# Patient Record
Sex: FEMALE | Race: BLACK OR AFRICAN AMERICAN | Employment: OTHER | URBAN - METROPOLITAN AREA
[De-identification: names, ages, dates, MRNs, and addresses within clinical notes are randomized per-mention and may not be internally consistent; named-entity substitution may affect disease eponyms.]

---

## 2018-01-09 PROBLEM — E11.21 TYPE 2 DIABETES MELLITUS WITH NEPHROPATHY (HCC): Status: ACTIVE | Noted: 2018-01-09

## 2020-04-28 ENCOUNTER — HOSPITAL ENCOUNTER (OUTPATIENT)
Dept: SURGERY | Age: 75
Discharge: HOME | End: 2020-04-28

## 2021-01-20 PROBLEM — N18.4 CKD (CHRONIC KIDNEY DISEASE) STAGE 4, GFR 15-29 ML/MIN (HCC): Status: ACTIVE | Noted: 2021-01-20

## 2021-01-20 PROBLEM — R01.1 CARDIAC MURMUR: Status: ACTIVE | Noted: 2021-01-20

## 2021-01-20 PROBLEM — R60.0 BILATERAL LEG EDEMA: Status: ACTIVE | Noted: 2021-01-20

## 2021-02-23 ENCOUNTER — HOSPITAL ENCOUNTER (OUTPATIENT)
Dept: LAB | Age: 76
Discharge: HOME | End: 2021-02-23
Payer: MEDICARE

## 2021-02-23 DIAGNOSIS — E78.5 HYPERLIPIDEMIA, UNSPECIFIED HYPERLIPIDEMIA TYPE: ICD-10-CM

## 2021-02-23 LAB
CHOLEST SERPL-MCNC: 145 MG/DL
HDLC SERPL-MCNC: 57 MG/DL (ref 40–60)
HDLC SERPL: 2.5 {RATIO}
LDLC SERPL CALC-MCNC: 65.2 MG/DL
LIPID PROFILE,FLP: NORMAL
TRIGL SERPL-MCNC: 114 MG/DL (ref 35–150)
VLDLC SERPL CALC-MCNC: 22.8 MG/DL (ref 6–23)

## 2021-02-23 PROCEDURE — 36415 COLL VENOUS BLD VENIPUNCTURE: CPT

## 2021-02-23 PROCEDURE — 80061 LIPID PANEL: CPT

## 2021-02-25 NOTE — PROGRESS NOTES
Spoke with pt and let her know that Dr. Mee De Oliveira wants a record of bp readings daily for 7 days

## 2021-09-01 ENCOUNTER — HOSPITAL ENCOUNTER (EMERGENCY)
Age: 76
Discharge: HOME | End: 2021-09-01
Payer: MEDICARE

## 2021-09-01 VITALS
WEIGHT: 182 LBS | RESPIRATION RATE: 15 BRPM | SYSTOLIC BLOOD PRESSURE: 199 MMHG | HEIGHT: 63 IN | OXYGEN SATURATION: 98 % | DIASTOLIC BLOOD PRESSURE: 82 MMHG | TEMPERATURE: 98.3 F | HEART RATE: 77 BPM | BODY MASS INDEX: 32.25 KG/M2

## 2021-09-01 DIAGNOSIS — I10 ESSENTIAL HYPERTENSION: ICD-10-CM

## 2021-09-01 DIAGNOSIS — R04.0 EPISTAXIS: Primary | ICD-10-CM

## 2021-09-01 PROCEDURE — 74011250637 HC RX REV CODE- 250/637

## 2021-09-01 PROCEDURE — 99283 EMERGENCY DEPT VISIT LOW MDM: CPT

## 2021-09-01 RX ORDER — AMOXICILLIN 875 MG/1
875 TABLET, FILM COATED ORAL 2 TIMES DAILY
Qty: 14 TABLET | Refills: 0 | Status: SHIPPED | OUTPATIENT
Start: 2021-09-01 | End: 2021-09-08

## 2021-09-01 RX ORDER — AMLODIPINE BESYLATE 10 MG/1
10 TABLET ORAL
Status: COMPLETED | OUTPATIENT
Start: 2021-09-01 | End: 2021-09-01

## 2021-09-01 RX ORDER — OXYMETAZOLINE HCL 0.05 %
2 SPRAY, NON-AEROSOL (ML) NASAL
Status: COMPLETED | OUTPATIENT
Start: 2021-09-01 | End: 2021-09-01

## 2021-09-01 RX ADMIN — AMLODIPINE BESYLATE 10 MG: 10 TABLET ORAL at 05:58

## 2021-09-01 RX ADMIN — OXYMETAZOLINE HCL 2 SPRAY: 0.05 SPRAY NASAL at 05:34

## 2021-09-01 NOTE — ED PROVIDER NOTES
61-year-old female with nosebleed. Patient woke this morning had a nosebleed. She got to the ED her blood pressure was elevated as well. Is unsure which came first.    The history is provided by the patient. Epistaxis   This is a new problem. The current episode started less than 1 hour ago. The problem occurs constantly. The problem has not changed since onset. The bleeding has been from the right nare. She has tried applying pressure for the symptoms. Past Medical History:   Diagnosis Date    DM (diabetes mellitus) Type 2 2/19/2014    HTN (hypertension) 2/19/2014    Hyperlipemia 2/19/2014       Past Surgical History:   Procedure Laterality Date    HX HEENT  11/15    partial parathyroidectomy    HX PARATHYROIDECTOMY  11/11/2015    HX REFRACTIVE SURGERY  2011         Family History:   Problem Relation Age of Onset    Elevated Lipids Other     Hypertension Other     Heart Disease Other     Diabetes Other        Social History     Socioeconomic History    Marital status:      Spouse name: Not on file    Number of children: Not on file    Years of education: Not on file    Highest education level: Not on file   Occupational History    Not on file   Tobacco Use    Smoking status: Never Smoker    Smokeless tobacco: Never Used   Substance and Sexual Activity    Alcohol use: No    Drug use: No    Sexual activity: Yes     Partners: Male   Other Topics Concern    Not on file   Social History Narrative    Not on file     Social Determinants of Health     Financial Resource Strain:     Difficulty of Paying Living Expenses:    Food Insecurity:     Worried About Running Out of Food in the Last Year:     Ran Out of Food in the Last Year:    Transportation Needs:     Lack of Transportation (Medical):      Lack of Transportation (Non-Medical):    Physical Activity:     Days of Exercise per Week:     Minutes of Exercise per Session:    Stress:     Feeling of Stress :    Social Connections:     Frequency of Communication with Friends and Family:     Frequency of Social Gatherings with Friends and Family:     Attends Tenriism Services:     Active Member of Clubs or Organizations:     Attends Club or Organization Meetings:     Marital Status:    Intimate Partner Violence:     Fear of Current or Ex-Partner:     Emotionally Abused:     Physically Abused:     Sexually Abused: ALLERGIES: Prednisone    Review of Systems   Constitutional: Negative. Negative for activity change. HENT: Negative. Eyes: Negative. Respiratory: Negative. Cardiovascular: Negative. Gastrointestinal: Negative. Genitourinary: Negative. Musculoskeletal: Negative. Skin: Negative. Neurological: Negative. Psychiatric/Behavioral: Negative. All other systems reviewed and are negative. Vitals:    09/01/21 0516   BP: (!) 188/87   Pulse: 74   Resp: 16   Temp: 98.3 °F (36.8 °C)   SpO2: 99%   Weight: 82.6 kg (182 lb)   Height: 5' 3\" (1.6 m)            Physical Exam  Vitals and nursing note reviewed. Constitutional:       General: She is not in acute distress. Appearance: She is well-developed. HENT:      Head: Normocephalic and atraumatic. Right Ear: External ear normal.      Left Ear: External ear normal.      Nose:      Right Nostril: Epistaxis present. Eyes:      General: No scleral icterus. Right eye: No discharge. Left eye: No discharge. Conjunctiva/sclera: Conjunctivae normal.      Pupils: Pupils are equal, round, and reactive to light. Cardiovascular:      Rate and Rhythm: Normal rate. Pulmonary:      Effort: Pulmonary effort is normal. No respiratory distress. Breath sounds: No stridor. No wheezing or rales. Abdominal:      General: There is no distension. Palpations: Abdomen is soft. Tenderness: There is no abdominal tenderness. Musculoskeletal:         General: Normal range of motion.       Cervical back: Normal range of motion. Skin:     General: Skin is warm and dry. Findings: No rash. Neurological:      Mental Status: She is alert and oriented to person, place, and time. Motor: No abnormal muscle tone. Coordination: Coordination normal.   Psychiatric:         Behavior: Behavior normal.          MDM  Number of Diagnoses or Management Options  Diagnosis management comments: Right anterior nosebleed off the septum. Merocel sponge placed hemostasis is achieved blood pressure trending down.     Risk of Complications, Morbidity, and/or Mortality  Presenting problems: moderate  Diagnostic procedures: moderate  Management options: moderate           Procedures

## 2021-09-01 NOTE — ED NOTES
I have reviewed discharge instructions with the patient. The patient verbalized understanding. Patient left ED via Discharge Method: ambulatory to Home with family. Opportunity for questions and clarification provided. Patient given 1 scripts. To continue your aftercare when you leave the hospital, you may receive an automated call from our care team to check in on how you are doing. This is a free service and part of our promise to provide the best care and service to meet your aftercare needs.  If you have questions, or wish to unsubscribe from this service please call [phone]. Thank you for Choosing our [facility].

## 2022-03-19 PROBLEM — R01.1 CARDIAC MURMUR: Status: ACTIVE | Noted: 2021-01-20

## 2022-03-19 PROBLEM — R60.0 BILATERAL LEG EDEMA: Status: ACTIVE | Noted: 2021-01-20

## 2022-03-19 PROBLEM — E11.21 TYPE 2 DIABETES MELLITUS WITH NEPHROPATHY (HCC): Status: ACTIVE | Noted: 2018-01-09

## 2022-03-19 PROBLEM — N18.4 CKD (CHRONIC KIDNEY DISEASE) STAGE 4, GFR 15-29 ML/MIN (HCC): Status: ACTIVE | Noted: 2021-01-20

## 2022-05-16 LAB
AVERAGE GLUCOSE: NORMAL
HBA1C MFR BLD: 6.7 %

## 2022-05-23 RX ORDER — OLMESARTAN MEDOXOMIL 40 MG/1
TABLET ORAL
Qty: 90 TABLET | Refills: 3 | Status: SHIPPED | OUTPATIENT
Start: 2022-05-23

## 2022-07-21 ENCOUNTER — OFFICE VISIT (OUTPATIENT)
Dept: FAMILY MEDICINE CLINIC | Facility: CLINIC | Age: 77
End: 2022-07-21
Payer: MEDICARE

## 2022-07-21 VITALS
WEIGHT: 178.6 LBS | SYSTOLIC BLOOD PRESSURE: 156 MMHG | RESPIRATION RATE: 18 BRPM | DIASTOLIC BLOOD PRESSURE: 86 MMHG | BODY MASS INDEX: 31.64 KG/M2 | HEART RATE: 69 BPM | OXYGEN SATURATION: 98 %

## 2022-07-21 DIAGNOSIS — I10 PRIMARY HYPERTENSION: ICD-10-CM

## 2022-07-21 DIAGNOSIS — E03.9 HYPOTHYROIDISM, UNSPECIFIED TYPE: ICD-10-CM

## 2022-07-21 DIAGNOSIS — N18.4 CKD (CHRONIC KIDNEY DISEASE) STAGE 4, GFR 15-29 ML/MIN (HCC): ICD-10-CM

## 2022-07-21 DIAGNOSIS — E11.69 TYPE 2 DIABETES MELLITUS WITH OTHER SPECIFIED COMPLICATION, WITHOUT LONG-TERM CURRENT USE OF INSULIN (HCC): ICD-10-CM

## 2022-07-21 DIAGNOSIS — E55.9 VITAMIN D DEFICIENCY: Primary | ICD-10-CM

## 2022-07-21 PROCEDURE — 2022F DILAT RTA XM EVC RTNOPTHY: CPT | Performed by: FAMILY MEDICINE

## 2022-07-21 PROCEDURE — G8417 CALC BMI ABV UP PARAM F/U: HCPCS | Performed by: FAMILY MEDICINE

## 2022-07-21 PROCEDURE — 3044F HG A1C LEVEL LT 7.0%: CPT | Performed by: FAMILY MEDICINE

## 2022-07-21 PROCEDURE — 1123F ACP DISCUSS/DSCN MKR DOCD: CPT | Performed by: FAMILY MEDICINE

## 2022-07-21 PROCEDURE — 1090F PRES/ABSN URINE INCON ASSESS: CPT | Performed by: FAMILY MEDICINE

## 2022-07-21 PROCEDURE — 4004F PT TOBACCO SCREEN RCVD TLK: CPT | Performed by: FAMILY MEDICINE

## 2022-07-21 PROCEDURE — G8400 PT W/DXA NO RESULTS DOC: HCPCS | Performed by: FAMILY MEDICINE

## 2022-07-21 PROCEDURE — G8427 DOCREV CUR MEDS BY ELIG CLIN: HCPCS | Performed by: FAMILY MEDICINE

## 2022-07-21 PROCEDURE — 99214 OFFICE O/P EST MOD 30 MIN: CPT | Performed by: FAMILY MEDICINE

## 2022-07-21 PROCEDURE — 3017F COLORECTAL CA SCREEN DOC REV: CPT | Performed by: FAMILY MEDICINE

## 2022-07-21 ASSESSMENT — ANXIETY QUESTIONNAIRES
6. BECOMING EASILY ANNOYED OR IRRITABLE: 0
IF YOU CHECKED OFF ANY PROBLEMS ON THIS QUESTIONNAIRE, HOW DIFFICULT HAVE THESE PROBLEMS MADE IT FOR YOU TO DO YOUR WORK, TAKE CARE OF THINGS AT HOME, OR GET ALONG WITH OTHER PEOPLE: NOT DIFFICULT AT ALL
1. FEELING NERVOUS, ANXIOUS, OR ON EDGE: 0
3. WORRYING TOO MUCH ABOUT DIFFERENT THINGS: 0
4. TROUBLE RELAXING: 0
7. FEELING AFRAID AS IF SOMETHING AWFUL MIGHT HAPPEN: 0
5. BEING SO RESTLESS THAT IT IS HARD TO SIT STILL: 0
GAD7 TOTAL SCORE: 0
2. NOT BEING ABLE TO STOP OR CONTROL WORRYING: 0

## 2022-07-21 ASSESSMENT — PATIENT HEALTH QUESTIONNAIRE - PHQ9
SUM OF ALL RESPONSES TO PHQ QUESTIONS 1-9: 0
1. LITTLE INTEREST OR PLEASURE IN DOING THINGS: 0
SUM OF ALL RESPONSES TO PHQ QUESTIONS 1-9: 0
SUM OF ALL RESPONSES TO PHQ QUESTIONS 1-9: 0
SUM OF ALL RESPONSES TO PHQ9 QUESTIONS 1 & 2: 0
2. FEELING DOWN, DEPRESSED OR HOPELESS: 0
SUM OF ALL RESPONSES TO PHQ QUESTIONS 1-9: 0

## 2022-07-22 LAB
ALBUMIN SERPL-MCNC: 3.2 G/DL (ref 3.2–4.6)
ALBUMIN/GLOB SERPL: 0.6 {RATIO} (ref 1.2–3.5)
ALP SERPL-CCNC: 307 U/L (ref 50–136)
ALT SERPL-CCNC: 93 U/L (ref 12–65)
ANION GAP SERPL CALC-SCNC: 6 MMOL/L (ref 7–16)
AST SERPL-CCNC: 82 U/L (ref 15–37)
BILIRUB SERPL-MCNC: 0.3 MG/DL (ref 0.2–1.1)
BUN SERPL-MCNC: 57 MG/DL (ref 8–23)
CALCIUM SERPL-MCNC: 8.7 MG/DL (ref 8.3–10.4)
CHLORIDE SERPL-SCNC: 113 MMOL/L (ref 98–107)
CO2 SERPL-SCNC: 20 MMOL/L (ref 21–32)
CREAT SERPL-MCNC: 3.6 MG/DL (ref 0.6–1)
GLOBULIN SER CALC-MCNC: 5.8 G/DL (ref 2.3–3.5)
GLUCOSE SERPL-MCNC: 124 MG/DL (ref 65–100)
POTASSIUM SERPL-SCNC: 4.7 MMOL/L (ref 3.5–5.1)
PROT SERPL-MCNC: 9 G/DL (ref 6.3–8.2)
SODIUM SERPL-SCNC: 139 MMOL/L (ref 136–145)
TSH, 3RD GENERATION: 3.16 UIU/ML (ref 0.36–3.74)

## 2022-07-23 ASSESSMENT — ENCOUNTER SYMPTOMS
GASTROINTESTINAL NEGATIVE: 1
RESPIRATORY NEGATIVE: 1
EYES NEGATIVE: 1

## 2022-07-24 NOTE — PROGRESS NOTES
HISTORY OF PRESENT ILLNESS  Nataliia Diaz is a 78 y.o. y.o. female    Diabetes  She presents for her follow-up diabetic visit. She has type 2 diabetes mellitus. Her disease course has been improving. Other  This is a recurrent (thyroid) problem. The problem has been unchanged. Flank Pain  This is a recurrent (ckd) problem. The problem has been gradually worsening since onset. Hypertension  This is a recurrent problem. The problem is unchanged. Allergies   Allergen Reactions    Prednisone Other (See Comments)     Elevated blood sugar         Current Outpatient Medications   Medication Sig    olmesartan (BENICAR) 40 MG tablet TAKE 1 TABLET BY MOUTH DAILY    Lancets MISC Check BS BID    amLODIPine (NORVASC) 10 MG tablet TAKE 1 TABLET BY MOUTH DAILY    atorvastatin (LIPITOR) 40 MG tablet Take 40 mg by mouth daily    carvedilol (COREG) 25 MG tablet Take 25 mg by mouth 2 times daily    doxazosin (CARDURA) 4 MG tablet Take 4 mg by mouth daily    furosemide (LASIX) 40 MG tablet Take 40 mg by mouth 2 times daily    glipiZIDE (GLUCOTROL) 10 MG tablet Take 10 mg by mouth 2 times daily    levothyroxine (SYNTHROID) 75 MCG tablet TAKE 1 TABLET BY MOUTH DAILY BEFORE BREAKFAST     No current facility-administered medications for this visit.         Past Medical History:   Diagnosis Date    DM (diabetes mellitus) ([facility] [address] 2/19/2014    HTN (hypertension) 2/19/2014    Hyperlipemia 2/19/2014        Past Surgical History:   Procedure Laterality Date    HEENT  11/15    partial parathyroidectomy    PARATHYROIDECTOMY  11/11/2015    REFRACTIVE SURGERY  2011        Social History     Socioeconomic History    Marital status:      Spouse name: Not on file    Number of children: Not on file    Years of education: Not on file    Highest education level: Not on file   Occupational History    Not on file   Tobacco Use    Smoking status: Never    Smokeless tobacco: Never   Substance and Sexual Activity    Alcohol use: No    Drug use: No    Sexual activity: Not on file   Other Topics Concern    Not on file   Social History Narrative    Not on file     Social Determinants of Health     Financial Resource Strain: Not on file   Food Insecurity: Not on file   Transportation Needs: Not on file   Physical Activity: Not on file   Stress: Not on file   Social Connections: Not on file   Intimate Partner Violence: Not on file   Housing Stability: Not on file        Review of Systems   Constitutional: Negative. HENT: Negative. Eyes: Negative. Respiratory: Negative. Cardiovascular: Negative. Gastrointestinal: Negative. Endocrine: Negative. Genitourinary:  Positive for flank pain. Skin: Negative. Neurological: Negative. Psychiatric/Behavioral: Negative. BP (!) 156/86   Pulse 69   Resp 18   Wt 178 lb 9.6 oz (81 kg)   SpO2 98%   BMI 31.64 kg/m²      Physical Exam  Constitutional:       Appearance: Normal appearance. HENT:      Head: Normocephalic. Right Ear: Tympanic membrane, ear canal and external ear normal.      Left Ear: Tympanic membrane, ear canal and external ear normal.      Nose: Nose normal.      Mouth/Throat:      Mouth: Mucous membranes are moist.      Pharynx: Oropharynx is clear. Eyes:      General: No scleral icterus. Extraocular Movements: Extraocular movements intact. Conjunctiva/sclera: Conjunctivae normal.   Neck:      Vascular: No carotid bruit. Cardiovascular:      Rate and Rhythm: Normal rate and regular rhythm. Pulses: Normal pulses. Heart sounds: Normal heart sounds. Pulmonary:      Effort: Pulmonary effort is normal. No respiratory distress. Breath sounds: Normal breath sounds. No wheezing or rales. Abdominal:      General: Abdomen is flat. Bowel sounds are normal. There is no distension. Palpations: Abdomen is soft. There is no mass. Tenderness: There is no abdominal tenderness. Musculoskeletal:         General: Normal range of motion. Cervical back: Normal range of motion and neck supple. Skin:     General: Skin is warm and dry. Neurological:      General: No focal deficit present. Mental Status: She is alert and oriented to person, place, and time. Psychiatric:         Mood and Affect: Mood normal.         Behavior: Behavior normal.         Thought Content: Thought content normal.         Judgment: Judgment normal.       Abstract on 07/01/2022   Component Date Value Ref Range Status    Hemoglobin A1C 05/16/2022 6.7  % Final       ASSESSMENT and PLAN    Vitamin D deficiency  Hypothyroidism, unspecified type  -     TSH; Future  CKD (chronic kidney disease) stage 4, GFR 15-29 ml/min ([facility])  -     Comprehensive Metabolic Panel; Future  Primary hypertension  Type 2 diabetes mellitus with other specified complication, without long-term current use of insulin ([facility] [address]      Current treatment plan is effective. no changes in therapy  lab results and schedule for future lab studies reviewed with patient  reviewed diet, exercise and weight control     No follow-ups on file.      Elva Velarde MD

## 2022-08-04 DIAGNOSIS — R19.00 ABDOMINAL SWELLING: ICD-10-CM

## 2022-08-04 DIAGNOSIS — N19 RENAL FAILURE, UNSPECIFIED CHRONICITY: Primary | ICD-10-CM

## 2022-08-15 ENCOUNTER — HOSPITAL ENCOUNTER (OUTPATIENT)
Dept: ULTRASOUND IMAGING | Age: 77
Discharge: HOME | End: 2022-08-18
Payer: MEDICARE

## 2022-08-15 DIAGNOSIS — R19.00 ABDOMINAL SWELLING: ICD-10-CM

## 2022-08-15 DIAGNOSIS — N19 RENAL FAILURE, UNSPECIFIED CHRONICITY: ICD-10-CM

## 2022-08-15 PROCEDURE — 76700 US EXAM ABDOM COMPLETE: CPT

## 2022-08-15 NOTE — RESULT ENCOUNTER NOTE
Please call and notify to get a better look at the live we need to order an abd MRI with att to findings on the Left hepatic lobe seen on the [address] on 8/15 /2022

## 2022-08-16 LAB
AVERAGE GLUCOSE: NORMAL
HBA1C MFR BLD: 7.3 %

## 2022-08-17 DIAGNOSIS — R93.2 ABNORMAL LIVER ULTRASOUND: Primary | ICD-10-CM

## 2022-11-10 ENCOUNTER — NURSE TRIAGE (OUTPATIENT)
Dept: OTHER | Facility: CLINIC | Age: 77
End: 2022-11-10

## 2022-11-10 RX ORDER — LEVOTHYROXINE SODIUM 0.07 MG/1
TABLET ORAL
Qty: 90 TABLET | OUTPATIENT
Start: 2022-11-10

## 2022-11-10 NOTE — TELEPHONE ENCOUNTER
Location of patient: [place]    Received call from Marisela Blancas at [facility] with [employer]. Subjective: Caller states \"I am having a little swelling in my ankles. \"     Current Symptoms: intermittent bilateral ankle and leg swelling - swelling does not extend up to knee    Onset: patient declined to answer      Pain Severity: 0/10; Denies - discoloration / red streaking / chest pain / shortness of breath / swelling of face arms or hands    Temperature: denies     What has been tried: nothing      Recommended disposition: See PCP within 3 Days    Care advice provided, patient verbalizes understanding; denies any other questions or concerns; instructed to call back for any new or worsening symptoms. Patient/Caller agrees with recommended disposition; writer provided warm transfer to Marshall Chatterjee at [facility] for appointment scheduling    Attention Provider: Thank you for allowing me to participate in the care of your patient. The patient was connected to triage in response to information provided to the ECC/PSC. Please do not respond through this encounter as the response is not directed to a shared pool.           Reason for Disposition   MILD swelling of both ankles (i.e., pedal edema) AND new-onset or worsening    Protocols used: Leg Swelling and Edema-ADULT-[place]

## 2022-11-18 ENCOUNTER — OFFICE VISIT (OUTPATIENT)
Dept: FAMILY MEDICINE CLINIC | Facility: CLINIC | Age: 77
End: 2022-11-18
Payer: MEDICARE

## 2022-11-18 VITALS
DIASTOLIC BLOOD PRESSURE: 86 MMHG | SYSTOLIC BLOOD PRESSURE: 166 MMHG | OXYGEN SATURATION: 99 % | WEIGHT: 166 LBS | BODY MASS INDEX: 29.41 KG/M2 | HEART RATE: 71 BPM | TEMPERATURE: 97.4 F

## 2022-11-18 DIAGNOSIS — N18.5 CHRONIC KIDNEY DISEASE (CKD), STAGE V (HCC): ICD-10-CM

## 2022-11-18 DIAGNOSIS — R60.0 BILATERAL LOWER EXTREMITY EDEMA: ICD-10-CM

## 2022-11-18 DIAGNOSIS — E11.69 TYPE 2 DIABETES MELLITUS WITH OTHER SPECIFIED COMPLICATION, WITHOUT LONG-TERM CURRENT USE OF INSULIN (HCC): Primary | ICD-10-CM

## 2022-11-18 DIAGNOSIS — E03.9 HYPOTHYROIDISM, UNSPECIFIED TYPE: ICD-10-CM

## 2022-11-18 LAB
ALBUMIN SERPL-MCNC: 3.1 G/DL (ref 3.2–4.6)
ALBUMIN/GLOB SERPL: 0.5 {RATIO} (ref 0.4–1.6)
ALP SERPL-CCNC: 400 U/L (ref 50–136)
ALT SERPL-CCNC: 87 U/L (ref 12–65)
ANION GAP SERPL CALC-SCNC: 8 MMOL/L (ref 2–11)
AST SERPL-CCNC: 96 U/L (ref 15–37)
BASOPHILS # BLD: 0 K/UL (ref 0–0.2)
BASOPHILS NFR BLD: 1 % (ref 0–2)
BILIRUB SERPL-MCNC: 0.4 MG/DL (ref 0.2–1.1)
BUN SERPL-MCNC: 61 MG/DL (ref 8–23)
CALCIUM SERPL-MCNC: 9 MG/DL (ref 8.3–10.4)
CHLORIDE SERPL-SCNC: 106 MMOL/L (ref 101–110)
CO2 SERPL-SCNC: 23 MMOL/L (ref 21–32)
CREAT SERPL-MCNC: 4.6 MG/DL (ref 0.6–1)
DIFFERENTIAL METHOD BLD: ABNORMAL
EOSINOPHIL # BLD: 0.1 K/UL (ref 0–0.8)
EOSINOPHIL NFR BLD: 2 % (ref 0.5–7.8)
ERYTHROCYTE [DISTWIDTH] IN BLOOD BY AUTOMATED COUNT: 16.5 % (ref 11.9–14.6)
GLOBULIN SER CALC-MCNC: 5.7 G/DL (ref 2.8–4.5)
GLUCOSE SERPL-MCNC: 206 MG/DL (ref 65–100)
HBA1C MFR BLD: 5.9 %
HCT VFR BLD AUTO: 28.3 % (ref 35.8–46.3)
HGB BLD-MCNC: 8.7 G/DL (ref 11.7–15.4)
IMM GRANULOCYTES # BLD AUTO: 0 K/UL (ref 0–0.5)
IMM GRANULOCYTES NFR BLD AUTO: 0 % (ref 0–5)
LYMPHOCYTES # BLD: 1 K/UL (ref 0.5–4.6)
LYMPHOCYTES NFR BLD: 23 % (ref 13–44)
MCH RBC QN AUTO: 26.2 PG (ref 26.1–32.9)
MCHC RBC AUTO-ENTMCNC: 30.7 G/DL (ref 31.4–35)
MCV RBC AUTO: 85.2 FL (ref 82–102)
MONOCYTES # BLD: 0.5 K/UL (ref 0.1–1.3)
MONOCYTES NFR BLD: 11 % (ref 4–12)
NEUTS SEG # BLD: 2.7 K/UL (ref 1.7–8.2)
NEUTS SEG NFR BLD: 63 % (ref 43–78)
NRBC # BLD: 0 K/UL (ref 0–0.2)
NT PRO BNP: 1825 PG/ML
PLATELET # BLD AUTO: 114 K/UL (ref 150–450)
PMV BLD AUTO: ABNORMAL FL (ref 9.4–12.3)
POTASSIUM SERPL-SCNC: 4.5 MMOL/L (ref 3.5–5.1)
PROT SERPL-MCNC: 8.8 G/DL (ref 6.3–8.2)
RBC # BLD AUTO: 3.32 M/UL (ref 4.05–5.2)
SODIUM SERPL-SCNC: 137 MMOL/L (ref 133–143)
TSH, 3RD GENERATION: 4.26 UIU/ML (ref 0.36–3.74)
WBC # BLD AUTO: 4.2 K/UL (ref 4.3–11.1)

## 2022-11-18 PROCEDURE — 3078F DIAST BP <80 MM HG: CPT | Performed by: NURSE PRACTITIONER

## 2022-11-18 PROCEDURE — 3075F SYST BP GE 130 - 139MM HG: CPT | Performed by: NURSE PRACTITIONER

## 2022-11-18 PROCEDURE — 1123F ACP DISCUSS/DSCN MKR DOCD: CPT | Performed by: NURSE PRACTITIONER

## 2022-11-18 PROCEDURE — 3051F HG A1C>EQUAL 7.0%<8.0%: CPT | Performed by: NURSE PRACTITIONER

## 2022-11-18 PROCEDURE — 99213 OFFICE O/P EST LOW 20 MIN: CPT | Performed by: NURSE PRACTITIONER

## 2022-11-18 PROCEDURE — 83036 HEMOGLOBIN GLYCOSYLATED A1C: CPT | Performed by: NURSE PRACTITIONER

## 2022-11-18 RX ORDER — INSULIN GLARGINE 300 U/ML
20 INJECTION, SOLUTION SUBCUTANEOUS EVERY MORNING
COMMUNITY
Start: 2022-08-16 | End: 2023-08-16

## 2022-11-18 RX ORDER — OLMESARTAN MEDOXOMIL 40 MG/1
40 TABLET ORAL DAILY
COMMUNITY

## 2022-11-18 RX ORDER — LEVOTHYROXINE SODIUM 0.07 MG/1
75 TABLET ORAL DAILY
Qty: 90 TABLET | Refills: 3 | Status: SHIPPED | OUTPATIENT
Start: 2022-11-18

## 2022-11-18 ASSESSMENT — PATIENT HEALTH QUESTIONNAIRE - PHQ9
DEPRESSION UNABLE TO ASSESS: FUNCTIONAL CAPACITY MOTIVATION LIMITS ACCURACY
SUM OF ALL RESPONSES TO PHQ9 QUESTIONS 1 & 2: 0
SUM OF ALL RESPONSES TO PHQ QUESTIONS 1-9: 0
SUM OF ALL RESPONSES TO PHQ QUESTIONS 1-9: 0
1. LITTLE INTEREST OR PLEASURE IN DOING THINGS: 0
SUM OF ALL RESPONSES TO PHQ QUESTIONS 1-9: 0
2. FEELING DOWN, DEPRESSED OR HOPELESS: 0
SUM OF ALL RESPONSES TO PHQ QUESTIONS 1-9: 0

## 2022-11-18 NOTE — PROGRESS NOTES
Nataliia Boucher (:  1945) is a 78 y.o. female,Established patient, here for evaluation of the following chief complaint(s):  3 Month Follow-Up, Diabetes, and Swelling (ankles)         ASSESSMENT/PLAN:  1. Type 2 diabetes mellitus with other specified complication, without long-term current use of insulin (HCC)  -     AMB POC HEMOGLOBIN A1C  -     levothyroxine (SYNTHROID) 75 MCG tablet; Take 1 tablet by mouth Daily, Disp-90 tablet, R-3Normal  -     Comprehensive Metabolic Panel; Future  2. Bilateral lower extremity edema  -     CBC with Auto Differential; Future  -     Comprehensive Metabolic Panel; Future  -     Brain Natriuretic Peptide; Future  3. Hypothyroidism, unspecified type  -     TSH; Future  4. Chronic kidney disease (CKD), stage V ([facility] [address] [[id number]]    Await labs. Pt has appointment with endo later this month. She will decrease her Toujeo to 18 units daily. Precautions discussed and she verbalized understanding. Return in about 3 months (around 2023), or if symptoms worsen or fail to improve. Subjective   SUBJECTIVE/OBJECTIVE:  HPI  Recheck of DM. No increased urination or thirst.  BS have been between 70 and 121 fasting. No numbness in feet. No CP or SOB. BLE swelling. Improving. CKD stage 4. Followed by nephrology and vascular. Weight has been about the same. On Lasix daily. Doing fairly well with diet. No nausea or vomiting. A1c is 5.9 22. Review of Systems   Constitutional:  Negative for chills and fever. HENT:  Negative for congestion, ear pain, sinus pain and sore throat. Eyes:  Negative for pain and redness. Respiratory:  Negative for cough and shortness of breath. Cardiovascular:  Positive for leg swelling. Negative for chest pain and palpitations. Gastrointestinal:  Negative for blood in stool, constipation, diarrhea, nausea and vomiting. Endocrine: Negative for polydipsia.    Genitourinary:  Negative for dysuria, frequency and urgency. Musculoskeletal:  Negative for arthralgias, back pain and myalgias. Skin:  Negative for rash. Allergic/Immunologic: Negative for environmental allergies. Neurological:  Negative for dizziness and headaches. Psychiatric/Behavioral:  Negative for hallucinations and suicidal ideas. Objective   Physical Exam  Vitals and nursing note reviewed. Constitutional:       General: She is not in acute distress. Appearance: Normal appearance. HENT:      Head: Normocephalic. Right Ear: External ear normal.      Left Ear: External ear normal.      Nose: Nose normal.      Mouth/Throat:      Lips: Pink. Mouth: Mucous membranes are moist.   Eyes:      Conjunctiva/sclera: Conjunctivae normal.      Pupils: Pupils are equal, round, and reactive to light. Cardiovascular:      Rate and Rhythm: Normal rate and regular rhythm. Pulmonary:      Effort: Pulmonary effort is normal.      Breath sounds: Normal breath sounds. No wheezing, rhonchi or rales. Musculoskeletal:         General: Normal range of motion. Cervical back: Normal range of motion. Right lower leg: Edema (1+) present. Left lower leg: Edema (1+) present. Lymphadenopathy:      Cervical: No cervical adenopathy. Skin:     General: Skin is warm and dry. Capillary Refill: Capillary refill takes less than 2 seconds. Findings: No rash. Neurological:      General: No focal deficit present. Mental Status: She is alert and oriented to person, place, and time. Mental status is at baseline. On this date 11/18/2022 I have spent 20-29 minutes reviewing previous notes, test results and face to face with the patient discussing the diagnosis and importance of compliance with the treatment plan as well as documenting on the day of the visit. An electronic signature was used to authenticate this note.     --MATTHEW Miller - CNP

## 2022-11-21 ENCOUNTER — HOSPITAL ENCOUNTER (EMERGENCY)
Age: 77
Discharge: HOME | End: 2022-11-21
Attending: EMERGENCY MEDICINE
Payer: MEDICARE

## 2022-11-21 VITALS
HEART RATE: 74 BPM | HEIGHT: 64 IN | SYSTOLIC BLOOD PRESSURE: 181 MMHG | RESPIRATION RATE: 18 BRPM | TEMPERATURE: 98.2 F | BODY MASS INDEX: 28.51 KG/M2 | OXYGEN SATURATION: 99 % | WEIGHT: 167 LBS | DIASTOLIC BLOOD PRESSURE: 80 MMHG

## 2022-11-21 DIAGNOSIS — N18.5 STAGE 5 CHRONIC KIDNEY DISEASE NOT ON CHRONIC DIALYSIS (HCC): Primary | ICD-10-CM

## 2022-11-21 DIAGNOSIS — I10 ESSENTIAL HYPERTENSION: ICD-10-CM

## 2022-11-21 DIAGNOSIS — R79.89 ELEVATED BRAIN NATRIURETIC PEPTIDE (BNP) LEVEL: Primary | ICD-10-CM

## 2022-11-21 DIAGNOSIS — R60.9 SWELLING: ICD-10-CM

## 2022-11-21 LAB
ALBUMIN SERPL-MCNC: 3 G/DL (ref 3.2–4.6)
ALBUMIN/GLOB SERPL: 0.5 {RATIO} (ref 0.4–1.6)
ALP SERPL-CCNC: 434 U/L (ref 50–130)
ALT SERPL-CCNC: 94 U/L (ref 12–65)
ANION GAP SERPL CALC-SCNC: 5 MMOL/L (ref 2–11)
APPEARANCE UR: ABNORMAL
AST SERPL-CCNC: 113 U/L (ref 15–37)
BACTERIA URNS QL MICRO: ABNORMAL /HPF
BASOPHILS # BLD: 0 K/UL (ref 0–0.2)
BASOPHILS NFR BLD: 0 % (ref 0–2)
BILIRUB SERPL-MCNC: 0.4 MG/DL (ref 0.2–1.1)
BILIRUB UR QL: NEGATIVE
BUN SERPL-MCNC: 60 MG/DL (ref 8–23)
CALCIUM SERPL-MCNC: 9 MG/DL (ref 8.3–10.4)
CHLORIDE SERPL-SCNC: 109 MMOL/L (ref 101–110)
CO2 SERPL-SCNC: 24 MMOL/L (ref 21–32)
COLOR UR: YELLOW
CREAT SERPL-MCNC: 4.19 MG/DL (ref 0.6–1)
DIFFERENTIAL METHOD BLD: ABNORMAL
EOSINOPHIL # BLD: 0.1 K/UL (ref 0–0.8)
EOSINOPHIL NFR BLD: 3 % (ref 0.5–7.8)
EPI CELLS #/AREA URNS HPF: ABNORMAL /HPF
ERYTHROCYTE [DISTWIDTH] IN BLOOD BY AUTOMATED COUNT: 16.2 % (ref 11.9–14.6)
GLOBULIN SER CALC-MCNC: 6.4 G/DL (ref 2.8–4.5)
GLUCOSE SERPL-MCNC: 104 MG/DL (ref 65–100)
GLUCOSE UR STRIP.AUTO-MCNC: NEGATIVE MG/DL
HCT VFR BLD AUTO: 28.9 % (ref 35.8–46.3)
HGB BLD-MCNC: 9.1 G/DL (ref 11.7–15.4)
HGB UR QL STRIP: ABNORMAL
IMM GRANULOCYTES # BLD AUTO: 0 K/UL (ref 0–0.5)
IMM GRANULOCYTES NFR BLD AUTO: 0 % (ref 0–5)
KETONES UR QL STRIP.AUTO: NEGATIVE MG/DL
LEUKOCYTE ESTERASE UR QL STRIP.AUTO: ABNORMAL
LYMPHOCYTES # BLD: 1.1 K/UL (ref 0.5–4.6)
LYMPHOCYTES NFR BLD: 22 % (ref 13–44)
MCH RBC QN AUTO: 25.9 PG (ref 26.1–32.9)
MCHC RBC AUTO-ENTMCNC: 31.5 G/DL (ref 31.4–35)
MCV RBC AUTO: 82.3 FL (ref 82–102)
MONOCYTES # BLD: 0.5 K/UL (ref 0.1–1.3)
MONOCYTES NFR BLD: 11 % (ref 4–12)
NEUTS SEG # BLD: 3.2 K/UL (ref 1.7–8.2)
NEUTS SEG NFR BLD: 64 % (ref 43–78)
NITRITE UR QL STRIP.AUTO: NEGATIVE
NRBC # BLD: 0 K/UL (ref 0–0.2)
NT PRO BNP: 3115 PG/ML
OTHER OBSERVATIONS: ABNORMAL
PH UR STRIP: 6.5 [PH] (ref 5–9)
PHOSPHATE SERPL-MCNC: 4 MG/DL (ref 2.3–3.7)
PLATELET # BLD AUTO: 127 K/UL (ref 150–450)
PMV BLD AUTO: ABNORMAL FL (ref 9.4–12.3)
POTASSIUM SERPL-SCNC: 4.6 MMOL/L (ref 3.5–5.1)
PROT SERPL-MCNC: 9.4 G/DL (ref 6.3–8.2)
PROT UR STRIP-MCNC: ABNORMAL MG/DL
RBC # BLD AUTO: 3.51 M/UL (ref 4.05–5.2)
RBC #/AREA URNS HPF: ABNORMAL /HPF
SODIUM SERPL-SCNC: 138 MMOL/L (ref 133–143)
SP GR UR REFRACTOMETRY: 1.02 (ref 1–1.02)
TRICHOMONAS UR QL MICRO: ABNORMAL
UROBILINOGEN UR QL STRIP.AUTO: 0.2 EU/DL (ref 0.2–1)
WBC # BLD AUTO: 5 K/UL (ref 4.3–11.1)
WBC URNS QL MICRO: ABNORMAL /HPF

## 2022-11-21 PROCEDURE — 99283 EMERGENCY DEPT VISIT LOW MDM: CPT

## 2022-11-21 PROCEDURE — 83880 ASSAY OF NATRIURETIC PEPTIDE: CPT

## 2022-11-21 PROCEDURE — 85025 COMPLETE CBC W/AUTO DIFF WBC: CPT

## 2022-11-21 PROCEDURE — 80053 COMPREHEN METABOLIC PANEL: CPT

## 2022-11-21 PROCEDURE — 83735 ASSAY OF MAGNESIUM: CPT

## 2022-11-21 PROCEDURE — 81003 URINALYSIS AUTO W/O SCOPE: CPT

## 2022-11-21 PROCEDURE — 36415 COLL VENOUS BLD VENIPUNCTURE: CPT

## 2022-11-21 PROCEDURE — 84100 ASSAY OF PHOSPHORUS: CPT

## 2022-11-21 ASSESSMENT — ENCOUNTER SYMPTOMS
CHEST TIGHTNESS: 0
SORE THROAT: 0
SHORTNESS OF BREATH: 0
EYE REDNESS: 0
SHORTNESS OF BREATH: 0
BACK PAIN: 0
DIARRHEA: 0
EYE PAIN: 0
SINUS PAIN: 0
COUGH: 0
BLOOD IN STOOL: 0
VOMITING: 0
CONSTIPATION: 0
NAUSEA: 0

## 2022-11-21 ASSESSMENT — PAIN - FUNCTIONAL ASSESSMENT: PAIN_FUNCTIONAL_ASSESSMENT: NONE - DENIES PAIN

## 2022-11-21 NOTE — ED TRIAGE NOTES
Patient reports seeing PCP last week for lab work, was having bilateral ankle swelling. Patient sent here today by PCP for low H&H and Kidney function.

## 2022-11-22 ENCOUNTER — CARE COORDINATION (OUTPATIENT)
Dept: CARE COORDINATION | Facility: CLINIC | Age: 77
End: 2022-11-22

## 2022-11-22 LAB — MAGNESIUM SERPL-MCNC: 2 MG/DL (ref 1.8–2.4)

## 2022-11-22 NOTE — ED NOTES
I have reviewed discharge instructions with the patient. The patient verbalized understanding. Patient left ED via Discharge Method: ambulatory to Home with self. Opportunity for questions and clarification provided. Patient given 0 scripts. To continue your aftercare when you leave the hospital, you may receive an automated call from our care team to check in on how you are doing. This is a free service and part of our promise to provide the best care and service to meet your aftercare needs.  If you have questions, or wish to unsubscribe from this service please call [phone]. Thank you for Choosing our [facility] Emergency Department.         TIMUR Ambrose  11/21/22 1429

## 2022-11-22 NOTE — CARE COORDINATION
Ambulatory Care Coordination Note  11/22/2022    ACC: Michael Hull RN    See assessment below. Ccm outreached to patient. Patient agreeable to ccm services. Reviewed with patient discharge summary. Patient verbalized understanding. Patient states overall she is doing well. Patient states no questions or concerns. Follow up appointments have been made. [facility] discussed that I would outreach next week. Patient agreeable. Offered patient enrollment in the Remote Patient Monitoring (RPM) program for in-home monitoring: NA. Ambulatory Care Coordination Assessment    Care Coordination Protocol  Referral from Primary Care Provider: No  Week 1 - Initial Assessment     Do you have all of your prescriptions and are they filled?: No  Barriers to medication adherence: None  Are you able to afford your medications?: Yes  How often do you have trouble taking your medications the way you have been told to take them?: I always take them as prescribed. Do you have Home O2 Therapy?: No      Ability to seek help/take action for Emergent Urgent situations i.e. fire, crime, inclement weather or health crisis. : Independent  Ability to ambulate to restroom: Independent  Ability handle personal hygeine needs (bathing/dressing/grooming): Independent  Ability to manage Medications: Independent  Ability to prepare Food Preparation: Independent  Ability to maintain home (clean home, laundry): Independent  Ability to drive and/or has transportation: Independent  Ability to do shopping: Independent  Ability to manage finances:  Independent  Is patient able to live independently?: Yes     Current Housing: Private Residence        Per the Fall Risk Screening, did the patient have 2 or more falls or 1 fall with injury in the past year?: No     Frequent urination at night?: No  Do you use rails/bars?: No  Do you have a non-slip tub mat?: Yes        Suggested Interventions and Community Resources                  Prior to Admission medications    Medication Sig Start Date End Date Taking?  Authorizing Provider   levothyroxine (SYNTHROID) 75 MCG tablet Take 1 tablet by mouth Daily 11/18/22   Carmen Bland APRN - CNP   olmesartan (BENICAR) 40 MG tablet Take 40 mg by mouth daily    Historical Provider, MD   insulin glargine, 1 unit dial, (TOUJEO SOLOSTAR) 300 UNIT/ML concentrated injection pen Inject 20 Units into the skin every morning 8/16/22 8/16/23  Historical Provider, MD   olmesartan (BENICAR) 40 MG tablet TAKE 1 TABLET BY MOUTH DAILY 5/23/22   Hoang Gray MD   Lancets MISC Check BS BID 12/5/14   Ar Automatic Reconciliation   amLODIPine (NORVASC) 10 MG tablet TAKE 1 TABLET BY MOUTH DAILY 3/14/22   Ar Automatic Reconciliation   atorvastatin (LIPITOR) 40 MG tablet Take 40 mg by mouth daily 12/29/21   Ar Automatic Reconciliation   carvedilol (COREG) 25 MG tablet Take 25 mg by mouth 2 times daily 4/13/22   Ar Automatic Reconciliation   doxazosin (CARDURA) 4 MG tablet Take 4 mg by mouth daily 9/27/21   Ar Automatic Reconciliation   furosemide (LASIX) 40 MG tablet Take 80 mg by mouth daily 9/24/21   Ar Automatic Reconciliation   glipiZIDE (GLUCOTROL) 10 MG tablet Take 10 mg by mouth 2 times daily 4/13/22   Ar Automatic Reconciliation       Future Appointments   Date Time Provider Bettie Kinney   1/6/2023  9:00 AM Hoang Gray MD [facility]

## 2022-11-22 NOTE — ED PROVIDER NOTES
Emergency Department Provider Note                   PCP: Meg Layne MD               Age: 78 y.o. Sex: female     No diagnosis found. DISPOSITION          MDM  Number of Diagnoses or Management Options  Diagnosis management comments: Patient with chronic renal disease stage V and in the process of being seen by vascular surgery for dialysis graft. Patient is being otherwise asymptomatic and sent for abnormal blood work done 3 days ago. It would appear to be a standard progression of her renal disease. We will recheck labs but at this point with a hemoglobin of 8.7 transfusion is not indicated. Case was discussed and laboratory results discussed with Dr. Chun mike who is on-call for [facility]. He agrees there is no indication for admission to the hospital and will arrange for close follow-up in the office. We will place referral to vascular surgery for dialysis shunt assessment. Amount and/or Complexity of Data Reviewed  Clinical lab tests: ordered and reviewed  Independent visualization of images, tracings, or specimens: yes    Risk of Complications, Morbidity, and/or Mortality  Presenting problems: moderate  Diagnostic procedures: moderate  Management options: moderate    Patient Progress  Patient progress: stable             Orders Placed This Encounter   Procedures    CBC with Auto Differential    Comprehensive Metabolic Panel    Brain Natriuretic Peptide    Magnesium    Phosphorus    POCT Urine Dipstick        Medications - No data to display    New Prescriptions    No medications on file        Sarah Dhillon is a 78 y.o. female who presents to the Emergency Department with chief complaint of    Chief Complaint   Patient presents with    Other      Patient presents emerged from and at the direction of her primary care provider due to abnormal lab work performed 3 days ago in the office.   Patient has a history of chronic kidney disease and advanced age and is in the process of being evaluated for dialysis graft. She is being followed by Dr. Cosme Harris of [facility] nephrology as well. She is asymptomatic. But does have chronic edema of the lower extremities which she states has improved since she started taking Lasix 80 mg daily about 3 weeks ago. The history is provided by the patient and medical records. Review of Systems   Constitutional:  Negative for chills and fever. Respiratory:  Negative for chest tightness and shortness of breath. Cardiovascular:  Positive for leg swelling. Negative for chest pain. All other systems reviewed and are negative. Past Medical History:   Diagnosis Date    DM (diabetes mellitus) ([facility] [address] 2/19/2014    HTN (hypertension) 2/19/2014    Hyperlipemia 2/19/2014    Hyperparathyroidism ([facility] 8/26/2014        Past Surgical History:   Procedure Laterality Date    HEENT  11/15    partial parathyroidectomy    PARATHYROIDECTOMY  11/11/2015    REFRACTIVE SURGERY  2011        Family History   Problem Relation Age of Onset    Elevated Lipids Other     Hypertension Other     Diabetes Other     Heart Disease Other         Social History     Socioeconomic History    Marital status:       Spouse name: None    Number of children: None    Years of education: None    Highest education level: None   Tobacco Use    Smoking status: Never    Smokeless tobacco: Never   Vaping Use    Vaping Use: Never used   Substance and Sexual Activity    Alcohol use: No    Drug use: No         Prednisone     Previous Medications    AMLODIPINE (NORVASC) 10 MG TABLET    TAKE 1 TABLET BY MOUTH DAILY    ATORVASTATIN (LIPITOR) 40 MG TABLET    Take 40 mg by mouth daily    CARVEDILOL (COREG) 25 MG TABLET    Take 25 mg by mouth 2 times daily    DOXAZOSIN (CARDURA) 4 MG TABLET    Take 4 mg by mouth daily    FUROSEMIDE (LASIX) 40 MG TABLET    Take 80 mg by mouth daily    GLIPIZIDE (GLUCOTROL) 10 MG TABLET    Take 10 mg by mouth 2 times daily    INSULIN GLARGINE, 1 UNIT DIAL, (TOUJEO SOLOSTAR) 300 UNIT/ML CONCENTRATED INJECTION PEN    Inject 20 Units into the skin every morning    LANCETS MISC    Check BS BID    LEVOTHYROXINE (SYNTHROID) 75 MCG TABLET    Take 1 tablet by mouth Daily    OLMESARTAN (BENICAR) 40 MG TABLET    TAKE 1 TABLET BY MOUTH DAILY    OLMESARTAN (BENICAR) 40 MG TABLET    Take 40 mg by mouth daily        Vitals signs and nursing note reviewed. Patient Vitals for the past 4 hrs:   Temp Pulse Resp BP SpO2   11/21/22 1804 98.2 °F (36.8 °C) 74 18 (!) 181/80 99 %          Physical Exam  Vitals and nursing note reviewed. Constitutional:       General: She is not in acute distress. Appearance: Normal appearance. She is normal weight. She is not ill-appearing, toxic-appearing or diaphoretic. HENT:      Head: Normocephalic and atraumatic. Nose: Nose normal.      Mouth/Throat:      Mouth: Mucous membranes are moist.      Pharynx: Oropharynx is clear. Eyes:      Extraocular Movements: Extraocular movements intact. Conjunctiva/sclera: Conjunctivae normal.      Pupils: Pupils are equal, round, and reactive to light. Cardiovascular:      Rate and Rhythm: Normal rate and regular rhythm. Pulses: Normal pulses. Pulmonary:      Effort: Pulmonary effort is normal.      Breath sounds: Normal breath sounds. Abdominal:      General: There is no distension. Palpations: Abdomen is soft. Tenderness: There is no abdominal tenderness. There is no right CVA tenderness, left CVA tenderness or guarding. Musculoskeletal:         General: Normal range of motion. Cervical back: Normal range of motion and neck supple. No rigidity or tenderness. Right lower leg: Edema present. Left lower leg: Edema present. Lymphadenopathy:      Cervical: No cervical adenopathy. Skin:     General: Skin is warm and dry. Capillary Refill: Capillary refill takes less than 2 seconds. Neurological:      General: No focal deficit present. Mental Status: She is alert and oriented to person, place, and time. Mental status is at baseline. Psychiatric:         Mood and Affect: Mood normal.         Behavior: Behavior normal.         Thought Content: Thought content normal.        Procedures    No results found for any visits on 11/21/22. No orders to display                       Voice dictation software was used during the making of this note. This software is not perfect and grammatical and other typographical errors may be present. This note has not been completely proofread for errors.      Bear Barajas, DO  11/21/22 676 Juancarlos Ray, DO  11/21/22 [id number]

## 2022-11-28 ENCOUNTER — CARE COORDINATION (OUTPATIENT)
Dept: CARE COORDINATION | Facility: CLINIC | Age: 77
End: 2022-11-28

## 2022-11-28 NOTE — CARE COORDINATION
Ambulatory Care Coordination Note  11/28/2022    ACC: Gregory Love, RN    Ccm outreached to patient. Patient states she is doing well at this time. Patient states no questions or concerns. Ccm discussed that I would outreach next week. Patient agreeable. Offered patient enrollment in the Remote Patient Monitoring (RPM) program for in-home monitoring: NA. Lab Results       None            Care Coordination Interventions    Referral from Primary Care Provider: No  Suggested Interventions and Community Resources          Goals Addressed                   This Visit's Progress     Conditions and Symptoms   On track     I will schedule office visits, as directed by my provider. I will keep my appointment or reschedule if I have to cancel. I will notify my provider of any barriers to my plan of care. I will notify my provider of any symptoms that indicate a worsening of my condition. Barriers: none  Plan for overcoming my barriers: N/A  Confidence: 9/10  Anticipated Goal Completion Date: 2/22/23         Self Monitoring   On track     Self-Monitored Blood Glucose - I will check my blood sugar blood sugars ac & HS    MyChart Blood Glucose No flowsheet data found. Barriers: none  Plan for overcoming my barriers: N/A  Confidence: 9/10  Anticipated Goal Completion Date: 2/22/23                Prior to Admission medications    Medication Sig Start Date End Date Taking?  Authorizing Provider   levothyroxine (SYNTHROID) 75 MCG tablet Take 1 tablet by mouth Daily 11/18/22   MATTHEW Bergeron - CNP   olmesartan (BENICAR) 40 MG tablet Take 40 mg by mouth daily    Historical Provider, MD   insulin glargine, 1 unit dial, (TOUJEO SOLOSTAR) 300 UNIT/ML concentrated injection pen Inject 20 Units into the skin every morning 8/16/22 8/16/23  Historical Provider, MD   olmesartan (BENICAR) 40 MG tablet TAKE 1 TABLET BY MOUTH DAILY 5/23/22   Alli Ibarra MD   Lancets MISC Check BS BID 12/5/14   Ar Automatic Reconciliation amLODIPine (NORVASC) 10 MG tablet TAKE 1 TABLET BY MOUTH DAILY 3/14/22   Ar Automatic Reconciliation   atorvastatin (LIPITOR) 40 MG tablet Take 40 mg by mouth daily 12/29/21   Ar Automatic Reconciliation   carvedilol (COREG) 25 MG tablet Take 25 mg by mouth 2 times daily 4/13/22   Ar Automatic Reconciliation   doxazosin (CARDURA) 4 MG tablet Take 4 mg by mouth daily 9/27/21   Ar Automatic Reconciliation   furosemide (LASIX) 40 MG tablet Take 80 mg by mouth daily 9/24/21   Ar Automatic Reconciliation   glipiZIDE (GLUCOTROL) 10 MG tablet Take 10 mg by mouth 2 times daily 4/13/22   Ar Automatic Reconciliation       Future Appointments   Date Time Provider Bettie Kinney   12/6/2022  8:30 AM [facility] 3 [facility]   12/8/2022  8:30 AM Arianne Torres MD [facility] AM[facility]   12/27/2022  9:30 AM Annalise Heredia MD [facility] AM[facility]   1/6/2023  9:00 AM Ian Nuñez MD [facility] AMB

## 2022-12-05 ENCOUNTER — CARE COORDINATION (OUTPATIENT)
Dept: CARE COORDINATION | Facility: CLINIC | Age: 77
End: 2022-12-05

## 2022-12-05 NOTE — CARE COORDINATION
Ambulatory Care Coordination Note  12/5/2022    ACC: Jimmie Gilbert RN    Ccm outreached to patient. Patient states she is doing well at this time. Patient states no questions or concerns. Ccm discussed that I would outreach next week. Patient agreeable. Offered patient enrollment in the Remote Patient Monitoring (RPM) program for in-home monitoring: NA. Lab Results       None            Care Coordination Interventions    Referral from Primary Care Provider: No  Suggested Interventions and Community Resources          Goals Addressed                   This Visit's Progress     Conditions and Symptoms   On track     I will schedule office visits, as directed by my provider. I will keep my appointment or reschedule if I have to cancel. I will notify my provider of any barriers to my plan of care. I will notify my provider of any symptoms that indicate a worsening of my condition. Barriers: none  Plan for overcoming my barriers: N/A  Confidence: 9/10  Anticipated Goal Completion Date: 2/22/23         Self Monitoring   On track     Self-Monitored Blood Glucose - I will check my blood sugar blood sugars ac & HS    MyChart Blood Glucose No flowsheet data found. Barriers: none  Plan for overcoming my barriers: N/A  Confidence: 9/10  Anticipated Goal Completion Date: 2/22/23                Prior to Admission medications    Medication Sig Start Date End Date Taking?  Authorizing Provider   levothyroxine (SYNTHROID) 75 MCG tablet Take 1 tablet by mouth Daily 11/18/22   MATTHEW Bergeron - CNP   olmesartan (BENICAR) 40 MG tablet Take 40 mg by mouth daily    Historical Provider, MD   insulin glargine, 1 unit dial, (TOUJEO SOLOSTAR) 300 UNIT/ML concentrated injection pen Inject 20 Units into the skin every morning 8/16/22 8/16/23  Historical Provider, MD   olmesartan (BENICAR) 40 MG tablet TAKE 1 TABLET BY MOUTH DAILY 5/23/22   Yecenia Sarmiento MD   Lancets MISC Check BS BID 12/5/14   Ar Automatic Reconciliation amLODIPine (NORVASC) 10 MG tablet TAKE 1 TABLET BY MOUTH DAILY 3/14/22   Ar Automatic Reconciliation   atorvastatin (LIPITOR) 40 MG tablet Take 40 mg by mouth daily 12/29/21   Ar Automatic Reconciliation   carvedilol (COREG) 25 MG tablet Take 25 mg by mouth 2 times daily 4/13/22   Ar Automatic Reconciliation   doxazosin (CARDURA) 4 MG tablet Take 4 mg by mouth daily 9/27/21   Ar Automatic Reconciliation   furosemide (LASIX) 40 MG tablet Take 80 mg by mouth daily 9/24/21   Ar Automatic Reconciliation   glipiZIDE (GLUCOTROL) 10 MG tablet Take 10 mg by mouth 2 times daily 4/13/22   Ar Automatic Reconciliation       Future Appointments   Date Time Provider Bettie Gregoria   12/6/2022  8:30 AM [facility] 3 [facility] AM[facility]   12/8/2022  8:30 AM Adore Pope MD [facility] AMB   12/27/2022  9:30 AM Iker Dumont MD [facility] AMB   1/6/2023  9:00 AM Aye Roldan MD \[facility]\"" [facility] AMB

## 2022-12-12 ENCOUNTER — CARE COORDINATION (OUTPATIENT)
Dept: CARE COORDINATION | Facility: CLINIC | Age: 77
End: 2022-12-12

## 2022-12-12 NOTE — CARE COORDINATION
Ambulatory Care Management Note        Date/Time:  12/12/2022 1:30 PM    Ccm outreached to patient. No answer at this time, message left. Awaiting response. If no response, ccm will outreach next week.

## 2022-12-19 ENCOUNTER — CARE COORDINATION (OUTPATIENT)
Dept: CARE COORDINATION | Facility: CLINIC | Age: 77
End: 2022-12-19

## 2022-12-19 NOTE — CARE COORDINATION
Ambulatory Care Management Note        Date/Time:  12/19/2022 1:46 PM    Ccm outreached to patient. No answer at this time, message left. Awaiting response. If no response, ccm will outreach in 2 weeks.

## 2022-12-27 ENCOUNTER — INITIAL CONSULT (OUTPATIENT)
Dept: CARDIOLOGY CLINIC | Age: 77
End: 2022-12-27
Payer: MEDICARE

## 2022-12-27 VITALS
BODY MASS INDEX: 27.89 KG/M2 | DIASTOLIC BLOOD PRESSURE: 72 MMHG | HEIGHT: 64 IN | HEART RATE: 70 BPM | WEIGHT: 163.38 LBS | SYSTOLIC BLOOD PRESSURE: 167 MMHG

## 2022-12-27 DIAGNOSIS — N18.5 CHRONIC KIDNEY DISEASE (CKD), STAGE V (HCC): ICD-10-CM

## 2022-12-27 DIAGNOSIS — E78.2 MIXED HYPERLIPIDEMIA: ICD-10-CM

## 2022-12-27 DIAGNOSIS — I10 PRIMARY HYPERTENSION: ICD-10-CM

## 2022-12-27 DIAGNOSIS — R94.31 ABNORMAL EKG: ICD-10-CM

## 2022-12-27 DIAGNOSIS — R60.0 BILATERAL LEG EDEMA: Primary | ICD-10-CM

## 2022-12-27 PROCEDURE — 93000 ELECTROCARDIOGRAM COMPLETE: CPT | Performed by: INTERNAL MEDICINE

## 2022-12-27 PROCEDURE — 3075F SYST BP GE 130 - 139MM HG: CPT | Performed by: INTERNAL MEDICINE

## 2022-12-27 PROCEDURE — 99215 OFFICE O/P EST HI 40 MIN: CPT | Performed by: INTERNAL MEDICINE

## 2022-12-27 PROCEDURE — 1123F ACP DISCUSS/DSCN MKR DOCD: CPT | Performed by: INTERNAL MEDICINE

## 2022-12-27 PROCEDURE — 3078F DIAST BP <80 MM HG: CPT | Performed by: INTERNAL MEDICINE

## 2022-12-27 RX ORDER — SODIUM BICARBONATE 650 MG/1
1300 TABLET ORAL 2 TIMES DAILY
COMMUNITY
Start: 2022-10-21

## 2022-12-27 RX ORDER — CALCITRIOL 0.25 UG/1
0.25 CAPSULE, LIQUID FILLED ORAL DAILY
COMMUNITY
Start: 2022-10-21

## 2022-12-27 RX ORDER — DOXAZOSIN MESYLATE 4 MG/1
4 TABLET ORAL 2 TIMES DAILY
Qty: 180 TABLET | Refills: 3 | Status: SHIPPED | OUTPATIENT
Start: 2022-12-27

## 2022-12-27 NOTE — PATIENT INSTRUCTIONS
-Be careful with TV dinners, processed meats, canned soups and canned vegetables. These have a lot of salt and can cause you to retain fluid and increase your blood pressures.  -Keep an eye on your heart rates and blood pressures and make a log of this at least once a day until we see you next.   -We will plan an echocardiogram on you-ultrasound scan of the heart to ensure that the structure is good and the pumping ability is normal along with your valves especially with your murmur

## 2022-12-27 NOTE — PROGRESS NOTES
[facility] CARDIOLOGY  [address] [facility], [address] [facility], [address] [place] [address]  PHONE: [phone]          22    NAME:  Anali Lopez  : 1945  MRN: [id number]         SUBJECTIVE:   Anali Lopez is a 78 y.o. female seen for a visit regarding the following:     Chief Complaint   Patient presents with    Consultation    Hypertension    Edema             HPI:    Cardio problem list:  1. Bilateral lower extremity edema/  Elevated BNP  2. Chronic kidney disease-stage V  3. Hypertension  4. Hyperlipidemia  5. Aortic valve sclerosis without stenosis  -Echo from 2021 showed an EF of 55 to 60% mildly dilated left atrium, aortic sclerosis without stenosis  -Has previously seen Dr. Saranya Pulliam    Dear Dr. Nikko Donnelly,  I saw Ms. Kurt Armenta is a pleasant 53-year-old AA woman in cardiovascular follow-up for bilateral lower extremity edema with an elevated BNP with progressive chronic kidney disease-now stage V, hypertension, hyperlipidemia, aortic sclerosis without stenosis. Bilateral lower extremity edema/elevated BNP-she went into the ER recently with increasing lower extremity edema and an elevated BNP after being directed by her primary. The ER gave her a dose of IV Lasix and increased her home dose of Lasix. She has advanced chronic kidney disease and they got in touch with nephrology and she will follow-up with them. She is not on dialysis currently but is at CKD stage V based on her GFR. Dyspnea exertion-has some chronic dyspnea on exertion but she said nothing worse than usual.  She says it is better now that her lower extremity edema has resolved. Denies chest pain in any way. No significant wheezing. Hypertension-resistant hypertension-likely cause for worsening chronic kidney disease-on multiple antihypertensives and still has high blood pressures. Needs better control. Hyperlipidemia-remains on atorvastatin therapy    Aortic sclerosis-without stenosis-last echo showed this.   This was done close to 2 years ago and she might need a repeat study. Past Medical History, Past Surgical History, Family history, Social History, and Medications were all reviewed with the patient today and updated as necessary.      Allergies   Allergen Reactions    Prednisone Other (See Comments)     Elevated blood sugar      Patient Active Problem List   Diagnosis    Hyperlipemia    Foot pain    Type 2 diabetes mellitus with nephropathy (HCC)    Pre-ulcerative corn or callous    HTN (hypertension)    Cardiac murmur    Bilateral leg edema    CKD (chronic kidney disease) stage 4, GFR 15-29 ml/min (HCC)    Hypothyroidism    Chronic kidney disease (CKD), stage V ([facility] [address] [[id number]]    Abnormal EKG     Past Medical History:   Diagnosis Date    [facility]/19/2014    HTN (hypertension) 2/19/2014    Hyperlipemia 2/19/2014    Hyperparathyroidism ([facility] 8/26/2014     Past Surgical History:   Procedure Laterality Date    HEENT  11/15    partial parathyroidectomy    PARATHYROIDECTOMY  11/11/2015    REFRACTIVE SURGERY  2011     Family History   Problem Relation Age of Onset    Elevated Lipids Other     Hypertension Other     Diabetes Other     Heart Disease Other      Social History     Tobacco Use    Smoking status: Never    Smokeless tobacco: Never   Substance Use Topics    Alcohol use: No     Current Outpatient Medications   Medication Sig Dispense Refill    sodium bicarbonate 650 MG tablet Take 1,300 mg by mouth 2 times daily      calcitRIOL (ROCALTROL) 0.25 MCG capsule Take 0.25 mcg by mouth daily      doxazosin (CARDURA) 4 MG tablet Take 1 tablet by mouth in the morning and at bedtime 180 tablet 3    levothyroxine (SYNTHROID) 75 MCG tablet Take 1 tablet by mouth Daily 90 tablet 3    insulin glargine, 1 unit dial, (TOUJEO SOLOSTAR) 300 UNIT/ML concentrated injection pen Inject 20 Units into the skin every morning      olmesartan (BENICAR) 40 MG tablet TAKE 1 TABLET BY MOUTH DAILY 90 tablet 3    Lancets MISC Check BS BID      amLODIPine (NORVASC) 10 MG tablet TAKE 1 TABLET BY MOUTH DAILY      atorvastatin (LIPITOR) 40 MG tablet Take 40 mg by mouth daily      carvedilol (COREG) 25 MG tablet Take 25 mg by mouth 2 times daily      furosemide (LASIX) 40 MG tablet Take 80 mg by mouth daily      glipiZIDE (GLUCOTROL) 10 MG tablet Take 10 mg by mouth daily       No current facility-administered medications for this visit. ROS    PHYSICAL EXAM:    BP (!) 167/72   Pulse 70   Ht 5' 4\" (1.626 m)   Wt 163 lb 6 oz (74.1 kg)   BMI 28.04 kg/m²      Physical Exam    General: Alert and oriented in no acute distress  HEENT: Head is normocephalic, atraumatic, pupils are equal bilaterally, throat appears to be clear  Neck: No significant jugular venous distention no cervical bruits  Cardiovascular: S1 and S2 heard, regular rate and rhythm, no significant murmurs rubs or gallops. Respiratory: Clear to auscultation bilaterally with no adventitious sounds, respirations are normal  Abdomen: Soft, nontender, nondistended, bowel sounds present. Extremities: No cyanosis clubbing or edema  Peripheral pulses: Bilateral radial artery pulses are palpated. Bilateral pedal pulses are well felt. Neuro: No facial droop and no gross focal motor deficits  Lymphatic: No significant cervical lymphadenopathy noted. Musculoskeletal: No significant redness or swelling noted in all exposed joints. Skin: No significant rashes noted the of the exposed regions. Medical problems and test results were reviewed with the patient today.      Recent Results (from the past 672 hour(s))   Vascular duplex vessel map for preop dialysis access bilateral upper extremities    Collection Time: 12/06/22  9:36 AM   Result Value Ref Range    Left Basilic Vein Upper Arm Mid Diam 4.2 mm    Right Basilic Vein Upper Arm Mid Diam 4.1 mm    Left Cephalic Vein Upper Arm Mid Diam 2.6 mm    Right Cephalic Vein Upper Arm Mid Diam 2.0 mm    Left Brachial Vein 1 Diam 5.0 mm    Right Brachial Vein 1 Diam 5.0 mm    Left Cephalic Vein Upper Arm Dist Diam 3.2 mm    Right Cephalic Vein Upper Arm Dist Diam 2.2 mm    Left Basilic Vein Lower Arm Prox Diam 2.6 mm    Right Basilic Vein Lower Arm Prox Diam 2.5 mm    Left Cephalic Vein Lower Arm Dist Diam 1.7 mm    Right Cephalic Vein Lower Arm Dist Diam 1.8 mm    Left Brachial A Diam 0.54 cm    Right Brachial A Diam 0.51 cm    Left Radial A Diam 0.24 cm    Right Radial A Diam 1.17 cm    Left Basilic Vein Upper Arm Dist Diam 3.2 mm    Right Basilic Vein Upper Arm Dist Diam 4.7 mm    Left Cephalic Vein Lower Arm Prox Diam 2.4 mm    Right Cephalic Vein Lower Arm Prox Diam 1.7 mm    Left Brachial A mid PSV 77.6 cm/s    Left Brachial A mid EDV 1.9 cm/s    Right Brachial A mid PSV 76.3 cm/s    Right Brachial A mid EDV 2.5 cm/s    Left Cephalic Vein Lower Arm Mid Diam 1.6 mm    Right Cephalic Vein Lower Arm Mid Diam 2.8 mm    Left Basilic Vein Upper Arm Prox Diam 8.0 mm    Right Basilic Vein Upper Arm Prox Diam 7.9 mm    Left Cephalic Vein Upper Arm Prox Diam 2.3 mm    Right Cephalic Vein Upper Arm Prox Diam 2.2 mm    Left Brachial Vein 2 Diam 3.0 mm    Right Brachial Vein 2 Diam 4.0 mm    Body Surface Area 1.82 m2     Lab Results   Component Value Date/Time    CHOL 178 01/12/2022 11:10 AM    HDL 78 01/12/2022 11:10 AM    VLDL 17 01/12/2022 11:10 AM   ,hemoglobin, basic metabolic panel,   Lab Results   Component Value Date/Time    TSH 3.310 01/12/2022 11:10 AM    ,  Lab Results   Component Value Date/Time     11/21/2022 10:00 PM    K 4.6 11/21/2022 10:00 PM     11/21/2022 10:00 PM    CO2 24 11/21/2022 10:00 PM    BUN 60 11/21/2022 10:00 PM    GFRAA 16 07/21/2022 03:46 PM    GLOB 6.4 11/21/2022 10:00 PM    ALT 94 11/21/2022 10:00 PM     11/21/2022 10:00 PM      Lab Results   Component Value Date    LDLCALC 83 01/12/2022      Lab Results   Component Value Date    CREATININE 4.19 (H) 11/21/2022      No results found for this or any previous visit. Lab Results   Component Value Date    HGB 9.1 (L) 11/21/2022      Lab Results   Component Value Date     (L) 11/21/2022      EKG on 12/27/2022 showed sinus rhythm, poor R wave progression, left axis deviation  ASSESSMENT and PLAN      Bilateral leg edema  -Improved with Lasix. Likely from a combination of hypertensive heart disease, chronic kidney disease. Rechecking echo    Primary hypertension  -     EKG 12 lead  -     Transthoracic echocardiogram (TTE) complete with contrast, bubble, strain, and 3D PRN; Future  -     doxazosin (CARDURA) 4 MG tablet; Take 1 tablet by mouth in the morning and at bedtime  -Increase doxazosin to 4 mg twice daily, continue olmesartan 40 mg once daily, amlodipine 10 mg once daily, carvedilol 25 mg once daily. Clearly has resistant hypertension. Chronic kidney disease (CKD), stage V ([facility] [[id number]]  -Advanced chronic kidney disease-would benefit eventually from dialysis which would normalize blood pressures. Mixed hyperlipidemia  -Continue atorvastatin 40 mg daily    Abnormal EKG  -     Transthoracic echocardiogram (TTE) complete with contrast, bubble, strain, and 3D PRN; Future   -     EKG 12 lead  -With progression-suggestive of a possible previous anterior MI versus left anterior fascicular block. Getting echo    Overall Impression  Change in made with medical therapy was to increase doxazosin to 4 mg twice daily for better control of blood pressures. Pressures are still running in the 572V systolic range and better control of pressures will certainly help decrease her BNP, dyspnea on exertion, lower extremity edema. Counseled on the importance of following a low-sodium diet. She is on Lasix 80 mg once daily. She says she follows with nephrology. Already appears to be at CKD stage V but she is try to hold off on dialysis for as long as she can.   From a volume status, she is not too bad at this point.  -I will get her through an echo to rule out any significant cardiomyopathy/worsening valvular heart disease. If her testing looks good, would see on follow-up in 4 weeks time but of course we have had make a log of her blood pressures and heart rates and bring this in with her when she sees us in follow-up. Return in about 4 weeks (around 1/24/2023) for htn. Thank you for allowing us to participate in the care of your patient. If you have any further questions, please do not hesitate to contact us.   Sincerely,        Kellee Otoole MD   12/27/2022

## 2023-01-03 ENCOUNTER — CARE COORDINATION (OUTPATIENT)
Dept: CARE COORDINATION | Facility: CLINIC | Age: 78
End: 2023-01-03

## 2023-01-03 NOTE — CARE COORDINATION
Ambulatory Care Management Note        Date/Time:  1/3/2023 8:32 AM    Ccm outreached to patient. No answer at this time, message left. Ccm will close case at this time d/t unable to reach x3. Ccm will remain available as needed.

## 2023-01-04 RX ORDER — ATORVASTATIN CALCIUM 40 MG/1
TABLET, FILM COATED ORAL
Qty: 90 TABLET | OUTPATIENT
Start: 2023-01-04

## 2023-01-05 RX ORDER — AMLODIPINE BESYLATE 10 MG/1
10 TABLET ORAL DAILY
Qty: 90 TABLET | Refills: 3 | Status: SHIPPED | OUTPATIENT
Start: 2023-01-05

## 2023-01-05 NOTE — TELEPHONE ENCOUNTER
Refill: amlodipine    Dosage: 10 mg  Freq:1 tablet at night daily  To: doreen frederick [place] vivi

## 2023-02-03 ENCOUNTER — TELEPHONE (OUTPATIENT)
Dept: CARDIOLOGY CLINIC | Age: 78
End: 2023-02-03

## 2023-02-03 NOTE — TELEPHONE ENCOUNTER
----- Message from Keith Huitron MD sent at 2/3/2023  7:01 AM EST -----  Please let her know that we reviewed her echo results and it showed normal pumping ability of the heart with no significant valve disease. There is some enlargement of the left upper chamber of the heart that happens with higher blood pressures over the years. Overall findings are reassuring.   Thanks,  AD

## 2023-02-06 NOTE — TELEPHONE ENCOUNTER
Pt called back. Gave pt Echo results per Dr. Cookie Guthrie. Pt forgot follow up appt so rescheduled to 3/10/23. Pt verbalized understanding and had no further questions.

## 2023-02-13 ENCOUNTER — OFFICE VISIT (OUTPATIENT)
Dept: FAMILY MEDICINE CLINIC | Facility: CLINIC | Age: 78
End: 2023-02-13
Payer: MEDICARE

## 2023-02-13 VITALS
HEIGHT: 64 IN | BODY MASS INDEX: 27.49 KG/M2 | OXYGEN SATURATION: 99 % | HEART RATE: 68 BPM | TEMPERATURE: 97.3 F | DIASTOLIC BLOOD PRESSURE: 80 MMHG | WEIGHT: 161 LBS | SYSTOLIC BLOOD PRESSURE: 160 MMHG

## 2023-02-13 DIAGNOSIS — E11.69 TYPE 2 DIABETES MELLITUS WITH OTHER SPECIFIED COMPLICATION, WITHOUT LONG-TERM CURRENT USE OF INSULIN (HCC): ICD-10-CM

## 2023-02-13 DIAGNOSIS — E03.9 HYPOTHYROIDISM, UNSPECIFIED TYPE: Primary | ICD-10-CM

## 2023-02-13 DIAGNOSIS — Z12.11 SCREENING FOR COLON CANCER: ICD-10-CM

## 2023-02-13 DIAGNOSIS — N18.5 CHRONIC KIDNEY DISEASE (CKD), STAGE V (HCC): ICD-10-CM

## 2023-02-13 PROCEDURE — 3074F SYST BP LT 130 MM HG: CPT | Performed by: FAMILY MEDICINE

## 2023-02-13 PROCEDURE — G8400 PT W/DXA NO RESULTS DOC: HCPCS | Performed by: FAMILY MEDICINE

## 2023-02-13 PROCEDURE — G8484 FLU IMMUNIZE NO ADMIN: HCPCS | Performed by: FAMILY MEDICINE

## 2023-02-13 PROCEDURE — 1036F TOBACCO NON-USER: CPT | Performed by: FAMILY MEDICINE

## 2023-02-13 PROCEDURE — 3046F HEMOGLOBIN A1C LEVEL >9.0%: CPT | Performed by: FAMILY MEDICINE

## 2023-02-13 PROCEDURE — 2022F DILAT RTA XM EVC RTNOPTHY: CPT | Performed by: FAMILY MEDICINE

## 2023-02-13 PROCEDURE — 1090F PRES/ABSN URINE INCON ASSESS: CPT | Performed by: FAMILY MEDICINE

## 2023-02-13 PROCEDURE — G8417 CALC BMI ABV UP PARAM F/U: HCPCS | Performed by: FAMILY MEDICINE

## 2023-02-13 PROCEDURE — 3017F COLORECTAL CA SCREEN DOC REV: CPT | Performed by: FAMILY MEDICINE

## 2023-02-13 PROCEDURE — G8427 DOCREV CUR MEDS BY ELIG CLIN: HCPCS | Performed by: FAMILY MEDICINE

## 2023-02-13 PROCEDURE — 99213 OFFICE O/P EST LOW 20 MIN: CPT | Performed by: FAMILY MEDICINE

## 2023-02-13 PROCEDURE — 1123F ACP DISCUSS/DSCN MKR DOCD: CPT | Performed by: FAMILY MEDICINE

## 2023-02-13 PROCEDURE — 3078F DIAST BP <80 MM HG: CPT | Performed by: FAMILY MEDICINE

## 2023-02-13 RX ORDER — LEVOTHYROXINE SODIUM 0.07 MG/1
75 TABLET ORAL DAILY
Qty: 90 TABLET | Refills: 3 | Status: SHIPPED | OUTPATIENT
Start: 2023-02-13

## 2023-02-13 RX ORDER — ATORVASTATIN CALCIUM 40 MG/1
40 TABLET, FILM COATED ORAL DAILY
Qty: 90 TABLET | Refills: 3 | Status: SHIPPED | OUTPATIENT
Start: 2023-02-13

## 2023-02-13 RX ORDER — OLMESARTAN MEDOXOMIL 40 MG/1
TABLET ORAL
Qty: 90 TABLET | Refills: 3 | Status: SHIPPED | OUTPATIENT
Start: 2023-02-13

## 2023-02-13 RX ORDER — AMLODIPINE BESYLATE 10 MG/1
10 TABLET ORAL DAILY
Qty: 90 TABLET | Refills: 3 | Status: SHIPPED | OUTPATIENT
Start: 2023-02-13

## 2023-02-13 SDOH — ECONOMIC STABILITY: FOOD INSECURITY: WITHIN THE PAST 12 MONTHS, YOU WORRIED THAT YOUR FOOD WOULD RUN OUT BEFORE YOU GOT MONEY TO BUY MORE.: NEVER TRUE

## 2023-02-13 SDOH — ECONOMIC STABILITY: FOOD INSECURITY: WITHIN THE PAST 12 MONTHS, THE FOOD YOU BOUGHT JUST DIDN'T LAST AND YOU DIDN'T HAVE MONEY TO GET MORE.: NEVER TRUE

## 2023-02-13 SDOH — ECONOMIC STABILITY: HOUSING INSECURITY
IN THE LAST 12 MONTHS, WAS THERE A TIME WHEN YOU DID NOT HAVE A STEADY PLACE TO SLEEP OR SLEPT IN A SHELTER (INCLUDING NOW)?: NO

## 2023-02-13 SDOH — ECONOMIC STABILITY: INCOME INSECURITY: HOW HARD IS IT FOR YOU TO PAY FOR THE VERY BASICS LIKE FOOD, HOUSING, MEDICAL CARE, AND HEATING?: NOT HARD AT ALL

## 2023-02-13 ASSESSMENT — PATIENT HEALTH QUESTIONNAIRE - PHQ9
1. LITTLE INTEREST OR PLEASURE IN DOING THINGS: 0
SUM OF ALL RESPONSES TO PHQ QUESTIONS 1-9: 0
2. FEELING DOWN, DEPRESSED OR HOPELESS: 0
SUM OF ALL RESPONSES TO PHQ QUESTIONS 1-9: 0
SUM OF ALL RESPONSES TO PHQ QUESTIONS 1-9: 0
SUM OF ALL RESPONSES TO PHQ9 QUESTIONS 1 & 2: 0
SUM OF ALL RESPONSES TO PHQ QUESTIONS 1-9: 0

## 2023-02-14 LAB
CALCIUM SERPL-MCNC: 7.6 MG/DL (ref 8.3–10.4)
PTH-INTACT SERPL-MCNC: 540 PG/ML (ref 18.5–88)
TSH, 3RD GENERATION: 3.76 UIU/ML (ref 0.36–3.74)

## 2023-02-14 NOTE — RESULT ENCOUNTER NOTE
Please call and notify that thyroid is better but PTH is not so we need to see what Dr. Morgan Boyce says first but consider referral back to endocrine . Please call because she doesn't do MY CHART .

## 2023-02-16 LAB — CA-I SERPL ISE-MCNC: 4.4 MG/DL (ref 4.5–5.6)

## 2023-02-25 ASSESSMENT — ENCOUNTER SYMPTOMS
RESPIRATORY NEGATIVE: 1
GASTROINTESTINAL NEGATIVE: 1
EYES NEGATIVE: 1

## 2023-02-25 NOTE — PROGRESS NOTES
HISTORY OF PRESENT ILLNESS  Nataliia Rivas is a 78 y.o. y.o. female    Ankle Pain   The incident occurred more than 1 week ago. There was no injury mechanism. The pain is present in the right ankle. The quality of the pain is described as aching. The pain is at a severity of 3/10. Knee Pain   The incident occurred more than 1 week ago. There was no injury mechanism. The pain is present in the right knee. The quality of the pain is described as aching. The pain is at a severity of 3/10. Diabetes  She presents for her follow-up diabetic visit. She has type 2 diabetes mellitus. Her disease course has been stable. Kidney Problem  This is a recurrent (progressive CKD set up for a vascular shunt for dialysis) problem. The problem has been gradually worsening. Allergies   Allergen Reactions    Prednisone Other (See Comments)     Elevated blood sugar         Current Outpatient Medications   Medication Sig    amLODIPine (NORVASC) 10 MG tablet Take 1 tablet by mouth daily TAKE 1 TABLET BY MOUTH DAILY    levothyroxine (SYNTHROID) 75 MCG tablet Take 1 tablet by mouth Daily    olmesartan (BENICAR) 40 MG tablet TAKE 1 TABLET BY MOUTH DAILY    atorvastatin (LIPITOR) 40 MG tablet Take 1 tablet by mouth daily    sodium bicarbonate 650 MG tablet Take 1,300 mg by mouth 2 times daily    calcitRIOL (ROCALTROL) 0.25 MCG capsule Take 0.25 mcg by mouth daily    doxazosin (CARDURA) 4 MG tablet Take 1 tablet by mouth in the morning and at bedtime    insulin glargine, 1 unit dial, (TOUJEO SOLOSTAR) 300 UNIT/ML concentrated injection pen Inject 20 Units into the skin every morning    Lancets MISC Check BS BID    carvedilol (COREG) 25 MG tablet Take 25 mg by mouth 2 times daily    furosemide (LASIX) 40 MG tablet Take 80 mg by mouth daily    glipiZIDE (GLUCOTROL) 10 MG tablet Take 10 mg by mouth daily     No current facility-administered medications for this visit.         Past Medical History:   Diagnosis Date    DM (diabetes mellitus) ([facility] 2/19/2014    HTN (hypertension) 2/19/2014    Hyperlipemia 2/19/2014    Hyperparathyroidism ([facility] 8/26/2014        Past Surgical History:   Procedure Laterality Date    HEENT  11/15    partial parathyroidectomy    PARATHYROIDECTOMY  11/11/2015    REFRACTIVE SURGERY  2011        Social History     Socioeconomic History    Marital status:      Spouse name: Not on file    Number of children: Not on file    Years of education: Not on file    Highest education level: Not on file   Occupational History    Not on file   Tobacco Use    Smoking status: Never    Smokeless tobacco: Never   Vaping Use    Vaping Use: Never used   Substance and Sexual Activity    Alcohol use: No    Drug use: No    Sexual activity: Not on file   Other Topics Concern    Not on file   Social History Narrative    Not on file     Social Determinants of Health     Financial Resource Strain: Low Risk     Difficulty of Paying Living Expenses: Not hard at all   Food Insecurity: No Food Insecurity    Worried About Running Out of Food in the Last Year: Never true    [address] in the Last Year: Never true   Transportation Needs: Unknown    Lack of Transportation (Medical): Not on file    Lack of Transportation (Non-Medical): No   Physical Activity: Not on file   Stress: Not on file   Social Connections: Not on file   Intimate Partner Violence: Not on file   Housing Stability: Unknown    Unable to Pay for Housing in the Last Year: Not on file    Number of Places Lived in the Last Year: Not on file    Unstable Housing in the Last Year: No        Review of Systems   Constitutional: Negative. HENT: Negative. Eyes: Negative. Respiratory: Negative. Cardiovascular: Negative. Gastrointestinal: Negative. Endocrine: Negative. Genitourinary: Negative. Musculoskeletal: Negative. Skin: Negative. Neurological: Negative. Psychiatric/Behavioral: Negative.        BP (!) 160/80   Pulse 68   Temp 97.3 °F (36.3 °C) (Temporal)   Ht 5' 4\" (1.626 m)   Wt 161 lb (73 kg)   SpO2 99%   BMI 27.64 kg/m²      Physical Exam  Constitutional:       Appearance: Normal appearance. HENT:      Head: Normocephalic. Right Ear: Tympanic membrane, ear canal and external ear normal.      Left Ear: Tympanic membrane, ear canal and external ear normal.      Nose: Nose normal.      Mouth/Throat:      Mouth: Mucous membranes are moist.      Pharynx: Oropharynx is clear. Eyes:      General: No scleral icterus. Extraocular Movements: Extraocular movements intact. Conjunctiva/sclera: Conjunctivae normal.   Neck:      Vascular: No carotid bruit. Cardiovascular:      Rate and Rhythm: Normal rate and regular rhythm. Pulses: Normal pulses. Heart sounds: Normal heart sounds. Pulmonary:      Effort: Pulmonary effort is normal. No respiratory distress. Breath sounds: Normal breath sounds. No wheezing or rales. Abdominal:      General: Abdomen is flat. Bowel sounds are normal. There is no distension. Palpations: Abdomen is soft. There is no mass. Tenderness: There is no abdominal tenderness. Musculoskeletal:      Cervical back: Normal range of motion and neck supple. Right knee: Tenderness present. Right foot: Decreased range of motion. Legs:         Feet:    Skin:     General: Skin is warm and dry. Neurological:      General: No focal deficit present. Mental Status: She is alert and oriented to person, place, and time. Psychiatric:         Mood and Affect: Mood normal.         Behavior: Behavior normal.         Thought Content:  Thought content normal.         Judgment: Judgment normal.       Ancillary Procedure on 01/19/2023   Component Date Value Ref Range Status    LV EDV A2C 01/19/2023 84  mL Final    LV EDV A4C 01/19/2023 95  mL Final    LV ESV A2C 01/19/2023 29  mL Final    LV ESV A4C 01/19/2023 41  mL Final    IVSd 01/19/2023 0.9  0.6 - 0.9 cm Final    LVIDd 01/19/2023 5.0 3.9 - 5.3 cm Final    LVIDs 01/19/2023 3.3  cm Final    LVPWd 01/19/2023 1.0 (A)  0.6 - 0.9 cm Final    LV E' Lateral Velocity 01/19/2023 10  cm/s Final    LV E' Septal Velocity 01/19/2023 8  cm/s Final    LV Ejection Fraction A2C 01/19/2023 65  % Final    LV Ejection Fraction A4C 01/19/2023 57  % Final    EF BP 01/19/2023 60  55 - 100 % Final    LA Minor Axis 01/19/2023 6.0  cm Final    LA Major Axis 01/19/2023 6.4  cm Final    LA Area 2C 01/19/2023 24.1  cm2 Final    LA Area 4C 01/19/2023 25.0  cm2 Final    LA Volume 2C 01/19/2023 79 (A)  22 - 52 mL Final    LA Volume 4C 01/19/2023 76 (A)  22 - 52 mL Final    LA Volume BP 01/19/2023 80 (A)  22 - 52 mL Final    LA Diameter 01/19/2023 4.3  cm Final    Aortic Root 01/19/2023 3.0  cm Final    MV E Wave Deceleration Time 01/19/2023 246.0  ms Final    MV A Velocity 01/19/2023 1.04  m/s Final    MV E Velocity 01/19/2023 1.00  m/s Final    Est. RA Pressure 01/19/2023 3  mmHg Final    RVIDd 01/19/2023 2.7  cm Final    TR Max Velocity 01/19/2023 2.41  m/s Final    TR Peak Gradient 01/19/2023 23  mmHg Final    Body Surface Area 01/19/2023 1.83  m2 Final    Fractional Shortening 2D 01/19/2023 34  28 - 44 % Final    LV ESV Index A4C 01/19/2023 23  mL/m2 Final    LV EDV Index A4C 01/19/2023 53  mL/m2 Final    LV ESV Index A2C 01/19/2023 16  mL/m2 Final    LV EDV Index A2C 01/19/2023 47  mL/m2 Final    LVIDd Index 01/19/2023 2.79  cm/m2 Final    LVIDs Index 01/19/2023 1.84  cm/m2 Final    LV RWT Ratio 01/19/2023 0.40   Final    LV Mass 2D 01/19/2023 169.9 (A)  67 - 162 g Final    LV Mass 2D Index 01/19/2023 94.9  43 - 95 g/m2 Final    MV E/A 01/19/2023 0.96   Final    E/E' Ratio (Averaged) 01/19/2023 11.25   Final    E/E' Lateral 01/19/2023 10.00   Final    E/E' Septal 01/19/2023 12.50   Final    LA Volume Index BP 01/19/2023 45 (A)  16 - 34 ml/m2 Final    LA Volume Index 2C 01/19/2023 44 (A)  16 - 34 mL/m2 Final    LA Volume Index 4C 01/19/2023 42 (A)  16 - 34 mL/m2 Final    LA Size Index 01/19/2023 2.40  cm/m2 Final    LA/AO Root Ratio 01/19/2023 1.43   Final    Ao Root Index 01/19/2023 1.68  cm/m2 Final    RVSP 01/19/2023 26  mmHg Final    RV Basal Dimension 01/19/2023 3.7  cm Final    RV Mid Dimension 01/19/2023 2.4  cm Final    Left Ventricular Ejection Fraction 01/19/2023 60   Final-Edited    LVEF MODALITY 01/19/2023 ECHO   Final-Edited       ASSESSMENT and PLAN    Hypothyroidism, unspecified type  -     TSH; Future  -     PTH, Intact; Future  Type 2 diabetes mellitus with other specified complication, without long-term current use of insulin (HCC)  -     levothyroxine (SYNTHROID) 75 MCG tablet; Take 1 tablet by mouth Daily, Disp-90 tablet, R-3Normal  Chronic kidney disease (CKD), stage V (HCC)  Assessment & Plan:   Monitored by specialist- no acute findings meriting change in the plan    Orders:  -     TSH; Future  -     PTH, Intact; Future  Screening for colon cancer  -     [facility] - Colonoscopy      Current treatment plan is effective. no changes in therapy  the following changes in treatment are made Consider ortho eval for right foot and ankle if it persist , do not want to add meds at this point . lab results and schedule for future lab studies reviewed with patient    No follow-ups on file.      Mendy Marie MD

## 2023-03-03 ENCOUNTER — OFFICE VISIT (OUTPATIENT)
Dept: VASCULAR SURGERY | Age: 78
End: 2023-03-03
Payer: MEDICARE

## 2023-03-03 VITALS
HEIGHT: 64 IN | BODY MASS INDEX: 27.31 KG/M2 | SYSTOLIC BLOOD PRESSURE: 177 MMHG | DIASTOLIC BLOOD PRESSURE: 78 MMHG | HEART RATE: 71 BPM | OXYGEN SATURATION: 99 % | WEIGHT: 160 LBS

## 2023-03-03 DIAGNOSIS — Z99.2 ESRD (END STAGE RENAL DISEASE) ON DIALYSIS (HCC): Primary | ICD-10-CM

## 2023-03-03 DIAGNOSIS — N18.6 ESRD (END STAGE RENAL DISEASE) ON DIALYSIS (HCC): Primary | ICD-10-CM

## 2023-03-03 PROCEDURE — G8484 FLU IMMUNIZE NO ADMIN: HCPCS | Performed by: SURGERY

## 2023-03-03 PROCEDURE — G8417 CALC BMI ABV UP PARAM F/U: HCPCS | Performed by: SURGERY

## 2023-03-03 PROCEDURE — 3077F SYST BP >= 140 MM HG: CPT | Performed by: SURGERY

## 2023-03-03 PROCEDURE — 1123F ACP DISCUSS/DSCN MKR DOCD: CPT | Performed by: SURGERY

## 2023-03-03 PROCEDURE — G8427 DOCREV CUR MEDS BY ELIG CLIN: HCPCS | Performed by: SURGERY

## 2023-03-03 PROCEDURE — 1090F PRES/ABSN URINE INCON ASSESS: CPT | Performed by: SURGERY

## 2023-03-03 PROCEDURE — 3017F COLORECTAL CA SCREEN DOC REV: CPT | Performed by: SURGERY

## 2023-03-03 PROCEDURE — 99204 OFFICE O/P NEW MOD 45 MIN: CPT | Performed by: SURGERY

## 2023-03-03 PROCEDURE — 1036F TOBACCO NON-USER: CPT | Performed by: SURGERY

## 2023-03-03 PROCEDURE — G8400 PT W/DXA NO RESULTS DOC: HCPCS | Performed by: SURGERY

## 2023-03-03 PROCEDURE — 3078F DIAST BP <80 MM HG: CPT | Performed by: SURGERY

## 2023-03-03 NOTE — PROGRESS NOTES
[address]   [address] [facility] FAX: [phone]    Lani Haines  1945    Chief Complaint   Patient presents with    New Patient    Results     New Patient  [facility] Vein Mapping 3/3/23           HPI   Ms. Lani Haines is a 78y.o. year old female who presents for dialysis access placement. Patient currently not on dialysis. She is right-hand dominant. Current Outpatient Medications   Medication Sig Dispense Refill    amLODIPine (NORVASC) 10 MG tablet Take 1 tablet by mouth daily TAKE 1 TABLET BY MOUTH DAILY 90 tablet 3    levothyroxine (SYNTHROID) 75 MCG tablet Take 1 tablet by mouth Daily 90 tablet 3    olmesartan (BENICAR) 40 MG tablet TAKE 1 TABLET BY MOUTH DAILY 90 tablet 3    atorvastatin (LIPITOR) 40 MG tablet Take 1 tablet by mouth daily 90 tablet 3    sodium bicarbonate 650 MG tablet Take 1,300 mg by mouth 2 times daily      calcitRIOL (ROCALTROL) 0.25 MCG capsule Take 0.25 mcg by mouth daily      doxazosin (CARDURA) 4 MG tablet Take 1 tablet by mouth in the morning and at bedtime 180 tablet 3    insulin glargine, 1 unit dial, (TOUJEO SOLOSTAR) 300 UNIT/ML concentrated injection pen Inject 20 Units into the skin every morning      Lancets MISC Check BS BID      carvedilol (COREG) 25 MG tablet Take 25 mg by mouth 2 times daily      furosemide (LASIX) 40 MG tablet Take 80 mg by mouth daily      glipiZIDE (GLUCOTROL) 10 MG tablet Take 10 mg by mouth daily       No current facility-administered medications for this visit.      Allergies   Allergen Reactions    Prednisone Other (See Comments)     Elevated blood sugar      Past Medical History:   Diagnosis Date    DM (diabetes mellitus) ([facility] 2/19/2014    HTN (hypertension) 2/19/2014    Hyperlipemia 2/19/2014    Hyperparathyroidism ([address] 8/26/2014     Family History   Problem Relation Age of Onset    Elevated Lipids Other     Hypertension Other     Diabetes Other     Heart Disease Other      Past Surgical History:   Procedure Laterality Date    HEENT  11/15    partial parathyroidectomy    PARATHYROIDECTOMY  11/11/2015    REFRACTIVE SURGERY  2011     Social History     Tobacco Use    Smoking status: Never    Smokeless tobacco: Never   Substance Use Topics    Alcohol use: No        Review of Systems  Constitutional: Negative for fever and chills. HENT: Negative for congestion and sore throat. Skin: Negative for rash and itching. Eyes: Negative for blurred vision and double vision. Respiratory: Negative for cough and shortness of breath. Cardiovascular: Negative for chest pain, palpitations, claudication and leg swelling. Gastrointestinal: Negative for nausea, vomiting and abdominal pain. Genitourinary: Negative for dysuria, hematuria and flank pain. Musculoskeletal: Negative for joint pain and falls. Neurological: Negative for dizziness, sensory change, focal weakness, loss of consciousness and headaches. PHYSICAL EXAM   [unfilled]     Constitutional: she appears well-developed. No distress. HENT: Hearing intact. Head: Atraumatic. Eyes: Pupils are equal, round, and reactive to light. Neck: Normal range of motion. Cardiovascular: Regular rhythm. Pulmonary/Chest: Effort normal and breath sounds normal. No respiratory distress. Abdominal: Soft. Bowel sounds are normal. she exhibits no distension. There is no tenderness. There is no guarding. No hernia. Musculoskeletal: Normal range of motion. Neurological: She is alert. CN II- XII grossly intact  Skin: Warm and dry. Vascular: Palpable brachial pulse      Imaging Results  Left cephalic vein measuring 0.  0.4 mm at the [facility] fossa    ASSESSMENT AND PLAN   Ms. Tiana Alford is a 78y.o. year old female end-stage renal disease  Long-term dialysis we will schedule patient for left brachiocephalic fistula placement. I discussed with the patient the risks and benefits of either. We will schedule patient in near future. Patient is aware of no blood draws or IV sticks in the left arm    Elements of this note have been dictated using speech recognition software. As a result, errors of speech recognition may have occurred.    50 minutes of time was spent on this consult with greater than 50% of time spent face-to-face with the patient discussing the disease process, education and treatment options.

## 2023-03-06 ENCOUNTER — PREP FOR PROCEDURE (OUTPATIENT)
Dept: VASCULAR SURGERY | Age: 78
End: 2023-03-06

## 2023-03-06 PROBLEM — N18.6 ESRD (END STAGE RENAL DISEASE) (HCC): Status: ACTIVE | Noted: 2023-03-06

## 2023-03-07 NOTE — PRE-PROCEDURE INSTRUCTIONS
Patient verified name and . Order for consent was not found in EHR ; patient verifies procedure. Type IB surgery, phone assessment complete. Orders were not received. Labs per surgeon: none received. Labs per anesthesia protocol: POC Hgb, Glucose, and Potassium. Patient answered medical/surgical history questions at their best of ability. All prior to admission medications documented in EPIC. Patient instructed to take the following medications the day of surgery according to anesthesia guidelines with a small sip of water: Levothyroxine, Carvedilol, Doxazosin. On the day before surgery please take Acetaminophen 1000mg in the morning and then again before bed. You may substitute for Tylenol 650 mg. Hold all vitamins 7 days prior to surgery and NSAIDS 5 days prior to surgery. Prescription meds to hold:Glipizide, sodium bicarbonate, Olmesartan, Furosemide, Calcitriol. Patient instructed to take 1/2 of her Toujeo Insulin the morning of the procedure, 10 units and how to treat hypoglycemia while fasting. Patient verbalized understanding. Per anesthesia protocol: If you feel symptoms of low blood sugar while fasting, check level. If low, drink only 4 ounces of a clear, sugary liquid and call pre-op at [phone]. Patient instructed on the following:    > Arrive at [facility], time of arrival to be called the day before by 1700  > NPO after midnight, unless otherwise indicated, including gum, mints, and ice chips  > Responsible adult must drive patient to the hospital, stay during surgery, and patient will need supervision 24 hours after anesthesia  > Use antibacterial soap in shower the night before surgery and on the morning of surgery  > All piercings must be removed prior to arrival.    > Leave all valuables (money and jewelry) at home but bring insurance card and ID on DOS.   > You may be required to pay a deductible or co-pay on the day of your procedure.  You can pre-pay by calling [phone]

## 2023-03-19 ENCOUNTER — ANESTHESIA EVENT (OUTPATIENT)
Dept: SURGERY | Age: 78
End: 2023-03-19
Payer: MEDICARE

## 2023-03-20 ENCOUNTER — HOSPITAL ENCOUNTER (OUTPATIENT)
Age: 78
Setting detail: OUTPATIENT SURGERY
Discharge: HOME | End: 2023-03-20
Attending: SURGERY | Admitting: SURGERY
Payer: MEDICARE

## 2023-03-20 ENCOUNTER — APPOINTMENT (OUTPATIENT)
Dept: ULTRASOUND IMAGING | Age: 78
End: 2023-03-20
Attending: SURGERY
Payer: MEDICARE

## 2023-03-20 ENCOUNTER — ANESTHESIA (OUTPATIENT)
Dept: SURGERY | Age: 78
End: 2023-03-20
Payer: MEDICARE

## 2023-03-20 VITALS
HEIGHT: 65 IN | BODY MASS INDEX: 26.39 KG/M2 | HEART RATE: 70 BPM | SYSTOLIC BLOOD PRESSURE: 161 MMHG | TEMPERATURE: 97.8 F | RESPIRATION RATE: 18 BRPM | DIASTOLIC BLOOD PRESSURE: 75 MMHG | OXYGEN SATURATION: 97 % | WEIGHT: 158.38 LBS

## 2023-03-20 DIAGNOSIS — N18.6 ESRD (END STAGE RENAL DISEASE) (HCC): ICD-10-CM

## 2023-03-20 DIAGNOSIS — N18.6 ESRD (END STAGE RENAL DISEASE) (HCC): Primary | ICD-10-CM

## 2023-03-20 LAB
GLUCOSE BLD STRIP.AUTO-MCNC: 74 MG/DL (ref 65–100)
GLUCOSE BLD STRIP.AUTO-MCNC: 84 MG/DL (ref 65–100)
HGB BLD-MCNC: 8.7 G/DL (ref 11.7–15.4)
POTASSIUM BLD-SCNC: 4.8 MMOL/L (ref 3.5–5.1)
SERVICE CMNT-IMP: NORMAL
SERVICE CMNT-IMP: NORMAL

## 2023-03-20 PROCEDURE — 3600000013 HC SURGERY LEVEL 3 ADDTL 15MIN: Performed by: SURGERY

## 2023-03-20 PROCEDURE — 7100000000 HC PACU RECOVERY - FIRST 15 MIN: Performed by: SURGERY

## 2023-03-20 PROCEDURE — 6370000000 HC RX 637 (ALT 250 FOR IP): Performed by: ANESTHESIOLOGY

## 2023-03-20 PROCEDURE — 2500000003 HC RX 250 WO HCPCS: Performed by: SURGERY

## 2023-03-20 PROCEDURE — 2580000003 HC RX 258: Performed by: ANESTHESIOLOGY

## 2023-03-20 PROCEDURE — 3700000000 HC ANESTHESIA ATTENDED CARE: Performed by: SURGERY

## 2023-03-20 PROCEDURE — 6360000002 HC RX W HCPCS: Performed by: NURSE ANESTHETIST, CERTIFIED REGISTERED

## 2023-03-20 PROCEDURE — 85018 HEMOGLOBIN: CPT

## 2023-03-20 PROCEDURE — 3700000001 HC ADD 15 MINUTES (ANESTHESIA): Performed by: SURGERY

## 2023-03-20 PROCEDURE — 6360000002 HC RX W HCPCS: Performed by: SURGERY

## 2023-03-20 PROCEDURE — 36821 AV FUSION DIRECT ANY SITE: CPT | Performed by: SURGERY

## 2023-03-20 PROCEDURE — 3600000003 HC SURGERY LEVEL 3 BASE: Performed by: SURGERY

## 2023-03-20 PROCEDURE — 7100000011 HC PHASE II RECOVERY - ADDTL 15 MIN: Performed by: SURGERY

## 2023-03-20 PROCEDURE — 84132 ASSAY OF SERUM POTASSIUM: CPT

## 2023-03-20 PROCEDURE — 82962 GLUCOSE BLOOD TEST: CPT

## 2023-03-20 PROCEDURE — 2709999900 HC NON-CHARGEABLE SUPPLY: Performed by: SURGERY

## 2023-03-20 PROCEDURE — 2580000003 HC RX 258: Performed by: NURSE ANESTHETIST, CERTIFIED REGISTERED

## 2023-03-20 PROCEDURE — 7100000010 HC PHASE II RECOVERY - FIRST 15 MIN: Performed by: SURGERY

## 2023-03-20 PROCEDURE — 7100000001 HC PACU RECOVERY - ADDTL 15 MIN: Performed by: SURGERY

## 2023-03-20 PROCEDURE — 2500000003 HC RX 250 WO HCPCS: Performed by: NURSE ANESTHETIST, CERTIFIED REGISTERED

## 2023-03-20 RX ORDER — HEPARIN SODIUM 1000 [USP'U]/ML
INJECTION, SOLUTION INTRAVENOUS; SUBCUTANEOUS PRN
Status: DISCONTINUED | OUTPATIENT
Start: 2023-03-20 | End: 2023-03-20 | Stop reason: SDUPTHER

## 2023-03-20 RX ORDER — SODIUM CHLORIDE 9 MG/ML
INJECTION, SOLUTION INTRAVENOUS PRN
Status: DISCONTINUED | OUTPATIENT
Start: 2023-03-20 | End: 2023-03-20 | Stop reason: HOSPADM

## 2023-03-20 RX ORDER — OXYCODONE HYDROCHLORIDE 5 MG/1
5 TABLET ORAL
Status: COMPLETED | OUTPATIENT
Start: 2023-03-20 | End: 2023-03-20

## 2023-03-20 RX ORDER — SODIUM CHLORIDE, SODIUM LACTATE, POTASSIUM CHLORIDE, CALCIUM CHLORIDE 600; 310; 30; 20 MG/100ML; MG/100ML; MG/100ML; MG/100ML
INJECTION, SOLUTION INTRAVENOUS CONTINUOUS
Status: DISCONTINUED | OUTPATIENT
Start: 2023-03-20 | End: 2023-03-20 | Stop reason: HOSPADM

## 2023-03-20 RX ORDER — HEPARIN SODIUM 5000 [USP'U]/ML
INJECTION, SOLUTION INTRAVENOUS; SUBCUTANEOUS PRN
Status: DISCONTINUED | OUTPATIENT
Start: 2023-03-20 | End: 2023-03-20 | Stop reason: HOSPADM

## 2023-03-20 RX ORDER — GLYCOPYRROLATE 0.2 MG/ML
INJECTION INTRAMUSCULAR; INTRAVENOUS PRN
Status: DISCONTINUED | OUTPATIENT
Start: 2023-03-20 | End: 2023-03-20 | Stop reason: SDUPTHER

## 2023-03-20 RX ORDER — LIDOCAINE HYDROCHLORIDE 10 MG/ML
1 INJECTION, SOLUTION INFILTRATION; PERINEURAL
Status: DISCONTINUED | OUTPATIENT
Start: 2023-03-20 | End: 2023-03-20 | Stop reason: HOSPADM

## 2023-03-20 RX ORDER — BUPIVACAINE HYDROCHLORIDE 2.5 MG/ML
INJECTION, SOLUTION EPIDURAL; INFILTRATION; INTRACAUDAL PRN
Status: DISCONTINUED | OUTPATIENT
Start: 2023-03-20 | End: 2023-03-20 | Stop reason: HOSPADM

## 2023-03-20 RX ORDER — OXYCODONE HYDROCHLORIDE AND ACETAMINOPHEN 5; 325 MG/1; MG/1
1 TABLET ORAL EVERY 4 HOURS PRN
Qty: 30 TABLET | Refills: 0 | Status: SHIPPED | OUTPATIENT
Start: 2023-03-20 | End: 2023-03-25

## 2023-03-20 RX ORDER — DEXTROSE MONOHYDRATE 100 MG/ML
INJECTION, SOLUTION INTRAVENOUS CONTINUOUS PRN
Status: DISCONTINUED | OUTPATIENT
Start: 2023-03-20 | End: 2023-03-20 | Stop reason: HOSPADM

## 2023-03-20 RX ORDER — SODIUM CHLORIDE 0.9 % (FLUSH) 0.9 %
5-40 SYRINGE (ML) INJECTION PRN
Status: DISCONTINUED | OUTPATIENT
Start: 2023-03-20 | End: 2023-03-20 | Stop reason: HOSPADM

## 2023-03-20 RX ORDER — MIDAZOLAM HYDROCHLORIDE 2 MG/2ML
2 INJECTION, SOLUTION INTRAMUSCULAR; INTRAVENOUS
Status: DISCONTINUED | OUTPATIENT
Start: 2023-03-20 | End: 2023-03-20 | Stop reason: HOSPADM

## 2023-03-20 RX ORDER — PROPOFOL 10 MG/ML
INJECTION, EMULSION INTRAVENOUS PRN
Status: DISCONTINUED | OUTPATIENT
Start: 2023-03-20 | End: 2023-03-20 | Stop reason: SDUPTHER

## 2023-03-20 RX ORDER — LIDOCAINE HYDROCHLORIDE 20 MG/ML
INJECTION, SOLUTION EPIDURAL; INFILTRATION; INTRACAUDAL; PERINEURAL PRN
Status: DISCONTINUED | OUTPATIENT
Start: 2023-03-20 | End: 2023-03-20 | Stop reason: SDUPTHER

## 2023-03-20 RX ORDER — ACETAMINOPHEN 500 MG
1000 TABLET ORAL ONCE
Status: DISCONTINUED | OUTPATIENT
Start: 2023-03-20 | End: 2023-03-20 | Stop reason: HOSPADM

## 2023-03-20 RX ORDER — EPHEDRINE SULFATE/0.9% NACL/PF 50 MG/5 ML
SYRINGE (ML) INTRAVENOUS PRN
Status: DISCONTINUED | OUTPATIENT
Start: 2023-03-20 | End: 2023-03-20 | Stop reason: SDUPTHER

## 2023-03-20 RX ORDER — ONDANSETRON 2 MG/ML
4 INJECTION INTRAMUSCULAR; INTRAVENOUS
Status: DISCONTINUED | OUTPATIENT
Start: 2023-03-20 | End: 2023-03-20 | Stop reason: HOSPADM

## 2023-03-20 RX ORDER — HALOPERIDOL 5 MG/ML
1 INJECTION INTRAMUSCULAR
Status: DISCONTINUED | OUTPATIENT
Start: 2023-03-20 | End: 2023-03-20 | Stop reason: HOSPADM

## 2023-03-20 RX ORDER — PAPAVERINE HYDROCHLORIDE 30 MG/ML
INJECTION INTRAMUSCULAR; INTRAVENOUS PRN
Status: DISCONTINUED | OUTPATIENT
Start: 2023-03-20 | End: 2023-03-20 | Stop reason: HOSPADM

## 2023-03-20 RX ORDER — SODIUM CHLORIDE 0.9 % (FLUSH) 0.9 %
5-40 SYRINGE (ML) INJECTION EVERY 12 HOURS SCHEDULED
Status: DISCONTINUED | OUTPATIENT
Start: 2023-03-20 | End: 2023-03-20 | Stop reason: HOSPADM

## 2023-03-20 RX ORDER — ONDANSETRON 2 MG/ML
INJECTION INTRAMUSCULAR; INTRAVENOUS PRN
Status: DISCONTINUED | OUTPATIENT
Start: 2023-03-20 | End: 2023-03-20 | Stop reason: SDUPTHER

## 2023-03-20 RX ORDER — HYDROMORPHONE HYDROCHLORIDE 2 MG/ML
0.25 INJECTION, SOLUTION INTRAMUSCULAR; INTRAVENOUS; SUBCUTANEOUS EVERY 5 MIN PRN
Status: DISCONTINUED | OUTPATIENT
Start: 2023-03-20 | End: 2023-03-20 | Stop reason: HOSPADM

## 2023-03-20 RX ORDER — FENTANYL CITRATE 50 UG/ML
100 INJECTION, SOLUTION INTRAMUSCULAR; INTRAVENOUS
Status: DISCONTINUED | OUTPATIENT
Start: 2023-03-20 | End: 2023-03-20 | Stop reason: HOSPADM

## 2023-03-20 RX ORDER — SODIUM CHLORIDE 9 MG/ML
INJECTION, SOLUTION INTRAVENOUS CONTINUOUS PRN
Status: DISCONTINUED | OUTPATIENT
Start: 2023-03-20 | End: 2023-03-20 | Stop reason: SDUPTHER

## 2023-03-20 RX ORDER — FENTANYL CITRATE 50 UG/ML
INJECTION, SOLUTION INTRAMUSCULAR; INTRAVENOUS PRN
Status: DISCONTINUED | OUTPATIENT
Start: 2023-03-20 | End: 2023-03-20 | Stop reason: SDUPTHER

## 2023-03-20 RX ADMIN — OXYCODONE HYDROCHLORIDE 5 MG: 5 TABLET ORAL at 10:12

## 2023-03-20 RX ADMIN — ONDANSETRON 4 MG: 2 INJECTION INTRAMUSCULAR; INTRAVENOUS at 08:25

## 2023-03-20 RX ADMIN — Medication 5 MG: at 08:43

## 2023-03-20 RX ADMIN — PHENYLEPHRINE HYDROCHLORIDE 25 MCG/MIN: 10 INJECTION INTRAVENOUS at 08:57

## 2023-03-20 RX ADMIN — FENTANYL CITRATE 25 MCG: 50 INJECTION, SOLUTION INTRAMUSCULAR; INTRAVENOUS at 08:20

## 2023-03-20 RX ADMIN — FENTANYL CITRATE 25 MCG: 50 INJECTION, SOLUTION INTRAMUSCULAR; INTRAVENOUS at 08:27

## 2023-03-20 RX ADMIN — HEPARIN SODIUM 5000 UNITS: 1000 INJECTION INTRAVENOUS; SUBCUTANEOUS at 08:51

## 2023-03-20 RX ADMIN — LIDOCAINE HYDROCHLORIDE 60 MG: 20 INJECTION, SOLUTION EPIDURAL; INFILTRATION; INTRACAUDAL; PERINEURAL at 08:20

## 2023-03-20 RX ADMIN — FENTANYL CITRATE 25 MCG: 50 INJECTION, SOLUTION INTRAMUSCULAR; INTRAVENOUS at 08:36

## 2023-03-20 RX ADMIN — Medication 2 G: at 08:29

## 2023-03-20 RX ADMIN — SODIUM CHLORIDE: 9 INJECTION, SOLUTION INTRAVENOUS at 07:38

## 2023-03-20 RX ADMIN — PROPOFOL 150 MG: 10 INJECTION, EMULSION INTRAVENOUS at 08:20

## 2023-03-20 RX ADMIN — GLYCOPYRROLATE 0.1 MG: 0.2 INJECTION INTRAMUSCULAR; INTRAVENOUS at 08:43

## 2023-03-20 RX ADMIN — PHENYLEPHRINE HYDROCHLORIDE 50 MCG: 0.1 INJECTION, SOLUTION INTRAVENOUS at 08:51

## 2023-03-20 RX ADMIN — SODIUM CHLORIDE: 900 INJECTION INTRAVENOUS at 08:10

## 2023-03-20 ASSESSMENT — PAIN DESCRIPTION - DESCRIPTORS
DESCRIPTORS: SORE
DESCRIPTORS: SORE

## 2023-03-20 ASSESSMENT — PAIN DESCRIPTION - ORIENTATION: ORIENTATION: LEFT;UPPER

## 2023-03-20 ASSESSMENT — PAIN DESCRIPTION - LOCATION: LOCATION: ARM

## 2023-03-20 ASSESSMENT — PAIN - FUNCTIONAL ASSESSMENT
PAIN_FUNCTIONAL_ASSESSMENT: NONE - DENIES PAIN
PAIN_FUNCTIONAL_ASSESSMENT: 0-10

## 2023-03-20 ASSESSMENT — PAIN SCALES - GENERAL: PAINLEVEL_OUTOF10: 2

## 2023-03-20 NOTE — ANESTHESIA PROCEDURE NOTES
Airway  Date/Time: 3/20/2023 8:23 AM  Urgency: elective    Airway not difficult    General Information and Staff    Patient location during procedure: OR  Resident/CRNA: MATTHEW Camacho CRNA  Performed: resident/CRNA     Indications and Patient Condition  Indications for airway management: anesthesia  Spontaneous Ventilation: absent  Sedation level: deep  Preoxygenated: yes  Patient position: sniffing  Mask difficulty assessment: vent by bag mask    Final Airway Details  Final airway type: supraglottic airway      Successful airway: oropharyngeal  Size 4     Number of attempts at approach: 1  Ventilation between attempts: supraglottic airway  Number of other approaches attempted: 0    Additional Comments  Atraumatic.  Oral structures and dentition unchanged

## 2023-03-20 NOTE — ANESTHESIA POSTPROCEDURE EVALUATION
Department of Anesthesiology  Postprocedure Note    Patient: Basilio Bo  MRN: [id number]  YOB: 1945  Date of evaluation: 3/20/2023      Procedure Summary     Date: 03/20/23 Room / Location: [facility] MAIN OR  / [facility] MAIN OR    Anesthesia Start: 0810 Anesthesia Stop: 4510    Procedure: LEFT AV FISTULA CREATION (Left: Arm Lower) Diagnosis:       ESRD (end stage renal disease) ([facility]      (ESRD (end stage renal disease) ([facility] [N18.6])    Providers: Columba Betts MD Responsible Provider: Jassi Patel MD    Anesthesia Type: General ASA Status: 4          Anesthesia Type: General    Milton Phase I: Milton Score: 10    Milton Phase II: Milton Score: 10      Anesthesia Post Evaluation    Patient location during evaluation: PACU  Patient participation: complete - patient participated  Level of consciousness: awake and alert  Airway patency: patent  Nausea: well controlled. Complications: no  Cardiovascular status: acceptable.   Respiratory status: acceptable  Hydration status: stable

## 2023-03-20 NOTE — PROGRESS NOTES
Patient status post creation of left brachycephalic fistula. Patient will keep dressing on until tomorrow. Instructed family do not get incision wet until Thursday. Pain medication sent to her pharmacy. She will follow-up in the office in 2 weeks for postop visit.     Mariella Garcia

## 2023-03-20 NOTE — DISCHARGE INSTRUCTIONS
please go to the nearest ED. If you have sleep apnea and have a CPAP machine, please use it for all naps and sleeping. Please use caution when taking narcotics and any of your home medications that may cause drowsiness. *  Please give a list of your current medications to your Primary Care Provider. *  Please update this list whenever your medications are discontinued, doses are      changed, or new medications (including over-the-counter products) are added. *  Please carry medication information at all times in case of emergency situations. These are general instructions for a healthy lifestyle:  No smoking/ No tobacco products/ Avoid exposure to second hand smoke  Surgeon General's Warning:  Quitting smoking now greatly reduces serious risk to your health. Obesity, smoking, and sedentary lifestyle greatly increases your risk for illness  A healthy diet, regular physical exercise & weight monitoring are important for maintaining a healthy lifestyle    You may be retaining fluid if you have a history of heart failure or if you experience any of the following symptoms:  Weight gain of 3 pounds or more overnight or 5 pounds in a week, increased swelling in our hands or feet or shortness of breath while lying flat in bed. Please call your doctor as soon as you notice any of these symptoms; do not wait until your next office visit.

## 2023-03-20 NOTE — BRIEF OP NOTE
[address]   [address] [facility]. [address]  [phone] FAX: [phone]    Brief Op Note Template Note    Pre-Op Diagnosis: ESRD (end stage renal disease) ([facility] [N18.6]    Post-Op Diagnosis:  * No post-op diagnosis entered *    Procedures: Procedure(s):  LEFT AV FISTULA CREATION    Surgeon: Owen Dougherty MD    Assistants: Surgeon(s):  Daniella Diego MD      Anesthesia:  General     Findings: Creation of left brachiocephalic fistula    Tourniquet Time:  * No tourniquets in log *    Estimated Blood Loss:               Specimens:            Implants:  * No implants in log *    Complications: None               Signed: Owen Dougherty MD      Elements of this note have been dictated using speech recognition software. As a result, errors of speech recognition may have occurred.

## 2023-03-20 NOTE — ANESTHESIA PRE PROCEDURE
Department of Anesthesiology  Preprocedure Note       Name:  Ina Lee   Age:  78 y.o.  :  1945                                          MRN:  [id number]         Date:  3/20/2023      Surgeon: Jocelynn Pinzon):  Kacy Retana MD    Procedure: Procedure(s):  LEFT AV FISTULA CREATION    Medications prior to admission:   Prior to Admission medications    Medication Sig Start Date End Date Taking?  Authorizing Provider   Carboxymethylcellul-Glycerin 0.5-0.9 % SOLN Apply to eye as needed   Yes Historical Provider, MD   amLODIPine (NORVASC) 10 MG tablet Take 1 tablet by mouth daily TAKE 1 TABLET BY MOUTH DAILY  Patient taking differently: Take 10 mg by mouth at bedtime TAKE 1 TABLET BY MOUTH DAILY 23   Lia Mohs, MD   levothyroxine (SYNTHROID) 75 MCG tablet Take 1 tablet by mouth Daily 23   Lia Mohs, MD   olmesartan (BENICAR) 40 MG tablet TAKE 1 TABLET BY MOUTH DAILY 23   Lia Mohs, MD   atorvastatin (LIPITOR) 40 MG tablet Take 1 tablet by mouth daily  Patient taking differently: Take 40 mg by mouth at bedtime 23   Lia Mohs, MD   sodium bicarbonate 650 MG tablet Take 1,300 mg by mouth 2 times daily 10/21/22   Historical Provider, MD   calcitRIOL (ROCALTROL) 0.25 MCG capsule Take 0.25 mcg by mouth daily 10/21/22   Historical Provider, MD   doxazosin (CARDURA) 4 MG tablet Take 1 tablet by mouth in the morning and at bedtime 22   Chao Burton MD   insulin glargine, 1 unit dial, (TOUJEO SOLOSTAR) 300 UNIT/ML concentrated injection pen Inject 20 Units into the skin every morning 22  Historical Provider, MD   Lancets MISC Check BS BID 14   Ar Automatic Reconciliation   carvedilol (COREG) 25 MG tablet Take 25 mg by mouth 2 times daily 22   Ar Automatic Reconciliation   furosemide (LASIX) 40 MG tablet Take 80 mg by mouth daily 21   Ar Automatic Reconciliation   glipiZIDE (GLUCOTROL) 10 MG tablet Take 10 mg by mouth daily 22   Ar

## 2023-03-21 NOTE — OP NOTE
[facility]  OPERATIVE REPORT    Name:  GARRY GRANDE  MR#:  [id number]  :  1945  ACCOUNT #:  [id number]  DATE OF SERVICE:  2023    CLINICAL SERVICE:  Vascular Surgery.    PREOPERATIVE DIAGNOSIS:  End-stage renal disease needing long-term dialysis.    POSTOPERATIVE DIAGNOSIS:  End-stage renal disease needing long-term dialysis.    PROCEDURE PERFORMED:  Creation of left brachiocephalic fistula.    SURGEON:  Inder Rutledge MD    ASSISTANT:      ANESTHESIA:  General.    COMPLICATIONS:  None.    SPECIMENS REMOVED:  None.    IMPLANTS:      ESTIMATED BLOOD LOSS:  .    PROCEDURE:  After getting the informed consent, the patient was brought to the operating room.  Anesthesia was then induced.  Preop antibiotics were given before skin incision.  The patient's left arm was then prepped and draped in normal sterile fashion.  We made an incision just above antecubital fossa.  We dissected down through the subcutaneous tissue where the cephalic vein was identified  medial cubital vein.  We dissected  circumferentially.  It was measuring about 3 mm to 4 cm.  We got control of it and suture ligated with a 2-0 silk tie and a popoff.  We then dissected the brachial artery underneath the fascia.  It was measuring about 5 m to 6 mm.  I gave 5000 heparin.  I was able to get control.  An 11-blade was used to do arteriotomy, extended with Mark scissors.  We spatulated the vein and did an end-to-side anastomosis using a 6-0 Prolene.  Postprocedure, I had a good Doppler thrill of the graft.  We then held pressure for 5 minutes and closed with 2-0 Vicryl, 3-0 Vicryl, and 4-0 Monocryl.  The patient was extubated and taken to the PACU in stable condition.      INDER RUTLEDGE MD      [email or web address]  D:  2023 9:40  T:  2023 20:12  JOB #:  [id number]

## 2023-04-04 ENCOUNTER — OFFICE VISIT (OUTPATIENT)
Dept: VASCULAR SURGERY | Age: 78
End: 2023-04-04
Payer: MEDICARE

## 2023-04-04 VITALS
BODY MASS INDEX: 26.33 KG/M2 | HEART RATE: 69 BPM | SYSTOLIC BLOOD PRESSURE: 143 MMHG | OXYGEN SATURATION: 97 % | HEIGHT: 65 IN | DIASTOLIC BLOOD PRESSURE: 73 MMHG | WEIGHT: 158 LBS

## 2023-04-04 DIAGNOSIS — N18.6 ESRD (END STAGE RENAL DISEASE) ON DIALYSIS (HCC): Primary | ICD-10-CM

## 2023-04-04 DIAGNOSIS — Z99.2 ESRD (END STAGE RENAL DISEASE) ON DIALYSIS (HCC): Primary | ICD-10-CM

## 2023-04-04 PROCEDURE — 1123F ACP DISCUSS/DSCN MKR DOCD: CPT | Performed by: SURGERY

## 2023-04-04 PROCEDURE — 99213 OFFICE O/P EST LOW 20 MIN: CPT | Performed by: SURGERY

## 2023-04-04 PROCEDURE — 3077F SYST BP >= 140 MM HG: CPT | Performed by: SURGERY

## 2023-04-04 PROCEDURE — 3017F COLORECTAL CA SCREEN DOC REV: CPT | Performed by: SURGERY

## 2023-04-04 PROCEDURE — G8427 DOCREV CUR MEDS BY ELIG CLIN: HCPCS | Performed by: SURGERY

## 2023-04-04 PROCEDURE — G8400 PT W/DXA NO RESULTS DOC: HCPCS | Performed by: SURGERY

## 2023-04-04 PROCEDURE — 3078F DIAST BP <80 MM HG: CPT | Performed by: SURGERY

## 2023-04-04 PROCEDURE — 1090F PRES/ABSN URINE INCON ASSESS: CPT | Performed by: SURGERY

## 2023-04-04 PROCEDURE — 1036F TOBACCO NON-USER: CPT | Performed by: SURGERY

## 2023-04-04 PROCEDURE — G8417 CALC BMI ABV UP PARAM F/U: HCPCS | Performed by: SURGERY

## 2023-04-04 RX ORDER — HYDRALAZINE HYDROCHLORIDE 50 MG/1
50 TABLET, FILM COATED ORAL 3 TIMES DAILY
COMMUNITY
Start: 2023-03-30

## 2023-04-04 NOTE — PROGRESS NOTES
[address]   [address] [facility] FAX: [phone]    Adora Crigler  : 1945    Chief Complaint:     History of Present Illness:   Patient follows up today for follow-up status post recent left radiocephalic fistula. CURRENT MEDICATIONS:  Current Outpatient Medications   Medication Sig Dispense Refill    hydrALAZINE (APRESOLINE) 50 MG tablet Take 1 tablet by mouth 3 times daily      Carboxymethylcellul-Glycerin 0.5-0.9 % SOLN Apply to eye as needed      amLODIPine (NORVASC) 10 MG tablet Take 1 tablet by mouth daily TAKE 1 TABLET BY MOUTH DAILY (Patient taking differently: Take 1 tablet by mouth at bedtime TAKE 1 TABLET BY MOUTH DAILY) 90 tablet 3    levothyroxine (SYNTHROID) 75 MCG tablet Take 1 tablet by mouth Daily 90 tablet 3    olmesartan (BENICAR) 40 MG tablet TAKE 1 TABLET BY MOUTH DAILY 90 tablet 3    atorvastatin (LIPITOR) 40 MG tablet Take 1 tablet by mouth daily (Patient taking differently: Take 1 tablet by mouth at bedtime) 90 tablet 3    sodium bicarbonate 650 MG tablet Take 2 tablets by mouth 2 times daily      calcitRIOL (ROCALTROL) 0.25 MCG capsule Take 1 capsule by mouth daily      doxazosin (CARDURA) 4 MG tablet Take 1 tablet by mouth in the morning and at bedtime 180 tablet 3    insulin glargine, 1 unit dial, (TOUJEO SOLOSTAR) 300 UNIT/ML concentrated injection pen Inject 20 Units into the skin every morning      Lancets MISC Check BS BID      carvedilol (COREG) 25 MG tablet Take 1 tablet by mouth 2 times daily      furosemide (LASIX) 40 MG tablet Take 2 tablets by mouth daily      glipiZIDE (GLUCOTROL) 10 MG tablet Take 1 tablet by mouth daily       No current facility-administered medications for this visit.        Past Medical History:   Diagnosis Date    Anemia     DM (diabetes mellitus) ([facility] [address] 2014    am BS     [facility]     HTN (hypertension) 2014    Hyperlipemia 2014    Hyperparathyroidism

## 2023-05-02 ENCOUNTER — OFFICE VISIT (OUTPATIENT)
Dept: VASCULAR SURGERY | Age: 78
End: 2023-05-02
Payer: MEDICARE

## 2023-05-02 VITALS
BODY MASS INDEX: 29.02 KG/M2 | WEIGHT: 170 LBS | HEART RATE: 79 BPM | DIASTOLIC BLOOD PRESSURE: 54 MMHG | SYSTOLIC BLOOD PRESSURE: 139 MMHG | HEIGHT: 64 IN | OXYGEN SATURATION: 94 %

## 2023-05-02 DIAGNOSIS — I73.9 PVD (PERIPHERAL VASCULAR DISEASE) (HCC): Primary | ICD-10-CM

## 2023-05-02 PROCEDURE — G8417 CALC BMI ABV UP PARAM F/U: HCPCS | Performed by: SURGERY

## 2023-05-02 PROCEDURE — 3075F SYST BP GE 130 - 139MM HG: CPT | Performed by: SURGERY

## 2023-05-02 PROCEDURE — 1090F PRES/ABSN URINE INCON ASSESS: CPT | Performed by: SURGERY

## 2023-05-02 PROCEDURE — 3078F DIAST BP <80 MM HG: CPT | Performed by: SURGERY

## 2023-05-02 PROCEDURE — 1123F ACP DISCUSS/DSCN MKR DOCD: CPT | Performed by: SURGERY

## 2023-05-02 PROCEDURE — 99213 OFFICE O/P EST LOW 20 MIN: CPT | Performed by: SURGERY

## 2023-05-02 PROCEDURE — G8427 DOCREV CUR MEDS BY ELIG CLIN: HCPCS | Performed by: SURGERY

## 2023-05-02 PROCEDURE — 1036F TOBACCO NON-USER: CPT | Performed by: SURGERY

## 2023-05-02 PROCEDURE — G8400 PT W/DXA NO RESULTS DOC: HCPCS | Performed by: SURGERY

## 2023-05-02 NOTE — PROGRESS NOTES
[address]   [address] [facility] FAX: [phone]    Sam Guerra  : 1945    Chief Complaint:     History of Present Illness:   Patient follows up today for follow-up status post creation of right radiocephalic fistula. Patient currently not on dialysis. CURRENT MEDICATIONS:  Current Outpatient Medications   Medication Sig Dispense Refill    hydrALAZINE (APRESOLINE) 50 MG tablet Take 1 tablet by mouth 3 times daily      amLODIPine (NORVASC) 10 MG tablet Take 1 tablet by mouth daily TAKE 1 TABLET BY MOUTH DAILY (Patient taking differently: Take 1 tablet by mouth at bedtime TAKE 1 TABLET BY MOUTH DAILY) 90 tablet 3    levothyroxine (SYNTHROID) 75 MCG tablet Take 1 tablet by mouth Daily 90 tablet 3    olmesartan (BENICAR) 40 MG tablet TAKE 1 TABLET BY MOUTH DAILY 90 tablet 3    atorvastatin (LIPITOR) 40 MG tablet Take 1 tablet by mouth daily (Patient taking differently: Take 1 tablet by mouth at bedtime) 90 tablet 3    sodium bicarbonate 650 MG tablet Take 2 tablets by mouth 2 times daily      calcitRIOL (ROCALTROL) 0.25 MCG capsule Take 1 capsule by mouth daily      doxazosin (CARDURA) 4 MG tablet Take 1 tablet by mouth in the morning and at bedtime 180 tablet 3    insulin glargine, 1 unit dial, (TOUJEO SOLOSTAR) 300 UNIT/ML concentrated injection pen Inject 20 Units into the skin every morning      carvedilol (COREG) 25 MG tablet Take 1 tablet by mouth 2 times daily      furosemide (LASIX) 40 MG tablet Take 2 tablets by mouth daily      glipiZIDE (GLUCOTROL) 10 MG tablet Take 1 tablet by mouth daily      Carboxymethylcellul-Glycerin 0.5-0.9 % SOLN Apply to eye as needed      Lancets MISC Check BS BID       No current facility-administered medications for this visit.        Past Medical History:   Diagnosis Date    Anemia     DM (diabetes mellitus) ([facility] [address] 2014    am BS     End stage kidney disease ([facility] [address]     HTN (hypertension) 2014

## 2023-06-06 ENCOUNTER — PREP FOR PROCEDURE (OUTPATIENT)
Dept: VASCULAR SURGERY | Age: 78
End: 2023-06-06

## 2023-06-06 PROBLEM — I73.9 PVD (PERIPHERAL VASCULAR DISEASE) (HCC): Status: ACTIVE | Noted: 2023-06-06

## 2023-06-06 RX ORDER — CARVEDILOL 25 MG/1
25 TABLET ORAL 2 TIMES DAILY
Qty: 180 TABLET | Refills: 3 | Status: SHIPPED | OUTPATIENT
Start: 2023-06-06

## 2023-06-08 ENCOUNTER — NURSE ONLY (OUTPATIENT)
Dept: FAMILY MEDICINE CLINIC | Facility: CLINIC | Age: 78
End: 2023-06-08

## 2023-06-08 ENCOUNTER — OFFICE VISIT (OUTPATIENT)
Dept: FAMILY MEDICINE CLINIC | Facility: CLINIC | Age: 78
End: 2023-06-08
Payer: MEDICARE

## 2023-06-08 VITALS
HEIGHT: 64 IN | BODY MASS INDEX: 27.66 KG/M2 | HEART RATE: 74 BPM | SYSTOLIC BLOOD PRESSURE: 132 MMHG | OXYGEN SATURATION: 98 % | DIASTOLIC BLOOD PRESSURE: 68 MMHG | WEIGHT: 162 LBS | TEMPERATURE: 97.3 F

## 2023-06-08 DIAGNOSIS — E78.2 MODERATE MIXED HYPERLIPIDEMIA NOT REQUIRING STATIN THERAPY: ICD-10-CM

## 2023-06-08 DIAGNOSIS — Z00.00 MEDICARE ANNUAL WELLNESS VISIT, SUBSEQUENT: ICD-10-CM

## 2023-06-08 DIAGNOSIS — N18.5 CHRONIC KIDNEY DISEASE (CKD), STAGE V (HCC): ICD-10-CM

## 2023-06-08 DIAGNOSIS — I10 ESSENTIAL (PRIMARY) HYPERTENSION: ICD-10-CM

## 2023-06-08 DIAGNOSIS — N18.4 CKD (CHRONIC KIDNEY DISEASE) STAGE 4, GFR 15-29 ML/MIN (HCC): ICD-10-CM

## 2023-06-08 DIAGNOSIS — Z00.00 MEDICARE ANNUAL WELLNESS VISIT, SUBSEQUENT: Primary | ICD-10-CM

## 2023-06-08 DIAGNOSIS — Z12.31 SCREENING MAMMOGRAM FOR BREAST CANCER: ICD-10-CM

## 2023-06-08 DIAGNOSIS — E03.9 HYPOTHYROIDISM, UNSPECIFIED TYPE: ICD-10-CM

## 2023-06-08 DIAGNOSIS — E11.69 TYPE 2 DIABETES MELLITUS WITH OTHER SPECIFIED COMPLICATION, WITHOUT LONG-TERM CURRENT USE OF INSULIN (HCC): ICD-10-CM

## 2023-06-08 DIAGNOSIS — E03.4 ATROPHY OF THYROID (ACQUIRED): ICD-10-CM

## 2023-06-08 DIAGNOSIS — Z12.11 SCREENING FOR COLON CANCER: Primary | ICD-10-CM

## 2023-06-08 DIAGNOSIS — E55.9 VITAMIN D DEFICIENCY: ICD-10-CM

## 2023-06-08 LAB
25(OH)D3 SERPL-MCNC: 15.5 NG/ML (ref 30–100)
ALBUMIN SERPL-MCNC: 2.9 G/DL (ref 3.2–4.6)
ALBUMIN/GLOB SERPL: 0.6 (ref 0.4–1.6)
ALP SERPL-CCNC: 214 U/L (ref 50–136)
ALT SERPL-CCNC: 50 U/L (ref 12–65)
ANION GAP SERPL CALC-SCNC: 6 MMOL/L (ref 2–11)
AST SERPL-CCNC: 41 U/L (ref 15–37)
BASOPHILS # BLD: 0 K/UL (ref 0–0.2)
BASOPHILS NFR BLD: 1 % (ref 0–2)
BILIRUB SERPL-MCNC: 0.2 MG/DL (ref 0.2–1.1)
BUN SERPL-MCNC: 56 MG/DL (ref 8–23)
CALCIUM SERPL-MCNC: 7.8 MG/DL (ref 8.3–10.4)
CHLORIDE SERPL-SCNC: 112 MMOL/L (ref 101–110)
CHOLEST SERPL-MCNC: 146 MG/DL
CO2 SERPL-SCNC: 21 MMOL/L (ref 21–32)
CREAT SERPL-MCNC: 6.8 MG/DL (ref 0.6–1)
DIFFERENTIAL METHOD BLD: ABNORMAL
EOSINOPHIL # BLD: 0.2 K/UL (ref 0–0.8)
EOSINOPHIL NFR BLD: 5 % (ref 0.5–7.8)
ERYTHROCYTE [DISTWIDTH] IN BLOOD BY AUTOMATED COUNT: 16.6 % (ref 11.9–14.6)
GLOBULIN SER CALC-MCNC: 4.5 G/DL (ref 2.8–4.5)
GLUCOSE SERPL-MCNC: 199 MG/DL (ref 65–100)
HCT VFR BLD AUTO: 23.3 % (ref 35.8–46.3)
HDLC SERPL-MCNC: 76 MG/DL (ref 40–60)
HDLC SERPL: 1.9
HGB BLD-MCNC: 7.1 G/DL (ref 11.7–15.4)
IMM GRANULOCYTES # BLD AUTO: 0 K/UL (ref 0–0.5)
IMM GRANULOCYTES NFR BLD AUTO: 0 % (ref 0–5)
LDLC SERPL CALC-MCNC: 43.8 MG/DL
LYMPHOCYTES # BLD: 0.8 K/UL (ref 0.5–4.6)
LYMPHOCYTES NFR BLD: 22 % (ref 13–44)
MCH RBC QN AUTO: 28.9 PG (ref 26.1–32.9)
MCHC RBC AUTO-ENTMCNC: 30.5 G/DL (ref 31.4–35)
MCV RBC AUTO: 94.7 FL (ref 82–102)
MONOCYTES # BLD: 0.4 K/UL (ref 0.1–1.3)
MONOCYTES NFR BLD: 10 % (ref 4–12)
NEUTS SEG # BLD: 2.3 K/UL (ref 1.7–8.2)
NEUTS SEG NFR BLD: 62 % (ref 43–78)
NRBC # BLD: 0 K/UL (ref 0–0.2)
PLATELET # BLD AUTO: 95 K/UL (ref 150–450)
PMV BLD AUTO: 13.1 FL (ref 9.4–12.3)
POTASSIUM SERPL-SCNC: 5.6 MMOL/L (ref 3.5–5.1)
PROT SERPL-MCNC: 7.4 G/DL (ref 6.3–8.2)
RBC # BLD AUTO: 2.46 M/UL (ref 4.05–5.2)
SODIUM SERPL-SCNC: 139 MMOL/L (ref 133–143)
TRIGL SERPL-MCNC: 131 MG/DL (ref 35–150)
TSH, 3RD GENERATION: 3.13 UIU/ML (ref 0.36–3.74)
VLDLC SERPL CALC-MCNC: 26.2 MG/DL (ref 6–23)
WBC # BLD AUTO: 3.7 K/UL (ref 4.3–11.1)

## 2023-06-08 PROCEDURE — 3074F SYST BP LT 130 MM HG: CPT | Performed by: FAMILY MEDICINE

## 2023-06-08 PROCEDURE — 3078F DIAST BP <80 MM HG: CPT | Performed by: FAMILY MEDICINE

## 2023-06-08 PROCEDURE — 3044F HG A1C LEVEL LT 7.0%: CPT | Performed by: FAMILY MEDICINE

## 2023-06-08 PROCEDURE — G8400 PT W/DXA NO RESULTS DOC: HCPCS | Performed by: FAMILY MEDICINE

## 2023-06-08 PROCEDURE — 1123F ACP DISCUSS/DSCN MKR DOCD: CPT | Performed by: FAMILY MEDICINE

## 2023-06-08 PROCEDURE — 99213 OFFICE O/P EST LOW 20 MIN: CPT | Performed by: FAMILY MEDICINE

## 2023-06-08 PROCEDURE — 1036F TOBACCO NON-USER: CPT | Performed by: FAMILY MEDICINE

## 2023-06-08 PROCEDURE — 1090F PRES/ABSN URINE INCON ASSESS: CPT | Performed by: FAMILY MEDICINE

## 2023-06-08 PROCEDURE — G8427 DOCREV CUR MEDS BY ELIG CLIN: HCPCS | Performed by: FAMILY MEDICINE

## 2023-06-08 PROCEDURE — G8417 CALC BMI ABV UP PARAM F/U: HCPCS | Performed by: FAMILY MEDICINE

## 2023-06-08 ASSESSMENT — PATIENT HEALTH QUESTIONNAIRE - PHQ9
1. LITTLE INTEREST OR PLEASURE IN DOING THINGS: 0
2. FEELING DOWN, DEPRESSED OR HOPELESS: 0
SUM OF ALL RESPONSES TO PHQ9 QUESTIONS 1 & 2: 0
SUM OF ALL RESPONSES TO PHQ QUESTIONS 1-9: 0

## 2023-06-08 ASSESSMENT — LIFESTYLE VARIABLES
HOW OFTEN DO YOU HAVE A DRINK CONTAINING ALCOHOL: NEVER
HOW MANY STANDARD DRINKS CONTAINING ALCOHOL DO YOU HAVE ON A TYPICAL DAY: PATIENT DOES NOT DRINK

## 2023-06-09 LAB
APPEARANCE UR: CLEAR
BACTERIA URNS QL MICRO: NEGATIVE /HPF
BILIRUB UR QL: NEGATIVE
CASTS URNS QL MICRO: ABNORMAL /LPF (ref 0–2)
COLOR UR: ABNORMAL
EPI CELLS #/AREA URNS HPF: ABNORMAL /HPF (ref 0–5)
EST. AVERAGE GLUCOSE BLD GHB EST-MCNC: 114 MG/DL
GLUCOSE UR STRIP.AUTO-MCNC: NEGATIVE MG/DL
HBA1C MFR BLD: 5.6 % (ref 4.8–5.6)
HGB UR QL STRIP: NEGATIVE
KETONES UR QL STRIP.AUTO: NEGATIVE MG/DL
LEUKOCYTE ESTERASE UR QL STRIP.AUTO: ABNORMAL
MUCOUS THREADS URNS QL MICRO: 0 /LPF
NITRITE UR QL STRIP.AUTO: NEGATIVE
PH UR STRIP: 6.5 (ref 5–9)
PROT UR STRIP-MCNC: 300 MG/DL
RBC #/AREA URNS HPF: ABNORMAL /HPF (ref 0–5)
SP GR UR REFRACTOMETRY: 1.01 (ref 1–1.02)
URINE CULTURE IF INDICATED: ABNORMAL
UROBILINOGEN UR QL STRIP.AUTO: 0.2 EU/DL (ref 0.2–1)
WBC URNS QL MICRO: ABNORMAL /HPF (ref 0–4)

## 2023-06-11 LAB
BACTERIA SPEC CULT: NORMAL
SERVICE CMNT-IMP: NORMAL

## 2023-06-13 ENCOUNTER — PREP FOR PROCEDURE (OUTPATIENT)
Dept: SURGERY | Age: 78
End: 2023-06-13

## 2023-06-13 PROBLEM — Z12.11 SPECIAL SCREENING FOR MALIGNANT NEOPLASMS, COLON: Status: ACTIVE | Noted: 2023-06-13

## 2023-06-19 ASSESSMENT — ENCOUNTER SYMPTOMS
GASTROINTESTINAL NEGATIVE: 1
EYES NEGATIVE: 1
RESPIRATORY NEGATIVE: 1

## 2023-06-19 NOTE — PROGRESS NOTES
file    Number of Places Lived in the Last Year: Not on file    Unstable Housing in the Last Year: No        Review of Systems   Constitutional: Negative. HENT: Negative. Eyes: Negative. Respiratory: Negative. Cardiovascular: Negative. Gastrointestinal: Negative. Endocrine: Negative. Genitourinary: Negative. Musculoskeletal: Negative. Skin: Negative. Neurological: Negative. Psychiatric/Behavioral: Negative. /68   Pulse 74   Temp 97.3 °F (36.3 °C) (Temporal)   Ht 5' 4\" (1.626 m)   Wt 162 lb (73.5 kg)   SpO2 98%   BMI 27.81 kg/m²      Physical Exam  Constitutional:       Appearance: Normal appearance. HENT:      Head: Normocephalic. Right Ear: Tympanic membrane, ear canal and external ear normal.      Left Ear: Tympanic membrane, ear canal and external ear normal.      Nose: Nose normal.      Mouth/Throat:      Mouth: Mucous membranes are moist.      Pharynx: Oropharynx is clear. Eyes:      General: No scleral icterus. Extraocular Movements: Extraocular movements intact. Conjunctiva/sclera: Conjunctivae normal.   Neck:      Vascular: No carotid bruit. Cardiovascular:      Rate and Rhythm: Normal rate and regular rhythm. Pulses: Normal pulses. Heart sounds: Normal heart sounds. Pulmonary:      Effort: Pulmonary effort is normal. No respiratory distress. Breath sounds: Normal breath sounds. No wheezing or rales. Abdominal:      General: Abdomen is flat. Bowel sounds are normal. There is no distension. Palpations: Abdomen is soft. There is no mass. Tenderness: There is no abdominal tenderness. Musculoskeletal:         General: Normal range of motion. Cervical back: Normal range of motion and neck supple. Skin:     General: Skin is warm and dry. Neurological:      General: No focal deficit present. Mental Status: She is alert and oriented to person, place, and time.    Psychiatric:         Mood and Affect:

## 2023-07-11 ENCOUNTER — OFFICE VISIT (OUTPATIENT)
Dept: VASCULAR SURGERY | Age: 78
End: 2023-07-11
Payer: MEDICARE

## 2023-07-11 VITALS
BODY MASS INDEX: 27.84 KG/M2 | OXYGEN SATURATION: 98 % | SYSTOLIC BLOOD PRESSURE: 151 MMHG | HEIGHT: 64 IN | HEART RATE: 73 BPM | DIASTOLIC BLOOD PRESSURE: 73 MMHG

## 2023-07-11 DIAGNOSIS — N18.6 ESRD (END STAGE RENAL DISEASE) ON DIALYSIS (HCC): Primary | ICD-10-CM

## 2023-07-11 DIAGNOSIS — Z99.2 ESRD (END STAGE RENAL DISEASE) ON DIALYSIS (HCC): Primary | ICD-10-CM

## 2023-07-11 PROCEDURE — 1123F ACP DISCUSS/DSCN MKR DOCD: CPT | Performed by: SURGERY

## 2023-07-11 PROCEDURE — G8417 CALC BMI ABV UP PARAM F/U: HCPCS | Performed by: SURGERY

## 2023-07-11 PROCEDURE — G8428 CUR MEDS NOT DOCUMENT: HCPCS | Performed by: SURGERY

## 2023-07-11 PROCEDURE — G8400 PT W/DXA NO RESULTS DOC: HCPCS | Performed by: SURGERY

## 2023-07-11 PROCEDURE — 3078F DIAST BP <80 MM HG: CPT | Performed by: SURGERY

## 2023-07-11 PROCEDURE — 1036F TOBACCO NON-USER: CPT | Performed by: SURGERY

## 2023-07-11 PROCEDURE — 99213 OFFICE O/P EST LOW 20 MIN: CPT | Performed by: SURGERY

## 2023-07-11 PROCEDURE — 1090F PRES/ABSN URINE INCON ASSESS: CPT | Performed by: SURGERY

## 2023-07-11 PROCEDURE — 3077F SYST BP >= 140 MM HG: CPT | Performed by: SURGERY

## 2023-07-11 NOTE — PROGRESS NOTES
[address]   [address] FAX: [phone]    Maria Elena Ortiz  : 1945    Chief Complaint:     History of Present Illness:   Patient follows up today for status post revision of left brachiocephalic fistula. Patient currently not on dialysis. CURRENT MEDICATIONS:  Current Outpatient Medications   Medication Sig Dispense Refill    Sodium Sulfate-Mag Sulfate-KCl 1051-330-703 MG TABS All clear liquids the day prior to procedure. Take Day 1 Dose 1 @ 5pm and Day 2 Dose 2 @ 8:30pm same day. 24 tablet 0    carvedilol (COREG) 25 MG tablet Take 1 tablet by mouth 2 times daily 180 tablet 3    hydrALAZINE (APRESOLINE) 50 MG tablet Take 1 tablet by mouth 3 times daily      Carboxymethylcellul-Glycerin 0.5-0.9 % SOLN Apply to eye as needed      amLODIPine (NORVASC) 10 MG tablet Take 1 tablet by mouth daily TAKE 1 TABLET BY MOUTH DAILY (Patient taking differently: Take 1 tablet by mouth at bedtime TAKE 1 TABLET BY MOUTH DAILY) 90 tablet 3    levothyroxine (SYNTHROID) 75 MCG tablet Take 1 tablet by mouth Daily 90 tablet 3    olmesartan (BENICAR) 40 MG tablet TAKE 1 TABLET BY MOUTH DAILY 90 tablet 3    atorvastatin (LIPITOR) 40 MG tablet Take 1 tablet by mouth daily (Patient taking differently: Take 1 tablet by mouth at bedtime) 90 tablet 3    sodium bicarbonate 650 MG tablet Take 2 tablets by mouth 2 times daily      calcitRIOL (ROCALTROL) 0.25 MCG capsule Take 1 capsule by mouth daily      doxazosin (CARDURA) 4 MG tablet Take 1 tablet by mouth in the morning and at bedtime 180 tablet 3    insulin glargine, 1 unit dial, (TOUJEO SOLOSTAR) 300 UNIT/ML concentrated injection pen Inject 20 Units into the skin every morning      Lancets MISC Check BS BID      furosemide (LASIX) 40 MG tablet Take 2 tablets by mouth daily      glipiZIDE (GLUCOTROL) 10 MG tablet Take 1 tablet by mouth daily       No current facility-administered medications for this visit.        Past Medical

## 2023-07-13 PROBLEM — Z12.11 SPECIAL SCREENING FOR MALIGNANT NEOPLASMS, COLON: Status: RESOLVED | Noted: 2023-06-13 | Resolved: 2023-07-13

## 2023-08-09 ENCOUNTER — TELEPHONE (OUTPATIENT)
Dept: SURGERY | Age: 78
End: 2023-08-09

## 2023-08-11 PROBLEM — Z12.11 SPECIAL SCREENING FOR MALIGNANT NEOPLASMS, COLON: Status: ACTIVE | Noted: 2023-06-13

## 2023-08-17 ENCOUNTER — OFFICE VISIT (OUTPATIENT)
Dept: FAMILY MEDICINE CLINIC | Facility: CLINIC | Age: 78
End: 2023-08-17

## 2023-08-17 VITALS
WEIGHT: 202 LBS | OXYGEN SATURATION: 98 % | DIASTOLIC BLOOD PRESSURE: 74 MMHG | SYSTOLIC BLOOD PRESSURE: 134 MMHG | HEART RATE: 66 BPM | TEMPERATURE: 97.4 F | BODY MASS INDEX: 34.67 KG/M2

## 2023-08-17 DIAGNOSIS — D68.9 COAGULATION DEFECT (HCC): Primary | ICD-10-CM

## 2023-08-17 DIAGNOSIS — E53.8 B12 DEFICIENCY: ICD-10-CM

## 2023-08-17 DIAGNOSIS — D64.9 ANEMIA, UNSPECIFIED TYPE: ICD-10-CM

## 2023-08-17 DIAGNOSIS — N18.4 CKD (CHRONIC KIDNEY DISEASE) STAGE 4, GFR 15-29 ML/MIN (HCC): ICD-10-CM

## 2023-08-17 DIAGNOSIS — E11.69 TYPE 2 DIABETES MELLITUS WITH OTHER SPECIFIED COMPLICATION, WITHOUT LONG-TERM CURRENT USE OF INSULIN (HCC): ICD-10-CM

## 2023-08-17 DIAGNOSIS — R60.0 PEDAL EDEMA: ICD-10-CM

## 2023-08-17 DIAGNOSIS — I10 ESSENTIAL (PRIMARY) HYPERTENSION: ICD-10-CM

## 2023-08-17 RX ORDER — CYANOCOBALAMIN 1000 UG/ML
1000 INJECTION, SOLUTION INTRAMUSCULAR; SUBCUTANEOUS ONCE
Status: COMPLETED | OUTPATIENT
Start: 2023-08-17 | End: 2023-08-17

## 2023-08-17 RX ADMIN — CYANOCOBALAMIN 1000 MCG: 1000 INJECTION, SOLUTION INTRAMUSCULAR; SUBCUTANEOUS at 08:35

## 2023-08-21 ENCOUNTER — TELEPHONE (OUTPATIENT)
Dept: FAMILY MEDICINE CLINIC | Facility: CLINIC | Age: 78
End: 2023-08-21

## 2023-08-21 NOTE — TELEPHONE ENCOUNTER
CALLED PT BACK REGARDING PRIOR CALL AS PER DR VANEGAS PT NEEDS TO CALL NEPHROLOGY TO MAKE APPT ASAP DUR TO KNEE ND STOMACH BEING SWELLING KIDNEY NUMBER ARE ELEVATED PT IS AWARE

## 2023-08-21 NOTE — TELEPHONE ENCOUNTER
Patient was seen last week and told to call back today. She still has swelling in thighs and stomach. Please call to advise.

## 2023-08-27 ASSESSMENT — ENCOUNTER SYMPTOMS
GASTROINTESTINAL NEGATIVE: 1
EYES NEGATIVE: 1
RESPIRATORY NEGATIVE: 1

## 2023-08-31 ENCOUNTER — HOSPITAL ENCOUNTER (EMERGENCY)
Age: 78
Discharge: HOME | End: 2023-09-01
Attending: EMERGENCY MEDICINE
Payer: MEDICARE

## 2023-08-31 VITALS
TEMPERATURE: 97.7 F | HEART RATE: 78 BPM | HEIGHT: 64 IN | OXYGEN SATURATION: 100 % | WEIGHT: 200 LBS | RESPIRATION RATE: 17 BRPM | SYSTOLIC BLOOD PRESSURE: 154 MMHG | BODY MASS INDEX: 34.15 KG/M2 | DIASTOLIC BLOOD PRESSURE: 67 MMHG

## 2023-08-31 DIAGNOSIS — N30.00 ACUTE CYSTITIS WITHOUT HEMATURIA: ICD-10-CM

## 2023-08-31 DIAGNOSIS — N18.5 STAGE 5 CHRONIC KIDNEY DISEASE NOT ON CHRONIC DIALYSIS (HCC): ICD-10-CM

## 2023-08-31 DIAGNOSIS — E11.649 UNCONTROLLED TYPE 2 DIABETES MELLITUS WITH HYPOGLYCEMIA WITHOUT COMA (HCC): Primary | ICD-10-CM

## 2023-08-31 LAB
ALBUMIN SERPL-MCNC: 2.6 G/DL (ref 3.2–4.6)
ALBUMIN/GLOB SERPL: 0.6 (ref 0.4–1.6)
ALP SERPL-CCNC: 171 U/L (ref 50–130)
ALT SERPL-CCNC: 20 U/L (ref 12–65)
ANION GAP SERPL CALC-SCNC: 7 MMOL/L (ref 2–11)
APPEARANCE UR: CLEAR
AST SERPL-CCNC: 25 U/L (ref 15–37)
BACTERIA URNS QL MICRO: ABNORMAL /HPF
BASOPHILS # BLD: 0 K/UL (ref 0–0.2)
BASOPHILS NFR BLD: 1 % (ref 0–2)
BILIRUB SERPL-MCNC: 0.5 MG/DL (ref 0.2–1.1)
BILIRUB UR QL: NEGATIVE
BUN SERPL-MCNC: 69 MG/DL (ref 8–23)
CALCIUM SERPL-MCNC: 7.4 MG/DL (ref 8.3–10.4)
CASTS URNS QL MICRO: ABNORMAL /LPF
CHLORIDE SERPL-SCNC: 115 MMOL/L (ref 101–110)
CHP ED QC CHECK: YES
CO2 SERPL-SCNC: 23 MMOL/L (ref 21–32)
COLOR UR: ABNORMAL
CREAT SERPL-MCNC: 6.2 MG/DL (ref 0.6–1)
DIFFERENTIAL METHOD BLD: ABNORMAL
EOSINOPHIL # BLD: 0 K/UL (ref 0–0.8)
EOSINOPHIL NFR BLD: 1 % (ref 0.5–7.8)
EPI CELLS #/AREA URNS HPF: ABNORMAL /HPF
ERYTHROCYTE [DISTWIDTH] IN BLOOD BY AUTOMATED COUNT: 15.3 % (ref 11.9–14.6)
GLOBULIN SER CALC-MCNC: 4.3 G/DL (ref 2.8–4.5)
GLUCOSE BLD STRIP.AUTO-MCNC: 105 MG/DL (ref 65–100)
GLUCOSE BLD STRIP.AUTO-MCNC: 113 MG/DL (ref 65–100)
GLUCOSE BLD STRIP.AUTO-MCNC: 93 MG/DL (ref 65–100)
GLUCOSE BLD STRIP.AUTO-MCNC: 94 MG/DL (ref 65–100)
GLUCOSE BLD-MCNC: 94 MG/DL
GLUCOSE SERPL-MCNC: 119 MG/DL (ref 65–100)
GLUCOSE UR STRIP.AUTO-MCNC: NEGATIVE MG/DL
HCT VFR BLD AUTO: 31.1 % (ref 35.8–46.3)
HGB BLD-MCNC: 9.5 G/DL (ref 11.7–15.4)
HGB UR QL STRIP: NEGATIVE
IMM GRANULOCYTES # BLD AUTO: 0 K/UL (ref 0–0.5)
IMM GRANULOCYTES NFR BLD AUTO: 0 % (ref 0–5)
KETONES UR QL STRIP.AUTO: NEGATIVE MG/DL
LACTATE SERPL-SCNC: 0.6 MMOL/L (ref 0.4–2)
LEUKOCYTE ESTERASE UR QL STRIP.AUTO: ABNORMAL
LYMPHOCYTES # BLD: 0.9 K/UL (ref 0.5–4.6)
LYMPHOCYTES NFR BLD: 15 % (ref 13–44)
MAGNESIUM SERPL-MCNC: 2.1 MG/DL (ref 1.8–2.4)
MCH RBC QN AUTO: 27.4 PG (ref 26.1–32.9)
MCHC RBC AUTO-ENTMCNC: 30.5 G/DL (ref 31.4–35)
MCV RBC AUTO: 89.6 FL (ref 82–102)
MONOCYTES # BLD: 0.3 K/UL (ref 0.1–1.3)
MONOCYTES NFR BLD: 5 % (ref 4–12)
MUCOUS THREADS URNS QL MICRO: 0 /LPF
NEUTS SEG # BLD: 5.1 K/UL (ref 1.7–8.2)
NEUTS SEG NFR BLD: 80 % (ref 43–78)
NITRITE UR QL STRIP.AUTO: NEGATIVE
NRBC # BLD: 0 K/UL (ref 0–0.2)
PH UR STRIP: 7 (ref 5–9)
PLATELET # BLD AUTO: 109 K/UL (ref 150–450)
PMV BLD AUTO: 12.9 FL (ref 9.4–12.3)
POTASSIUM SERPL-SCNC: 4.3 MMOL/L (ref 3.5–5.1)
PROCALCITONIN SERPL-MCNC: 0.07 NG/ML (ref 0–0.49)
PROT SERPL-MCNC: 6.9 G/DL (ref 6.3–8.2)
PROT UR STRIP-MCNC: 300 MG/DL
RBC # BLD AUTO: 3.47 M/UL (ref 4.05–5.2)
RBC #/AREA URNS HPF: ABNORMAL /HPF
SERVICE CMNT-IMP: ABNORMAL
SERVICE CMNT-IMP: ABNORMAL
SERVICE CMNT-IMP: NORMAL
SERVICE CMNT-IMP: NORMAL
SODIUM SERPL-SCNC: 145 MMOL/L (ref 133–143)
SP GR UR REFRACTOMETRY: 1.01 (ref 1–1.02)
URINE CULTURE IF INDICATED: ABNORMAL
UROBILINOGEN UR QL STRIP.AUTO: 1 EU/DL (ref 0.2–1)
WBC # BLD AUTO: 6.4 K/UL (ref 4.3–11.1)
WBC URNS QL MICRO: ABNORMAL /HPF

## 2023-08-31 PROCEDURE — 6370000000 HC RX 637 (ALT 250 FOR IP): Performed by: EMERGENCY MEDICINE

## 2023-08-31 PROCEDURE — 87086 URINE CULTURE/COLONY COUNT: CPT

## 2023-08-31 PROCEDURE — 99284 EMERGENCY DEPT VISIT MOD MDM: CPT

## 2023-08-31 PROCEDURE — 85025 COMPLETE CBC W/AUTO DIFF WBC: CPT

## 2023-08-31 PROCEDURE — 81001 URINALYSIS AUTO W/SCOPE: CPT

## 2023-08-31 PROCEDURE — 82962 GLUCOSE BLOOD TEST: CPT

## 2023-08-31 PROCEDURE — 83735 ASSAY OF MAGNESIUM: CPT

## 2023-08-31 PROCEDURE — 80053 COMPREHEN METABOLIC PANEL: CPT

## 2023-08-31 PROCEDURE — 93005 ELECTROCARDIOGRAM TRACING: CPT | Performed by: EMERGENCY MEDICINE

## 2023-08-31 PROCEDURE — 83605 ASSAY OF LACTIC ACID: CPT

## 2023-08-31 PROCEDURE — 84145 PROCALCITONIN (PCT): CPT

## 2023-08-31 RX ORDER — CEFPODOXIME PROXETIL 100 MG/1
100 TABLET, FILM COATED ORAL 2 TIMES DAILY
Qty: 20 TABLET | Refills: 0 | Status: SHIPPED | OUTPATIENT
Start: 2023-08-31 | End: 2023-09-10

## 2023-08-31 RX ORDER — CEFDINIR 300 MG/1
300 CAPSULE ORAL
Status: COMPLETED | OUTPATIENT
Start: 2023-08-31 | End: 2023-08-31

## 2023-08-31 RX ADMIN — CEFDINIR 300 MG: 300 CAPSULE ORAL at 23:53

## 2023-08-31 NOTE — ED PROVIDER NOTES
olmesartan (BENICAR) 40 MG tablet TAKE 1 TABLET BY MOUTH DAILY, Disp-90 tablet, R-3Normal      atorvastatin (LIPITOR) 40 MG tablet Take 1 tablet by mouth daily, Disp-90 tablet, R-3Normal      sodium bicarbonate 650 MG tablet Take 2 tablets by mouth 2 times dailyHistorical Med      calcitRIOL (ROCALTROL) 0.25 MCG capsule Take 1 capsule by mouth dailyHistorical Med      doxazosin (CARDURA) 4 MG tablet Take 1 tablet by mouth in the morning and at bedtime, Disp-180 tablet, R-3Normal      insulin glargine, 1 unit dial, (TOUJEO SOLOSTAR) 300 UNIT/ML concentrated injection pen Inject 20 Units into the skin every morningHistorical Med      Lancets MISC Starting Fri 12/5/2014, Historical MedCheck BS BID      furosemide (LASIX) 40 MG tablet Take 2 tablets by mouth dailyHistorical Med      glipiZIDE (GLUCOTROL) 10 MG tablet Take 1 tablet by mouth dailyHistorical Med              Results for orders placed or performed during the hospital encounter of 08/31/23   CBC with Auto Differential   Result Value Ref Range    WBC 6.4 4.3 - 11.1 K/uL    RBC 3.47 (L) 4.05 - 5.2 M/uL    Hemoglobin 9.5 (L) 11.7 - 15.4 g/dL    Hematocrit 31.1 (L) 35.8 - 46.3 %    MCV 89.6 82.0 - 102.0 FL    MCH 27.4 26.1 - 32.9 PG    MCHC 30.5 (L) 31.4 - 35.0 g/dL    RDW 15.3 (H) 11.9 - 14.6 %    Platelets 357 (L) 844 - 450 K/uL    MPV 12.9 (H) 9.4 - 12.3 FL    nRBC 0.00 0.0 - 0.2 K/uL    Differential Type AUTOMATED      Neutrophils % 80 (H) 43 - 78 %    Lymphocytes % 15 13 - 44 %    Monocytes % 5 4.0 - 12.0 %    Eosinophils % 1 0.5 - 7.8 %    Basophils % 1 0.0 - 2.0 %    Immature Granulocytes 0 0.0 - 5.0 %    Neutrophils Absolute 5.1 1.7 - 8.2 K/UL    Lymphocytes Absolute 0.9 0.5 - 4.6 K/UL    Monocytes Absolute 0.3 0.1 - 1.3 K/UL    Eosinophils Absolute 0.0 0.0 - 0.8 K/UL    Basophils Absolute 0.0 0.0 - 0.2 K/UL    Absolute Immature Granulocyte 0.0 0.0 - 0.5 K/UL   Comprehensive Metabolic Panel   Result Value Ref Range    Sodium 145 (H) 133 - 143 mmol/L

## 2023-08-31 NOTE — ED NOTES
Pt arrives via [facility] coming from home after finding patient unresponsive. Pt was found with a BGL of 34 on EMS arrival. Pt was given 1mg of glucagon IM and 200ml D10 by EMS. Last BGL with .  Pt is now A&Ox4     Domingo Pavon RN  08/31/23 5725

## 2023-09-01 LAB
EKG ATRIAL RATE: 77 BPM
EKG DIAGNOSIS: NORMAL
EKG P AXIS: 53 DEGREES
EKG P-R INTERVAL: 135 MS
EKG Q-T INTERVAL: 433 MS
EKG QRS DURATION: 82 MS
EKG QTC CALCULATION (BAZETT): 491 MS
EKG R AXIS: 26 DEGREES
EKG T AXIS: 15 DEGREES
EKG VENTRICULAR RATE: 77 BPM

## 2023-09-01 PROCEDURE — 93010 ELECTROCARDIOGRAM REPORT: CPT | Performed by: INTERNAL MEDICINE

## 2023-09-01 NOTE — DISCHARGE INSTRUCTIONS
Hold your diabetes medication tomorrow. Check your blood sugar every 4 hours for the next 72 hours and make sure you are eating properly.

## 2023-09-03 LAB
BACTERIA SPEC CULT: NORMAL
BACTERIA SPEC CULT: NORMAL
SERVICE CMNT-IMP: NORMAL

## 2023-09-05 ENCOUNTER — CARE COORDINATION (OUTPATIENT)
Dept: CARE COORDINATION | Facility: CLINIC | Age: 78
End: 2023-09-05

## 2023-09-05 NOTE — CARE COORDINATION
Ambulatory Care Coordination Note  2023    Patient Current Location:  [facility]    ACM contacted the patient by telephone. Verified name and  with patient as identifiers. Provided introduction to self, and explanation of the ACM role. ACM: Antolin Peñaloza RN    Challenges to be reviewed by the provider   Additional needs identified to be addressed with provider: Yes  Notified pcp of er admission and need for follow up               Method of communication with provider: staff message. See assessment  below. Ccm outreached to patient. Patient agreeable to ccm services. Reviewed with patient discharge summary. Patient verbalized understanding. Patient is taking medication as directed. Patient is taking abt therapy as directed. Patient is eating and drinking well. Patient states blood sugars have been stable. Patient states latest blood sugar was 137. Ccm notified pcp of er admission and need for follow up. Patient states no questions or concerns. [facility] discussed that I would outreach next week. Patient agreeable. Offered patient enrollment in the Remote Patient Monitoring (RPM) program for in-home monitoring: Yes, but did not enroll at this time. Ambulatory Care Coordination Assessment    Care Coordination Protocol  Week 1 - Initial Assessment     Do you have all of your prescriptions and are they filled?: No  Barriers to medication adherence: None  Are you able to afford your medications?: Yes  How often do you have trouble taking your medications the way you have been told to take them?: I always take them as prescribed. Do you have Home O2 Therapy?: No      Ability to seek help/take action for Emergent Urgent situations i.e. fire, crime, inclement weather or health crisis. : Independent  Ability to ambulate to restroom: Independent  Ability handle personal hygeine needs (bathing/dressing/grooming): Independent  Ability to manage Medications:  Independent  Ability to prepare Food

## 2023-09-10 PROBLEM — Z12.11 SPECIAL SCREENING FOR MALIGNANT NEOPLASMS, COLON: Status: RESOLVED | Noted: 2023-06-13 | Resolved: 2023-09-10

## 2023-09-11 ENCOUNTER — CARE COORDINATION (OUTPATIENT)
Dept: CARE COORDINATION | Facility: CLINIC | Age: 78
End: 2023-09-11

## 2023-09-11 NOTE — CARE COORDINATION
Ambulatory Care Management Note        Date/Time:  9/11/2023 5:07 PM    Ccm outreached to patient. No answer at this time, unable to leave message. Awaiting response. If no response, ccm will outreach next week.

## 2023-09-18 ENCOUNTER — CARE COORDINATION (OUTPATIENT)
Dept: CARE COORDINATION | Facility: CLINIC | Age: 78
End: 2023-09-18

## 2023-09-18 NOTE — CARE COORDINATION
Ambulatory Care Management Note        Date/Time:  9/18/2023 12:55 PM    Ccm outreached to patient. No answer at this time, message left. Awaiting response. If no response, ccm will outreach next week.

## 2023-09-25 ENCOUNTER — CARE COORDINATION (OUTPATIENT)
Dept: CARE COORDINATION | Facility: CLINIC | Age: 78
End: 2023-09-25

## 2023-09-25 NOTE — CARE COORDINATION
Ambulatory Care Management Note        Date/Time:  9/25/2023 4:02 PM    Ccm outreached to patient. No answer at this time, message left. Ccm will close case at this time d/t unable to reach x3. Ccm will remain available as needed.

## 2023-10-01 ENCOUNTER — HOSPITAL ENCOUNTER (EMERGENCY)
Age: 78
Discharge: HOME | End: 2023-10-01
Attending: EMERGENCY MEDICINE
Payer: MEDICARE

## 2023-10-01 ENCOUNTER — HOSPITAL ENCOUNTER (INPATIENT)
Age: 78
LOS: 3 days | Discharge: HOME HEALTH | DRG: 640 | End: 2023-10-04
Attending: FAMILY MEDICINE | Admitting: FAMILY MEDICINE
Payer: MEDICARE

## 2023-10-01 ENCOUNTER — APPOINTMENT (OUTPATIENT)
Dept: GENERAL RADIOLOGY | Age: 78
End: 2023-10-01
Payer: MEDICARE

## 2023-10-01 ENCOUNTER — APPOINTMENT (OUTPATIENT)
Dept: CT IMAGING | Age: 78
End: 2023-10-01
Payer: MEDICARE

## 2023-10-01 VITALS
HEART RATE: 93 BPM | OXYGEN SATURATION: 98 % | HEIGHT: 64 IN | RESPIRATION RATE: 19 BRPM | SYSTOLIC BLOOD PRESSURE: 175 MMHG | WEIGHT: 200 LBS | DIASTOLIC BLOOD PRESSURE: 70 MMHG | TEMPERATURE: 98.6 F | BODY MASS INDEX: 34.15 KG/M2

## 2023-10-01 DIAGNOSIS — N18.6 END STAGE RENAL DISEASE (HCC): Primary | ICD-10-CM

## 2023-10-01 DIAGNOSIS — E16.2 HYPOGLYCEMIA: ICD-10-CM

## 2023-10-01 DIAGNOSIS — R18.8 OTHER ASCITES: ICD-10-CM

## 2023-10-01 DIAGNOSIS — Z99.2 ESRD NEEDING DIALYSIS (HCC): Primary | ICD-10-CM

## 2023-10-01 DIAGNOSIS — R60.1 ANASARCA: ICD-10-CM

## 2023-10-01 DIAGNOSIS — N18.6 ESRD NEEDING DIALYSIS (HCC): Primary | ICD-10-CM

## 2023-10-01 PROBLEM — E11.21 TYPE 2 DIABETES MELLITUS WITH NEPHROPATHY (HCC): Status: ACTIVE | Noted: 2018-01-09

## 2023-10-01 PROBLEM — E11.21 TYPE 2 DIABETES MELLITUS WITH NEPHROPATHY (HCC): Chronic | Status: ACTIVE | Noted: 2018-01-09

## 2023-10-01 PROBLEM — N04.9 ANASARCA ASSOCIATED WITH DISORDER OF KIDNEY: Chronic | Status: ACTIVE | Noted: 2023-10-01

## 2023-10-01 PROBLEM — K74.60 CIRRHOSIS OF LIVER WITH ASCITES (HCC): Status: ACTIVE | Noted: 2023-10-01

## 2023-10-01 PROBLEM — I73.9 PVD (PERIPHERAL VASCULAR DISEASE) (HCC): Chronic | Status: ACTIVE | Noted: 2023-06-06

## 2023-10-01 PROBLEM — E87.70 VOLUME OVERLOAD: Status: ACTIVE | Noted: 2023-09-20

## 2023-10-01 PROBLEM — K74.60 CIRRHOSIS OF LIVER WITH ASCITES (HCC): Chronic | Status: ACTIVE | Noted: 2023-10-01

## 2023-10-01 LAB
ALBUMIN SERPL-MCNC: 2.3 G/DL (ref 3.2–4.6)
ALBUMIN/GLOB SERPL: 0.5 (ref 0.4–1.6)
ALP SERPL-CCNC: 134 U/L (ref 50–136)
ALT SERPL-CCNC: 18 U/L (ref 12–65)
ANION GAP SERPL CALC-SCNC: 8 MMOL/L (ref 2–11)
APPEARANCE UR: ABNORMAL
APTT PPP: 32 SEC (ref 24.5–34.2)
AST SERPL-CCNC: 36 U/L (ref 15–37)
BACTERIA URNS QL MICRO: 0 /HPF
BASOPHILS # BLD: 0 K/UL (ref 0–0.2)
BASOPHILS NFR BLD: 0 % (ref 0–2)
BILIRUB SERPL-MCNC: 0.4 MG/DL (ref 0.2–1.1)
BILIRUB UR QL: NEGATIVE
BUN SERPL-MCNC: 54 MG/DL (ref 8–23)
CALCIUM SERPL-MCNC: 6.9 MG/DL (ref 8.3–10.4)
CASTS URNS QL MICRO: ABNORMAL /LPF
CHLORIDE SERPL-SCNC: 111 MMOL/L (ref 101–110)
CHP ED QC CHECK: NORMAL
CO2 SERPL-SCNC: 25 MMOL/L (ref 21–32)
COLOR UR: ABNORMAL
CREAT SERPL-MCNC: 5.93 MG/DL (ref 0.6–1)
CRYSTALS URNS QL MICRO: 0 /LPF
DIFFERENTIAL METHOD BLD: ABNORMAL
EKG ATRIAL RATE: 0 BPM
EKG DIAGNOSIS: NORMAL
EKG P AXIS: 109 DEGREES
EKG P-R INTERVAL: 43 MS
EKG Q-T INTERVAL: 343 MS
EKG QRS DURATION: 76 MS
EKG QTC CALCULATION (BAZETT): 456 MS
EKG R AXIS: 49 DEGREES
EKG T AXIS: 52 DEGREES
EKG VENTRICULAR RATE: 106 BPM
EOSINOPHIL # BLD: 0.1 K/UL (ref 0–0.8)
EOSINOPHIL NFR BLD: 2 % (ref 0.5–7.8)
EPI CELLS #/AREA URNS HPF: ABNORMAL /HPF
ERYTHROCYTE [DISTWIDTH] IN BLOOD BY AUTOMATED COUNT: 15.9 % (ref 11.9–14.6)
GLOBULIN SER CALC-MCNC: 4.4 G/DL (ref 2.8–4.5)
GLUCOSE BLD STRIP.AUTO-MCNC: 113 MG/DL (ref 65–100)
GLUCOSE BLD STRIP.AUTO-MCNC: 61 MG/DL (ref 65–100)
GLUCOSE BLD STRIP.AUTO-MCNC: 94 MG/DL (ref 65–100)
GLUCOSE BLD-MCNC: 113 MG/DL
GLUCOSE SERPL-MCNC: 67 MG/DL (ref 65–100)
GLUCOSE UR STRIP.AUTO-MCNC: NEGATIVE MG/DL
HCT VFR BLD AUTO: 28.4 % (ref 35.8–46.3)
HGB BLD-MCNC: 8.9 G/DL (ref 11.7–15.4)
HGB UR QL STRIP: NEGATIVE
IMM GRANULOCYTES # BLD AUTO: 0 K/UL (ref 0–0.5)
IMM GRANULOCYTES NFR BLD AUTO: 0 % (ref 0–5)
INR PPP: 1.2
KETONES UR QL STRIP.AUTO: NEGATIVE MG/DL
LEUKOCYTE ESTERASE UR QL STRIP.AUTO: ABNORMAL
LIPASE SERPL-CCNC: 240 U/L (ref 73–393)
LYMPHOCYTES # BLD: 1.4 K/UL (ref 0.5–4.6)
LYMPHOCYTES NFR BLD: 28 % (ref 13–44)
MAGNESIUM SERPL-MCNC: 1.6 MG/DL (ref 1.8–2.4)
MCH RBC QN AUTO: 27.1 PG (ref 26.1–32.9)
MCHC RBC AUTO-ENTMCNC: 31.3 G/DL (ref 31.4–35)
MCV RBC AUTO: 86.6 FL (ref 82–102)
MONOCYTES # BLD: 0.5 K/UL (ref 0.1–1.3)
MONOCYTES NFR BLD: 10 % (ref 4–12)
MUCOUS THREADS URNS QL MICRO: 0 /LPF
NEUTS SEG # BLD: 3.1 K/UL (ref 1.7–8.2)
NEUTS SEG NFR BLD: 60 % (ref 43–78)
NITRITE UR QL STRIP.AUTO: NEGATIVE
NRBC # BLD: 0 K/UL (ref 0–0.2)
PH UR STRIP: 5 (ref 5–9)
PLATELET # BLD AUTO: 187 K/UL (ref 150–450)
PMV BLD AUTO: 12.7 FL (ref 9.4–12.3)
POTASSIUM SERPL-SCNC: 4.7 MMOL/L (ref 3.5–5.1)
PROT SERPL-MCNC: 6.7 G/DL (ref 6.3–8.2)
PROT UR STRIP-MCNC: 100 MG/DL
PROTHROMBIN TIME: 14.7 SEC (ref 12.6–14.3)
RBC # BLD AUTO: 3.28 M/UL (ref 4.05–5.2)
RBC #/AREA URNS HPF: ABNORMAL /HPF
SERVICE CMNT-IMP: ABNORMAL
SERVICE CMNT-IMP: ABNORMAL
SERVICE CMNT-IMP: NORMAL
SODIUM SERPL-SCNC: 144 MMOL/L (ref 133–143)
SP GR UR REFRACTOMETRY: 1.01 (ref 1–1.02)
URINE CULTURE IF INDICATED: ABNORMAL
UROBILINOGEN UR QL STRIP.AUTO: 0.2 EU/DL (ref 0.2–1)
WBC # BLD AUTO: 5.1 K/UL (ref 4.3–11.1)
WBC URNS QL MICRO: ABNORMAL /HPF
YEAST URNS QL MICRO: ABNORMAL

## 2023-10-01 PROCEDURE — 2500000003 HC RX 250 WO HCPCS: Performed by: EMERGENCY MEDICINE

## 2023-10-01 PROCEDURE — 83690 ASSAY OF LIPASE: CPT

## 2023-10-01 PROCEDURE — 85025 COMPLETE CBC W/AUTO DIFF WBC: CPT

## 2023-10-01 PROCEDURE — 5A1D70Z PERFORMANCE OF URINARY FILTRATION, INTERMITTENT, LESS THAN 6 HOURS PER DAY: ICD-10-PCS | Performed by: INTERNAL MEDICINE

## 2023-10-01 PROCEDURE — 99285 EMERGENCY DEPT VISIT HI MDM: CPT

## 2023-10-01 PROCEDURE — 71046 X-RAY EXAM CHEST 2 VIEWS: CPT

## 2023-10-01 PROCEDURE — 87086 URINE CULTURE/COLONY COUNT: CPT

## 2023-10-01 PROCEDURE — 80053 COMPREHEN METABOLIC PANEL: CPT

## 2023-10-01 PROCEDURE — 83735 ASSAY OF MAGNESIUM: CPT

## 2023-10-01 PROCEDURE — 82962 GLUCOSE BLOOD TEST: CPT

## 2023-10-01 PROCEDURE — 1100000000 HC RM PRIVATE

## 2023-10-01 PROCEDURE — 81001 URINALYSIS AUTO W/SCOPE: CPT

## 2023-10-01 PROCEDURE — 85730 THROMBOPLASTIN TIME PARTIAL: CPT

## 2023-10-01 PROCEDURE — 74176 CT ABD & PELVIS W/O CONTRAST: CPT

## 2023-10-01 PROCEDURE — 2580000003 HC RX 258: Performed by: FAMILY MEDICINE

## 2023-10-01 PROCEDURE — 93010 ELECTROCARDIOGRAM REPORT: CPT | Performed by: INTERNAL MEDICINE

## 2023-10-01 PROCEDURE — 6370000000 HC RX 637 (ALT 250 FOR IP): Performed by: FAMILY MEDICINE

## 2023-10-01 PROCEDURE — 93005 ELECTROCARDIOGRAM TRACING: CPT | Performed by: EMERGENCY MEDICINE

## 2023-10-01 PROCEDURE — 6360000002 HC RX W HCPCS: Performed by: FAMILY MEDICINE

## 2023-10-01 PROCEDURE — 85610 PROTHROMBIN TIME: CPT

## 2023-10-01 RX ORDER — HYDRALAZINE HYDROCHLORIDE 50 MG/1
50 TABLET, FILM COATED ORAL 3 TIMES DAILY
Status: DISCONTINUED | OUTPATIENT
Start: 2023-10-01 | End: 2023-10-04 | Stop reason: HOSPADM

## 2023-10-01 RX ORDER — BUMETANIDE 1 MG/1
4 TABLET ORAL 2 TIMES DAILY
Status: DISCONTINUED | OUTPATIENT
Start: 2023-10-01 | End: 2023-10-04 | Stop reason: HOSPADM

## 2023-10-01 RX ORDER — INSULIN GLARGINE 100 [IU]/ML
10 INJECTION, SOLUTION SUBCUTANEOUS EVERY MORNING
Status: DISCONTINUED | OUTPATIENT
Start: 2023-10-02 | End: 2023-10-03

## 2023-10-01 RX ORDER — CARBOXYMETHYLCELLULOSE SODIUM 10 MG/ML
1 GEL OPHTHALMIC PRN
Status: DISCONTINUED | OUTPATIENT
Start: 2023-10-01 | End: 2023-10-04 | Stop reason: HOSPADM

## 2023-10-01 RX ORDER — CALCITRIOL 0.25 UG/1
0.25 CAPSULE, LIQUID FILLED ORAL DAILY
Status: DISCONTINUED | OUTPATIENT
Start: 2023-10-02 | End: 2023-10-04 | Stop reason: HOSPADM

## 2023-10-01 RX ORDER — BUMETANIDE 2 MG/1
4 TABLET ORAL 2 TIMES DAILY
COMMUNITY
Start: 2023-09-20

## 2023-10-01 RX ORDER — DEXTROSE MONOHYDRATE 100 MG/ML
INJECTION, SOLUTION INTRAVENOUS CONTINUOUS PRN
Status: DISCONTINUED | OUTPATIENT
Start: 2023-10-01 | End: 2023-10-04 | Stop reason: HOSPADM

## 2023-10-01 RX ORDER — INSULIN LISPRO 100 [IU]/ML
0-8 INJECTION, SOLUTION INTRAVENOUS; SUBCUTANEOUS NIGHTLY
Status: DISCONTINUED | OUTPATIENT
Start: 2023-10-01 | End: 2023-10-04 | Stop reason: HOSPADM

## 2023-10-01 RX ORDER — DOXAZOSIN MESYLATE 4 MG/1
4 TABLET ORAL 2 TIMES DAILY
Status: DISCONTINUED | OUTPATIENT
Start: 2023-10-01 | End: 2023-10-04 | Stop reason: HOSPADM

## 2023-10-01 RX ORDER — LOSARTAN POTASSIUM 50 MG/1
100 TABLET ORAL DAILY
Status: DISCONTINUED | OUTPATIENT
Start: 2023-10-02 | End: 2023-10-04 | Stop reason: HOSPADM

## 2023-10-01 RX ORDER — BISACODYL 5 MG/1
5 TABLET, DELAYED RELEASE ORAL DAILY PRN
Status: DISCONTINUED | OUTPATIENT
Start: 2023-10-01 | End: 2023-10-04 | Stop reason: HOSPADM

## 2023-10-01 RX ORDER — INSULIN LISPRO 100 [IU]/ML
0-10 INJECTION, SOLUTION INTRAVENOUS; SUBCUTANEOUS
Status: DISCONTINUED | OUTPATIENT
Start: 2023-10-02 | End: 2023-10-04 | Stop reason: HOSPADM

## 2023-10-01 RX ORDER — ONDANSETRON 2 MG/ML
4 INJECTION INTRAMUSCULAR; INTRAVENOUS EVERY 6 HOURS PRN
Status: DISCONTINUED | OUTPATIENT
Start: 2023-10-01 | End: 2023-10-04 | Stop reason: HOSPADM

## 2023-10-01 RX ORDER — SODIUM CHLORIDE 9 MG/ML
INJECTION, SOLUTION INTRAVENOUS PRN
Status: DISCONTINUED | OUTPATIENT
Start: 2023-10-01 | End: 2023-10-04 | Stop reason: HOSPADM

## 2023-10-01 RX ORDER — ACETAMINOPHEN 650 MG/1
650 SUPPOSITORY RECTAL EVERY 6 HOURS PRN
Status: DISCONTINUED | OUTPATIENT
Start: 2023-10-01 | End: 2023-10-04 | Stop reason: HOSPADM

## 2023-10-01 RX ORDER — AMLODIPINE BESYLATE 10 MG/1
10 TABLET ORAL NIGHTLY
Status: DISCONTINUED | OUTPATIENT
Start: 2023-10-01 | End: 2023-10-04 | Stop reason: HOSPADM

## 2023-10-01 RX ORDER — DEXTROSE MONOHYDRATE 25 G/50ML
25 INJECTION, SOLUTION INTRAVENOUS
Status: COMPLETED | OUTPATIENT
Start: 2023-10-01 | End: 2023-10-01

## 2023-10-01 RX ORDER — ACETAMINOPHEN 325 MG/1
650 TABLET ORAL EVERY 6 HOURS PRN
Status: DISCONTINUED | OUTPATIENT
Start: 2023-10-01 | End: 2023-10-04 | Stop reason: HOSPADM

## 2023-10-01 RX ORDER — SODIUM CHLORIDE 0.9 % (FLUSH) 0.9 %
5-40 SYRINGE (ML) INJECTION EVERY 12 HOURS SCHEDULED
Status: DISCONTINUED | OUTPATIENT
Start: 2023-10-01 | End: 2023-10-04 | Stop reason: HOSPADM

## 2023-10-01 RX ORDER — MAGNESIUM HYDROXIDE/ALUMINUM HYDROXICE/SIMETHICONE 120; 1200; 1200 MG/30ML; MG/30ML; MG/30ML
30 SUSPENSION ORAL EVERY 6 HOURS PRN
Status: DISCONTINUED | OUTPATIENT
Start: 2023-10-01 | End: 2023-10-04 | Stop reason: HOSPADM

## 2023-10-01 RX ORDER — LEVOTHYROXINE SODIUM 0.07 MG/1
75 TABLET ORAL DAILY
Status: DISCONTINUED | OUTPATIENT
Start: 2023-10-02 | End: 2023-10-04 | Stop reason: HOSPADM

## 2023-10-01 RX ORDER — ATORVASTATIN CALCIUM 40 MG/1
40 TABLET, FILM COATED ORAL NIGHTLY
Status: DISCONTINUED | OUTPATIENT
Start: 2023-10-01 | End: 2023-10-04 | Stop reason: HOSPADM

## 2023-10-01 RX ORDER — SODIUM CHLORIDE 0.9 % (FLUSH) 0.9 %
5-40 SYRINGE (ML) INJECTION PRN
Status: DISCONTINUED | OUTPATIENT
Start: 2023-10-01 | End: 2023-10-04 | Stop reason: HOSPADM

## 2023-10-01 RX ORDER — POLYETHYLENE GLYCOL 3350 17 G/17G
17 POWDER, FOR SOLUTION ORAL DAILY
Status: DISCONTINUED | OUTPATIENT
Start: 2023-10-02 | End: 2023-10-04 | Stop reason: HOSPADM

## 2023-10-01 RX ORDER — LANOLIN ALCOHOL/MO/W.PET/CERES
400 CREAM (GRAM) TOPICAL 2 TIMES DAILY
Status: DISCONTINUED | OUTPATIENT
Start: 2023-10-01 | End: 2023-10-04 | Stop reason: HOSPADM

## 2023-10-01 RX ORDER — PROMETHAZINE HYDROCHLORIDE 25 MG/1
12.5 TABLET ORAL EVERY 6 HOURS PRN
Status: DISCONTINUED | OUTPATIENT
Start: 2023-10-01 | End: 2023-10-04 | Stop reason: HOSPADM

## 2023-10-01 RX ORDER — CARVEDILOL 25 MG/1
25 TABLET ORAL 2 TIMES DAILY
Status: DISCONTINUED | OUTPATIENT
Start: 2023-10-01 | End: 2023-10-04 | Stop reason: HOSPADM

## 2023-10-01 RX ORDER — SODIUM BICARBONATE 650 MG/1
1300 TABLET ORAL 2 TIMES DAILY
Status: DISCONTINUED | OUTPATIENT
Start: 2023-10-02 | End: 2023-10-04 | Stop reason: HOSPADM

## 2023-10-01 RX ORDER — HEPARIN SODIUM 5000 [USP'U]/ML
5000 INJECTION, SOLUTION INTRAVENOUS; SUBCUTANEOUS 2 TIMES DAILY
Status: DISCONTINUED | OUTPATIENT
Start: 2023-10-01 | End: 2023-10-04 | Stop reason: HOSPADM

## 2023-10-01 RX ADMIN — AMLODIPINE BESYLATE 10 MG: 10 TABLET ORAL at 23:36

## 2023-10-01 RX ADMIN — HEPARIN SODIUM 5000 UNITS: 5000 INJECTION INTRAVENOUS; SUBCUTANEOUS at 23:37

## 2023-10-01 RX ADMIN — BUMETANIDE 4 MG: 1 TABLET ORAL at 23:36

## 2023-10-01 RX ADMIN — SODIUM CHLORIDE, PRESERVATIVE FREE 10 ML: 5 INJECTION INTRAVENOUS at 23:36

## 2023-10-01 RX ADMIN — DEXTROSE MONOHYDRATE 25 G: 25 INJECTION, SOLUTION INTRAVENOUS at 16:50

## 2023-10-01 RX ADMIN — ATORVASTATIN CALCIUM 40 MG: 40 TABLET, FILM COATED ORAL at 23:36

## 2023-10-01 RX ADMIN — HYDRALAZINE HYDROCHLORIDE 50 MG: 50 TABLET, FILM COATED ORAL at 23:37

## 2023-10-01 RX ADMIN — DOXAZOSIN 4 MG: 4 TABLET ORAL at 23:36

## 2023-10-01 RX ADMIN — MAGNESIUM GLUCONATE 500 MG ORAL TABLET 400 MG: 500 TABLET ORAL at 23:36

## 2023-10-01 ASSESSMENT — ENCOUNTER SYMPTOMS
VOMITING: 0
ABDOMINAL PAIN: 0
NAUSEA: 0
DIARRHEA: 0
SORE THROAT: 0
SHORTNESS OF BREATH: 0
COUGH: 0
BACK PAIN: 0

## 2023-10-01 ASSESSMENT — PAIN - FUNCTIONAL ASSESSMENT: PAIN_FUNCTIONAL_ASSESSMENT: NONE - DENIES PAIN

## 2023-10-01 ASSESSMENT — PAIN SCALES - GENERAL
PAINLEVEL_OUTOF10: 0
PAINLEVEL_OUTOF10: 0

## 2023-10-01 NOTE — ED TRIAGE NOTES
Pt states that BS was 59 this morning, got it up to 90 and then the recheck was 67. Denies any n/v, dizziness, diaphoresis, denies any pain. Demoes flu-like symptoms or fevers.

## 2023-10-01 NOTE — ED NOTES
Pt unable to urinate at this this time. Pt is drinking water and has call light in reach. Pt was educated to use call light and tell the nurse when she feels the urge to void.      Colin Davies RN  10/1945

## 2023-10-01 NOTE — ED PROVIDER NOTES
[facility] Emergency Department Provider Note                   PCP: Desiree Yang MD               Age: 70 y.o. Sex: female     MEDICAL DECISION MAKING  Complexity of Problems Addressed:   1 or more acute illness/injury that poses a threat to life or bodily function    Data Reviewed and Analyzed:  Category 1:    I have reviewed outside records from an external source for any pertinent PMH, ED visits, primary care visits, specialist visits, labs, EKG, and/or radiologic studies. Category 2:     ED EKG Interpretation  EKG was interpreted in the absence of a cardiologist.    Rate: Rate: Tachycardia  EKG Interpretation: EKG Interpretation: sinus rhythm, no evidence of arrhythmia  ST Segments: Nonspecific ST segments - NO STEMI      I independently ordered and reviewed the labs. I have reviewed the Radiologist interpretation of the radiologic studies. The patient was admitted and I have discussed patient management with the admitting provider. Category 3:     Discussion of management or test interpretation:    MDM  Number of Diagnoses or Management Options  Anasarca  End stage renal disease ([address])  Hypoglycemia  Other ascites  Diagnosis management comments: Patient presents for evaluation of hypoglycemia that started today. Patient is diabetic but her last dose of oral and long-acting insulin was yesterday morning. She states that she has been eating throughout the day but has had several episodes of hypoglycemia. Patient did have relative hypoglycemia in the ED and was fed and her blood sugar normalized. Patient has end-stage renal disease and has dialysis fistula present but has not begun dialysis. She has developed diffuse body swelling over the last 1 week. Patient's BUN and creatinine are elevated but at her baseline. Her electrolytes are stable. Her abdomen pelvic CT shows finding of cirrhosis and ascites.   Her liver enzymes are normal but they can be in end-stage liver

## 2023-10-01 NOTE — ED NOTES
Pt drank orange juice. Pt had multiple unsuccessful IV attempts with US. MD aware. EJ  is getting placed.       Romeo Haji RN  10/01/23 1640

## 2023-10-02 LAB
ANION GAP SERPL CALC-SCNC: 10 MMOL/L (ref 2–11)
BASOPHILS # BLD: 0 K/UL (ref 0–0.2)
BASOPHILS # BLD: 0 K/UL (ref 0–0.2)
BASOPHILS NFR BLD: 0 % (ref 0–2)
BASOPHILS NFR BLD: 1 % (ref 0–2)
BUN SERPL-MCNC: 56 MG/DL (ref 8–23)
CALCIUM SERPL-MCNC: 6.6 MG/DL (ref 8.3–10.4)
CHLORIDE SERPL-SCNC: 113 MMOL/L (ref 101–110)
CO2 SERPL-SCNC: 22 MMOL/L (ref 21–32)
CREAT SERPL-MCNC: 5.9 MG/DL (ref 0.6–1)
DIFFERENTIAL METHOD BLD: ABNORMAL
DIFFERENTIAL METHOD BLD: ABNORMAL
EOSINOPHIL # BLD: 0.1 K/UL (ref 0–0.8)
EOSINOPHIL # BLD: 0.1 K/UL (ref 0–0.8)
EOSINOPHIL NFR BLD: 3 % (ref 0.5–7.8)
EOSINOPHIL NFR BLD: 3 % (ref 0.5–7.8)
ERYTHROCYTE [DISTWIDTH] IN BLOOD BY AUTOMATED COUNT: 16.1 % (ref 11.9–14.6)
ERYTHROCYTE [DISTWIDTH] IN BLOOD BY AUTOMATED COUNT: 16.3 % (ref 11.9–14.6)
EST. AVERAGE GLUCOSE BLD GHB EST-MCNC: 137 MG/DL
GLUCOSE BLD STRIP.AUTO-MCNC: 100 MG/DL (ref 65–100)
GLUCOSE BLD STRIP.AUTO-MCNC: 207 MG/DL (ref 65–100)
GLUCOSE BLD STRIP.AUTO-MCNC: 67 MG/DL (ref 65–100)
GLUCOSE BLD STRIP.AUTO-MCNC: 78 MG/DL (ref 65–100)
GLUCOSE BLD STRIP.AUTO-MCNC: 88 MG/DL (ref 65–100)
GLUCOSE BLD STRIP.AUTO-MCNC: 91 MG/DL (ref 65–100)
GLUCOSE SERPL-MCNC: 78 MG/DL (ref 65–100)
HBA1C MFR BLD: 6.4 % (ref 4.8–5.6)
HBV SURFACE AG SER QL: NONREACTIVE
HCT VFR BLD AUTO: 24.2 % (ref 35.8–46.3)
HCT VFR BLD AUTO: 25.9 % (ref 35.8–46.3)
HCV AB SER QL: NONREACTIVE
HGB BLD-MCNC: 7.5 G/DL (ref 11.7–15.4)
HGB BLD-MCNC: 8 G/DL (ref 11.7–15.4)
IMM GRANULOCYTES # BLD AUTO: 0 K/UL (ref 0–0.5)
IMM GRANULOCYTES # BLD AUTO: 0 K/UL (ref 0–0.5)
IMM GRANULOCYTES NFR BLD AUTO: 0 % (ref 0–5)
IMM GRANULOCYTES NFR BLD AUTO: 0 % (ref 0–5)
LYMPHOCYTES # BLD: 1 K/UL (ref 0.5–4.6)
LYMPHOCYTES # BLD: 1.5 K/UL (ref 0.5–4.6)
LYMPHOCYTES NFR BLD: 37 % (ref 13–44)
LYMPHOCYTES NFR BLD: 41 % (ref 13–44)
MCH RBC QN AUTO: 26.9 PG (ref 26.1–32.9)
MCH RBC QN AUTO: 27.4 PG (ref 26.1–32.9)
MCHC RBC AUTO-ENTMCNC: 31 G/DL (ref 31.4–35)
MCHC RBC AUTO-ENTMCNC: 31.1 G/DL (ref 31.4–35)
MCV RBC AUTO: 86.7 FL (ref 82–102)
MCV RBC AUTO: 88.3 FL (ref 82–102)
MONOCYTES # BLD: 0.2 K/UL (ref 0.1–1.3)
MONOCYTES # BLD: 0.4 K/UL (ref 0.1–1.3)
MONOCYTES NFR BLD: 10 % (ref 4–12)
MONOCYTES NFR BLD: 9 % (ref 4–12)
NEUTS SEG # BLD: 1.1 K/UL (ref 1.7–8.2)
NEUTS SEG # BLD: 2 K/UL (ref 1.7–8.2)
NEUTS SEG NFR BLD: 46 % (ref 43–78)
NEUTS SEG NFR BLD: 49 % (ref 43–78)
NRBC # BLD: 0 K/UL (ref 0–0.2)
NRBC # BLD: 0.02 K/UL (ref 0–0.2)
PLATELET # BLD AUTO: 108 K/UL (ref 150–450)
PLATELET # BLD AUTO: 39 K/UL (ref 150–450)
PMV BLD AUTO: 11.2 FL (ref 9.4–12.3)
PMV BLD AUTO: ABNORMAL FL (ref 9.4–12.3)
POTASSIUM SERPL-SCNC: 4.1 MMOL/L (ref 3.5–5.1)
RBC # BLD AUTO: 2.74 M/UL (ref 4.05–5.2)
RBC # BLD AUTO: 2.98 M/UL (ref 4.05–5.2)
SARS-COV-2 RDRP RESP QL NAA+PROBE: NOT DETECTED
SERVICE CMNT-IMP: ABNORMAL
SERVICE CMNT-IMP: NORMAL
SODIUM SERPL-SCNC: 145 MMOL/L (ref 133–143)
SOURCE: NORMAL
T4 FREE SERPL-MCNC: 1.4 NG/DL (ref 0.78–1.46)
TSH W FREE THYROID IF ABNORMAL: 8.96 UIU/ML (ref 0.36–3.74)
WBC # BLD AUTO: 2.3 K/UL (ref 4.3–11.1)
WBC # BLD AUTO: 4.5 K/UL (ref 4.3–11.1)

## 2023-10-02 PROCEDURE — 36415 COLL VENOUS BLD VENIPUNCTURE: CPT

## 2023-10-02 PROCEDURE — 2500000003 HC RX 250 WO HCPCS: Performed by: INTERNAL MEDICINE

## 2023-10-02 PROCEDURE — 6360000002 HC RX W HCPCS: Performed by: FAMILY MEDICINE

## 2023-10-02 PROCEDURE — 86704 HEP B CORE ANTIBODY TOTAL: CPT

## 2023-10-02 PROCEDURE — 87635 SARS-COV-2 COVID-19 AMP PRB: CPT

## 2023-10-02 PROCEDURE — 83036 HEMOGLOBIN GLYCOSYLATED A1C: CPT

## 2023-10-02 PROCEDURE — 1100000000 HC RM PRIVATE

## 2023-10-02 PROCEDURE — 6370000000 HC RX 637 (ALT 250 FOR IP): Performed by: FAMILY MEDICINE

## 2023-10-02 PROCEDURE — 84439 ASSAY OF FREE THYROXINE: CPT

## 2023-10-02 PROCEDURE — 85025 COMPLETE CBC W/AUTO DIFF WBC: CPT

## 2023-10-02 PROCEDURE — 2580000003 HC RX 258: Performed by: FAMILY MEDICINE

## 2023-10-02 PROCEDURE — 90935 HEMODIALYSIS ONE EVALUATION: CPT

## 2023-10-02 PROCEDURE — 87340 HEPATITIS B SURFACE AG IA: CPT

## 2023-10-02 PROCEDURE — 82962 GLUCOSE BLOOD TEST: CPT

## 2023-10-02 PROCEDURE — 86803 HEPATITIS C AB TEST: CPT

## 2023-10-02 PROCEDURE — 80048 BASIC METABOLIC PNL TOTAL CA: CPT

## 2023-10-02 PROCEDURE — 84443 ASSAY THYROID STIM HORMONE: CPT

## 2023-10-02 RX ADMIN — CARVEDILOL 25 MG: 25 TABLET, FILM COATED ORAL at 08:14

## 2023-10-02 RX ADMIN — AMLODIPINE BESYLATE 10 MG: 10 TABLET ORAL at 21:23

## 2023-10-02 RX ADMIN — HEPARIN SODIUM 5000 UNITS: 5000 INJECTION INTRAVENOUS; SUBCUTANEOUS at 21:25

## 2023-10-02 RX ADMIN — LEVOTHYROXINE SODIUM 75 MCG: 0.07 TABLET ORAL at 06:08

## 2023-10-02 RX ADMIN — LOSARTAN POTASSIUM 100 MG: 50 TABLET, FILM COATED ORAL at 08:14

## 2023-10-02 RX ADMIN — INSULIN GLARGINE 10 UNITS: 100 INJECTION, SOLUTION SUBCUTANEOUS at 08:16

## 2023-10-02 RX ADMIN — HYDRALAZINE HYDROCHLORIDE 50 MG: 50 TABLET, FILM COATED ORAL at 08:14

## 2023-10-02 RX ADMIN — BUMETANIDE 4 MG: 1 TABLET ORAL at 11:30

## 2023-10-02 RX ADMIN — TUBERCULIN PURIFIED PROTEIN DERIVATIVE 5 UNITS: 5 INJECTION, SOLUTION INTRADERMAL at 15:37

## 2023-10-02 RX ADMIN — CARVEDILOL 25 MG: 25 TABLET, FILM COATED ORAL at 21:23

## 2023-10-02 RX ADMIN — SODIUM CHLORIDE, PRESERVATIVE FREE 10 ML: 5 INJECTION INTRAVENOUS at 21:27

## 2023-10-02 RX ADMIN — SODIUM BICARBONATE 650 MG TABLET 1300 MG: at 21:24

## 2023-10-02 RX ADMIN — HYDRALAZINE HYDROCHLORIDE 50 MG: 50 TABLET, FILM COATED ORAL at 21:23

## 2023-10-02 RX ADMIN — SODIUM BICARBONATE 650 MG TABLET 1300 MG: at 08:15

## 2023-10-02 RX ADMIN — MAGNESIUM GLUCONATE 500 MG ORAL TABLET 400 MG: 500 TABLET ORAL at 08:15

## 2023-10-02 RX ADMIN — DOXAZOSIN 4 MG: 4 TABLET ORAL at 08:37

## 2023-10-02 RX ADMIN — CARVEDILOL 25 MG: 25 TABLET, FILM COATED ORAL at 02:29

## 2023-10-02 RX ADMIN — CALCITRIOL 0.25 MCG: 0.25 CAPSULE ORAL at 08:14

## 2023-10-02 RX ADMIN — HYDRALAZINE HYDROCHLORIDE 50 MG: 50 TABLET, FILM COATED ORAL at 14:45

## 2023-10-02 RX ADMIN — BUMETANIDE 4 MG: 1 TABLET ORAL at 21:21

## 2023-10-02 RX ADMIN — HEPARIN SODIUM 5000 UNITS: 5000 INJECTION INTRAVENOUS; SUBCUTANEOUS at 08:16

## 2023-10-02 RX ADMIN — ATORVASTATIN CALCIUM 40 MG: 40 TABLET, FILM COATED ORAL at 21:21

## 2023-10-02 RX ADMIN — MAGNESIUM GLUCONATE 500 MG ORAL TABLET 400 MG: 500 TABLET ORAL at 21:24

## 2023-10-02 RX ADMIN — DOXAZOSIN 4 MG: 4 TABLET ORAL at 21:24

## 2023-10-02 RX ADMIN — INSULIN LISPRO 3 UNITS: 100 INJECTION, SOLUTION INTRAVENOUS; SUBCUTANEOUS at 11:29

## 2023-10-02 RX ADMIN — SODIUM CHLORIDE, PRESERVATIVE FREE 10 ML: 5 INJECTION INTRAVENOUS at 08:22

## 2023-10-02 ASSESSMENT — PAIN SCALES - GENERAL: PAINLEVEL_OUTOF10: 0

## 2023-10-02 NOTE — CARE COORDINATION
CM reviewed pt chart for discharge planning. CM to establish dialysis chair time with [facility] at [facility]. CM to email requested documents for appointment. CM met pt at bedside to update the progress and will confirm dates and times with pt, once received from [facility]. CM to continue follow pt for care.

## 2023-10-02 NOTE — ED NOTES
Pt had expiratory wheezing. MD aware. Pt oxygen saturation within range. Pt O2 98%.      Chiquita Borrego RN  10/01/23 4554

## 2023-10-02 NOTE — H&P
Provider Result on 1/21/2023 12:00 AM            Signed:  Wilfrid Davis MD    Part of this note may have been written by using a voice dictation software. The note has been proof read but may still contain some grammatical/other typographical errors.

## 2023-10-02 NOTE — ED NOTES
TRANSFER - OUT REPORT:    Verbal report given to April on Zoie Dejesus  being transferred to [facility] [phone] or routine progression of patient care       Report consisted of patient's Situation, Background, Assessment and   Recommendations(SBAR). Information from the following report(s) Nurse Handoff Report was reviewed with the receiving nurse. Esther Fall Assessment:    Presents to emergency department  because of falls (Syncope, seizure, or loss of consciousness): No  Age > 70: Yes  Altered Mental Status, Intoxication with alcohol or substance confusion (Disorientation, impaired judgment, poor safety awaremess, or inability to follow instructions): No  Impaired Mobility: Ambulates or transfers with assistive devices or assistance; Unable to ambulate or transer.: Yes  Nursing Judgement: Yes          Lines:   Peripheral IV 10/01/23 Right External Jugular (Active)   Site Assessment Clean, dry & intact 10/01/23 1642   Line Status Flushed;Normal saline locked 10/01/23 1642   Phlebitis Assessment No symptoms 10/01/23 1642   Infiltration Assessment 0 10/01/23 1642   Dressing Status Clean, dry & intact 10/01/23 1642   Dressing Type Transparent 10/01/23 1642        Opportunity for questions and clarification was provided.       Patient transported with:  Transport          [place], [place]  10/01/23 2040

## 2023-10-02 NOTE — CONSULTS
180 tablet 3    hydrALAZINE (APRESOLINE) 50 MG tablet Take 1 tablet by mouth 3 times daily      Carboxymethylcellul-Glycerin 0.5-0.9 % SOLN Apply to eye as needed      amLODIPine (NORVASC) 10 MG tablet Take 1 tablet by mouth daily TAKE 1 TABLET BY MOUTH DAILY (Patient taking differently: Take 1 tablet by mouth at bedtime TAKE 1 TABLET BY MOUTH DAILY) 90 tablet 3    levothyroxine (SYNTHROID) 75 MCG tablet Take 1 tablet by mouth Daily 90 tablet 3    olmesartan (BENICAR) 40 MG tablet TAKE 1 TABLET BY MOUTH DAILY 90 tablet 3    atorvastatin (LIPITOR) 40 MG tablet Take 1 tablet by mouth daily (Patient taking differently: Take 1 tablet by mouth at bedtime) 90 tablet 3    sodium bicarbonate 650 MG tablet Take 2 tablets by mouth 2 times daily      calcitRIOL (ROCALTROL) 0.25 MCG capsule Take 1 capsule by mouth daily      doxazosin (CARDURA) 4 MG tablet Take 1 tablet by mouth in the morning and at bedtime 180 tablet 3    insulin glargine, 1 unit dial, (TOUJEO SOLOSTAR) 300 UNIT/ML concentrated injection pen Inject 20 Units into the skin every morning      Lancets MISC Check BS BID      furosemide (LASIX) 40 MG tablet Take 2 tablets by mouth daily (Patient not taking: Reported on 10/1/2023)      glipiZIDE (GLUCOTROL) 10 MG tablet Take 1 tablet by mouth daily       Immunization History   Administered Date(s) Administered    COVID-19, PFIZER PURPLE top, DILUTE for use, (age 15 y+), 30mcg/0.3mL 03/09/2021, 03/30/2021    Influenza Virus Vaccine 11/01/2017, 10/17/2019    Influenza, FLUAD, (age 72 y+), Adjuvanted, 0.5mL 10/12/2020, 10/04/2021    Influenza, High Dose (Fluzone 65 yrs and older) 10/26/2015, 11/15/2016, 11/16/2018, 10/17/2019    Influenza, Triv, inactivated, subunit, adjuvanted, IM (Fluad 65 yrs and older) 10/17/2019    TDaP, ADACEL (age 6y-58y), BOOSTRIX (age 10y+), IM, 0.5mL 07/20/2017         Past Medical History:   Diagnosis Date    Anemia     s/p iron infusions (last 6/5/23)    DM (diabetes mellitus) ([address] [facility])

## 2023-10-02 NOTE — CARE COORDINATION
Pt chart reviewed for discharge planning. CM met with pt at bedside, verified demographic information/ health insurance. Pt lives in two level home with family, is independent with ADLs, uses no DME, and drives . PCP was confirmed, last seen a month ago in the office. Pt reports no outside services in the home at this time and has good family support. CM will follow pt plan of care and assist with supportive care referrals pending pt clinical progress. Please consult case management if specific needs arise. 10/01/23 2203   Service Assessment   Patient Orientation Alert and Oriented   Cognition Alert   History Provided By Patient   Primary Caregiver Self   Support Systems Children;Family Members   Patient's Healthcare Decision Maker is: Legal Next of Kin   PCP Verified by CM Yes   Last Visit to PCP Within last 3 months   Prior Functional Level Independent in ADLs/IADLs   Current Functional Level Independent in ADLs/IADLs   Can patient return to prior living arrangement Yes   Ability to make needs known: Good   Family able to assist with home care needs: Yes   Would you like for me to discuss the discharge plan with any other family members/significant others, and if so, who? No   Financial Resources Medicare   Community Resources None   Social/Functional History   Lives With Family   [facility]   Active  Yes   Occupation Retired   Discharge Planning   Type of [address] [place] [address] Prior To Admission None   Potential Assistance Needed N/A   DME Ordered?  No   Potential Assistance Purchasing Medications No   Type of Home Care Services None   Patient expects to be discharged to: House   One/Two Story Residence Two story   Condition of Participation: Discharge Planning   The Plan for Transition of Care is related to the following treatment

## 2023-10-02 NOTE — DIALYSIS
TRANSFER IN - DIALYSIS    Received patient in dialysis unit from [facility] (unit) for ordered procedure. First treatment. Consent verified for renal replacement therapy. Procedure explained to patient, opportunity for Q&A provided. Call light given. Patient alert and vital signs stable. /75 , P75 ,  room air. Hemodialysis initiated using left AVF and 17 g needles. Machine settings per MD order. Heparin 0 unit bolus and 0 units/hr. Will monitor during treatment.

## 2023-10-03 ENCOUNTER — PREP FOR PROCEDURE (OUTPATIENT)
Dept: VASCULAR SURGERY | Age: 78
End: 2023-10-03

## 2023-10-03 PROBLEM — T82.590A MECHANICAL COMPLICATION OF ARTERIOVENOUS FISTULA SURGICALLY CREATED (HCC): Status: ACTIVE | Noted: 2023-10-03

## 2023-10-03 LAB
ANION GAP SERPL CALC-SCNC: 5 MMOL/L (ref 2–11)
BASOPHILS # BLD: 0 K/UL (ref 0–0.2)
BASOPHILS NFR BLD: 0 % (ref 0–2)
BUN SERPL-MCNC: 42 MG/DL (ref 8–23)
CALCIUM SERPL-MCNC: 6.6 MG/DL (ref 8.3–10.4)
CHLORIDE SERPL-SCNC: 111 MMOL/L (ref 101–110)
CO2 SERPL-SCNC: 28 MMOL/L (ref 21–32)
CREAT SERPL-MCNC: 5 MG/DL (ref 0.6–1)
DIFFERENTIAL METHOD BLD: ABNORMAL
EOSINOPHIL # BLD: 0.2 K/UL (ref 0–0.8)
EOSINOPHIL NFR BLD: 4 % (ref 0.5–7.8)
ERYTHROCYTE [DISTWIDTH] IN BLOOD BY AUTOMATED COUNT: 15.9 % (ref 11.9–14.6)
FERRITIN SERPL-MCNC: 91 NG/ML (ref 8–388)
FOLATE SERPL-MCNC: 4.7 NG/ML (ref 3.1–17.5)
GLUCOSE BLD STRIP.AUTO-MCNC: 200 MG/DL (ref 65–100)
GLUCOSE BLD STRIP.AUTO-MCNC: 233 MG/DL (ref 65–100)
GLUCOSE BLD STRIP.AUTO-MCNC: 234 MG/DL (ref 65–100)
GLUCOSE SERPL-MCNC: 75 MG/DL (ref 65–100)
HCT VFR BLD AUTO: 24.3 % (ref 35.8–46.3)
HGB BLD-MCNC: 7.4 G/DL (ref 11.7–15.4)
IMM GRANULOCYTES # BLD AUTO: 0 K/UL (ref 0–0.5)
IMM GRANULOCYTES NFR BLD AUTO: 0 % (ref 0–5)
IRON SATN MFR SERPL: 18 %
IRON SERPL-MCNC: 31 UG/DL (ref 35–150)
LYMPHOCYTES # BLD: 1.7 K/UL (ref 0.5–4.6)
LYMPHOCYTES NFR BLD: 45 % (ref 13–44)
MCH RBC QN AUTO: 26.8 PG (ref 26.1–32.9)
MCHC RBC AUTO-ENTMCNC: 30.5 G/DL (ref 31.4–35)
MCV RBC AUTO: 88 FL (ref 82–102)
MM INDURATION, POC: 0 MM (ref 0–5)
MONOCYTES # BLD: 0.4 K/UL (ref 0.1–1.3)
MONOCYTES NFR BLD: 11 % (ref 4–12)
NEUTS SEG # BLD: 1.6 K/UL (ref 1.7–8.2)
NEUTS SEG NFR BLD: 40 % (ref 43–78)
NRBC # BLD: 0 K/UL (ref 0–0.2)
PHOSPHATE SERPL-MCNC: 3.8 MG/DL (ref 2.3–3.7)
PLATELET # BLD AUTO: 113 K/UL (ref 150–450)
PMV BLD AUTO: 11.1 FL (ref 9.4–12.3)
POTASSIUM SERPL-SCNC: 3.8 MMOL/L (ref 3.5–5.1)
PPD, POC: NEGATIVE
RBC # BLD AUTO: 2.76 M/UL (ref 4.05–5.2)
SERVICE CMNT-IMP: ABNORMAL
SODIUM SERPL-SCNC: 144 MMOL/L (ref 133–143)
TIBC SERPL-MCNC: 170 UG/DL (ref 250–450)
VIT B12 SERPL-MCNC: 985 PG/ML (ref 193–986)
WBC # BLD AUTO: 3.9 K/UL (ref 4.3–11.1)

## 2023-10-03 PROCEDURE — 90935 HEMODIALYSIS ONE EVALUATION: CPT

## 2023-10-03 PROCEDURE — 97165 OT EVAL LOW COMPLEX 30 MIN: CPT

## 2023-10-03 PROCEDURE — 1100000000 HC RM PRIVATE

## 2023-10-03 PROCEDURE — 83540 ASSAY OF IRON: CPT

## 2023-10-03 PROCEDURE — 97530 THERAPEUTIC ACTIVITIES: CPT

## 2023-10-03 PROCEDURE — 84100 ASSAY OF PHOSPHORUS: CPT

## 2023-10-03 PROCEDURE — 82607 VITAMIN B-12: CPT

## 2023-10-03 PROCEDURE — 2580000003 HC RX 258: Performed by: FAMILY MEDICINE

## 2023-10-03 PROCEDURE — 80048 BASIC METABOLIC PNL TOTAL CA: CPT

## 2023-10-03 PROCEDURE — 83550 IRON BINDING TEST: CPT

## 2023-10-03 PROCEDURE — 82728 ASSAY OF FERRITIN: CPT

## 2023-10-03 PROCEDURE — 82962 GLUCOSE BLOOD TEST: CPT

## 2023-10-03 PROCEDURE — 97112 NEUROMUSCULAR REEDUCATION: CPT

## 2023-10-03 PROCEDURE — 6360000002 HC RX W HCPCS: Performed by: FAMILY MEDICINE

## 2023-10-03 PROCEDURE — 6370000000 HC RX 637 (ALT 250 FOR IP): Performed by: FAMILY MEDICINE

## 2023-10-03 PROCEDURE — 82746 ASSAY OF FOLIC ACID SERUM: CPT

## 2023-10-03 PROCEDURE — 85025 COMPLETE CBC W/AUTO DIFF WBC: CPT

## 2023-10-03 PROCEDURE — 97161 PT EVAL LOW COMPLEX 20 MIN: CPT

## 2023-10-03 RX ORDER — FERROUS SULFATE 325(65) MG
325 TABLET ORAL
Status: DISCONTINUED | OUTPATIENT
Start: 2023-10-04 | End: 2023-10-04

## 2023-10-03 RX ADMIN — BUMETANIDE 4 MG: 1 TABLET ORAL at 13:48

## 2023-10-03 RX ADMIN — SODIUM BICARBONATE 650 MG TABLET 1300 MG: at 13:51

## 2023-10-03 RX ADMIN — AMLODIPINE BESYLATE 10 MG: 10 TABLET ORAL at 21:15

## 2023-10-03 RX ADMIN — MAGNESIUM GLUCONATE 500 MG ORAL TABLET 400 MG: 500 TABLET ORAL at 13:48

## 2023-10-03 RX ADMIN — HEPARIN SODIUM 5000 UNITS: 5000 INJECTION INTRAVENOUS; SUBCUTANEOUS at 13:49

## 2023-10-03 RX ADMIN — INSULIN LISPRO 3 UNITS: 100 INJECTION, SOLUTION INTRAVENOUS; SUBCUTANEOUS at 17:31

## 2023-10-03 RX ADMIN — DOXAZOSIN 4 MG: 4 TABLET ORAL at 13:49

## 2023-10-03 RX ADMIN — SODIUM BICARBONATE 650 MG TABLET 1300 MG: at 21:22

## 2023-10-03 RX ADMIN — HYDRALAZINE HYDROCHLORIDE 50 MG: 50 TABLET, FILM COATED ORAL at 21:16

## 2023-10-03 RX ADMIN — CALCITRIOL 0.25 MCG: 0.25 CAPSULE ORAL at 13:44

## 2023-10-03 RX ADMIN — CARVEDILOL 25 MG: 25 TABLET, FILM COATED ORAL at 13:47

## 2023-10-03 RX ADMIN — BUMETANIDE 4 MG: 1 TABLET ORAL at 21:13

## 2023-10-03 RX ADMIN — INSULIN LISPRO 3 UNITS: 100 INJECTION, SOLUTION INTRAVENOUS; SUBCUTANEOUS at 14:11

## 2023-10-03 RX ADMIN — LEVOTHYROXINE SODIUM 75 MCG: 0.07 TABLET ORAL at 05:40

## 2023-10-03 RX ADMIN — HYDRALAZINE HYDROCHLORIDE 50 MG: 50 TABLET, FILM COATED ORAL at 14:00

## 2023-10-03 RX ADMIN — HEPARIN SODIUM 5000 UNITS: 5000 INJECTION INTRAVENOUS; SUBCUTANEOUS at 21:14

## 2023-10-03 RX ADMIN — SODIUM CHLORIDE, PRESERVATIVE FREE 10 ML: 5 INJECTION INTRAVENOUS at 21:12

## 2023-10-03 RX ADMIN — ATORVASTATIN CALCIUM 40 MG: 40 TABLET, FILM COATED ORAL at 21:15

## 2023-10-03 RX ADMIN — LOSARTAN POTASSIUM 100 MG: 50 TABLET, FILM COATED ORAL at 13:46

## 2023-10-03 RX ADMIN — MAGNESIUM GLUCONATE 500 MG ORAL TABLET 400 MG: 500 TABLET ORAL at 21:15

## 2023-10-03 RX ADMIN — SODIUM CHLORIDE, PRESERVATIVE FREE 10 ML: 5 INJECTION INTRAVENOUS at 14:15

## 2023-10-03 RX ADMIN — CARVEDILOL 25 MG: 25 TABLET, FILM COATED ORAL at 21:15

## 2023-10-03 RX ADMIN — DOXAZOSIN 4 MG: 4 TABLET ORAL at 21:15

## 2023-10-03 NOTE — CARE COORDINATION
CM recevied call from [address] [place] [address] [facility] regarding chair time for pt. Schedule is set for MWF at 10:45 am at the [address] [place] [address]  [place] location. CM met with pt at bedside to inform her of chair time and location. CM will continue to follow pt for care.

## 2023-10-04 ENCOUNTER — HOME HEALTH ADMISSION (OUTPATIENT)
Dept: HOME HEALTH SERVICES | Facility: HOME HEALTH | Age: 78
End: 2023-10-04

## 2023-10-04 ENCOUNTER — TELEPHONE (OUTPATIENT)
Dept: FAMILY MEDICINE CLINIC | Facility: CLINIC | Age: 78
End: 2023-10-04

## 2023-10-04 VITALS
RESPIRATION RATE: 19 BRPM | DIASTOLIC BLOOD PRESSURE: 57 MMHG | WEIGHT: 206.79 LBS | BODY MASS INDEX: 35.3 KG/M2 | OXYGEN SATURATION: 94 % | HEIGHT: 64 IN | HEART RATE: 71 BPM | SYSTOLIC BLOOD PRESSURE: 131 MMHG | TEMPERATURE: 99 F

## 2023-10-04 PROBLEM — N18.5 CHRONIC KIDNEY DISEASE (CKD), STAGE V (HCC): Status: RESOLVED | Noted: 2022-11-18 | Resolved: 2023-10-04

## 2023-10-04 PROBLEM — K75.81 LIVER CIRRHOSIS SECONDARY TO NASH (HCC): Status: ACTIVE | Noted: 2023-10-01

## 2023-10-04 LAB
ANION GAP SERPL CALC-SCNC: 5 MMOL/L (ref 2–11)
BACTERIA SPEC CULT: NORMAL
BASOPHILS # BLD: 0 K/UL (ref 0–0.2)
BASOPHILS NFR BLD: 1 % (ref 0–2)
BUN SERPL-MCNC: 37 MG/DL (ref 8–23)
CALCIUM SERPL-MCNC: 6.7 MG/DL (ref 8.3–10.4)
CHLORIDE SERPL-SCNC: 110 MMOL/L (ref 101–110)
CO2 SERPL-SCNC: 30 MMOL/L (ref 21–32)
CREAT SERPL-MCNC: 4.7 MG/DL (ref 0.6–1)
DIFFERENTIAL METHOD BLD: ABNORMAL
EOSINOPHIL # BLD: 0.1 K/UL (ref 0–0.8)
EOSINOPHIL NFR BLD: 4 % (ref 0.5–7.8)
ERYTHROCYTE [DISTWIDTH] IN BLOOD BY AUTOMATED COUNT: 15.7 % (ref 11.9–14.6)
GLUCOSE BLD STRIP.AUTO-MCNC: 137 MG/DL (ref 65–100)
GLUCOSE BLD STRIP.AUTO-MCNC: 226 MG/DL (ref 65–100)
GLUCOSE SERPL-MCNC: 151 MG/DL (ref 65–100)
HBV CORE AB SERPL QL IA: NEGATIVE
HCT VFR BLD AUTO: 24 % (ref 35.8–46.3)
HGB BLD-MCNC: 7.4 G/DL (ref 11.7–15.4)
IMM GRANULOCYTES # BLD AUTO: 0 K/UL (ref 0–0.5)
IMM GRANULOCYTES NFR BLD AUTO: 0 % (ref 0–5)
LYMPHOCYTES # BLD: 1.5 K/UL (ref 0.5–4.6)
LYMPHOCYTES NFR BLD: 40 % (ref 13–44)
MCH RBC QN AUTO: 26.7 PG (ref 26.1–32.9)
MCHC RBC AUTO-ENTMCNC: 30.8 G/DL (ref 31.4–35)
MCV RBC AUTO: 86.6 FL (ref 82–102)
MM INDURATION, POC: 0 MM (ref 0–5)
MONOCYTES # BLD: 0.5 K/UL (ref 0.1–1.3)
MONOCYTES NFR BLD: 12 % (ref 4–12)
NEUTS SEG # BLD: 1.6 K/UL (ref 1.7–8.2)
NEUTS SEG NFR BLD: 43 % (ref 43–78)
NRBC # BLD: 0 K/UL (ref 0–0.2)
PLATELET # BLD AUTO: 105 K/UL (ref 150–450)
PMV BLD AUTO: 11.1 FL (ref 9.4–12.3)
POTASSIUM SERPL-SCNC: 3.8 MMOL/L (ref 3.5–5.1)
PPD, POC: NEGATIVE
RBC # BLD AUTO: 2.77 M/UL (ref 4.05–5.2)
SERVICE CMNT-IMP: ABNORMAL
SERVICE CMNT-IMP: ABNORMAL
SERVICE CMNT-IMP: NORMAL
SODIUM SERPL-SCNC: 145 MMOL/L (ref 133–143)
WBC # BLD AUTO: 3.7 K/UL (ref 4.3–11.1)

## 2023-10-04 PROCEDURE — 90935 HEMODIALYSIS ONE EVALUATION: CPT

## 2023-10-04 PROCEDURE — 82962 GLUCOSE BLOOD TEST: CPT

## 2023-10-04 PROCEDURE — 6370000000 HC RX 637 (ALT 250 FOR IP): Performed by: FAMILY MEDICINE

## 2023-10-04 PROCEDURE — 85025 COMPLETE CBC W/AUTO DIFF WBC: CPT

## 2023-10-04 PROCEDURE — 6360000002 HC RX W HCPCS: Performed by: NURSE PRACTITIONER

## 2023-10-04 PROCEDURE — 2580000003 HC RX 258: Performed by: NURSE PRACTITIONER

## 2023-10-04 PROCEDURE — 2580000003 HC RX 258: Performed by: FAMILY MEDICINE

## 2023-10-04 PROCEDURE — 80048 BASIC METABOLIC PNL TOTAL CA: CPT

## 2023-10-04 RX ORDER — INSULIN GLARGINE 300 U/ML
10 INJECTION, SOLUTION SUBCUTANEOUS EVERY MORNING
Qty: 5 ADJUSTABLE DOSE PRE-FILLED PEN SYRINGE | Refills: 2 | Status: SHIPPED
Start: 2023-10-04 | End: 2024-10-04

## 2023-10-04 RX ORDER — FERROUS SULFATE 325(65) MG
325 TABLET ORAL
Qty: 90 TABLET | Refills: 1 | Status: SHIPPED | OUTPATIENT
Start: 2023-10-04

## 2023-10-04 RX ADMIN — INSULIN LISPRO 3 UNITS: 100 INJECTION, SOLUTION INTRAVENOUS; SUBCUTANEOUS at 17:10

## 2023-10-04 RX ADMIN — BUMETANIDE 4 MG: 1 TABLET ORAL at 12:22

## 2023-10-04 RX ADMIN — CALCITRIOL 0.25 MCG: 0.25 CAPSULE ORAL at 12:24

## 2023-10-04 RX ADMIN — MAGNESIUM GLUCONATE 500 MG ORAL TABLET 400 MG: 500 TABLET ORAL at 12:43

## 2023-10-04 RX ADMIN — LOSARTAN POTASSIUM 100 MG: 50 TABLET, FILM COATED ORAL at 12:40

## 2023-10-04 RX ADMIN — DOXAZOSIN 4 MG: 4 TABLET ORAL at 12:42

## 2023-10-04 RX ADMIN — LEVOTHYROXINE SODIUM 75 MCG: 0.07 TABLET ORAL at 05:29

## 2023-10-04 RX ADMIN — SODIUM BICARBONATE 650 MG TABLET 1300 MG: at 12:20

## 2023-10-04 RX ADMIN — SODIUM CHLORIDE 125 MG: 9 INJECTION, SOLUTION INTRAVENOUS at 13:05

## 2023-10-04 RX ADMIN — HYDRALAZINE HYDROCHLORIDE 50 MG: 50 TABLET, FILM COATED ORAL at 12:27

## 2023-10-04 RX ADMIN — CARVEDILOL 25 MG: 25 TABLET, FILM COATED ORAL at 12:25

## 2023-10-04 RX ADMIN — SODIUM CHLORIDE, PRESERVATIVE FREE 10 ML: 5 INJECTION INTRAVENOUS at 13:21

## 2023-10-04 NOTE — TELEPHONE ENCOUNTER
[address] calling to ask if Dr Deb Love will follow home health care orders to have physical therapy and occupational therapy after she is dismissed from the hospital     Hospital Phone - secure line: [phone]

## 2023-10-04 NOTE — DIALYSIS
TRANSFER OUT - DIALYSIS    Hemodialysis treatment completed, pt ran 3.5 hours without complications. Patient alert and /77 , P 64       3.2 Kg removed. Needles x2 removed from access and manual pressure held until hemostasis complete and pressure dressing applied. Meds given: 0.    RBCs given during dialysis: 0    Patient to 612 after dialysis.

## 2023-10-04 NOTE — PLAN OF CARE
Problem: Discharge Planning  Goal: Discharge to home or other facility with appropriate resources  Outcome: Completed     Problem: ABCDS Injury Assessment  Goal: Absence of physical injury  Outcome: Completed     Problem: Safety - Adult  Goal: Free from fall injury  Outcome: Completed     Problem: Chronic Conditions and Co-morbidities  Goal: Patient's chronic conditions and co-morbidity symptoms are monitored and maintained or improved  Outcome: Completed     Problem: Skin/Tissue Integrity  Goal: Absence of new skin breakdown  Description: 1. Monitor for areas of redness and/or skin breakdown  2. Assess vascular access sites hourly  3. Every 4-6 hours minimum:  Change oxygen saturation probe site  4. Every 4-6 hours:  If on nasal continuous positive airway pressure, respiratory therapy assess nares and determine need for appliance change or resting period.   Outcome: Completed

## 2023-10-04 NOTE — CARE COORDINATION
Patient to be discharged home today. CM spoke w/ patient at bedside. HD has been arranged with [facility] for M,W,F at 10:45; CM spoke w/ Shira Schumacherder at facility and notified her of discharge today. Patient verbalized understanding that she will have HD on Friday at 10:45 at the [facility] location. PT/OT recommend [address] services. Patient is agreeable. CM provided [address] choice list; patient has no preference of agency and is agreeable to [facility]. Referral sent to [facility] for [address] [facility] [address] PT/OT services. No further discharge needs identified at this time. CM will follow. 10/04/23 1226   Service Assessment   Patient Orientation Alert and Oriented   Services At/After Discharge   Transition of Care Consult (CM Consult) Other;Home Health  (Hemodialysis)   Internal Home Health Yes   Services At/After Discharge Home Health;OT;PT   Mode of Transport at Discharge Self   Confirm Follow Up Transport Self   Condition of Participation: Discharge Planning   The Plan for Transition of Care is related to the following treatment goals: Return home; HD M, W, F. The Patient and/or Patient Representative was provided with a Choice of Provider? Patient   The Patient and/Or Patient Representative agree with the Discharge Plan? Yes   Freedom of Choice list was provided with basic dialogue that supports the patient's individualized plan of care/goals, treatment preferences, and shares the quality data associated with the providers?   Yes

## 2023-10-05 ENCOUNTER — CARE COORDINATION (OUTPATIENT)
Dept: CARE COORDINATION | Facility: CLINIC | Age: 78
End: 2023-10-05

## 2023-10-05 ENCOUNTER — TELEPHONE (OUTPATIENT)
Dept: FAMILY MEDICINE CLINIC | Facility: CLINIC | Age: 78
End: 2023-10-05

## 2023-10-05 NOTE — CARE COORDINATION
Care Transitions Initial Follow Up Call    Call within 2 business days of discharge: Yes    Patient: Vickey Wells Patient : 1945   MRN: [id number]  Reason for Admission: End stage renal disease  Discharge Date: 10/4/23 RARS: Readmission Risk Score: 18.6  CTN attempted to contact patient for transitions of care outreach after hospital discharge on 10//23. Unable to reach at this time and unable to leave a message due to voicemail is full at this time. Will try again later. Last Discharge Facility       Date Complaint Diagnosis Description Type Department Provider    10/1/23  ESRD needing dialysis [facility]) Admission (Discharged) [id number] Woo Fields MD    10/1/23 Hypoglycemia End stage renal disease ([address]) . .. ED (TRANSFER) Joseph Mendoza MD          Was this an external facility discharge?  No Discharge Facility: [facility]  Follow Up  Future Appointments   Date Time Provider [address]   10/19/2023 11:20 AM Desiree Yang MD [facility] AMB   2024  8:00 AM [facility] LAB RESOURCE [facility] AMB   2024  9:20 AM Karl Mcrae MD [facility] AMB   Jamison Smith, ANGIE

## 2023-10-05 NOTE — TELEPHONE ENCOUNTER
[facility] Rubens Schmidt calling to let Dr Tim Domínguez know patient is requesting start of care on Tuesday Oct. 10th.

## 2023-10-06 ENCOUNTER — CARE COORDINATION (OUTPATIENT)
Dept: CARE COORDINATION | Facility: CLINIC | Age: 78
End: 2023-10-06

## 2023-10-06 NOTE — CARE COORDINATION
Care Transitions Initial Follow Up Call    Call within 2 business days of discharge: Yes  Patient: Ade Vann Patient : 1945   MRN: [id number]  Reason for Admission: End stage renal disease   Discharge Date: 10/4/23 RARS: Readmission Risk Score: 18.6  CTN attempted to contact patient for transitions of care outreach after hospital discharge on 10/4/23. Unable to reach at this time and unable to leave a message due to voicemail is full at this time. Last Discharge Facility       Date Complaint Diagnosis Description Type Department Provider    10/1/23  ESRD needing dialysis [facility]) Admission (Discharged) [id number] Mahsa Jaime MD    10/1/23 Hypoglycemia End stage renal disease ([address]) . .. ED (TRANSFER) Rosa Thomas MD          Was this an external facility discharge?  No Discharge Facility: [facility]    Follow Up  Future Appointments   Date Time Provider [address]   10/19/2023 11:20 AM Sveta Finn MD [facility] AMB   2024  8:00 AM [facility] LAB RESOURCE F [facility] AMB   2024  9:20 AM Meir Mcrae MD F [facility] AMB     Swapna Song, ANGIE

## 2023-10-10 ENCOUNTER — HOME CARE VISIT (OUTPATIENT)
Dept: HOME HEALTH SERVICES | Facility: HOME HEALTH | Age: 78
End: 2023-10-10

## 2023-10-19 ENCOUNTER — OFFICE VISIT (OUTPATIENT)
Dept: FAMILY MEDICINE CLINIC | Facility: CLINIC | Age: 78
End: 2023-10-19

## 2023-10-19 VITALS
OXYGEN SATURATION: 97 % | DIASTOLIC BLOOD PRESSURE: 78 MMHG | HEART RATE: 104 BPM | BODY MASS INDEX: 30.39 KG/M2 | TEMPERATURE: 97.2 F | WEIGHT: 178 LBS | SYSTOLIC BLOOD PRESSURE: 160 MMHG | HEIGHT: 64 IN

## 2023-10-19 DIAGNOSIS — E16.2 HYPOGLYCEMIA: ICD-10-CM

## 2023-10-19 DIAGNOSIS — D64.9 LOW HEMOGLOBIN: Primary | ICD-10-CM

## 2023-10-19 DIAGNOSIS — N18.5 CHRONIC KIDNEY DISEASE (CKD), STAGE V (HCC): ICD-10-CM

## 2023-10-19 DIAGNOSIS — Z23 PNEUMOCOCCAL VACCINATION GIVEN: ICD-10-CM

## 2023-10-19 DIAGNOSIS — E11.69 TYPE 2 DIABETES MELLITUS WITH OTHER SPECIFIED COMPLICATION, WITHOUT LONG-TERM CURRENT USE OF INSULIN (HCC): ICD-10-CM

## 2023-10-19 LAB
HBA1C MFR BLD: 5.6 %
HEMOGLOBIN, POC: 7.1 G/DL

## 2023-10-19 RX ORDER — CARVEDILOL 25 MG/1
25 TABLET ORAL 2 TIMES DAILY
Qty: 180 TABLET | Refills: 3 | Status: CANCELLED | OUTPATIENT
Start: 2023-10-19

## 2023-10-30 ENCOUNTER — HOSPITAL ENCOUNTER (EMERGENCY)
Age: 78
Discharge: HOME | End: 2023-10-30
Attending: EMERGENCY MEDICINE
Payer: MEDICARE

## 2023-10-30 VITALS
TEMPERATURE: 98.5 F | SYSTOLIC BLOOD PRESSURE: 153 MMHG | RESPIRATION RATE: 18 BRPM | OXYGEN SATURATION: 98 % | HEART RATE: 100 BPM | DIASTOLIC BLOOD PRESSURE: 65 MMHG

## 2023-10-30 DIAGNOSIS — T82.838A BLEEDING FROM DIALYSIS SHUNT, INITIAL ENCOUNTER (HCC): Primary | ICD-10-CM

## 2023-10-30 DIAGNOSIS — T82.9XXA COMPLICATION OF AV DIALYSIS FISTULA, INITIAL ENCOUNTER: ICD-10-CM

## 2023-10-30 LAB
BASOPHILS # BLD: 0 K/UL (ref 0–0.2)
BASOPHILS NFR BLD: 0 % (ref 0–2)
DIFFERENTIAL METHOD BLD: ABNORMAL
EOSINOPHIL # BLD: 0 K/UL (ref 0–0.8)
EOSINOPHIL NFR BLD: 0 % (ref 0.5–7.8)
ERYTHROCYTE [DISTWIDTH] IN BLOOD BY AUTOMATED COUNT: 17.8 % (ref 11.9–14.6)
HCT VFR BLD AUTO: 28.7 % (ref 35.8–46.3)
HGB BLD-MCNC: 9.1 G/DL (ref 11.7–15.4)
IMM GRANULOCYTES # BLD AUTO: 0 K/UL (ref 0–0.5)
IMM GRANULOCYTES NFR BLD AUTO: 0 % (ref 0–5)
LYMPHOCYTES # BLD: 0.7 K/UL (ref 0.5–4.6)
LYMPHOCYTES NFR BLD: 14 % (ref 13–44)
MCH RBC QN AUTO: 28.5 PG (ref 26.1–32.9)
MCHC RBC AUTO-ENTMCNC: 31.7 G/DL (ref 31.4–35)
MCV RBC AUTO: 90 FL (ref 82–102)
MONOCYTES # BLD: 0.3 K/UL (ref 0.1–1.3)
MONOCYTES NFR BLD: 6 % (ref 4–12)
NEUTS SEG # BLD: 4.3 K/UL (ref 1.7–8.2)
NEUTS SEG NFR BLD: 79 % (ref 43–78)
NRBC # BLD: 0 K/UL (ref 0–0.2)
PLATELET # BLD AUTO: 112 K/UL (ref 150–450)
PMV BLD AUTO: 10.4 FL (ref 9.4–12.3)
RBC # BLD AUTO: 3.19 M/UL (ref 4.05–5.2)
WBC # BLD AUTO: 5.4 K/UL (ref 4.3–11.1)

## 2023-10-30 PROCEDURE — 99283 EMERGENCY DEPT VISIT LOW MDM: CPT

## 2023-10-30 PROCEDURE — 85025 COMPLETE CBC W/AUTO DIFF WBC: CPT

## 2023-10-30 ASSESSMENT — ENCOUNTER SYMPTOMS
COLOR CHANGE: 0
ABDOMINAL PAIN: 0
NAUSEA: 0
SHORTNESS OF BREATH: 0
VOMITING: 0
BACK PAIN: 0
COUGH: 0

## 2023-10-30 ASSESSMENT — PAIN - FUNCTIONAL ASSESSMENT: PAIN_FUNCTIONAL_ASSESSMENT: 0-10

## 2023-10-30 ASSESSMENT — PAIN SCALES - GENERAL: PAINLEVEL_OUTOF10: 0

## 2023-10-30 NOTE — ED TRIAGE NOTES
Pt arrives via EMS. EMS states pt was having a dialysis infusion and when it finished they could not clamp it off. Pt arrives with clamp to L bicep.

## 2023-10-30 NOTE — ED PROVIDER NOTES
% 79 (H) 43 - 78 %    Lymphocytes % 14 13 - 44 %    Monocytes % 6 4.0 - 12.0 %    Eosinophils % 0 (L) 0.5 - 7.8 %    Basophils % 0 0.0 - 2.0 %    Immature Granulocytes 0 0.0 - 5.0 %    Neutrophils Absolute 4.3 1.7 - 8.2 K/UL    Lymphocytes Absolute 0.7 0.5 - 4.6 K/UL    Monocytes Absolute 0.3 0.1 - 1.3 K/UL    Eosinophils Absolute 0.0 0.0 - 0.8 K/UL    Basophils Absolute 0.0 0.0 - 0.2 K/UL    Absolute Immature Granulocyte 0.0 0.0 - 0.5 K/UL        No orders to display                     Voice dictation software was used during the making of this note. This software is not perfect and grammatical and other typographical errors may be present. This note has not been completely proofread for errors.      Aleatha Burkitt., MD  10/30/23 5685

## 2023-10-31 ENCOUNTER — CARE COORDINATION (OUTPATIENT)
Dept: CARE COORDINATION | Facility: CLINIC | Age: 78
End: 2023-10-31

## 2023-10-31 NOTE — CARE COORDINATION
Ambulatory Care Coordination Note  10/31/2023    Patient Current Location:  [place]    ACM contacted the patient by telephone. Verified name and  with patient as identifiers. Provided introduction to self, and explanation of the ACM role. ACM: Nicole Keane RN    Challenges to be reviewed by the provider   Additional needs identified to be addressed with provider: Yes  Notified pcp of er admission               Method of communication with provider: staff message. See assessment below. Ccm outreached to patient. Patient agreeable to ccm services. Reviewed with patient discharge summary. Patient verbalized understanding. Patient has dialysis on M,W,F. Patient states she has help in the home if needed. Ccm notified pcp of er admission. Patient states overall doing well. No bleeding or pain from site. Patient states no questions or concerns. [facility] discussed that I would outreach next week. Patient agreeable. Offered patient enrollment in the Remote Patient Monitoring (RPM) program for in-home monitoring: NA. Ambulatory Care Coordination Assessment    Care Coordination Protocol  Referral from Primary Care Provider: No  Week 1 - Initial Assessment     Do you have all of your prescriptions and are they filled?: No  Barriers to medication adherence: None  Are you able to afford your medications?: Yes  How often do you have trouble taking your medications the way you have been told to take them?: I always take them as prescribed. Do you have Home O2 Therapy?: No      Ability to seek help/take action for Emergent Urgent situations i.e. fire, crime, inclement weather or health crisis. : Independent  Ability to ambulate to restroom: Independent  Ability handle personal hygeine needs (bathing/dressing/grooming): Independent  Ability to manage Medications: Independent  Ability to prepare Food Preparation: Independent  Ability to maintain home (clean home, laundry):  Independent  Ability to drive and/or

## 2023-11-01 ASSESSMENT — ENCOUNTER SYMPTOMS
GASTROINTESTINAL NEGATIVE: 1
RESPIRATORY NEGATIVE: 1
EYES NEGATIVE: 1

## 2023-11-02 NOTE — PROGRESS NOTES
ingestion, or following therapy that affects renal tubular secretion. Calcium 10/02/2023 6.6 (L)  8.3 - 10.4 MG/DL Final    POC Glucose 10/01/2023 94  65 - 100 mg/dL Final    Comment: 47 - 60 mg/dl Consistent with, but not fully diagnostic of hypoglycemia. 101 - 125 mg/dl Impaired fasting glucose/consistent with pre-diabetes mellitus  > 126 mg/dl Fasting glucose consistent with overt diabetes mellitus      Performed by: 10[facility]20[facility]   Final    POC Glucose 10/02/2023 91  65 - 100 mg/dL Final    Comment: 47 - 60 mg/dl Consistent with, but not fully diagnostic of hypoglycemia. 101 - 125 mg/dl Impaired fasting glucose/consistent with pre-diabetes mellitus  > 126 mg/dl Fasting glucose consistent with overt diabetes mellitus      Performed by: 10/02/2023 AprilANELLIE   Final    Hemoglobin A1C 10/02/2023 6.4 (H)  4.8 - 5.6 % Final    eAG 10/02/2023 137  mg/dL Final    Comment: Reference Range  Normal: 4.8-5.6  Diabetic >=6.5%  Normal       <5.7%      WBC 10/02/2023 2.3 (L)  4.3 - 11.1 K/uL Final    RBC 10/02/2023 2.74 (L)  4.05 - 5.2 M/uL Final    Hemoglobin 10/02/2023 7.5 (L)  11.7 - 15.4 g/dL Final    Hematocrit 10/02/2023 24.2 (L)  35.8 - 46.3 % Final    MCV 10/02/2023 88.3  82 - 102 FL Final    MCH 10/02/2023 27.4  26.1 - 32.9 PG Final    MCHC 10/02/2023 31.0 (L)  31.4 - 35.0 g/dL Final    RDW 10/02/2023 16.3 (H)  11.9 - 14.6 % Final    Platelets 74/60/5388 39 (L)  150 - 450 K/uL Final    MPV 10/02/2023 Unable to calculate. Recommend adding IPF.   9.4 - 12.3 FL Final    nRBC 10/02/2023 0.02  0.0 - 0.2 K/uL Final    **Note: Absolute NRBC parameter is now reported with Hemogram**    Differential Type 10/02/2023 AUTOMATED    Final    Neutrophils % 10/02/2023 46  43 - 78 % Final    Lymphocytes % 10/02/2023 41  13 - 44 % Final    Monocytes % 10/02/2023 9  4.0 - 12.0 % Final    Eosinophils % 10/02/2023 3  0.5 - 7.8 % Final    Basophils % 10/02/2023 0  0.0 - 2.0 % Final    Immature Granulocytes

## 2023-11-06 ENCOUNTER — CARE COORDINATION (OUTPATIENT)
Dept: CARE COORDINATION | Facility: CLINIC | Age: 78
End: 2023-11-06

## 2023-11-06 NOTE — CARE COORDINATION
Ambulatory Care Management Note        Date/Time:  11/6/2023 1:46 PM    Ccm outreached to patient. No answer at this time, message left. Awaiting response. If no response, ccm will outreach next week.

## 2023-11-13 ENCOUNTER — ANESTHESIA EVENT (OUTPATIENT)
Dept: SURGERY | Age: 78
End: 2023-11-13
Payer: MEDICARE

## 2023-11-13 ENCOUNTER — PREP FOR PROCEDURE (OUTPATIENT)
Dept: VASCULAR SURGERY | Age: 78
End: 2023-11-13

## 2023-11-13 ENCOUNTER — CARE COORDINATION (OUTPATIENT)
Dept: CARE COORDINATION | Facility: CLINIC | Age: 78
End: 2023-11-13

## 2023-11-13 PROBLEM — T82.49XA MECHANICAL COMPLICATION OF DIALYSIS CATHETER (HCC): Status: ACTIVE | Noted: 2023-11-13

## 2023-11-13 NOTE — CARE COORDINATION
Ambulatory Care Coordination Note  2023    Patient Current Location:  [facility]     ACM contacted the patient by telephone. Verified name and  with patient as identifiers. Provided introduction to self, and explanation of the ACM role. Challenges to be reviewed by the provider   Additional needs identified to be addressed with provider: No  none               Method of communication with provider: none. ACM: Nicole Keane RN    Ccm outreached to patient. Patent states she is doing well at this time. Patient states no questions or concerns. Ccm discussed that I would outreach next week. Patient agreeable. Offered patient enrollment in the Remote Patient Monitoring (RPM) program for in-home monitoring: Yes, but did not enroll at this time. Lab Results       None            Care Coordination Interventions    Referral from Primary Care Provider: No  Suggested Interventions and Community Resources          Goals Addressed                   This Visit's Progress     Conditions and Symptoms   On track     I will schedule office visits, as directed by my provider. I will keep my appointment or reschedule if I have to cancel. I will notify my provider of any barriers to my plan of care. I will notify my provider of any symptoms that indicate a worsening of my condition.     Barriers: none  Plan for overcoming my barriers: N/A  Confidence: 8/10  Anticipated Goal Completion Date: 24                Future Appointments   Date Time Provider [address]   2024 10:40 AM Fransisco Gutierrez MD [facility] AMB   2024  8:00 AM [facility] LAB RESOURCE [facility] AMB   2024  9:20 AM [facility]NL[facility] : warm, clear [[email or web address]] : + barky/ stridor cough when crying only, at rest no stridor or barking cough, easy work of breathing, lungs CTA b/l

## 2023-11-14 ENCOUNTER — APPOINTMENT (OUTPATIENT)
Dept: INTERVENTIONAL RADIOLOGY/VASCULAR | Age: 78
End: 2023-11-14
Attending: STUDENT IN AN ORGANIZED HEALTH CARE EDUCATION/TRAINING PROGRAM
Payer: MEDICARE

## 2023-11-14 ENCOUNTER — ANESTHESIA (OUTPATIENT)
Dept: SURGERY | Age: 78
End: 2023-11-14
Payer: MEDICARE

## 2023-11-14 ENCOUNTER — HOSPITAL ENCOUNTER (OUTPATIENT)
Age: 78
Setting detail: OBSERVATION
Discharge: HOME | End: 2023-11-14
Attending: STUDENT IN AN ORGANIZED HEALTH CARE EDUCATION/TRAINING PROGRAM
Payer: MEDICARE

## 2023-11-14 ENCOUNTER — APPOINTMENT (OUTPATIENT)
Dept: GENERAL RADIOLOGY | Age: 78
End: 2023-11-14
Attending: STUDENT IN AN ORGANIZED HEALTH CARE EDUCATION/TRAINING PROGRAM
Payer: MEDICARE

## 2023-11-14 VITALS
BODY MASS INDEX: 23.56 KG/M2 | RESPIRATION RATE: 16 BRPM | OXYGEN SATURATION: 98 % | TEMPERATURE: 97.9 F | HEIGHT: 64 IN | HEART RATE: 73 BPM | WEIGHT: 138 LBS | DIASTOLIC BLOOD PRESSURE: 65 MMHG | SYSTOLIC BLOOD PRESSURE: 136 MMHG

## 2023-11-14 DIAGNOSIS — N18.6 ESRD (END STAGE RENAL DISEASE) (HCC): Primary | ICD-10-CM

## 2023-11-14 PROBLEM — Z99.2 ESRD (END STAGE RENAL DISEASE) ON DIALYSIS (HCC): Status: ACTIVE | Noted: 2023-11-14

## 2023-11-14 LAB
CREAT BLD-MCNC: 8.26 MG/DL (ref 0.8–1.5)
GLUCOSE BLD STRIP.AUTO-MCNC: 126 MG/DL (ref 65–100)
GLUCOSE BLD STRIP.AUTO-MCNC: 156 MG/DL (ref 65–100)
POTASSIUM BLD-SCNC: 3.9 MMOL/L (ref 3.5–5.1)
SERVICE CMNT-IMP: ABNORMAL
SERVICE CMNT-IMP: ABNORMAL

## 2023-11-14 PROCEDURE — 6360000002 HC RX W HCPCS: Performed by: STUDENT IN AN ORGANIZED HEALTH CARE EDUCATION/TRAINING PROGRAM

## 2023-11-14 PROCEDURE — 2500000003 HC RX 250 WO HCPCS: Performed by: RADIOLOGY

## 2023-11-14 PROCEDURE — 3600000003 HC SURGERY LEVEL 3 BASE: Performed by: STUDENT IN AN ORGANIZED HEALTH CARE EDUCATION/TRAINING PROGRAM

## 2023-11-14 PROCEDURE — 7100000001 HC PACU RECOVERY - ADDTL 15 MIN: Performed by: STUDENT IN AN ORGANIZED HEALTH CARE EDUCATION/TRAINING PROGRAM

## 2023-11-14 PROCEDURE — C1894 INTRO/SHEATH, NON-LASER: HCPCS | Performed by: STUDENT IN AN ORGANIZED HEALTH CARE EDUCATION/TRAINING PROGRAM

## 2023-11-14 PROCEDURE — 84132 ASSAY OF SERUM POTASSIUM: CPT

## 2023-11-14 PROCEDURE — C1768 GRAFT, VASCULAR: HCPCS | Performed by: STUDENT IN AN ORGANIZED HEALTH CARE EDUCATION/TRAINING PROGRAM

## 2023-11-14 PROCEDURE — 36558 INSERT TUNNELED CV CATH: CPT

## 2023-11-14 PROCEDURE — 6360000004 HC RX CONTRAST MEDICATION: Performed by: STUDENT IN AN ORGANIZED HEALTH CARE EDUCATION/TRAINING PROGRAM

## 2023-11-14 PROCEDURE — 3700000000 HC ANESTHESIA ATTENDED CARE: Performed by: STUDENT IN AN ORGANIZED HEALTH CARE EDUCATION/TRAINING PROGRAM

## 2023-11-14 PROCEDURE — 36833 AV FISTULA REVISION: CPT | Performed by: STUDENT IN AN ORGANIZED HEALTH CARE EDUCATION/TRAINING PROGRAM

## 2023-11-14 PROCEDURE — 90935 HEMODIALYSIS ONE EVALUATION: CPT

## 2023-11-14 PROCEDURE — C1757 CATH, THROMBECTOMY/EMBOLECT: HCPCS | Performed by: STUDENT IN AN ORGANIZED HEALTH CARE EDUCATION/TRAINING PROGRAM

## 2023-11-14 PROCEDURE — 6360000002 HC RX W HCPCS: Performed by: REGISTERED NURSE

## 2023-11-14 PROCEDURE — 3700000001 HC ADD 15 MINUTES (ANESTHESIA): Performed by: STUDENT IN AN ORGANIZED HEALTH CARE EDUCATION/TRAINING PROGRAM

## 2023-11-14 PROCEDURE — 2580000003 HC RX 258: Performed by: ANESTHESIOLOGY

## 2023-11-14 PROCEDURE — 2580000003 HC RX 258: Performed by: REGISTERED NURSE

## 2023-11-14 PROCEDURE — 82962 GLUCOSE BLOOD TEST: CPT

## 2023-11-14 PROCEDURE — 2709999900 HC NON-CHARGEABLE SUPPLY: Performed by: STUDENT IN AN ORGANIZED HEALTH CARE EDUCATION/TRAINING PROGRAM

## 2023-11-14 PROCEDURE — 77001 FLUOROGUIDE FOR VEIN DEVICE: CPT

## 2023-11-14 PROCEDURE — 2500000003 HC RX 250 WO HCPCS: Performed by: REGISTERED NURSE

## 2023-11-14 PROCEDURE — 6360000002 HC RX W HCPCS: Performed by: RADIOLOGY

## 2023-11-14 PROCEDURE — 7100000000 HC PACU RECOVERY - FIRST 15 MIN: Performed by: STUDENT IN AN ORGANIZED HEALTH CARE EDUCATION/TRAINING PROGRAM

## 2023-11-14 PROCEDURE — 82565 ASSAY OF CREATININE: CPT

## 2023-11-14 PROCEDURE — 3600000013 HC SURGERY LEVEL 3 ADDTL 15MIN: Performed by: STUDENT IN AN ORGANIZED HEALTH CARE EDUCATION/TRAINING PROGRAM

## 2023-11-14 DEVICE — GORE ACUSEAL VASCULAR GRAFT 6MMX40CM HEPARIN
Type: IMPLANTABLE DEVICE | Site: ARM | Status: FUNCTIONAL
Brand: GORE ACUSEAL VASCULAR GRAFT

## 2023-11-14 RX ORDER — PROPOFOL 10 MG/ML
INJECTION, EMULSION INTRAVENOUS PRN
Status: DISCONTINUED | OUTPATIENT
Start: 2023-11-14 | End: 2023-11-14 | Stop reason: SDUPTHER

## 2023-11-14 RX ORDER — HYDROMORPHONE HYDROCHLORIDE 2 MG/ML
0.5 INJECTION, SOLUTION INTRAMUSCULAR; INTRAVENOUS; SUBCUTANEOUS EVERY 5 MIN PRN
Status: DISCONTINUED | OUTPATIENT
Start: 2023-11-14 | End: 2023-11-18 | Stop reason: HOSPADM

## 2023-11-14 RX ORDER — SODIUM CHLORIDE 9 MG/ML
INJECTION, SOLUTION INTRAVENOUS PRN
Status: DISCONTINUED | OUTPATIENT
Start: 2023-11-14 | End: 2023-11-18 | Stop reason: HOSPADM

## 2023-11-14 RX ORDER — EPHEDRINE SULFATE/0.9% NACL/PF 50 MG/5 ML
SYRINGE (ML) INTRAVENOUS PRN
Status: DISCONTINUED | OUTPATIENT
Start: 2023-11-14 | End: 2023-11-14 | Stop reason: SDUPTHER

## 2023-11-14 RX ORDER — OXYCODONE HYDROCHLORIDE AND ACETAMINOPHEN 5; 325 MG/1; MG/1
1 TABLET ORAL EVERY 4 HOURS PRN
Qty: 30 TABLET | Refills: 0 | Status: SHIPPED | OUTPATIENT
Start: 2023-11-14 | End: 2023-11-19

## 2023-11-14 RX ORDER — HEPARIN SODIUM 1000 [USP'U]/ML
INJECTION, SOLUTION INTRAVENOUS; SUBCUTANEOUS PRN
Status: DISCONTINUED | OUTPATIENT
Start: 2023-11-14 | End: 2023-11-14 | Stop reason: SDUPTHER

## 2023-11-14 RX ORDER — DIPHENHYDRAMINE HYDROCHLORIDE 50 MG/ML
6.25 INJECTION INTRAMUSCULAR; INTRAVENOUS
Status: DISCONTINUED | OUTPATIENT
Start: 2023-11-14 | End: 2023-11-15 | Stop reason: HOSPADM

## 2023-11-14 RX ORDER — LIDOCAINE HYDROCHLORIDE 20 MG/ML
INJECTION, SOLUTION EPIDURAL; INFILTRATION; INTRACAUDAL; PERINEURAL PRN
Status: DISCONTINUED | OUTPATIENT
Start: 2023-11-14 | End: 2023-11-14 | Stop reason: SDUPTHER

## 2023-11-14 RX ORDER — BUPIVACAINE HYDROCHLORIDE 2.5 MG/ML
INJECTION, SOLUTION EPIDURAL; INFILTRATION; INTRACAUDAL PRN
Status: DISCONTINUED | OUTPATIENT
Start: 2023-11-14 | End: 2023-11-14 | Stop reason: ALTCHOICE

## 2023-11-14 RX ORDER — FENTANYL CITRATE 50 UG/ML
100 INJECTION, SOLUTION INTRAMUSCULAR; INTRAVENOUS
Status: DISCONTINUED | OUTPATIENT
Start: 2023-11-14 | End: 2023-11-15 | Stop reason: HOSPADM

## 2023-11-14 RX ORDER — OXYCODONE HYDROCHLORIDE 5 MG/1
5 TABLET ORAL PRN
Status: DISCONTINUED | OUTPATIENT
Start: 2023-11-14 | End: 2023-11-18 | Stop reason: HOSPADM

## 2023-11-14 RX ORDER — HYDROMORPHONE HYDROCHLORIDE 2 MG/ML
0.25 INJECTION, SOLUTION INTRAMUSCULAR; INTRAVENOUS; SUBCUTANEOUS EVERY 5 MIN PRN
Status: DISCONTINUED | OUTPATIENT
Start: 2023-11-14 | End: 2023-11-18 | Stop reason: HOSPADM

## 2023-11-14 RX ORDER — SODIUM CHLORIDE 0.9 % (FLUSH) 0.9 %
5-40 SYRINGE (ML) INJECTION PRN
Status: DISCONTINUED | OUTPATIENT
Start: 2023-11-14 | End: 2023-11-18 | Stop reason: HOSPADM

## 2023-11-14 RX ORDER — HEPARIN SODIUM 5000 [USP'U]/ML
INJECTION, SOLUTION INTRAVENOUS; SUBCUTANEOUS PRN
Status: DISCONTINUED | OUTPATIENT
Start: 2023-11-14 | End: 2023-11-14 | Stop reason: ALTCHOICE

## 2023-11-14 RX ORDER — SODIUM CHLORIDE 0.9 % (FLUSH) 0.9 %
5-40 SYRINGE (ML) INJECTION EVERY 12 HOURS SCHEDULED
Status: DISCONTINUED | OUTPATIENT
Start: 2023-11-14 | End: 2023-11-18 | Stop reason: HOSPADM

## 2023-11-14 RX ORDER — OXYCODONE HYDROCHLORIDE 5 MG/1
10 TABLET ORAL PRN
Status: DISCONTINUED | OUTPATIENT
Start: 2023-11-14 | End: 2023-11-18 | Stop reason: HOSPADM

## 2023-11-14 RX ORDER — SODIUM CHLORIDE 9 MG/ML
INJECTION, SOLUTION INTRAVENOUS CONTINUOUS
Status: DISCONTINUED | OUTPATIENT
Start: 2023-11-14 | End: 2023-11-18 | Stop reason: HOSPADM

## 2023-11-14 RX ORDER — ONDANSETRON 2 MG/ML
INJECTION INTRAMUSCULAR; INTRAVENOUS PRN
Status: DISCONTINUED | OUTPATIENT
Start: 2023-11-14 | End: 2023-11-14 | Stop reason: SDUPTHER

## 2023-11-14 RX ORDER — MIDAZOLAM HYDROCHLORIDE 2 MG/2ML
2 INJECTION, SOLUTION INTRAMUSCULAR; INTRAVENOUS
Status: DISCONTINUED | OUTPATIENT
Start: 2023-11-14 | End: 2023-11-15 | Stop reason: HOSPADM

## 2023-11-14 RX ORDER — FENTANYL CITRATE 50 UG/ML
25 INJECTION, SOLUTION INTRAMUSCULAR; INTRAVENOUS
Status: DISCONTINUED | OUTPATIENT
Start: 2023-11-14 | End: 2023-11-15 | Stop reason: HOSPADM

## 2023-11-14 RX ORDER — LIDOCAINE HYDROCHLORIDE 10 MG/ML
1 INJECTION, SOLUTION INFILTRATION; PERINEURAL
Status: DISCONTINUED | OUTPATIENT
Start: 2023-11-14 | End: 2023-11-15 | Stop reason: HOSPADM

## 2023-11-14 RX ORDER — MIDAZOLAM HYDROCHLORIDE 2 MG/2ML
INJECTION, SOLUTION INTRAMUSCULAR; INTRAVENOUS PRN
Status: COMPLETED | OUTPATIENT
Start: 2023-11-14 | End: 2023-11-14

## 2023-11-14 RX ORDER — FENTANYL CITRATE 50 UG/ML
INJECTION, SOLUTION INTRAMUSCULAR; INTRAVENOUS PRN
Status: COMPLETED | OUTPATIENT
Start: 2023-11-14 | End: 2023-11-14

## 2023-11-14 RX ORDER — ONDANSETRON 2 MG/ML
4 INJECTION INTRAMUSCULAR; INTRAVENOUS
Status: DISCONTINUED | OUTPATIENT
Start: 2023-11-14 | End: 2023-11-15 | Stop reason: HOSPADM

## 2023-11-14 RX ORDER — SODIUM CHLORIDE, SODIUM LACTATE, POTASSIUM CHLORIDE, CALCIUM CHLORIDE 600; 310; 30; 20 MG/100ML; MG/100ML; MG/100ML; MG/100ML
INJECTION, SOLUTION INTRAVENOUS CONTINUOUS
Status: DISCONTINUED | OUTPATIENT
Start: 2023-11-14 | End: 2023-11-18 | Stop reason: HOSPADM

## 2023-11-14 RX ORDER — LIDOCAINE HYDROCHLORIDE 20 MG/ML
INJECTION, SOLUTION INFILTRATION; PERINEURAL PRN
Status: COMPLETED | OUTPATIENT
Start: 2023-11-14 | End: 2023-11-14

## 2023-11-14 RX ADMIN — PHENYLEPHRINE HYDROCHLORIDE 100 MCG: 10 INJECTION INTRAVENOUS at 07:44

## 2023-11-14 RX ADMIN — Medication 5 MG: at 09:22

## 2023-11-14 RX ADMIN — FENTANYL CITRATE 12.5 MCG: 50 INJECTION INTRAMUSCULAR; INTRAVENOUS at 08:07

## 2023-11-14 RX ADMIN — PHENYLEPHRINE HYDROCHLORIDE 50 MCG: 10 INJECTION INTRAVENOUS at 08:31

## 2023-11-14 RX ADMIN — PHENYLEPHRINE HYDROCHLORIDE 100 MCG: 10 INJECTION INTRAVENOUS at 08:25

## 2023-11-14 RX ADMIN — PHENYLEPHRINE HYDROCHLORIDE 100 MCG: 10 INJECTION INTRAVENOUS at 08:04

## 2023-11-14 RX ADMIN — HEPARIN SODIUM 3500 UNITS: 1000 INJECTION INTRAVENOUS; SUBCUTANEOUS at 08:28

## 2023-11-14 RX ADMIN — PHENYLEPHRINE HYDROCHLORIDE 100 MCG: 10 INJECTION INTRAVENOUS at 09:16

## 2023-11-14 RX ADMIN — FENTANYL CITRATE 25 MCG: 50 INJECTION, SOLUTION INTRAMUSCULAR; INTRAVENOUS at 15:15

## 2023-11-14 RX ADMIN — PHENYLEPHRINE HYDROCHLORIDE 100 MCG: 10 INJECTION INTRAVENOUS at 09:22

## 2023-11-14 RX ADMIN — Medication 5 MG: at 08:04

## 2023-11-14 RX ADMIN — PHENYLEPHRINE HYDROCHLORIDE 50 MCG: 10 INJECTION INTRAVENOUS at 08:55

## 2023-11-14 RX ADMIN — PHENYLEPHRINE HYDROCHLORIDE 100 MCG: 10 INJECTION INTRAVENOUS at 09:34

## 2023-11-14 RX ADMIN — MIDAZOLAM HYDROCHLORIDE 1 MG: 1 INJECTION, SOLUTION INTRAMUSCULAR; INTRAVENOUS at 15:05

## 2023-11-14 RX ADMIN — PHENYLEPHRINE HYDROCHLORIDE 50 MCG: 10 INJECTION INTRAVENOUS at 08:44

## 2023-11-14 RX ADMIN — PHENYLEPHRINE HYDROCHLORIDE 100 MCG: 10 INJECTION INTRAVENOUS at 07:55

## 2023-11-14 RX ADMIN — FENTANYL CITRATE 25 MCG: 50 INJECTION INTRAMUSCULAR; INTRAVENOUS at 07:18

## 2023-11-14 RX ADMIN — LIDOCAINE HYDROCHLORIDE 10 ML: 20 INJECTION, SOLUTION INFILTRATION; PERINEURAL at 15:19

## 2023-11-14 RX ADMIN — Medication 2 G: at 07:18

## 2023-11-14 RX ADMIN — PHENYLEPHRINE HYDROCHLORIDE 100 MCG: 10 INJECTION INTRAVENOUS at 09:10

## 2023-11-14 RX ADMIN — FENTANYL CITRATE 12.5 MCG: 50 INJECTION INTRAMUSCULAR; INTRAVENOUS at 07:48

## 2023-11-14 RX ADMIN — LIDOCAINE HYDROCHLORIDE 80 MG: 20 INJECTION, SOLUTION EPIDURAL; INFILTRATION; INTRACAUDAL; PERINEURAL at 07:08

## 2023-11-14 RX ADMIN — Medication 10 MG: at 07:29

## 2023-11-14 RX ADMIN — PHENYLEPHRINE HYDROCHLORIDE 100 MCG: 10 INJECTION INTRAVENOUS at 09:01

## 2023-11-14 RX ADMIN — FENTANYL CITRATE 50 MCG: 50 INJECTION, SOLUTION INTRAMUSCULAR; INTRAVENOUS at 15:05

## 2023-11-14 RX ADMIN — HEPARIN SODIUM 7000 UNITS: 1000 INJECTION INTRAVENOUS; SUBCUTANEOUS at 07:42

## 2023-11-14 RX ADMIN — ONDANSETRON 4 MG: 2 INJECTION INTRAMUSCULAR; INTRAVENOUS at 09:27

## 2023-11-14 RX ADMIN — PHENYLEPHRINE HYDROCHLORIDE 100 MCG: 10 INJECTION INTRAVENOUS at 09:44

## 2023-11-14 RX ADMIN — Medication 5 MG: at 08:31

## 2023-11-14 RX ADMIN — PHENYLEPHRINE HYDROCHLORIDE 100 MCG: 10 INJECTION INTRAVENOUS at 09:07

## 2023-11-14 RX ADMIN — MIDAZOLAM HYDROCHLORIDE 0.5 MG: 1 INJECTION, SOLUTION INTRAMUSCULAR; INTRAVENOUS at 15:15

## 2023-11-14 RX ADMIN — SODIUM CHLORIDE: 9 INJECTION, SOLUTION INTRAVENOUS at 06:28

## 2023-11-14 RX ADMIN — PROPOFOL 150 MG: 10 INJECTION, EMULSION INTRAVENOUS at 07:08

## 2023-11-14 RX ADMIN — FENTANYL CITRATE 12.5 MCG: 50 INJECTION INTRAMUSCULAR; INTRAVENOUS at 08:55

## 2023-11-14 RX ADMIN — FENTANYL CITRATE 12.5 MCG: 50 INJECTION INTRAMUSCULAR; INTRAVENOUS at 09:07

## 2023-11-14 RX ADMIN — PHENYLEPHRINE HYDROCHLORIDE 50 MCG: 10 INJECTION INTRAVENOUS at 07:40

## 2023-11-14 ASSESSMENT — PAIN SCALES - GENERAL
PAINLEVEL_OUTOF10: 0

## 2023-11-14 ASSESSMENT — PAIN DESCRIPTION - ORIENTATION: ORIENTATION: LEFT

## 2023-11-14 ASSESSMENT — PAIN DESCRIPTION - LOCATION: LOCATION: ARM

## 2023-11-14 ASSESSMENT — PAIN - FUNCTIONAL ASSESSMENT: PAIN_FUNCTIONAL_ASSESSMENT: 0-10

## 2023-11-14 NOTE — ANESTHESIA POSTPROCEDURE EVALUATION
Department of Anesthesiology  Postprocedure Note    Patient: Angelika Hendricks  MRN: [id number]  YOB: 1945  Date of evaluation: 11/14/2023      Procedure Summary     Date: 11/14/23 Room / Location: [facility] MAIN OR 08 / [facility] MAIN OR    Anesthesia Start: 1022 Anesthesia Stop: 5112    Procedure: LEFT UPPER ARM AV FISTULA  THROMBECTOMY/ FISTULAGRAM/ JUMP GRAFT INSERTION (Left: Arm Upper) Diagnosis:       Mechanical complication of dialysis catheter ([address])      (Mechanical complication of dialysis catheter ([address]) Wyvonnia Adams)    Providers: Jimbo Stout MD Responsible Provider: Ayla Alva MD    Anesthesia Type: general ASA Status: 3          Anesthesia Type: No value filed.     Milton Phase I: Milton Score: 9    Milton Phase II: Milton Score: 10      Anesthesia Post Evaluation    Patient location during evaluation: PACU  Patient participation: complete - patient participated  Level of consciousness: awake  Airway patency: patent  Nausea & Vomiting: no nausea  Complications: no  Cardiovascular status: blood pressure returned to baseline and hemodynamically stable  Respiratory status: acceptable  Hydration status: stable  Multimodal analgesia pain management approach  Pain management: adequate

## 2023-11-14 NOTE — OP NOTE
VASCULAR SURGERY   [address] [place] [address], 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 No evidence of axillary subclavian stenosis     Description of procedure: After informed consent was obtained the patient was brought to the operating room and placed in supine position. Preoperative antibiotics were given. Appropriate anesthesia was administered and endotracheal intubation was performed. Timeout was performed confirming patient identity and procedure. Patient was then prepped and draped in sterile fashion. Transverse incision was made in the upper arm just distal to the aneurysmal portion of AV fistula. Dissection was carried down and controlled fistula was obtained. Patient was systemically heparinized. Transverse arteriotomy was performed with removal of fresh thrombus. 4 Eric was only passed 2 cm proximally with significant resistance in the mid aspect of the aneurysmal portion of the access. Multiple attempts were unsuccessful at passing Eric proximally. Transverse incision was then made proximal to the aneurysmal portion of fistula. Dissection was carried down and multiple small pseudoaneurysms were noted off of the fistula. Proximal control with normal-appearing fistula was obtained roughly 2 cm proximally. 4 Eric was then passed proximally with removal of fresh thrombus and venous backbleeding.

## 2023-11-14 NOTE — H&P
VASCULAR SURGERY   [address], [address] [place]  [phone] FAX: Chris  : 1945    Reason for visit: Brachiocephalic AVF thrombosis    Chief Complaint: 70 y.o. female presents with thrombosis of her brachiocephalic AVF, last HD treatment Saturday. K 3.9. Hx of elevation and patch angioplasty of arterial anastomosis. Plan: Thrombosed left arm AVF: Thrombectomy with fistulogram    Imaging interrupted:   None    Constitutional:   Negative for fevers and unexplained weight loss. Eyes:   Negative for visual disturbance. ENT:   Negative for significant hearing loss and tinnitus. Respiratory:   Negative for hemoptysis. Cardiovascular:   Negative except as noted in HPI. Gastrointestinal:   Negative for melena and abdominal pain. Genitourinary:   Negative for hematuria, renal stones. Integumentary:   Negative for rash or non-healing wounds  Hematologic/Lymphatic:   Negative for excessive bleeding hx or clotting disorder. Musculoskeletal:  Negative for active, unexplained/severe joint pain. Neurological:   Negative for stroke. Behavioral/Psych:   Negative for suicidal ideations. Endocrine:   Negative for uncontrolled diabetic symptoms including polyuria, polydipsia and poor wound healing. Physical Examination:   Height: 1.626 m (5' 4\"), Weight - Scale: 62.6 kg (138 lb), BP: 135/61, Pain 0-10: Pain Level: 0;     General: No acute distress  HENT: AT  CV: Regular rhythm. LUNG: No respiratory distress on RA  Abdominal: No distension.   Extremities: No wounds or edema, motor and sensation grossly intact, no thrill left AVF  Vascular:   Radial:  L    2+     R    2+              Current Facility-Administered Medications   Medication Dose Route Frequency Provider Last Rate Last Admin    lidocaine 1 % injection 1 mL  1 mL IntraDERmal Once PRN Osito Hennessy MD        fentaNYL (SUBLIMAZE) injection 25 mcg  25 mcg IntraVENous Once PRN

## 2023-11-14 NOTE — OR NURSING
Recovery period without difficulty. Pt alert and oriented and denies pain. Dressing is clean, dry, and intact. Reviewed discharge instructions with patient, verbalized understanding. Vital signs and Milton score completed.

## 2023-11-14 NOTE — DIALYSIS
TRANSFER IN - DIALYSIS    Received patient in dialysis unit from PACU (unit) for ordered procedure. Consent verified for renal replacement therapy. Procedure explained to patient, opportunity for Q&A provided. Call light given. Patient a/ox3 and vital signs stable. /53 , P75 ,  0L  O2  via RA. Hemodialysis initiated using LUE AVG and 17 g needles. Machine settings per MD order. Heparin 0 unit bolus and 0 units/hr. Will monitor during treatment.

## 2023-11-14 NOTE — DIALYSIS
Dialysis treatment stopped at this time. Venous needle infiltrated. Rinsed back and removed needle until hemostasis was achieved. Ice pack applied to infiltrated area. Dr. Elias Huitron aware and vascular is aware. Unable to complete HD at this time. Second needle removed and pressure applied until hemostasis achieved.

## 2023-11-14 NOTE — DISCHARGE INSTRUCTIONS
INSTRUCTIONS FOR A-V FISTULA, GRAFT ACCESS, REVISION OR DECLOT    ACTIVITY  As tolerated and as directed by your doctor. Keep arm straight and raised above heart level for the next 24 hours. DIET  Clear liquids until no nausea or vomiting; then light diet for the first day. Advance to regular diet on second day, unless your doctor orders otherwise. If nausea and vomiting continues, call your doctor. PAIN  Take pain medication as directed by your doctor. Call your doctor if pain is NOT relieved by the medication. DO NOT take aspirin or blood thinners until directed by your doctor. MEDICATION INTERACTION:  During your procedure you potentially received a medication or medications which may reduce the effectiveness of oral contraceptives. Please consider other forms of contraception for 1 month following your procedure if you are currently using oral contraceptives as your primary form of birth control. In addition to this, we recommend continuing your oral contraceptive as prescribed, unless otherwise instructed by your physician, during this time    [address] your dressing clean and dry  Can remove ace wrap the next day    CARE OF YOUR ACCESS  DO:  Keep access area clean with soap and water daily after dressing has been removed. Feel for the thrill daily. (by placing your fingers over the graft you will be able to feel a vibration (thrill) which means blood is flowing and the graft is working.)  DO NOT:  Wear tight sleeves, watches, belts or bracelets over graft. Carry heavy bags across the graft. Sleep on graft side. Let your blood pressure be taken on graft side. Let blood be drawn from your graft side. CALL YOUR DOCTOR IF  Excessive bleeding that does not stop after holding mild pressure over area. Temperature of 101 degrees F or above.   Redness, excessive swelling or bruising, and/or green or yellow, smelly discharge from incision   Loss of sensation-cold, white, or blue fingers

## 2023-11-14 NOTE — PROGRESS NOTES
Order placed for tunneled HD cathter. Pt infiltrated after running on HD for 30 mins. If pt is able to undergo tunneled catheter placement this afternoon, she can be discharged home and follow-up in our clinic in 2 weeks.      Taryn Cid, MAYRA
Patient verified name and . Order for consent not found in EHR and matches case posting; patient verifies procedure. Type 1B surgery, phone assessment complete. Orders not received. Labs per surgeon: none  Labs per anesthesia protocol: POC Glucose     Patient answered medical/surgical history questions at their best of ability. All prior to admission medications documented in EPIC. Patient instructed to take the following medications the day of surgery according to anesthesia guidelines with a small sip of water: Carvedilol, Doxazosin, 8 units Toujeo,  Levothyroxine, On the day before surgery please take Tylenol (Acetaminophen) 1000mg in the morning and then again before bed. You may substitute for Tylenol 650 mg. Hold all vitamins 7 days prior to surgery and NSAIDS 5 days prior to surgery. Prescription meds to hold   Patient instructed on the following:    > Arrive at OP Entrance, time of arrival to be called the day before by 1700  > NPO after midnight, unless otherwise indicated, including gum, mints, and ice chips  > Responsible adult must drive patient to the hospital, stay during surgery, and patient will need supervision 24 hours after anesthesia  > Use Dial in shower the night before surgery and on the morning of surgery  > All piercings must be removed prior to arrival.    > Leave all valuables (money and jewelry) at home but bring insurance card and ID on DOS.   > You may be required to pay a deductible or co-pay on the day of your procedure. You can pre-pay by calling [phone] if your surgery is at the [facility] or [phone] if your surgery is at the [facility]. > Do not wear make-up, nail polish, lotions, cologne, perfumes, powders, or oil on skin. Artificial nails are not permitted.
Pt can be discharged once her recovery time is complete from IR with her tunneled HD catheter. She needs to present at her regular HD clinic tomorrow for dialysis. Chanda Rhodes NP notified and will make sure the clinic is aware that she is coming tomorrow.     Taryn Cid NP
PRN         Objective:     Vitals:    11/14/23 1005 11/14/23 1010 11/14/23 1156 11/14/23 1200   BP: 126/62 (!) 124/55 (!) 96/54 (!) 105/53   Pulse: 71 72 79 75   Resp: 15 15     Temp:       TempSrc:       SpO2: 99% 99%     Weight:       Height:         Intake and Output:   No intake/output data recorded. 11/14 0701 - 11/14 1900  In: 450 [I.V.:450]  Out: 75     Physical Exam:   Constitutional:  the patient is well developed and in no acute distress, alert and oriented  HEENT:  Sclera clear, pupils equal, oral mucosa moist  Lungs: clear bilaterally   Cardiovascular:  Regular rate, S1, S2, no Rub   Abd/GI: soft and non-tender; with positive bowel sounds. Ext: warm without cyanosis. There is no lower leg edema. Skin:  no jaundice or rashes  Neuro: no gross neuro deficits   Psychiatric: Calm. Access: LUE AVG cannulated       LAB  No results for input(s): \"WBC\", \"HGB\", \"HCT\", \"PLT\", \"INR\" in the last 72 hours. Recent Labs     11/14/23  0557   CREATININE 8.26*     No results for input(s): \"PH\", \"PCO2\", \"PO2\", \"HCO3\" in the last 72 hours. Assessment/Plan:  (Medical Decision Making)   ESRD- [facility], last HD Friday 11/10 outpt  - seen on HD, tolerating UF, access functioning well    2.  LUE AVF thrombectomy with fistulogram, s/p AVG placement today per vascula      Betty Westfall, [address] [facility]
yes...

## 2023-11-14 NOTE — PRE SEDATION
11/19/23  Consuelo, MATTHEW Cotto - CNP   insulin glargine, 1 unit dial, (TOUJEO SOLOSTAR) 300 UNIT/ML concentrated injection pen Inject 10 Units into the skin every morning 10/4/23 10/4/24  Cheryl Jones MD   buangus Smith Mering) 2 MG tablet Take 2 tablets by mouth 2 times daily 9/20/23   You Estrada MD   Epoetin Bruce-epbx (RETACRIT) 60332 UNIT/ML SOLN injection Inject 1 mL into the skin every 14 days  Patient not taking: Reported on 11/14/2023 7/24/23   Yuo Estrada MD   Sodium Sulfate-Mag Sulfate-KCl 1880-247-989 MG TABS All clear liquids the day prior to procedure. Take Day 1 Dose 1 @ 5pm and Day 2 Dose 2 @ 8:30pm same day.  6/14/23   Darvin Walsh MD   carvedilol (COREG) 25 MG tablet Take 1 tablet by mouth 2 times daily 6/6/23   Dusty Meyer MD   hydrALAZINE (APRESOLINE) 50 MG tablet Take 1 tablet by mouth 3 times daily 3/30/23   You Estrada MD   Carboxymethylcellul-Glycerin 0.5-0.9 % SOLN Apply to eye as needed    You Estrada MD   amLODIPine (NORVASC) 10 MG tablet Take 1 tablet by mouth daily TAKE 1 TABLET BY MOUTH DAILY  Patient taking differently: Take 1 tablet by mouth at bedtime TAKE 1 TABLET BY MOUTH DAILY 2/13/23   Dusty Meyer MD   levothyroxine (SYNTHROID) 75 MCG tablet Take 1 tablet by mouth Daily 2/13/23   Dusty Meyer MD   olmesartan (BENICAR) 40 MG tablet TAKE 1 TABLET BY MOUTH DAILY 2/13/23   Dusty Meyer MD   atorvastatin (LIPITOR) 40 MG tablet Take 1 tablet by mouth daily  Patient taking differently: Take 1 tablet by mouth at bedtime 2/13/23   Dusty Meyer MD   sodium bicarbonate 650 MG tablet Take 2 tablets by mouth 2 times daily 10/21/22   You Estrada MD   calcitRIOL (ROCALTROL) 0.25 MCG capsule Take 1 capsule by mouth daily 10/21/22   You Estrada MD   doxazosin (CARDURA) 4 MG tablet Take 1 tablet by mouth in the morning and at bedtime 12/27/22   phillip Mejia MD   Lancets MISC Check BS BID 12/5/14

## 2023-11-20 ENCOUNTER — CARE COORDINATION (OUTPATIENT)
Dept: CARE COORDINATION | Facility: CLINIC | Age: 78
End: 2023-11-20

## 2023-11-20 NOTE — CARE COORDINATION
Ambulatory Care Management Note        Date/Time:  11/20/2023 12:06 PM    Ccm outreached to patient. No answer at this time, message left. Awaiting response. If no response, ccm will outreach next week.

## 2023-11-27 ENCOUNTER — CARE COORDINATION (OUTPATIENT)
Dept: CARE COORDINATION | Facility: CLINIC | Age: 78
End: 2023-11-27

## 2023-11-27 NOTE — CARE COORDINATION
Ambulatory Care Coordination Note  2023    Patient Current Location:  [facility]     ACM contacted the patient by telephone. Verified name and  with patient as identifiers. Provided introduction to self, and explanation of the ACM role. Challenges to be reviewed by the provider   Additional needs identified to be addressed with provider: No  none               Method of communication with provider: none. ACM: Roya Lozano RN    Ccm outreached to patient. Patient states she is doing well at this time. Patient states no questions or concerns. Ccm discussed that I would outreach next week. Patient agreeable. Offered patient enrollment in the Remote Patient Monitoring (RPM) program for in-home monitoring: Patient declined. Lab Results       None            Care Coordination Interventions    Referral from Primary Care Provider: No  Suggested Interventions and Community Resources          Goals Addressed                   This Visit's Progress     Conditions and Symptoms   On track     I will schedule office visits, as directed by my provider. I will keep my appointment or reschedule if I have to cancel. I will notify my provider of any barriers to my plan of care. I will notify my provider of any symptoms that indicate a worsening of my condition.     Barriers: none  Plan for overcoming my barriers: N/A  Confidence: 8/10  Anticipated Goal Completion Date: 24                Future Appointments   Date Time Provider [address]  [address]   2023  1:00 PM Patsy Bowman MD [facility] AMB   2024 10:40 AM Jesica Espinal MD [facility] AMB   2024  8:00 AM [facility] LAB RESOURCE [facility] AMB   2024  9:20 AM Jesica Espinal MD [facility] AMB

## 2023-11-30 ENCOUNTER — OFFICE VISIT (OUTPATIENT)
Dept: VASCULAR SURGERY | Age: 78
End: 2023-11-30

## 2023-11-30 VITALS
WEIGHT: 149 LBS | HEART RATE: 64 BPM | OXYGEN SATURATION: 97 % | SYSTOLIC BLOOD PRESSURE: 148 MMHG | BODY MASS INDEX: 25.44 KG/M2 | HEIGHT: 64 IN | DIASTOLIC BLOOD PRESSURE: 69 MMHG

## 2023-11-30 DIAGNOSIS — N18.6 ESRD (END STAGE RENAL DISEASE) ON DIALYSIS (HCC): Primary | ICD-10-CM

## 2023-11-30 DIAGNOSIS — Z99.2 ESRD (END STAGE RENAL DISEASE) ON DIALYSIS (HCC): Primary | ICD-10-CM

## 2023-11-30 PROCEDURE — 99024 POSTOP FOLLOW-UP VISIT: CPT | Performed by: STUDENT IN AN ORGANIZED HEALTH CARE EDUCATION/TRAINING PROGRAM

## 2023-12-01 NOTE — PROGRESS NOTES
03/20/2023    LEFT AV FISTULA CREATION performed by Rene Gross MD at [address] [place] [address] Left 6/12/2023    LEFT AV FISTULA REVISION, ELEVATION,  PATCH ANGIOPLASTY AND LIGATION OF SIDE BRANCHES performed by Rene Gross MD at [facility] MAIN OR    IR TUNNELED CATHETER PLACEMENT GREATER THAN 5 YEARS  11/14/2023    IR TUNNELED CATHETER PLACEMENT GREATER THAN 5 YEARS 11/14/2023 [facility]    PARATHYROIDECTOMY  11/11/2015    REFRACTIVE SURGERY  2011    VASCULAR SURGERY Left 11/14/2023    LEFT UPPER ARM AV FISTULA  THROMBECTOMY/ FISTULAGRAM/ JUMP GRAFT INSERTION performed by Willi Griffin MD at [facility] MAIN OR       Metal implants or AICD: no    Dye allergy: no     Smoker:  Tobacco Use      Smoking status: Never      Smokeless tobacco: Never      Referred by: No ref. provider found    MD Willi Barahona MD    Elements of this note have been dictated using speech recognition software. As a result, errors of speech recognition may have occurred.

## 2023-12-04 ENCOUNTER — CARE COORDINATION (OUTPATIENT)
Dept: CARE COORDINATION | Facility: CLINIC | Age: 78
End: 2023-12-04

## 2023-12-04 NOTE — CARE COORDINATION
Ambulatory Care Coordination Note  2023    Patient Current Location:  [facility]     ACM contacted the patient by telephone. Verified name and  with patient as identifiers. Provided introduction to self, and explanation of the ACM role. Challenges to be reviewed by the provider   Additional needs identified to be addressed with provider: No  none               Method of communication with provider: none. ACM: Krunal Díaz RN    Ccm outreached to patient. Patient states she is doing well at this time. Patient states no questions or concerns. Ccm discussed that I would outreach next week. Patient agreeable. Offered patient enrollment in the Remote Patient Monitoring (RPM) program for in-home monitoring: Patient is not eligible for RPM program.    Lab Results       None            Care Coordination Interventions    Referral from Primary Care Provider: No  Suggested Interventions and Community Resources          Goals Addressed                   This Visit's Progress     Conditions and Symptoms   On track     I will schedule office visits, as directed by my provider. I will keep my appointment or reschedule if I have to cancel. I will notify my provider of any barriers to my plan of care. I will notify my provider of any symptoms that indicate a worsening of my condition.     Barriers: none  Plan for overcoming my barriers: N/A  Confidence: 8/10  Anticipated Goal Completion Date: 24                Future Appointments   Date Time Provider [address]   2024 10:40 AM Sveta Finn MD \[facility]"" [facility] AMB   2024  8:00 AM F LAB RESOURCE \[facility]\"" [facility] AMB   2024  9:20 AM Meir Mcrae MD \[facility]\"" [facility] AMB

## 2023-12-11 ENCOUNTER — CARE COORDINATION (OUTPATIENT)
Dept: CARE COORDINATION | Facility: CLINIC | Age: 78
End: 2023-12-11

## 2023-12-11 NOTE — CARE COORDINATION
Ambulatory Care Coordination Note  2023    Patient Current Location:  [facility]     ACM contacted the patient by telephone. Verified name and  with patient as identifiers. Provided introduction to self, and explanation of the ACM role. Challenges to be reviewed by the provider   Additional needs identified to be addressed with provider: No  none               Method of communication with provider: none. ACM: Krunal Díaz RN    Ccm outreached to patient. Patient states she is doing well at this time. Patient states no questions or concerns. Ccm discussed that I would outreach in a few weeks. Patient agreeable. Offered patient enrollment in the Remote Patient Monitoring (RPM) program for in-home monitoring: Yes, but did not enroll at this time. Lab Results       None            Care Coordination Interventions    Referral from Primary Care Provider: No  Suggested Interventions and Community Resources          Goals Addressed                   This Visit's Progress     Conditions and Symptoms   On track     I will schedule office visits, as directed by my provider. I will keep my appointment or reschedule if I have to cancel. I will notify my provider of any barriers to my plan of care. I will notify my provider of any symptoms that indicate a worsening of my condition.     Barriers: none  Plan for overcoming my barriers: N/A  Confidence: 8/10  Anticipated Goal Completion Date: 24                Future Appointments   Date Time Provider [address]   2024 10:40 AM Sveta Finn MD [facility] AMB   2024  8:00 AM [facility] LAB RESOURCE [facility] AMB   2024  9:20 AM Meir Mcrae MD [facility] AMB

## 2023-12-31 DIAGNOSIS — I10 PRIMARY HYPERTENSION: ICD-10-CM

## 2023-12-31 RX ORDER — DOXAZOSIN MESYLATE 4 MG/1
4 TABLET ORAL 2 TIMES DAILY
Qty: 180 TABLET | Refills: 3 | Status: SHIPPED | OUTPATIENT
Start: 2023-12-31

## 2024-01-08 ENCOUNTER — CARE COORDINATION (OUTPATIENT)
Dept: CARE COORDINATION | Facility: CLINIC | Age: 79
End: 2024-01-08

## 2024-01-08 NOTE — CARE COORDINATION
Ambulatory Care Coordination Note  2024    Patient Current Location:  [place]     ACM contacted the patient by telephone. Verified name and  with patient as identifiers. Provided introduction to self, and explanation of the ACM role.     Challenges to be reviewed by the provider   Additional needs identified to be addressed with provider: No  none               Method of communication with provider: none.    ACM: Yadi Shanks RN    Ccm outreached to patient. Patient states she is doing well at this time. Patient states no questions or concerns. Ccm discussed that I would outreach next week. Patient agreeable.     Offered patient enrollment in the Remote Patient Monitoring (RPM) program for in-home monitoring: Yes, but did not enroll at this time.    Lab Results       None            Care Coordination Interventions    Referral from Primary Care Provider: No  Suggested Interventions and Community Resources          Goals Addressed                   This Visit's Progress     Conditions and Symptoms   On track     I will schedule office visits, as directed by my provider.  I will keep my appointment or reschedule if I have to cancel.  I will notify my provider of any barriers to my plan of care.  I will notify my provider of any symptoms that indicate a worsening of my condition.    Barriers: none  Plan for overcoming my barriers: N/A  Confidence: 8/10  Anticipated Goal Completion Date: 24                Future Appointments   Date Time Provider Department Center   2024 10:40 AM Burt Mcrae MD [facility] AMB   2024  8:00 AM [facility] LAB RESOURCE [facility] AMB   2024  9:20 AM Burt Mcrae MD [facility] AMB

## 2024-01-11 ENCOUNTER — HOSPITAL ENCOUNTER (OUTPATIENT)
Age: 79
Setting detail: OUTPATIENT SURGERY
Discharge: HOME | End: 2024-01-11
Attending: STUDENT IN AN ORGANIZED HEALTH CARE EDUCATION/TRAINING PROGRAM | Admitting: STUDENT IN AN ORGANIZED HEALTH CARE EDUCATION/TRAINING PROGRAM
Payer: MEDICARE

## 2024-01-11 ENCOUNTER — PREP FOR PROCEDURE (OUTPATIENT)
Dept: VASCULAR SURGERY | Age: 79
End: 2024-01-11

## 2024-01-11 DIAGNOSIS — N18.6 END STAGE RENAL DISEASE (HCC): ICD-10-CM

## 2024-01-16 ENCOUNTER — CARE COORDINATION (OUTPATIENT)
Dept: CARE COORDINATION | Facility: CLINIC | Age: 79
End: 2024-01-16

## 2024-01-16 NOTE — CARE COORDINATION
Ambulatory Care Coordination Note  2024    Patient Current Location:  [place]     ACM contacted the patient by telephone. Verified name and  with patient as identifiers. Provided introduction to self, and explanation of the ACM role.     Challenges to be reviewed by the provider   Additional needs identified to be addressed with provider: No  none               Method of communication with provider: none.    ACM: Yadi Shanks RN    Ccm outreached to patient. Patient states she is doing well at this time. Patient states no questions or concerns. Ccm discussed that I would outreach next week. Patient agreeable.     Offered patient enrollment in the Remote Patient Monitoring (RPM) program for in-home monitoring: Patient declined.    Lab Results       None            Care Coordination Interventions    Referral from Primary Care Provider: No  Suggested Interventions and Community Resources          Goals Addressed                   This Visit's Progress     Conditions and Symptoms   On track     I will schedule office visits, as directed by my provider.  I will keep my appointment or reschedule if I have to cancel.  I will notify my provider of any barriers to my plan of care.  I will notify my provider of any symptoms that indicate a worsening of my condition.    Barriers: none  Plan for overcoming my barriers: N/A  Confidence: 8/10  Anticipated Goal Completion Date: 24                Future Appointments   Date Time Provider Department Center   2024 10:40 AM Burt Mcrae MD [facility] AMB   2024  8:45 AM Woodrow Isbell MD [facility] AMB   2/15/2024 10:00 AM [facility] ULTRASOUND 2 [facility] AMB   2/15/2024 10:45 AM Woodrow Isbell MD [facility] AMB   2024  8:00 AM [facility] LAB RESOURCE [facility] AMB   2024  9:20 AM Burt Mcrae MD F [facility] AMB

## 2024-01-19 ENCOUNTER — TELEPHONE (OUTPATIENT)
Dept: VASCULAR SURGERY | Age: 79
End: 2024-01-19

## 2024-01-19 NOTE — TELEPHONE ENCOUNTER
Lyla from  [facility] called to notify that Ms. Pradhan's access is running really well and she doesn't think there will be a need for the next procedure and suggests to cancel the surgery on 1/26.    I asked her to let Ms. Pradhan run Mon and Wed of next week to be sure. If all is still well, we will cancel the surgery after she runs on the 24th.    She also mentioned she will be ordering the catheter removal soon.

## 2024-01-22 ENCOUNTER — CARE COORDINATION (OUTPATIENT)
Dept: CARE COORDINATION | Facility: CLINIC | Age: 79
End: 2024-01-22

## 2024-01-22 NOTE — CARE COORDINATION
Ambulatory Care Coordination Note  2024    Patient Current Location:  [place]     ACM contacted the patient by telephone. Verified name and  with patient as identifiers. Provided introduction to self, and explanation of the ACM role.     Challenges to be reviewed by the provider   Additional needs identified to be addressed with provider: No  none               Method of communication with provider: none.    ACM: Yadi Shanks RN    Ccm outreached to patient. Patient states she is doing well at this time. Patient states no questions or concerns. Ccm discussed that I would outreach next week. Patient agreeable.     Offered patient enrollment in the Remote Patient Monitoring (RPM) program for in-home monitoring: Patient declined.    Lab Results       None            Care Coordination Interventions    Referral from Primary Care Provider: No  Suggested Interventions and Community Resources          Goals Addressed                   This Visit's Progress     Conditions and Symptoms   On track     I will schedule office visits, as directed by my provider.  I will keep my appointment or reschedule if I have to cancel.  I will notify my provider of any barriers to my plan of care.  I will notify my provider of any symptoms that indicate a worsening of my condition.    Barriers: none  Plan for overcoming my barriers: N/A  Confidence: 8/10  Anticipated Goal Completion Date: 24                Future Appointments   Date Time Provider Department Center   2024  8:45 AM Woodrow Isbell MD [facility] AMB   2/15/2024 10:00 AM [facility] ULTRASOUND 2 [facility] AMB   2/15/2024 10:45 AM Woodrow Isbell MD [facility] AMB   2024  8:00 AM F LAB RESOURCE [facility] AMB   2024  9:20 AM Burt Mcrae MD [facility] AMB

## 2024-01-25 NOTE — PERIOP NOTE
Preop nurse contacted with arrival information for procedure scheduled 1/26/24. There is some confusion regarding plan for tomorrow because she is to go to dialysis/radiology to get catheter removed. Pt unaware of vascular procedure. Informed to call Dr. Isbell's office for clarification, pt verbalized understanding.   SUBJECTIVE: Shanna Cuellar , a 11 year old  male, presents for counseling and information regarding upcoming travel to [facility]. Special medical concerns include: none. He anticipates the following unusual exposures: none.    Itinerary:  [facility]    Departure Date: 21 Return date: 21    Reason for travel (i.e. Business, pleasure):     Visiting an urban or rural area?: urban    Accommodations (i.e. hotel, hostel, friends, family, etc): family    Women - First day of your last period: n/a    IMMUNIZATION HISTORY  Have you received any vaccinations in the past 4 weeks?  No  Have you ever fainted from having your blood drawn or from an injection?  No  Have you ever had a fever reaction to vaccination?  No  Have you ever had any bad reaction or side effect from any vaccination?  No  Have you ever had hepatitis A or B vaccine?  Yes  Do you live (or work closely) with anyone who has AIDS, an AIDS-like condition, any other immune disorder or who is on chemotherapy for cancer?  No  Have you received any injection of immune globulin or any blood products during the past 12 months?  No    GENERAL MEDICAL HISTORY  Do you have a medical condition that warrants maintenance medication or physician follow-up?  No  Do you have a medical condition that is stable now, but that may recur while traveling?  No  Has your spleen been removed?  No  Have you had an acute illness or a fever in the past 48 hours?  No  Are you pregnant, or might you become pregnant on this trip?  Any chance of pregnancy?  No  Are you breastfeeding?  No  Do you have HIV, AIDS, an AIDS-like condition, any other immune disorder, leukemia or cancer?  No  Do you have a severe combined immunodeficiency disease?  No  Have you had your thymus gland removed or history of problems with your thymus, such as myasthenia gravis, DiGeorge syndrome, or thymoma?  No    Do you have severe thrombocytopenia (low platelet count) or a coagulation disorder?  No  Have you  ever had a convulsion, seizure, epilepsy, neurologic condition or brain infection?  No  Do you have any stomach conditions?  No  Do you have a G6PD deficiency?  No  Do you have severe renal or kidney impairment?  No  Do you have a history of psychiatric problems?  No  Do you have a problem with strange dreams and/or nightmares?  No  Do you have insomnia?  No  Do you have problems with vaginitis?  No  Do you have psoriasis?  No  Are you prone to motion sickness?  No  Have you ever had headaches, nausea, vomiting, or breathing problems from altitude exposure?  No      History reviewed. No pertinent past medical history.   Immunization History   Administered Date(s) Administered     DTAP (<7y) 06/02/2014, 07/31/2015, 09/23/2016     DTAP-IPV, <7Y 07/05/2018     Hep B, Peds or Adolescent 10/22/2019, 01/02/2020, 10/21/2020     Ig, Unspecified Formulation 2011, 02/01/2012     Influenza Vaccine IM > 6 months Valent IIV4 03/10/2020     MMR 08/07/2017, 08/19/2019     Poliovirus, inactivated (IPV) 07/31/2015, 09/23/2016     Synagis 2011, 02/01/2012     Varicella 07/05/2018, 08/19/2019       Current Outpatient Medications   Medication Sig Dispense Refill     acetaminophen (TYLENOL) 120 MG suppository Place 1 suppository rectally every 4 hours as needed for fever. (Patient not taking: Reported on 6/3/2021) 12 suppository 0     docusate (COLACE) 50 MG/5ML liquid Two drops in both ears twice daily to loosen ear wax (Patient not taking: Reported on 6/3/2021) 20 mL 0     NO ACTIVE MEDICATIONS        ondansetron (ZOFRAN) 4 MG/5ML solution Take 3.5 mLs (2.8 mg) by mouth 3 times daily as needed for nausea or vomiting (Patient not taking: Reported on 6/3/2021) 35 mL 0     vitamin A-D-C & iron drops (TRI-VI-SOL W/ IRON) 10 MG/ML SOLN Take 1 mL by mouth daily. (Patient not taking: Reported on 6/3/2021) 1 Bottle 6     No Known Allergies     EXAM: deferred    Immunizations discussed include: Hepatitis A and Typhoid  Malaria  prophylaxis recommended: mefloquine  Symptomatic treatment for traveler's diarrhea: azithromycin     ASSESSMENT/PLAN:  No diagnosis found.  I have reviewed general recommendations for safe travel   including: food/water precautions, insect avoidance, safe sex   practices given high prevalence of HIV and other STDs,   roadway safety. Educational materials and links to the CDC   Traveler's health website have been provided.    Total time 20 minutes, greater than 50 percent in counseling   and coordination of care.      Sherrell Bunch MD, [facility]

## 2024-01-26 ENCOUNTER — APPOINTMENT (OUTPATIENT)
Dept: INTERVENTIONAL RADIOLOGY/VASCULAR | Age: 79
End: 2024-01-26
Attending: INTERNAL MEDICINE
Payer: MEDICARE

## 2024-01-26 VITALS
RESPIRATION RATE: 18 BRPM | WEIGHT: 150 LBS | BODY MASS INDEX: 25.61 KG/M2 | HEART RATE: 68 BPM | OXYGEN SATURATION: 99 % | TEMPERATURE: 97.7 F | DIASTOLIC BLOOD PRESSURE: 76 MMHG | SYSTOLIC BLOOD PRESSURE: 177 MMHG | HEIGHT: 64 IN

## 2024-01-26 PROCEDURE — 2500000003 HC RX 250 WO HCPCS: Performed by: PHYSICIAN ASSISTANT

## 2024-01-26 PROCEDURE — 36589 REMOVAL TUNNELED CV CATH: CPT | Performed by: PHYSICIAN ASSISTANT

## 2024-01-26 PROCEDURE — 36589 REMOVAL TUNNELED CV CATH: CPT

## 2024-01-26 RX ORDER — LIDOCAINE HYDROCHLORIDE AND EPINEPHRINE 10; 10 MG/ML; UG/ML
INJECTION, SOLUTION INFILTRATION; PERINEURAL PRN
Status: COMPLETED | OUTPATIENT
Start: 2024-01-26 | End: 2024-01-26

## 2024-01-26 RX ADMIN — LIDOCAINE HYDROCHLORIDE,EPINEPHRINE BITARTRATE 10 ML: 10; .01 INJECTION, SOLUTION INFILTRATION; PERINEURAL at 11:45

## 2024-01-26 NOTE — DISCHARGE INSTRUCTIONS
Port/Catheter Removal Discharge Instructions          General information: Your doctor has ordered us to remove your port, or catheter. This could be that you do not need it anymore, it is not doing its job, or your physician has decided on another plan for your treatment.      Post-Op Instructions:     Patients may experience some mild swelling, redness and or bruising around the incision. The area may be tender. This is normal after a surgical procedure. This may take several days to resolve.     Do not allow bra straps or any clothing to rub the skin over the incision with skin glue present     You may start taking showers again after 48 hours (about 2 days). Please protect the area with saran wrap if possible.     Do not take a bath or swim until the skin has healed. Skin glue will flake off within 7-10 days (about 1 and a half weeks). Once it is healed it is okay to bathe and swim.      Do not use antibiotic ointment, rubbing alcohol or hydrogen peroxide on the skin glue as it can break down the adhesive     Restrict yourself to light activity for the first 3 days after getting the port out, after that, resume normal activity slowly.      You may resume your normal diet and medications.      If you have pain, you can take whatever you have at home including acetaminophen (Tylenol) or Ibuprofren (Advil) as needed. Ice packs are also recommended.      Call if: You should call your Physician and/or the Radiology Nurse if you notice:      Signs of infection such as swelling and redness that is warm to the touch or streaking, or pus     Fever (greater than 100.4).      Severe arm / chest pain coming from your port site     Active bleeding     We can be reached at [phone], between 8 am and 5 pm Monday through Friday. After hours call the answering service at [phone] and ask for the radiologist to be paged. If you have a medical emergency, go to the nearest emergency room, or call [phone].      Follow-Up

## 2024-01-26 NOTE — OR NURSING
Prep complete. Patient ready for procedure. States that she did not take her BP medications this morning. 197/85

## 2024-01-26 NOTE — PROGRESS NOTES
Patient is Alert and discharge instructions given and received.  Change dressing to right chest in two days information given to patient.

## 2024-01-29 ENCOUNTER — CARE COORDINATION (OUTPATIENT)
Dept: CARE COORDINATION | Facility: CLINIC | Age: 79
End: 2024-01-29

## 2024-01-29 NOTE — CARE COORDINATION
Patient has graduated from the Complex Case Management  program on 1/29/24.  Patient/family has the ability to self-manage at this time.  Care management goals have been completed. No further Ambulatory Care Manager follow up scheduled.     Goals Addressed                   This Visit's Progress     COMPLETED: Conditions and Symptoms        I will schedule office visits, as directed by my provider.  I will keep my appointment or reschedule if I have to cancel.  I will notify my provider of any barriers to my plan of care.  I will notify my provider of any symptoms that indicate a worsening of my condition.    Barriers: none  Plan for overcoming my barriers: N/A  Confidence: 8/10  Anticipated Goal Completion Date: 1/31/24                Patient has Ambulatory Care Manager's contact information for any further questions, concerns, or needs.  Patients upcoming visits:    Future Appointments   Date Time Provider Department Center   6/17/2024  8:00 AM [facility] LAB RESOURCE [facility]   6/17/2024  9:20 AM Burt Mcrae MD [facility] AMB

## 2024-02-07 RX ORDER — OLMESARTAN MEDOXOMIL 40 MG/1
TABLET ORAL
Qty: 90 TABLET | Refills: 3 | OUTPATIENT
Start: 2024-02-07

## 2024-02-25 DIAGNOSIS — E11.69 TYPE 2 DIABETES MELLITUS WITH OTHER SPECIFIED COMPLICATION, WITHOUT LONG-TERM CURRENT USE OF INSULIN (HCC): ICD-10-CM

## 2024-02-26 RX ORDER — ATORVASTATIN CALCIUM 40 MG/1
40 TABLET, FILM COATED ORAL DAILY
Qty: 90 TABLET | Refills: 3 | OUTPATIENT
Start: 2024-02-26

## 2024-02-26 RX ORDER — LEVOTHYROXINE SODIUM 0.07 MG/1
75 TABLET ORAL DAILY
Qty: 90 TABLET | Refills: 3 | OUTPATIENT
Start: 2024-02-26

## 2024-04-12 ENCOUNTER — PREP FOR PROCEDURE (OUTPATIENT)
Dept: CARDIOTHORACIC SURGERY | Age: 79
End: 2024-04-12

## 2024-04-12 PROBLEM — T82.868A CLOTTED RENAL DIALYSIS AV GRAFT (HCC): Status: ACTIVE | Noted: 2024-04-12

## 2024-04-12 NOTE — PERIOP NOTE
Patient verified name and .  Order for consent NOT found in EHR at time of PAT visit. Unable to verify case posting against order; surgery verified by patient.     Type 1B surgery, PAT phone assessment complete.  Orders not received.  Labs per surgeon: unknown; no orders received    Labs per anesthesia protocol: POC glucose, POC K+, Hgb for DOS    Patient answered medical/surgical history questions at their best of ability. All prior to admission medications documented in EPIC.    Patient instructed to continue taking all prescription medications up to the day of surgery but to take only the following medications the day of surgery according to anesthesia guidelines with a small sip of water: doxazosin, hydralazine, amlodipine, carvedilol, levothyroxine. Take 80% of Toujeo on the morning of procedure. Adjusted dose is 11 units.      Patient informed that all vitamins and supplements should be held 7 days prior to surgery and NSAIDS 5 days prior to surgery. Prescription meds to hold:none    Patient instructed on the following:    > Arrive at Main Entrance, time of arrival to be called the day before by 1700  > NPO after midnight, unless otherwise indicated, including gum, mints, and ice chips  > Responsible adult must drive patient to the hospital, stay during surgery, and patient will need supervision 24 hours after anesthesia  > Use non moisturizing soap in shower the night before surgery and on the morning of surgery  > All piercings must be removed prior to arrival.    > Leave all valuables (money and jewelry) at home but bring insurance card and ID on DOS.   > You may be required to pay a deductible or co-pay on the day of your procedure. You can pre-pay by calling [phone] if your surgery is at the [facility] or [phone] if your surgery is at the [facility].  > Do not wear make-up, nail polish, lotions, cologne, perfumes, powders, or oil on skin. Artificial nails are not permitted.

## 2024-04-13 ENCOUNTER — ANESTHESIA (OUTPATIENT)
Dept: SURGERY | Age: 79
End: 2024-04-13
Payer: MEDICARE

## 2024-04-13 ENCOUNTER — ANESTHESIA EVENT (OUTPATIENT)
Dept: SURGERY | Age: 79
End: 2024-04-13
Payer: MEDICARE

## 2024-04-13 ENCOUNTER — APPOINTMENT (OUTPATIENT)
Dept: INTERVENTIONAL RADIOLOGY/VASCULAR | Age: 79
End: 2024-04-13
Attending: SURGERY
Payer: MEDICARE

## 2024-04-13 ENCOUNTER — HOSPITAL ENCOUNTER (OUTPATIENT)
Age: 79
Setting detail: OBSERVATION
Discharge: HOME | End: 2024-04-13
Attending: SURGERY | Admitting: SURGERY
Payer: MEDICARE

## 2024-04-13 VITALS
HEART RATE: 54 BPM | WEIGHT: 142.4 LBS | TEMPERATURE: 97.8 F | HEIGHT: 64 IN | DIASTOLIC BLOOD PRESSURE: 69 MMHG | RESPIRATION RATE: 14 BRPM | SYSTOLIC BLOOD PRESSURE: 152 MMHG | OXYGEN SATURATION: 100 % | BODY MASS INDEX: 24.31 KG/M2

## 2024-04-13 PROBLEM — E87.5 ACUTE HYPERKALEMIA: Status: ACTIVE | Noted: 2024-04-13

## 2024-04-13 LAB
GLUCOSE BLD STRIP.AUTO-MCNC: 55 MG/DL (ref 65–100)
GLUCOSE BLD STRIP.AUTO-MCNC: 86 MG/DL (ref 65–100)
HBV SURFACE AG SER QL: NONREACTIVE
HGB BLD-MCNC: 12.3 G/DL (ref 11.7–15.4)
POTASSIUM BLD-SCNC: 5.2 MMOL/L (ref 3.5–5.1)
SERVICE CMNT-IMP: ABNORMAL
SERVICE CMNT-IMP: NORMAL

## 2024-04-13 PROCEDURE — 6360000002 HC RX W HCPCS: Performed by: SURGERY

## 2024-04-13 PROCEDURE — 85018 HEMOGLOBIN: CPT

## 2024-04-13 PROCEDURE — 3600000007 HC SURGERY HYBRID BASE: Performed by: SURGERY

## 2024-04-13 PROCEDURE — C1769 GUIDE WIRE: HCPCS | Performed by: SURGERY

## 2024-04-13 PROCEDURE — 2500000003 HC RX 250 WO HCPCS: Performed by: NURSE ANESTHETIST, CERTIFIED REGISTERED

## 2024-04-13 PROCEDURE — 7100000001 HC PACU RECOVERY - ADDTL 15 MIN: Performed by: SURGERY

## 2024-04-13 PROCEDURE — C1725 CATH, TRANSLUMIN NON-LASER: HCPCS

## 2024-04-13 PROCEDURE — 82962 GLUCOSE BLOOD TEST: CPT

## 2024-04-13 PROCEDURE — 3600000017 HC SURGERY HYBRID ADDL 15MIN: Performed by: SURGERY

## 2024-04-13 PROCEDURE — C1894 INTRO/SHEATH, NON-LASER: HCPCS | Performed by: SURGERY

## 2024-04-13 PROCEDURE — 2580000003 HC RX 258: Performed by: NURSE ANESTHETIST, CERTIFIED REGISTERED

## 2024-04-13 PROCEDURE — 6360000004 HC RX CONTRAST MEDICATION: Performed by: SURGERY

## 2024-04-13 PROCEDURE — C1874 STENT, COATED/COV W/DEL SYS: HCPCS | Performed by: SURGERY

## 2024-04-13 PROCEDURE — 3600000007 HC SURGERY HYBRID BASE

## 2024-04-13 PROCEDURE — 87340 HEPATITIS B SURFACE AG IA: CPT

## 2024-04-13 PROCEDURE — 96372 THER/PROPH/DIAG INJ SC/IM: CPT

## 2024-04-13 PROCEDURE — 2580000003 HC RX 258: Performed by: ANESTHESIOLOGY

## 2024-04-13 PROCEDURE — 2500000003 HC RX 250 WO HCPCS: Performed by: SURGERY

## 2024-04-13 PROCEDURE — 3700000001 HC ADD 15 MINUTES (ANESTHESIA): Performed by: SURGERY

## 2024-04-13 PROCEDURE — 3600000017 HC SURGERY HYBRID ADDL 15MIN

## 2024-04-13 PROCEDURE — 3700000000 HC ANESTHESIA ATTENDED CARE: Performed by: SURGERY

## 2024-04-13 PROCEDURE — 2580000003 HC RX 258

## 2024-04-13 PROCEDURE — 6360000002 HC RX W HCPCS: Performed by: NURSE ANESTHETIST, CERTIFIED REGISTERED

## 2024-04-13 PROCEDURE — C1757 CATH, THROMBECTOMY/EMBOLECT: HCPCS | Performed by: SURGERY

## 2024-04-13 PROCEDURE — 2709999900 HC NON-CHARGEABLE SUPPLY: Performed by: SURGERY

## 2024-04-13 PROCEDURE — 7100000000 HC PACU RECOVERY - FIRST 15 MIN: Performed by: SURGERY

## 2024-04-13 PROCEDURE — 84132 ASSAY OF SERUM POTASSIUM: CPT

## 2024-04-13 PROCEDURE — 90935 HEMODIALYSIS ONE EVALUATION: CPT

## 2024-04-13 DEVICE — IMPLANTABLE DEVICE: Type: IMPLANTABLE DEVICE | Site: ARM | Status: FUNCTIONAL

## 2024-04-13 RX ORDER — IPRATROPIUM BROMIDE AND ALBUTEROL SULFATE 2.5; .5 MG/3ML; MG/3ML
1 SOLUTION RESPIRATORY (INHALATION)
Status: DISCONTINUED | OUTPATIENT
Start: 2024-04-13 | End: 2024-04-14 | Stop reason: HOSPADM

## 2024-04-13 RX ORDER — HYDROMORPHONE HYDROCHLORIDE 2 MG/ML
0.5 INJECTION, SOLUTION INTRAMUSCULAR; INTRAVENOUS; SUBCUTANEOUS EVERY 5 MIN PRN
Status: DISCONTINUED | OUTPATIENT
Start: 2024-04-13 | End: 2024-04-14 | Stop reason: HOSPADM

## 2024-04-13 RX ORDER — HEPARIN SODIUM 1000 [USP'U]/ML
INJECTION, SOLUTION INTRAVENOUS; SUBCUTANEOUS PRN
Status: DISCONTINUED | OUTPATIENT
Start: 2024-04-13 | End: 2024-04-13 | Stop reason: SDUPTHER

## 2024-04-13 RX ORDER — HEPARIN SODIUM 5000 [USP'U]/ML
INJECTION, SOLUTION INTRAVENOUS; SUBCUTANEOUS PRN
Status: DISCONTINUED | OUTPATIENT
Start: 2024-04-13 | End: 2024-04-13 | Stop reason: ALTCHOICE

## 2024-04-13 RX ORDER — DEXAMETHASONE SODIUM PHOSPHATE 4 MG/ML
INJECTION, SOLUTION INTRA-ARTICULAR; INTRALESIONAL; INTRAMUSCULAR; INTRAVENOUS; SOFT TISSUE PRN
Status: DISCONTINUED | OUTPATIENT
Start: 2024-04-13 | End: 2024-04-13 | Stop reason: SDUPTHER

## 2024-04-13 RX ORDER — HALOPERIDOL 5 MG/ML
1 INJECTION INTRAMUSCULAR
Status: DISCONTINUED | OUTPATIENT
Start: 2024-04-13 | End: 2024-04-14 | Stop reason: HOSPADM

## 2024-04-13 RX ORDER — ONDANSETRON 2 MG/ML
INJECTION INTRAMUSCULAR; INTRAVENOUS PRN
Status: DISCONTINUED | OUTPATIENT
Start: 2024-04-13 | End: 2024-04-13 | Stop reason: SDUPTHER

## 2024-04-13 RX ORDER — OXYCODONE HYDROCHLORIDE 5 MG/1
5 TABLET ORAL
Status: DISCONTINUED | OUTPATIENT
Start: 2024-04-13 | End: 2024-04-14 | Stop reason: HOSPADM

## 2024-04-13 RX ORDER — ACETAMINOPHEN 325 MG/1
650 TABLET ORAL EVERY 6 HOURS PRN
Status: DISCONTINUED | OUTPATIENT
Start: 2024-04-13 | End: 2024-04-14 | Stop reason: HOSPADM

## 2024-04-13 RX ORDER — DEXTROSE MONOHYDRATE 100 MG/ML
INJECTION, SOLUTION INTRAVENOUS
Status: COMPLETED | OUTPATIENT
Start: 2024-04-13 | End: 2024-04-13

## 2024-04-13 RX ORDER — EPHEDRINE SULFATE 5 MG/ML
INJECTION INTRAVENOUS PRN
Status: DISCONTINUED | OUTPATIENT
Start: 2024-04-13 | End: 2024-04-13 | Stop reason: SDUPTHER

## 2024-04-13 RX ORDER — SODIUM CHLORIDE 9 MG/ML
INJECTION, SOLUTION INTRAVENOUS
Status: COMPLETED | OUTPATIENT
Start: 2024-04-13 | End: 2024-04-13

## 2024-04-13 RX ORDER — FENTANYL CITRATE 50 UG/ML
INJECTION, SOLUTION INTRAMUSCULAR; INTRAVENOUS PRN
Status: DISCONTINUED | OUTPATIENT
Start: 2024-04-13 | End: 2024-04-13 | Stop reason: SDUPTHER

## 2024-04-13 RX ORDER — SODIUM CHLORIDE 9 MG/ML
INJECTION, SOLUTION INTRAVENOUS CONTINUOUS PRN
Status: DISCONTINUED | OUTPATIENT
Start: 2024-04-13 | End: 2024-04-13 | Stop reason: SDUPTHER

## 2024-04-13 RX ORDER — DEXTROSE MONOHYDRATE 100 MG/ML
INJECTION, SOLUTION INTRAVENOUS
Status: COMPLETED
Start: 2024-04-13 | End: 2024-04-13

## 2024-04-13 RX ORDER — LIDOCAINE HYDROCHLORIDE 20 MG/ML
INJECTION, SOLUTION EPIDURAL; INFILTRATION; INTRACAUDAL; PERINEURAL PRN
Status: DISCONTINUED | OUTPATIENT
Start: 2024-04-13 | End: 2024-04-13 | Stop reason: SDUPTHER

## 2024-04-13 RX ORDER — LIDOCAINE HYDROCHLORIDE 10 MG/ML
INJECTION, SOLUTION INFILTRATION; PERINEURAL PRN
Status: DISCONTINUED | OUTPATIENT
Start: 2024-04-13 | End: 2024-04-13 | Stop reason: ALTCHOICE

## 2024-04-13 RX ORDER — PROPOFOL 10 MG/ML
INJECTION, EMULSION INTRAVENOUS PRN
Status: DISCONTINUED | OUTPATIENT
Start: 2024-04-13 | End: 2024-04-13 | Stop reason: SDUPTHER

## 2024-04-13 RX ORDER — SODIUM CHLORIDE 0.9 % (FLUSH) 0.9 %
5-40 SYRINGE (ML) INJECTION EVERY 12 HOURS SCHEDULED
Status: DISCONTINUED | OUTPATIENT
Start: 2024-04-13 | End: 2024-04-14 | Stop reason: HOSPADM

## 2024-04-13 RX ORDER — NALOXONE HYDROCHLORIDE 0.4 MG/ML
INJECTION, SOLUTION INTRAMUSCULAR; INTRAVENOUS; SUBCUTANEOUS PRN
Status: DISCONTINUED | OUTPATIENT
Start: 2024-04-13 | End: 2024-04-14 | Stop reason: HOSPADM

## 2024-04-13 RX ORDER — PROCHLORPERAZINE EDISYLATE 5 MG/ML
5 INJECTION INTRAMUSCULAR; INTRAVENOUS
Status: DISCONTINUED | OUTPATIENT
Start: 2024-04-13 | End: 2024-04-14 | Stop reason: HOSPADM

## 2024-04-13 RX ADMIN — PHENYLEPHRINE HYDROCHLORIDE 100 MCG: 10 INJECTION INTRAVENOUS at 09:57

## 2024-04-13 RX ADMIN — EPHEDRINE SULFATE 5 MG: 5 INJECTION INTRAVENOUS at 10:22

## 2024-04-13 RX ADMIN — PROPOFOL 200 MG: 10 INJECTION, EMULSION INTRAVENOUS at 09:57

## 2024-04-13 RX ADMIN — FENTANYL CITRATE 25 MCG: 50 INJECTION, SOLUTION INTRAMUSCULAR; INTRAVENOUS at 10:15

## 2024-04-13 RX ADMIN — DEXAMETHASONE SODIUM PHOSPHATE 4 MG: 4 INJECTION, SOLUTION INTRAMUSCULAR; INTRAVENOUS at 10:04

## 2024-04-13 RX ADMIN — EPHEDRINE SULFATE 10 MG: 5 INJECTION INTRAVENOUS at 10:25

## 2024-04-13 RX ADMIN — FENTANYL CITRATE 25 MCG: 50 INJECTION, SOLUTION INTRAMUSCULAR; INTRAVENOUS at 09:52

## 2024-04-13 RX ADMIN — DEXTROSE MONOHYDRATE: 100 INJECTION, SOLUTION INTRAVENOUS at 09:44

## 2024-04-13 RX ADMIN — PHENYLEPHRINE HYDROCHLORIDE 100 MCG: 10 INJECTION INTRAVENOUS at 11:25

## 2024-04-13 RX ADMIN — ONDANSETRON 4 MG: 2 INJECTION INTRAMUSCULAR; INTRAVENOUS at 10:04

## 2024-04-13 RX ADMIN — FENTANYL CITRATE 25 MCG: 50 INJECTION, SOLUTION INTRAMUSCULAR; INTRAVENOUS at 10:09

## 2024-04-13 RX ADMIN — PHENYLEPHRINE HYDROCHLORIDE 100 MCG: 10 INJECTION INTRAVENOUS at 10:49

## 2024-04-13 RX ADMIN — EPHEDRINE SULFATE 5 MG: 5 INJECTION INTRAVENOUS at 10:20

## 2024-04-13 RX ADMIN — HEPARIN SODIUM 5000 UNITS: 1000 INJECTION INTRAVENOUS; SUBCUTANEOUS at 10:22

## 2024-04-13 RX ADMIN — EPHEDRINE SULFATE 5 MG: 5 INJECTION INTRAVENOUS at 10:32

## 2024-04-13 RX ADMIN — LIDOCAINE HYDROCHLORIDE 70 MG: 20 INJECTION, SOLUTION EPIDURAL; INFILTRATION; INTRACAUDAL; PERINEURAL at 09:57

## 2024-04-13 RX ADMIN — Medication 2000 MG: at 10:04

## 2024-04-13 RX ADMIN — SODIUM CHLORIDE: 900 INJECTION INTRAVENOUS at 09:52

## 2024-04-13 RX ADMIN — PHENYLEPHRINE HYDROCHLORIDE 100 MCG: 10 INJECTION INTRAVENOUS at 10:32

## 2024-04-13 RX ADMIN — SODIUM CHLORIDE: 9 INJECTION, SOLUTION INTRAVENOUS at 08:55

## 2024-04-13 RX ADMIN — FENTANYL CITRATE 25 MCG: 50 INJECTION, SOLUTION INTRAMUSCULAR; INTRAVENOUS at 11:09

## 2024-04-13 ASSESSMENT — PAIN - FUNCTIONAL ASSESSMENT: PAIN_FUNCTIONAL_ASSESSMENT: 0-10

## 2024-04-13 NOTE — DIALYSIS
TRANSFER IN - DIALYSIS    Received patient in dialysis unit from PACU (unit) for ordered procedure.    Consent verified for renal replacement therapy. Procedure explained to patient, opportunity for Q&A provided. Call light given.     Patient a/ox3 and vital signs stable.  /68 , P57 ,  1L  O2  via NC.    Hemodialysis initiated using LUE AVG and 15 g needles.  Machine settings per MD order.    Heparin 0 unit bolus and 0 units/hr.    Will monitor during treatment.

## 2024-04-13 NOTE — H&P
[address] [address]  [phone] FAX: [phone]    Nataliia Pradhan  1945          [facility]   Ms. Nataliia Pradhan is a 78 y.o. year old female who Presents with clortted left arm composite access. Missed dialysis yesterday. Last revision in march. Small cephalic vein outflow    Current Facility-Administered Medications   Medication Dose Route Frequency Provider Last Rate Last Admin    ceFAZolin (ANCEF) 2000 mg in sterile water 20 mL IV syringe  2,000 mg IntraVENous On Call to OR Nelson Longoria MD        dextrose 10 % infusion   IntraVENous On Call to OR Deon Butt MD         Allergies   Allergen Reactions    Prednisone Other (See Comments)     Elevated blood sugar      Past Medical History:   Diagnosis Date    Anemia     s/p iron infusions (last 6/5/23)    DM (diabetes mellitus) ([facility]) 02/19/2014    Toujeo-am -160; A1C: 7.0 per pt    End stage kidney disease ([facility])     [facility] M,W, F    HTN (hypertension) 02/19/2014    Hyperlipemia 02/19/2014    Hyperparathyroidism (HCC) 08/26/2014    Thyroid disease      Family History   Problem Relation Age of Onset    Elevated Lipids Other     Hypertension Other     Diabetes Other     Heart Disease Other      Past Surgical History:   Procedure Laterality Date    AV FISTULA PLACEMENT  03/2023    with Dr Rutledge    DIALYSIS FISTULA CREATION Left 03/20/2023    LEFT AV FISTULA CREATION performed by Inder Rutledge MD at [facility] MAIN OR    DIALYSIS FISTULA CREATION Left 6/12/2023    LEFT AV FISTULA REVISION, ELEVATION,  PATCH ANGIOPLASTY AND LIGATION OF SIDE BRANCHES performed by Inder Rutledge MD at [facility] MAIN OR    IR TUNNELED CATHETER PLACEMENT GREATER THAN 5 YEARS  11/14/2023    IR TUNNELED CATHETER PLACEMENT GREATER THAN 5 YEARS 11/14/2023 [facility] RADIOLOGY SPECIALS    PARATHYROIDECTOMY  11/11/2015    REFRACTIVE SURGERY  2011    VASCULAR SURGERY Left 11/14/2023    LEFT UPPER ARM AV FISTULA  THROMBECTOMY/

## 2024-04-13 NOTE — PROGRESS NOTES
[facility] Progress Note    Follow-Up on: ESRD    ROS:  Gen - no fever, no chills, appetite unchanged  CV - no chest pain, no palpitation  Lung - no shortness of breath, no cough  Abd - no tenderness, no nausea/vomiting, no diarrhea  Ext - no edema    Exam:  Vitals:    04/12/24 1532 04/13/24 0850 04/13/24 1135   BP:  (!) 170/72 138/65   Pulse:  62 56   Resp:  16 14   Temp:  97.9 °F (36.6 °C) 97.6 °F (36.4 °C)   TempSrc:  Temporal Temporal   SpO2:  100% 99%   Weight: 65.8 kg (145 lb) 64.6 kg (142 lb 6.4 oz)    Height: 1.626 m (5' 4\") 1.626 m (5' 4\")          Intake/Output Summary (Last 24 hours) at 4/13/2024 1144  Last data filed at 4/13/2024 1110  Gross per 24 hour   Intake 300 ml   Output 20 ml   Net 280 ml       Wt Readings from Last 3 Encounters:   04/13/24 64.6 kg (142 lb 6.4 oz)   11/30/23 67.6 kg (149 lb)   11/14/23 62.6 kg (138 lb)       GEN - in no distress  CV - regular, no murmur, no rub  Lung - clear bilaterally  Abd - soft, nontender  Ext - no edema    Recent Labs     04/13/24  0901   HGB 12.3        No results for input(s): \"NA\", \"K\", \"CL\", \"CO2\", \"BUN\", \"CREATININE\", \"CA\", \"GLU\", \"CALCIUM\", \"MG\", \"PHOS\", \"ALBUMIN\" in the last 72 hours.    Assessment / Plan:  Principal Problem:    Clotted renal dialysis AV graft (HCC)  Resolved Problems:    * No resolved hospital problems. *    1) ESRD - HD MWF at [facility]  - S/p declot of AVG today, missed HD yesterday  - POC K+ 5.2  - Will plan for HD treatment, Vascular will place admit under Obs order  - From out standpoint, patient can de discharged after HD    2) Hyperkalemia - mild, planning HD today

## 2024-04-13 NOTE — ANESTHESIA PRE PROCEDURE
Department of Anesthesiology  Preprocedure Note       Name:  Nataliia Pradhan   Age:  78 y.o.  :  1945                                          MRN:  [id number]         Date:  2024      Surgeon: Surgeon(s):  Nelson Longoria MD    Procedure: Procedure(s):  LEFT ARM DECLOT/TO FOLLOW    Medications prior to admission:   Prior to Admission medications    Medication Sig Start Date End Date Taking? Authorizing Provider   doxazosin (CARDURA) 4 MG tablet TAKE 1 TABLET BY MOUTH IN THE MORNING AND AT BEDTIME 23   Tommy Degroot MD   insulin glargine, 1 unit dial, (TOUJEO SOLOSTAR) 300 UNIT/ML concentrated injection pen Inject 10 Units into the skin every morning  Patient taking differently: Inject 14 Units into the skin every morning 10/4/23 10/4/24  Andrew Chung MD   bumetanide (BUMEX) 2 MG tablet Take 2 tablets by mouth 2 times daily 23   ProviderYou MD   Sodium Sulfate-Mag Sulfate-KCl 0367-466-707 MG TABS All clear liquids the day prior to procedure. Take Day 1 Dose 1 @ 5pm and Day 2 Dose 2 @ 8:30pm same day. 23   Sheldon Bustos MD   carvedilol (COREG) 25 MG tablet Take 1 tablet by mouth 2 times daily 23   Burt Mcrae MD   hydrALAZINE (APRESOLINE) 50 MG tablet Take 1 tablet by mouth 3 times daily 3/30/23   You Estrada MD   Carboxymethylcellul-Glycerin 0.5-0.9 % SOLN Apply to eye as needed    You Estrada MD   amLODIPine (NORVASC) 10 MG tablet Take 1 tablet by mouth daily TAKE 1 TABLET BY MOUTH DAILY  Patient taking differently: Take 1 tablet by mouth at bedtime TAKE 1 TABLET BY MOUTH DAILY 23   Burt Mcrae MD   levothyroxine (SYNTHROID) 75 MCG tablet Take 1 tablet by mouth Daily 23   Burt Mcrae MD   olmesartan (BENICAR) 40 MG tablet TAKE 1 TABLET BY MOUTH DAILY 23   Burt Mcrae MD   atorvastatin (LIPITOR) 40 MG tablet Take 1 tablet by mouth daily  Patient taking differently: Take 1 tablet by mouth at bedtime

## 2024-04-13 NOTE — OP NOTE
Operative Note      Patient: Nataliia Pradhan  YOB: 1945  MRN: [id number]    Date of Procedure: 4/13/2024    Pre-Op Diagnosis Codes:     * Clotted renal dialysis AV graft (HCC) [T82.868A]    Post-Op Diagnosis: Same       Procedure(s):  THROMBECTOMY REVISION LEFT ARM DIALYSIS ACCESS, ANGIOPLASTY STENTING    Surgeon(s):  Nelson Longoria MD    Assistant:   * No surgical staff found *    Anesthesia: General    Estimated Blood Loss (mL): less than 50     Complications: None    Specimens:   * No specimens in log *    Implants:  Implant Name Type Inv. Item Serial No.  Lot No. LRB No. Used Action   STENT EVAR L39MM DIA7-11MM CATH L80CM INTRO SHTH 7FR - [id number] Peripheral stents STENT EVAR L39MM DIA7-11MM CATH L80CM INTRO SHTH 7FR [id number] [employer]WD  Left 1 Implanted         Drains:   [REMOVED] Fistula (Removed)       Findings:  Infection Present At Time Of Surgery (PATOS) (choose all levels that have infection present):  No infection present  Other Findings:  clotted Av access left arm with long segment stenosis at mid fistula beyond graft to vein anastomosis 90% over 4 cm with immediate recoil after DCB 6mm requiring covered stent.  Severe stenosis outflow cephalic groove 90% over 3 cm long segment responded well to angioplasty    Detailed Description of Procedure:   Patient was brought to OR and given GET. Antibiotics were given. Time out was taken.  An incision was made over the arterial side of the access over the proximal fistula. The AVF was controlled with loops. Heparin was given. Vessels were clamped. Venotomy was made. Thrombectomy of the venous limb was done with #4 alexis catheter. Some backbleeding was noted. Angiogram showed the above findings. Athe proximal lesion was predilated with a 5mm balloon followed by 6x40 DCB balloon for 3 min.   Repeat angio showed the lesion to improve with < 30% stenosis. The cephalic groove lesion was treated with conquest 6x40

## 2024-04-13 NOTE — ANESTHESIA POSTPROCEDURE EVALUATION
Department of Anesthesiology  Postprocedure Note    Patient: Nataliia Pradhan  MRN: [id number]  YOB: 1945  Date of evaluation: 4/13/2024    Procedure Summary       Date: 04/13/24 Room / Location: [facility] MAIN OR 12 / [facility] MAIN OR    Anesthesia Start: 0952 Anesthesia Stop: 1135    Procedure: THROMBECTOMY REVISION LEFT ARM DIALYSIS ACCESS, ANGIOPLASTY STENTING (Left: Arm Upper) Diagnosis:       Clotted renal dialysis AV graft (HCC)      (Clotted renal dialysis AV graft (HCC) [T82.868A])    Providers: Nelson Longoria MD Responsible Provider: Deon Butt MD    Anesthesia Type: General ASA Status: 3 - Emergent            Anesthesia Type: General    Milton Phase I: Milton Score: 10    Milton Phase II:      Anesthesia Post Evaluation    Patient location during evaluation: PACU  Patient participation: complete - patient participated  Level of consciousness: awake  Airway patency: patent  Nausea & Vomiting: no nausea  Cardiovascular status: hemodynamically stable  Respiratory status: acceptable and nonlabored ventilation  Hydration status: stable  Multimodal analgesia pain management approach    No notable events documented.

## 2024-04-13 NOTE — DIALYSIS
TRANSFER OUT - DIALYSIS    Hemodialysis treatment completed, pt ran 2.5 hours without complications.     Patient a/ox3 and /69 , P 54       1 Kg removed.      Needles x2 removed from access and manual pressure held until hemostasis complete and pressure dressing applied.      Meds given: 0.    RBCs given during dialysis: 0    Patient to discharge after dialysis. IV removed.

## 2024-04-15 ENCOUNTER — CARE COORDINATION (OUTPATIENT)
Dept: CARE COORDINATION | Facility: CLINIC | Age: 79
End: 2024-04-15

## 2024-04-15 NOTE — CARE COORDINATION
Care Transitions Outreach Attempt    Call within 2 business days of discharge: Yes   CTN attempted to contact patient for transitions of care outreach after hospital discharge on 24. Unable to reach at this time. HIPPA compliant message left with contact information requesting a return call. Will try again later.     Patient: Nataliia Pradhan Patient : 1945 MRN: [id number]    Last Discharge Facility       Date Complaint Diagnosis Description Type Department Provider    24   Admission (Discharged) [facility] Nelson Longoria MD        Was this an external facility discharge? No Discharge Facility Name: [facility]    Noted following upcoming appointments from discharge chart review:   [facility] follow up appointment(s):   Future Appointments   Date Time Provider Department Center   2024  9:20 AM Burt Mcrae MD [facility] AMB   7/10/2024  1:20 PM Burt Mcrae MD \[facility]\"" [facility] AMB

## 2024-04-16 ENCOUNTER — CARE COORDINATION (OUTPATIENT)
Dept: CARE COORDINATION | Facility: CLINIC | Age: 79
End: 2024-04-16

## 2024-04-16 RX ORDER — OLMESARTAN MEDOXOMIL 40 MG/1
TABLET ORAL
Qty: 90 TABLET | Refills: 3 | Status: SHIPPED | OUTPATIENT
Start: 2024-04-16

## 2024-04-16 RX ORDER — ATORVASTATIN CALCIUM 40 MG/1
40 TABLET, FILM COATED ORAL NIGHTLY
Qty: 90 TABLET | Refills: 3 | Status: SHIPPED | OUTPATIENT
Start: 2024-04-16

## 2024-04-16 NOTE — TELEPHONE ENCOUNTER
Refill: Atorvastatin  Dosage: 40 mg  Freq: qd  To: [facility] on [address]    Refill:  Olmesartan  Dosage: 40 mg  Freq: qd  To: [facility] on [address]

## 2024-04-16 NOTE — CARE COORDINATION
Care Transitions Outreach Attempt  Call within 2 business days of discharge: Yes   Multiple attempts to reach patient for transitions of care follow up. Unable to reach patient. Program closed at this time.    Patient: Nataliia Pradhan Patient : 1945 MRN: [id number]    Last Discharge Facility       Date Complaint Diagnosis Description Type Department Provider    24   Admission (Discharged) [facility] Nelson Longoria MD        Was this an external facility discharge? No Discharge Facility Name: [facility]    Noted following upcoming appointments from discharge chart review:   [facility] follow up appointment(s):   Future Appointments   Date Time Provider Department Center   2024  9:20 AM Burt Mcrae MD [facility] AMB   7/10/2024  1:20 PM Burt Mcrae MD [facility] AMB

## 2024-06-26 RX ORDER — CARVEDILOL 25 MG/1
25 TABLET ORAL 2 TIMES DAILY
Qty: 180 TABLET | Refills: 3 | OUTPATIENT
Start: 2024-06-26

## 2024-07-24 ENCOUNTER — TELEPHONE (OUTPATIENT)
Dept: FAMILY MEDICINE CLINIC | Facility: CLINIC | Age: 79
End: 2024-07-24

## 2024-12-24 DIAGNOSIS — I10 PRIMARY HYPERTENSION: ICD-10-CM

## 2024-12-25 RX ORDER — DOXAZOSIN 4 MG/1
4 TABLET ORAL 2 TIMES DAILY
Qty: 180 TABLET | Refills: 3 | Status: SHIPPED | OUTPATIENT
Start: 2024-12-25

## 2025-03-12 ENCOUNTER — APPOINTMENT (OUTPATIENT)
Dept: CT IMAGING | Age: 80
End: 2025-03-12
Payer: MEDICARE

## 2025-03-12 ENCOUNTER — APPOINTMENT (OUTPATIENT)
Dept: GENERAL RADIOLOGY | Age: 80
End: 2025-03-12
Payer: MEDICARE

## 2025-03-12 ENCOUNTER — HOSPITAL ENCOUNTER (EMERGENCY)
Age: 80
Discharge: HOME | End: 2025-03-12
Attending: EMERGENCY MEDICINE
Payer: MEDICARE

## 2025-03-12 VITALS
TEMPERATURE: 98.6 F | HEART RATE: 72 BPM | WEIGHT: 145 LBS | OXYGEN SATURATION: 98 % | SYSTOLIC BLOOD PRESSURE: 163 MMHG | DIASTOLIC BLOOD PRESSURE: 76 MMHG | RESPIRATION RATE: 16 BRPM | BODY MASS INDEX: 24.75 KG/M2 | HEIGHT: 64 IN

## 2025-03-12 DIAGNOSIS — R53.1 GENERALIZED WEAKNESS: Primary | ICD-10-CM

## 2025-03-12 LAB
ALBUMIN SERPL-MCNC: 3.3 G/DL (ref 3.2–4.6)
ALBUMIN/GLOB SERPL: 0.8 (ref 1–1.9)
ALP SERPL-CCNC: 129 U/L (ref 35–104)
ALT SERPL-CCNC: 8 U/L (ref 8–45)
ANION GAP SERPL CALC-SCNC: 13 MMOL/L (ref 7–16)
AST SERPL-CCNC: 20 U/L (ref 15–37)
BASOPHILS # BLD: 0.04 K/UL (ref 0–0.2)
BASOPHILS NFR BLD: 0.8 % (ref 0–2)
BILIRUB SERPL-MCNC: 0.6 MG/DL (ref 0–1.2)
BUN SERPL-MCNC: 29 MG/DL (ref 8–23)
CALCIUM SERPL-MCNC: 9.4 MG/DL (ref 8.8–10.2)
CHLORIDE SERPL-SCNC: 94 MMOL/L (ref 98–107)
CO2 SERPL-SCNC: 29 MMOL/L (ref 20–29)
CREAT SERPL-MCNC: 5.03 MG/DL (ref 0.6–1.1)
DIFFERENTIAL METHOD BLD: ABNORMAL
EKG ATRIAL RATE: 64 BPM
EKG DIAGNOSIS: NORMAL
EKG P AXIS: 51 DEGREES
EKG P-R INTERVAL: 145 MS
EKG Q-T INTERVAL: 467 MS
EKG QRS DURATION: 97 MS
EKG QTC CALCULATION (BAZETT): 482 MS
EKG R AXIS: -54 DEGREES
EKG T AXIS: 48 DEGREES
EKG VENTRICULAR RATE: 64 BPM
EOSINOPHIL # BLD: 0.09 K/UL (ref 0–0.8)
EOSINOPHIL NFR BLD: 1.9 % (ref 0.5–7.8)
ERYTHROCYTE [DISTWIDTH] IN BLOOD BY AUTOMATED COUNT: 14.9 % (ref 11.9–14.6)
GLOBULIN SER CALC-MCNC: 4.4 G/DL (ref 2.3–3.5)
GLUCOSE SERPL-MCNC: 279 MG/DL (ref 70–99)
HCT VFR BLD AUTO: 39.5 % (ref 35.8–46.3)
HGB BLD-MCNC: 12.5 G/DL (ref 11.7–15.4)
IMM GRANULOCYTES # BLD AUTO: 0.01 K/UL (ref 0–0.5)
IMM GRANULOCYTES NFR BLD AUTO: 0.2 % (ref 0–5)
INR PPP: 0.9
LYMPHOCYTES # BLD: 0.96 K/UL (ref 0.5–4.6)
LYMPHOCYTES NFR BLD: 20 % (ref 13–44)
MCH RBC QN AUTO: 28.8 PG (ref 26.1–32.9)
MCHC RBC AUTO-ENTMCNC: 31.6 G/DL (ref 31.4–35)
MCV RBC AUTO: 91 FL (ref 82–102)
MONOCYTES # BLD: 0.28 K/UL (ref 0.1–1.3)
MONOCYTES NFR BLD: 5.8 % (ref 4–12)
NEUTS SEG # BLD: 3.42 K/UL (ref 1.7–8.2)
NEUTS SEG NFR BLD: 71.3 % (ref 43–78)
NRBC # BLD: 0 K/UL (ref 0–0.2)
PLATELET # BLD AUTO: 123 K/UL (ref 150–450)
PMV BLD AUTO: 11.1 FL (ref 9.4–12.3)
POTASSIUM SERPL-SCNC: 4.1 MMOL/L (ref 3.5–5.1)
PROT SERPL-MCNC: 7.7 G/DL (ref 6.3–8.2)
PROTHROMBIN TIME: 13.1 SEC (ref 11.3–14.9)
RBC # BLD AUTO: 4.34 M/UL (ref 4.05–5.2)
SODIUM SERPL-SCNC: 136 MMOL/L (ref 136–145)
TROPONIN T SERPL HS-MCNC: 128 NG/L (ref 0–14)
TROPONIN T SERPL HS-MCNC: 146 NG/L (ref 0–14)
WBC # BLD AUTO: 4.8 K/UL (ref 4.3–11.1)

## 2025-03-12 PROCEDURE — 96374 THER/PROPH/DIAG INJ IV PUSH: CPT

## 2025-03-12 PROCEDURE — 93005 ELECTROCARDIOGRAM TRACING: CPT | Performed by: EMERGENCY MEDICINE

## 2025-03-12 PROCEDURE — 6360000002 HC RX W HCPCS: Performed by: EMERGENCY MEDICINE

## 2025-03-12 PROCEDURE — 70450 CT HEAD/BRAIN W/O DYE: CPT

## 2025-03-12 PROCEDURE — 84484 ASSAY OF TROPONIN QUANT: CPT

## 2025-03-12 PROCEDURE — 93010 ELECTROCARDIOGRAM REPORT: CPT | Performed by: INTERNAL MEDICINE

## 2025-03-12 PROCEDURE — 80053 COMPREHEN METABOLIC PANEL: CPT

## 2025-03-12 PROCEDURE — 85610 PROTHROMBIN TIME: CPT

## 2025-03-12 PROCEDURE — 73562 X-RAY EXAM OF KNEE 3: CPT

## 2025-03-12 PROCEDURE — 99285 EMERGENCY DEPT VISIT HI MDM: CPT

## 2025-03-12 PROCEDURE — 85025 COMPLETE CBC W/AUTO DIFF WBC: CPT

## 2025-03-12 RX ORDER — HYDRALAZINE HYDROCHLORIDE 20 MG/ML
10 INJECTION INTRAMUSCULAR; INTRAVENOUS
Status: COMPLETED | OUTPATIENT
Start: 2025-03-12 | End: 2025-03-12

## 2025-03-12 RX ORDER — 0.9 % SODIUM CHLORIDE 0.9 %
1000 INTRAVENOUS SOLUTION INTRAVENOUS
Status: DISCONTINUED | OUTPATIENT
Start: 2025-03-12 | End: 2025-03-12

## 2025-03-12 RX ADMIN — HYDRALAZINE HYDROCHLORIDE 10 MG: 20 INJECTION INTRAMUSCULAR; INTRAVENOUS at 11:08

## 2025-03-12 ASSESSMENT — PAIN - FUNCTIONAL ASSESSMENT: PAIN_FUNCTIONAL_ASSESSMENT: NONE - DENIES PAIN

## 2025-03-12 NOTE — DISCHARGE INSTRUCTIONS
Your evaluation in the emergency department today is reassuring.  The CT scan your brain is reassuring.  I see no neurological findings concerning for stroke.  The x-ray of your knee does show some degenerative changes in your knee.  You can use Voltaren gel topically to help with pain as this should not hurt your kidneys.  In addition, you should follow-up with nephrology to see if they think that an MRI of your brain would be beneficial to further evaluate more subtle strokes though I do think this is less likely.  As we discussed, if you begin to develop slurred speech facial droop weakness in 1 extremity more than the other, you should return the emergency department immediately for reevaluation as these symptoms would be concerning for stroke

## 2025-03-12 NOTE — ED TRIAGE NOTES
Presents to the ED via POV accompanied bu her daughter. She was wheeled to triage they daughter advised that the people at dialysis informed her today that they thought the patient may have had a stroke 2 weeks ago. They were concerned about her leaning to the side. Everything tracks normally for her.  She is in therapy as well and they did not note or mention any physical decline. Pt is alert and oriented x4

## 2025-03-12 NOTE — ED PROVIDER NOTES
Emergency Department Provider Note       PCP: Melo Pope MD   Age: 79 y.o.   Sex: female     DISPOSITION Decision To Discharge 03/12/2025 03:14:54 PM    ICD-10-CM    1. Generalized weakness  R53.1               History     79-year-old female presenting to the emergency department with private vehicle from dialysis today.  Family states that today she seems slumped over to her left.  When they mention this to dialysis that the dialysis worker stated that this had been present for 2 weeks intermittently and that they thought she might of had a stroke about 2 weeks ago.  The family note that she has had no slurred speech, no facial asymmetry, no localized weakness in extremities.  She has not had any fevers cough or congestion denies chest pain or shortness of breath.  There are 2 family members present in the emergency department states that she seems to be at her baseline at this time.  Does not seem to be sloping.  They note that she has had some trouble walking recently because of pain in her left knee and that her doctor recently got her set up for physical therapy but that she has not had any x-rays of that knee done.    Physical Exam  Vitals and nursing note reviewed.   Constitutional:       General: She is not in acute distress.     Appearance: Normal appearance. She is normal weight. She is not ill-appearing or toxic-appearing.   HENT:      Head: Normocephalic and atraumatic.      Mouth/Throat:      Mouth: Mucous membranes are moist.   Eyes:      General: No visual field deficit.     Extraocular Movements: Extraocular movements intact.      Pupils: Pupils are equal, round, and reactive to light.   Cardiovascular:      Rate and Rhythm: Normal rate and regular rhythm.      Pulses: Normal pulses.      Heart sounds: Normal heart sounds.   Pulmonary:      Effort: Pulmonary effort is normal. No respiratory distress.   Abdominal:      General: There is no distension.      Palpations: Abdomen is soft.

## 2025-03-12 NOTE — ED NOTES
Patient mobility status  with mild difficulty.     I have reviewed discharge instructions with the patient and caregiver.  The patient and caregiver verbalized understanding.    Patient left ED via Discharge Method: wheelchair to Home with Child.    Opportunity for questions and clarification provided.     Patient given 0 scripts.           Laly Lieberman, RN  03/12/25 1867

## 2025-03-19 ENCOUNTER — PREP FOR PROCEDURE (OUTPATIENT)
Dept: VASCULAR SURGERY | Age: 80
End: 2025-03-19

## 2025-03-19 ENCOUNTER — HOSPITAL ENCOUNTER (OUTPATIENT)
Age: 80
Setting detail: OUTPATIENT SURGERY
Discharge: HOME | End: 2025-03-19
Attending: SURGERY | Admitting: SURGERY
Payer: MEDICARE

## 2025-03-19 ENCOUNTER — APPOINTMENT (OUTPATIENT)
Dept: INTERVENTIONAL RADIOLOGY/VASCULAR | Age: 80
End: 2025-03-19
Attending: SURGERY
Payer: MEDICARE

## 2025-03-19 ENCOUNTER — ANESTHESIA EVENT (OUTPATIENT)
Dept: SURGERY | Age: 80
End: 2025-03-19
Payer: MEDICARE

## 2025-03-19 ENCOUNTER — ANESTHESIA (OUTPATIENT)
Dept: SURGERY | Age: 80
End: 2025-03-19
Payer: MEDICARE

## 2025-03-19 ENCOUNTER — APPOINTMENT (OUTPATIENT)
Dept: GENERAL RADIOLOGY | Age: 80
End: 2025-03-19
Attending: SURGERY
Payer: MEDICARE

## 2025-03-19 VITALS
HEIGHT: 64 IN | DIASTOLIC BLOOD PRESSURE: 63 MMHG | RESPIRATION RATE: 15 BRPM | SYSTOLIC BLOOD PRESSURE: 138 MMHG | WEIGHT: 122 LBS | TEMPERATURE: 97.9 F | OXYGEN SATURATION: 96 % | BODY MASS INDEX: 20.83 KG/M2 | HEART RATE: 66 BPM

## 2025-03-19 LAB
GLUCOSE BLD STRIP.AUTO-MCNC: 195 MG/DL (ref 65–100)
GLUCOSE BLD STRIP.AUTO-MCNC: 228 MG/DL (ref 65–100)
POTASSIUM BLD-SCNC: 4.8 MMOL/L (ref 3.5–5.1)
SERVICE CMNT-IMP: ABNORMAL
SERVICE CMNT-IMP: ABNORMAL

## 2025-03-19 PROCEDURE — 6360000002 HC RX W HCPCS: Performed by: SURGERY

## 2025-03-19 PROCEDURE — 2580000003 HC RX 258: Performed by: NURSE ANESTHETIST, CERTIFIED REGISTERED

## 2025-03-19 PROCEDURE — 84132 ASSAY OF SERUM POTASSIUM: CPT

## 2025-03-19 PROCEDURE — C1757 CATH, THROMBECTOMY/EMBOLECT: HCPCS | Performed by: SURGERY

## 2025-03-19 PROCEDURE — 7100000001 HC PACU RECOVERY - ADDTL 15 MIN: Performed by: SURGERY

## 2025-03-19 PROCEDURE — 2500000003 HC RX 250 WO HCPCS: Performed by: SURGERY

## 2025-03-19 PROCEDURE — 99221 1ST HOSP IP/OBS SF/LOW 40: CPT | Performed by: SURGERY

## 2025-03-19 PROCEDURE — C1769 GUIDE WIRE: HCPCS | Performed by: SURGERY

## 2025-03-19 PROCEDURE — 82962 GLUCOSE BLOOD TEST: CPT

## 2025-03-19 PROCEDURE — 3700000001 HC ADD 15 MINUTES (ANESTHESIA): Performed by: SURGERY

## 2025-03-19 PROCEDURE — 3600000003 HC SURGERY LEVEL 3 BASE: Performed by: SURGERY

## 2025-03-19 PROCEDURE — C1725 CATH, TRANSLUMIN NON-LASER: HCPCS

## 2025-03-19 PROCEDURE — 2500000003 HC RX 250 WO HCPCS: Performed by: NURSE ANESTHETIST, CERTIFIED REGISTERED

## 2025-03-19 PROCEDURE — 6360000002 HC RX W HCPCS: Performed by: NURSE ANESTHETIST, CERTIFIED REGISTERED

## 2025-03-19 PROCEDURE — 7100000011 HC PHASE II RECOVERY - ADDTL 15 MIN: Performed by: SURGERY

## 2025-03-19 PROCEDURE — 7100000000 HC PACU RECOVERY - FIRST 15 MIN: Performed by: SURGERY

## 2025-03-19 PROCEDURE — 6360000004 HC RX CONTRAST MEDICATION: Performed by: SURGERY

## 2025-03-19 PROCEDURE — 3600000013 HC SURGERY LEVEL 3 ADDTL 15MIN: Performed by: SURGERY

## 2025-03-19 PROCEDURE — 3700000000 HC ANESTHESIA ATTENDED CARE: Performed by: SURGERY

## 2025-03-19 PROCEDURE — C1894 INTRO/SHEATH, NON-LASER: HCPCS | Performed by: SURGERY

## 2025-03-19 PROCEDURE — 2709999900 HC NON-CHARGEABLE SUPPLY: Performed by: SURGERY

## 2025-03-19 PROCEDURE — C1874 STENT, COATED/COV W/DEL SYS: HCPCS | Performed by: SURGERY

## 2025-03-19 PROCEDURE — 7100000010 HC PHASE II RECOVERY - FIRST 15 MIN: Performed by: SURGERY

## 2025-03-19 PROCEDURE — C1725 CATH, TRANSLUMIN NON-LASER: HCPCS | Performed by: SURGERY

## 2025-03-19 DEVICE — IMPLANTABLE DEVICE: Type: IMPLANTABLE DEVICE | Site: ARM | Status: FUNCTIONAL

## 2025-03-19 RX ORDER — PROPOFOL 10 MG/ML
INJECTION, EMULSION INTRAVENOUS
Status: DISCONTINUED | OUTPATIENT
Start: 2025-03-19 | End: 2025-03-19 | Stop reason: SDUPTHER

## 2025-03-19 RX ORDER — SODIUM CHLORIDE 9 MG/ML
INJECTION, SOLUTION INTRAVENOUS
Status: DISCONTINUED | OUTPATIENT
Start: 2025-03-19 | End: 2025-03-19 | Stop reason: SDUPTHER

## 2025-03-19 RX ORDER — LIDOCAINE HYDROCHLORIDE 20 MG/ML
INJECTION, SOLUTION EPIDURAL; INFILTRATION; INTRACAUDAL; PERINEURAL
Status: DISCONTINUED | OUTPATIENT
Start: 2025-03-19 | End: 2025-03-19 | Stop reason: SDUPTHER

## 2025-03-19 RX ORDER — IOPAMIDOL 612 MG/ML
INJECTION, SOLUTION INTRAVASCULAR PRN
Status: DISCONTINUED | OUTPATIENT
Start: 2025-03-19 | End: 2025-03-19 | Stop reason: ALTCHOICE

## 2025-03-19 RX ORDER — ONDANSETRON 2 MG/ML
INJECTION INTRAMUSCULAR; INTRAVENOUS
Status: DISCONTINUED | OUTPATIENT
Start: 2025-03-19 | End: 2025-03-19 | Stop reason: SDUPTHER

## 2025-03-19 RX ORDER — EPHEDRINE SULFATE 5 MG/ML
INJECTION INTRAVENOUS
Status: DISCONTINUED | OUTPATIENT
Start: 2025-03-19 | End: 2025-03-19 | Stop reason: SDUPTHER

## 2025-03-19 RX ORDER — FENTANYL CITRATE 50 UG/ML
INJECTION, SOLUTION INTRAMUSCULAR; INTRAVENOUS
Status: DISCONTINUED | OUTPATIENT
Start: 2025-03-19 | End: 2025-03-19 | Stop reason: SDUPTHER

## 2025-03-19 RX ORDER — BUPIVACAINE HYDROCHLORIDE 2.5 MG/ML
INJECTION, SOLUTION EPIDURAL; INFILTRATION; INTRACAUDAL; PERINEURAL PRN
Status: DISCONTINUED | OUTPATIENT
Start: 2025-03-19 | End: 2025-03-19 | Stop reason: ALTCHOICE

## 2025-03-19 RX ORDER — HEPARIN SODIUM 1000 [USP'U]/ML
INJECTION, SOLUTION INTRAVENOUS; SUBCUTANEOUS
Status: DISCONTINUED | OUTPATIENT
Start: 2025-03-19 | End: 2025-03-19 | Stop reason: SDUPTHER

## 2025-03-19 RX ADMIN — EPHEDRINE SULFATE 5 MG: 5 INJECTION INTRAVENOUS at 12:55

## 2025-03-19 RX ADMIN — SODIUM CHLORIDE: 9 INJECTION, SOLUTION INTRAVENOUS at 12:19

## 2025-03-19 RX ADMIN — Medication 2000 MG: at 12:41

## 2025-03-19 RX ADMIN — EPHEDRINE SULFATE 5 MG: 5 INJECTION INTRAVENOUS at 12:59

## 2025-03-19 RX ADMIN — PHENYLEPHRINE HYDROCHLORIDE 100 MCG: 10 INJECTION INTRAVENOUS at 12:57

## 2025-03-19 RX ADMIN — PHENYLEPHRINE HYDROCHLORIDE 100 MCG: 10 INJECTION INTRAVENOUS at 12:52

## 2025-03-19 RX ADMIN — EPHEDRINE SULFATE 5 MG: 5 INJECTION INTRAVENOUS at 12:52

## 2025-03-19 RX ADMIN — PHENYLEPHRINE HYDROCHLORIDE 150 MCG: 10 INJECTION INTRAVENOUS at 12:59

## 2025-03-19 RX ADMIN — HEPARIN SODIUM 5000 UNITS: 1000 INJECTION, SOLUTION INTRAVENOUS; SUBCUTANEOUS at 12:46

## 2025-03-19 RX ADMIN — LIDOCAINE HYDROCHLORIDE 100 MG: 20 INJECTION, SOLUTION EPIDURAL; INFILTRATION; INTRACAUDAL; PERINEURAL at 12:34

## 2025-03-19 RX ADMIN — ONDANSETRON 4 MG: 2 INJECTION INTRAMUSCULAR; INTRAVENOUS at 12:40

## 2025-03-19 RX ADMIN — FENTANYL CITRATE 25 MCG: 50 INJECTION, SOLUTION INTRAMUSCULAR; INTRAVENOUS at 12:43

## 2025-03-19 RX ADMIN — PROPOFOL 100 MG: 10 INJECTION, EMULSION INTRAVENOUS at 12:34

## 2025-03-19 RX ADMIN — EPHEDRINE SULFATE 10 MG: 5 INJECTION INTRAVENOUS at 12:57

## 2025-03-19 RX ADMIN — PHENYLEPHRINE HYDROCHLORIDE 100 MCG: 10 INJECTION INTRAVENOUS at 12:55

## 2025-03-19 ASSESSMENT — PAIN - FUNCTIONAL ASSESSMENT: PAIN_FUNCTIONAL_ASSESSMENT: 0-10

## 2025-03-19 ASSESSMENT — PAIN SCALES - GENERAL
PAINLEVEL_OUTOF10: 0
PAINLEVEL_OUTOF10: 0

## 2025-03-19 NOTE — PERIOP NOTE
Spoke with patient and family regarding preparation for procedure. Instructed to take carvedilol, doxazosin and hydralazine prior to coming to hospital with a small sip of water and otherwise NPO. Family states she will get here as soon as possible but it will likely be close to 1000. Luke at OR desk notified of patient arrival time.

## 2025-03-19 NOTE — ANESTHESIA POSTPROCEDURE EVALUATION
Department of Anesthesiology  Postprocedure Note    Patient: Nataliia Pradhan  MRN: [id number]  YOB: 1945  Date of evaluation: 3/19/2025    Procedure Summary       Date: 03/19/25 Room / Location: [facility] MAIN OR 09 / [facility] MAIN OR    Anesthesia Start: 1219 Anesthesia Stop: 1340    Procedure: LEFT ARM THROMBECTOMY (Left: Arm Upper) Diagnosis:       Acute thrombosis of arm, left      (Acute thrombosis of arm, left [I82.602])    Providers: Inder Rutledge MD Responsible Provider: Evangelina Martinez MD    Anesthesia Type: general ASA Status: 3            Anesthesia Type: No value filed.    Milton Phase I: Milton Score: 10    Milton Phase II:      Anesthesia Post Evaluation    Patient location during evaluation: PACU  Patient participation: complete - patient participated  Level of consciousness: awake and alert  Airway patency: patent  Nausea & Vomiting: no nausea  Cardiovascular status: hemodynamically stable  Respiratory status: acceptable  Hydration status: euvolemic  Comments: Family present for talk about checking BS at home and following up on medication ordered by endocrinologist.  Pain management: adequate and satisfactory to patient        No notable events documented.

## 2025-03-19 NOTE — OP NOTE
[address]  [address]                            OPERATIVE REPORT      PATIENT NAME: GARRY GRANDE           : 1945  MED REC NO: [id number]                       ROOM: [facility] NO: [id number]                       ADMIT DATE: 2025  PROVIDER: Inder Rutledge MD    DATE OF SERVICE:  2025    PREOPERATIVE DIAGNOSES:  Occluded left upper arm graft.    POSTOPERATIVE DIAGNOSES:  Occluded left upper arm graft.    PROCEDURES PERFORMED:       1. Left open thromboembolectomy of brachiocephalic loop graft.     2. Fistulogram with balloon angioplasty and stent of the cephalic subclavian junction with an 8 x 59 VBX stent.    SURGEON:  Inder Rutledge MD    ASSISTANT:      ANESTHESIA:  General.    ESTIMATED BLOOD LOSS:      SPECIMENS REMOVED:      INTRAOPERATIVE FINDINGS:       COMPLICATIONS:  Extravasation at the cephalic subclavian junction, repaired with covered stent    IMPLANTS:      INDICATIONS:      DESCRIPTION OF PROCEDURE:  After getting informed consent, the patient was brought to the operating room, anesthesia was then induced.  Preop antibiotics were given before skin incision.  The patient's left arm was then prepped and draped in normal sterile fashion.  Transverse incision was made in the middle portion of the graft, dissected down to the subcutaneous tissue where the graft was incorporated.  We got proximal and distal control with vessel loops and a Rumel tourniquet.  I gave 5000 of heparin.  With a #11 blade, we did arteriotomy, extended it out with #3 Eric catheter, got over the thrombus, however, was not able to get any backbleeding.  We put a #8 sheath and did a digital subtraction, which showed there was some narrowing at the distal portions of the Viabahn and the distal portion of cephalic, and also a tight grade stenosis at the cephalic subclavian junction.  I was able to get a Glidewire and a

## 2025-03-19 NOTE — ANESTHESIA PRE PROCEDURE
Take 2 tablets by mouth 2 times daily 10/21/22   Provider, MD Yuo   calcitRIOL (ROCALTROL) 0.25 MCG capsule Take 1 capsule by mouth daily 10/21/22   ProviderYou MD   Lancsaray MISC Check BS BID 12/5/14   Automatic Reconciliation, Ar       Current medications:    Current Facility-Administered Medications   Medication Dose Route Frequency Provider Last Rate Last Admin    ceFAZolin (ANCEF) 2000 mg in sterile water 20 mL IV syringe  2,000 mg IntraVENous On Call to OR Inder Rutledge MD           Allergies:    Allergies   Allergen Reactions    Prednisone Other (See Comments)     Elevated blood sugar        Problem List:    Patient Active Problem List   Diagnosis Code    Hyperlipemia E78.5    Foot pain M79.673    Type 2 diabetes mellitus with nephropathy (HCC) E11.21    Pre-ulcerative corn or callous L84    HTN (hypertension) I10    Cardiac murmur R01.1    Bilateral leg edema R60.0    CKD (chronic kidney disease) stage 4, GFR 15-29 ml/min ([facility]) N18.4    Hypothyroidism E03.9    Abnormal EKG R94.31    ESRD (end stage renal disease) ([facility]) N18.6    PVD (peripheral vascular disease) I73.9    Coagulation defect D68.9    ESRD needing dialysis ([facility]) N18.6, Z99.2    Volume overload E87.70    Anasarca associated with disorder of kidney N04.9    Liver cirrhosis secondary to SALES ([facility]) K75.81, K74.60    Mechanical complication of arteriovenous fistula surgically created T82.590A    Mechanical complication of dialysis catheter T82.49XA    ESRD (end stage renal disease) on dialysis ([facility]) N18.6, Z99.2    Clotted renal dialysis AV graft T82.868A    Acute hyperkalemia E87.5       Past Medical History:        Diagnosis Date    Anemia     s/p iron infusions (last 6/5/23)    DM (diabetes mellitus) ([facility]) 02/19/2014    Toujeo-am -160; A1C: 7.0 per pt    End stage kidney disease ([facility])     [facility] M,W, F    HTN (hypertension) 02/19/2014    Hyperlipemia 02/19/2014    Hyperparathyroidism 08/26/2014    Thyroid

## 2025-03-19 NOTE — BRIEF OP NOTE
[address] [address]  [phone] FAX: [phone]    Brief Op Note Template Note    Pre-Op Diagnosis: Acute thrombosis of arm, left [I82.602]    Post-Op Diagnosis:  * No post-op diagnosis entered *    Procedures: Procedure(s):  LEFT ARM THROMBECTOMY    Surgeon: INDER RUTLEDGE MD    Assistants: Surgeon(s):  Inder Rutledge MD      Anesthesia:  General     Findings: Tight stenosis at the cephalic subclavian junction balloon angioplasty with a 8 x 59    Tourniquet Time:  * No tourniquets in log *    Estimated Blood Loss:               Specimens:             Implants:    Implant Name Type Inv. Item Serial No.  Lot No. LRB No. Used Action   STENT ENDOPROSTHESIS L 59 MM SHERIN 8 MM CATH L 80 CM - [id number]  STENT ENDOPROSTHESIS L 59 MM SHERIN 8 MM CATH L 80 CM [id number] [employer]  Left 1 Implanted       Complications: None               Signed: INDER RUTLEDGE MD      Elements of this note have been dictated using speech recognition software. As a result, errors of speech recognition may have occurred.

## 2025-03-19 NOTE — PROGRESS NOTES
Status post left arm declot with fistulogram.  Patient has some extravasation after ballooning her cephalic subclavian juncture.  This was repaired with a 8 x 59 VBX stent.  However the middle portion of that was not able to be fully opposed to the outer wall secondary to most likely the clavicle  The graft is working and notify the clinic.    Inder Rutledge

## 2025-03-24 ENCOUNTER — TELEPHONE (OUTPATIENT)
Dept: VASCULAR SURGERY | Age: 80
End: 2025-03-24

## 2025-03-24 ENCOUNTER — HOSPITAL ENCOUNTER (OUTPATIENT)
Dept: INTERVENTIONAL RADIOLOGY/VASCULAR | Age: 80
Discharge: HOME | End: 2025-03-26
Attending: SURGERY
Payer: MEDICARE

## 2025-03-24 ENCOUNTER — TRANSCRIBE ORDERS (OUTPATIENT)
Dept: INTERVENTIONAL RADIOLOGY/VASCULAR | Age: 80
End: 2025-03-24

## 2025-03-24 VITALS
OXYGEN SATURATION: 100 % | BODY MASS INDEX: 20.83 KG/M2 | HEART RATE: 70 BPM | HEIGHT: 64 IN | DIASTOLIC BLOOD PRESSURE: 64 MMHG | TEMPERATURE: 98.7 F | SYSTOLIC BLOOD PRESSURE: 132 MMHG | WEIGHT: 122 LBS | RESPIRATION RATE: 18 BRPM

## 2025-03-24 DIAGNOSIS — N18.6 ESRD (END STAGE RENAL DISEASE) (HCC): Primary | ICD-10-CM

## 2025-03-24 DIAGNOSIS — N18.6 ESRD (END STAGE RENAL DISEASE) (HCC): ICD-10-CM

## 2025-03-24 PROCEDURE — 6360000002 HC RX W HCPCS: Performed by: RADIOLOGY

## 2025-03-24 PROCEDURE — 76937 US GUIDE VASCULAR ACCESS: CPT | Performed by: RADIOLOGY

## 2025-03-24 PROCEDURE — 36558 INSERT TUNNELED CV CATH: CPT | Performed by: RADIOLOGY

## 2025-03-24 PROCEDURE — 99152 MOD SED SAME PHYS/QHP 5/>YRS: CPT | Performed by: RADIOLOGY

## 2025-03-24 PROCEDURE — 77001 FLUOROGUIDE FOR VEIN DEVICE: CPT

## 2025-03-24 PROCEDURE — 77001 FLUOROGUIDE FOR VEIN DEVICE: CPT | Performed by: RADIOLOGY

## 2025-03-24 RX ORDER — LIDOCAINE HYDROCHLORIDE 10 MG/ML
INJECTION, SOLUTION INFILTRATION; PERINEURAL PRN
Status: COMPLETED | OUTPATIENT
Start: 2025-03-24 | End: 2025-03-24

## 2025-03-24 RX ORDER — FENTANYL CITRATE 50 UG/ML
INJECTION, SOLUTION INTRAMUSCULAR; INTRAVENOUS PRN
Status: COMPLETED | OUTPATIENT
Start: 2025-03-24 | End: 2025-03-24

## 2025-03-24 RX ADMIN — LIDOCAINE HYDROCHLORIDE 10 ML: 10 INJECTION, SOLUTION INFILTRATION; PERINEURAL at 10:00

## 2025-03-24 RX ADMIN — FENTANYL CITRATE 25 MCG: 50 INJECTION, SOLUTION INTRAMUSCULAR; INTRAVENOUS at 09:53

## 2025-03-24 NOTE — OR NURSING
Recovery period without difficulty. Pt alert and oriented and denies pain. Dressing is clean, dry, and intact. Reviewed discharge instructions with patient, daughter and son, both verbalized understanding. Pt escorted to lobby discharge area via wheelchair. Vital signs and Milton score completed.

## 2025-03-24 NOTE — DISCHARGE INSTRUCTIONS
[facility]     Department of Interventional Radiology     [facility]    [phone] Office     [phone] Fax       Tunneled or Non Tunneled Catheter Discharge Instructions           General Information:      A catheter, either tunneled (permanent) or non-tunneled (temporary) catheter was inserted into your neck today for the purpose of Cancer treatment (aphresis) or dialysis.  Your catheter will be used for the length of your aphresis, or until you get a fistula placed in surgery for dialysis.  After this time, your catheter may be removed.  You may return to our department for the catheter removal.  A non-tunneled catheter will exit at the neck.  There will be three ports, two of which will be used for dialysis or aphresis.  The one smaller port in the middle can be used like a regular IV.  It ideally is used only for about two weeks.  A tunneled catheter will exit lower down on the chest wall, and can be used for a longer period of time.  There is no IV port on these.  The tunneled catheter is used only for dialysis.  In case of emergencies only can drugs be given through these catheters and only with written permission from your doctor.           Home Care Instructions:      You can resume your regular diet. Do not drink alcohol, drive, or make any important legal decisions in the next 24 hours.  Watch the site carefully for signs of infection, like fever, drainage, redness, and/or swelling.  Your catheter should be \"packed\" with heparin after each use or at least once a week if it is not being used.  This \"packing\" should only be done by nurses experienced with caring for this type of catheter.  You may shower in 24 hours, but you need to cover the catheter with plastic wrap and tape to keep it dry while you are showering.  Keep the site clean, dry, and protected.  Do not immerse yourself in water like in the case of tub baths or swimming as long as you

## 2025-03-24 NOTE — OR NURSING
.TRANSFER - OUT REPORT:           Verbal report given to ANGIE Jane on Nataliia Pradhan  being transferred to IR Recovery for routine post-op      Report consisted of patient’s Situation, Background, Assessment and Recommendations(SBAR).          Information from the following report(s) SBAR, Procedure Summary, and MAR was reviewed with the receiving nurse.       Opportunity for questions and clarification was provided.          Conscious Sedation:    25 Mcg of Fentanyl administered   0 Mg of Versed administered   0 Mg of Benadryl administered        Pt tolerated procedure well.      Hemodialysis Catheter:       transparent film and Other: CHG clean, dry, intact, and nontender    VITALS:  /64   Pulse 70   Temp 98.7 °F (37.1 °C) (Oral)   Resp 18   Ht 1.626 m (5' 4.02\")   Wt 55.3 kg (122 lb)   SpO2 100%   BMI 20.93 kg/m²

## 2025-03-24 NOTE — BRIEF OP NOTE
[facility]  Department of Interventional Radiology  [phone]        Interventional Radiology Brief Procedure Note    Patient: Nataliia Pradhan MRN: [id number]  SSN: [id number]    YOB: 1945  Age: 79 y.o.  Sex: female      Date of Procedure: 3/24/2025    Pre-Procedure Diagnosis: ESRD    Post-Procedure Diagnosis: SAME    Procedure(s): Tunneled Central Venous Catheter       Performed By: Jass Kirkland MD     Assistants: None    Anesthesia:Lidocaine    Estimated Blood Loss: None    Specimens:  None    Implants:  Tunneled Hemodialysis Catheter       Complications: None    Recommendations: may use catheter     Follow Up: with Dialysis    Signed By: Jass Kirkland MD     March 24, 2025

## 2025-03-24 NOTE — TELEPHONE ENCOUNTER
Katalina clayton/Lyla inquiring on what transpired with the pt on and after Fri. 3/21. Dr. Rutledge called me asking what was going on with the dialysis ctr not knowing that he has recommended a catheter. I stated, I had spoken to Mikala originally, but when I tried calling back after getting his recommendation, there was no answer at the center. I called three times and left a voicemail once.    This morning, Mon. 3/24/25, I called the dialysis ctr to follow-up and was able to katalina Villalpando. She wasn't aware of the situation, but Osbaldo, stated she was aware. She confirmed the pt is getting the tunneled catheter today and she will be assessed for a new access. Lyla asked if I could go ahead and schedule the pt for a vein mapping on the right arm. I stated I will find a date and call her back and fax or email it to her as well.

## 2025-04-17 ENCOUNTER — APPOINTMENT (OUTPATIENT)
Dept: GENERAL RADIOLOGY | Age: 80
DRG: 100 | End: 2025-04-17
Payer: MEDICARE

## 2025-04-17 ENCOUNTER — HOSPITAL ENCOUNTER (INPATIENT)
Age: 80
LOS: 25 days | Discharge: NURSING, REHAB OR LONG-TERM CARE | DRG: 100 | End: 2025-05-12
Attending: EMERGENCY MEDICINE | Admitting: HOSPITALIST
Payer: MEDICARE

## 2025-04-17 ENCOUNTER — APPOINTMENT (OUTPATIENT)
Dept: CT IMAGING | Age: 80
DRG: 100 | End: 2025-04-17
Payer: MEDICARE

## 2025-04-17 DIAGNOSIS — R56.9 SEIZURE-LIKE ACTIVITY (HCC): ICD-10-CM

## 2025-04-17 DIAGNOSIS — R41.82 ALTERED MENTAL STATUS, UNSPECIFIED ALTERED MENTAL STATUS TYPE: ICD-10-CM

## 2025-04-17 DIAGNOSIS — N18.6 ESRD ON HEMODIALYSIS (HCC): ICD-10-CM

## 2025-04-17 DIAGNOSIS — Z99.2 ESRD ON HEMODIALYSIS (HCC): ICD-10-CM

## 2025-04-17 DIAGNOSIS — D69.6 THROMBOCYTOPENIA: ICD-10-CM

## 2025-04-17 DIAGNOSIS — I10 HTN (HYPERTENSION), MALIGNANT: ICD-10-CM

## 2025-04-17 DIAGNOSIS — G40.901 STATUS EPILEPTICUS (HCC): Primary | ICD-10-CM

## 2025-04-17 LAB
ALBUMIN SERPL-MCNC: 2.9 G/DL (ref 3.2–4.6)
ALBUMIN/GLOB SERPL: 0.8 (ref 1–1.9)
ALP SERPL-CCNC: 140 U/L (ref 35–104)
ALT SERPL-CCNC: 9 U/L (ref 8–45)
AMPHET UR QL SCN: NEGATIVE
ANION GAP BLD CALC-SCNC: ABNORMAL MMOL/L
ANION GAP SERPL CALC-SCNC: 16 MMOL/L (ref 7–16)
ARTERIAL PATENCY WRIST A: POSITIVE
AST SERPL-CCNC: 34 U/L (ref 15–37)
BARBITURATES UR QL SCN: NEGATIVE
BASE EXCESS BLD CALC-SCNC: 4.3 MMOL/L
BASOPHILS # BLD: 0.02 K/UL (ref 0–0.2)
BASOPHILS NFR BLD: 0.4 % (ref 0–2)
BDY SITE: ABNORMAL
BENZODIAZ UR QL: POSITIVE
BILIRUB SERPL-MCNC: 0.6 MG/DL (ref 0–1.2)
BUN SERPL-MCNC: 20 MG/DL (ref 8–23)
CA-I BLD-MCNC: 1.11 MMOL/L (ref 1.12–1.32)
CALCIUM SERPL-MCNC: 8.8 MG/DL (ref 8.8–10.2)
CANNABINOIDS UR QL SCN: NEGATIVE
CHLORIDE SERPL-SCNC: 101 MMOL/L (ref 98–107)
CO2 BLD-SCNC: 26 MMOL/L (ref 13–23)
CO2 SERPL-SCNC: 23 MMOL/L (ref 20–29)
COCAINE UR QL SCN: NEGATIVE
CREAT SERPL-MCNC: 4.82 MG/DL (ref 0.6–1.1)
DIFFERENTIAL METHOD BLD: ABNORMAL
EOSINOPHIL # BLD: 0.16 K/UL (ref 0–0.8)
EOSINOPHIL NFR BLD: 2.9 % (ref 0.5–7.8)
ERYTHROCYTE [DISTWIDTH] IN BLOOD BY AUTOMATED COUNT: 14.4 % (ref 11.9–14.6)
ETHANOL SERPL-MCNC: <11 MG/DL (ref 0–0.08)
FIO2 ON VENT: 60 %
GAS FLOW.O2 O2 DELIVERY SYS: ABNORMAL
GLOBULIN SER CALC-MCNC: 3.5 G/DL (ref 2.3–3.5)
GLUCOSE BLD STRIP.AUTO-MCNC: 155 MG/DL (ref 65–100)
GLUCOSE SERPL-MCNC: 146 MG/DL (ref 70–99)
HCO3 BLD-SCNC: 27.3 MMOL/L (ref 21–28)
HCT VFR BLD AUTO: 34.7 % (ref 35.8–46.3)
HGB BLD-MCNC: 10.9 G/DL (ref 11.7–15.4)
IMM GRANULOCYTES # BLD AUTO: 0.02 K/UL (ref 0–0.5)
IMM GRANULOCYTES NFR BLD AUTO: 0.4 % (ref 0–5)
INR PPP: 1.1
IPAP/PIP: 25
LYMPHOCYTES # BLD: 0.73 K/UL (ref 0.5–4.6)
LYMPHOCYTES NFR BLD: 13.2 % (ref 13–44)
MCH RBC QN AUTO: 29.3 PG (ref 26.1–32.9)
MCHC RBC AUTO-ENTMCNC: 31.4 G/DL (ref 31.4–35)
MCV RBC AUTO: 93.3 FL (ref 82–102)
METHADONE UR QL: NEGATIVE
MONOCYTES # BLD: 0.4 K/UL (ref 0.1–1.3)
MONOCYTES NFR BLD: 7.2 % (ref 4–12)
NEUTS SEG # BLD: 4.22 K/UL (ref 1.7–8.2)
NEUTS SEG NFR BLD: 75.9 % (ref 43–78)
NRBC # BLD: 0 K/UL (ref 0–0.2)
OPIATES UR QL: NEGATIVE
PCO2 BLD: 34.4 MMHG (ref 35–45)
PCP UR QL: NEGATIVE
PEEP RESPIRATORY: 8
PH BLD: 7.51 (ref 7.35–7.45)
PLATELET # BLD AUTO: 99 K/UL (ref 150–450)
PMV BLD AUTO: 10.9 FL (ref 9.4–12.3)
PO2 BLD: 268 MMHG (ref 75–100)
POTASSIUM BLD-SCNC: 3.6 MMOL/L (ref 3.5–5.1)
POTASSIUM SERPL-SCNC: 4.5 MMOL/L (ref 3.5–5.1)
PROT SERPL-MCNC: 6.3 G/DL (ref 6.3–8.2)
PROTHROMBIN TIME: 14.2 SEC (ref 11.3–14.9)
RBC # BLD AUTO: 3.72 M/UL (ref 4.05–5.2)
RESPIRATORY RATE, POC: 16 (ref 5–40)
RESPIRATORY RATE: 16
SAO2 % BLD: 100 %
SERVICE CMNT-IMP: ABNORMAL
SODIUM BLD-SCNC: 140 MMOL/L (ref 136–145)
SODIUM SERPL-SCNC: 139 MMOL/L (ref 136–145)
SPECIMEN SITE: ABNORMAL
TROPONIN T SERPL HS-MCNC: 236 NG/L (ref 0–14)
TROPONIN T SERPL HS-MCNC: 294 NG/L (ref 0–14)
VENTILATION MODE VENT: ABNORMAL
VT SETTING VENT: 400
WBC # BLD AUTO: 5.6 K/UL (ref 4.3–11.1)

## 2025-04-17 PROCEDURE — 2000000000 HC ICU R&B

## 2025-04-17 PROCEDURE — 70450 CT HEAD/BRAIN W/O DYE: CPT

## 2025-04-17 PROCEDURE — 82962 GLUCOSE BLOOD TEST: CPT

## 2025-04-17 PROCEDURE — 80307 DRUG TEST PRSMV CHEM ANLYZR: CPT

## 2025-04-17 PROCEDURE — 70496 CT ANGIOGRAPHY HEAD: CPT

## 2025-04-17 PROCEDURE — 82077 ASSAY SPEC XCP UR&BREATH IA: CPT

## 2025-04-17 PROCEDURE — 6360000004 HC RX CONTRAST MEDICATION: Performed by: EMERGENCY MEDICINE

## 2025-04-17 PROCEDURE — 82330 ASSAY OF CALCIUM: CPT

## 2025-04-17 PROCEDURE — 80053 COMPREHEN METABOLIC PANEL: CPT

## 2025-04-17 PROCEDURE — 6360000002 HC RX W HCPCS: Performed by: EMERGENCY MEDICINE

## 2025-04-17 PROCEDURE — 51702 INSERT TEMP BLADDER CATH: CPT

## 2025-04-17 PROCEDURE — 5A1955Z RESPIRATORY VENTILATION, GREATER THAN 96 CONSECUTIVE HOURS: ICD-10-PCS

## 2025-04-17 PROCEDURE — 84132 ASSAY OF SERUM POTASSIUM: CPT

## 2025-04-17 PROCEDURE — 84484 ASSAY OF TROPONIN QUANT: CPT

## 2025-04-17 PROCEDURE — 94002 VENT MGMT INPAT INIT DAY: CPT

## 2025-04-17 PROCEDURE — 99291 CRITICAL CARE FIRST HOUR: CPT

## 2025-04-17 PROCEDURE — 82947 ASSAY GLUCOSE BLOOD QUANT: CPT

## 2025-04-17 PROCEDURE — 99223 1ST HOSP IP/OBS HIGH 75: CPT

## 2025-04-17 PROCEDURE — 2580000003 HC RX 258

## 2025-04-17 PROCEDURE — 82803 BLOOD GASES ANY COMBINATION: CPT

## 2025-04-17 PROCEDURE — 84295 ASSAY OF SERUM SODIUM: CPT

## 2025-04-17 PROCEDURE — 85610 PROTHROMBIN TIME: CPT

## 2025-04-17 PROCEDURE — 2700000000 HC OXYGEN THERAPY PER DAY

## 2025-04-17 PROCEDURE — 85025 COMPLETE CBC W/AUTO DIFF WBC: CPT

## 2025-04-17 PROCEDURE — 93005 ELECTROCARDIOGRAM TRACING: CPT | Performed by: EMERGENCY MEDICINE

## 2025-04-17 PROCEDURE — 6360000002 HC RX W HCPCS

## 2025-04-17 PROCEDURE — 2500000003 HC RX 250 WO HCPCS

## 2025-04-17 PROCEDURE — 94761 N-INVAS EAR/PLS OXIMETRY MLT: CPT

## 2025-04-17 PROCEDURE — 71045 X-RAY EXAM CHEST 1 VIEW: CPT

## 2025-04-17 PROCEDURE — 31500 INSERT EMERGENCY AIRWAY: CPT

## 2025-04-17 RX ORDER — ONDANSETRON 2 MG/ML
4 INJECTION INTRAMUSCULAR; INTRAVENOUS EVERY 6 HOURS PRN
Status: DISCONTINUED | OUTPATIENT
Start: 2025-04-17 | End: 2025-05-12 | Stop reason: HOSPADM

## 2025-04-17 RX ORDER — ONDANSETRON 4 MG/1
4 TABLET, ORALLY DISINTEGRATING ORAL EVERY 8 HOURS PRN
Status: DISCONTINUED | OUTPATIENT
Start: 2025-04-17 | End: 2025-05-12 | Stop reason: HOSPADM

## 2025-04-17 RX ORDER — ENOXAPARIN SODIUM 100 MG/ML
40 INJECTION SUBCUTANEOUS DAILY
Status: DISCONTINUED | OUTPATIENT
Start: 2025-04-18 | End: 2025-04-18 | Stop reason: ALTCHOICE

## 2025-04-17 RX ORDER — ACETAMINOPHEN 325 MG/1
650 TABLET ORAL EVERY 6 HOURS PRN
Status: DISCONTINUED | OUTPATIENT
Start: 2025-04-17 | End: 2025-04-28

## 2025-04-17 RX ORDER — LEVETIRACETAM 500 MG/5ML
1500 INJECTION, SOLUTION, CONCENTRATE INTRAVENOUS ONCE
Status: COMPLETED | OUTPATIENT
Start: 2025-04-17 | End: 2025-04-17

## 2025-04-17 RX ORDER — MAGNESIUM SULFATE IN WATER 40 MG/ML
2000 INJECTION, SOLUTION INTRAVENOUS PRN
Status: DISCONTINUED | OUTPATIENT
Start: 2025-04-17 | End: 2025-05-12 | Stop reason: HOSPADM

## 2025-04-17 RX ORDER — POTASSIUM CHLORIDE 29.8 MG/ML
20 INJECTION INTRAVENOUS PRN
Status: DISCONTINUED | OUTPATIENT
Start: 2025-04-17 | End: 2025-05-12 | Stop reason: HOSPADM

## 2025-04-17 RX ORDER — SODIUM CHLORIDE 9 MG/ML
INJECTION, SOLUTION INTRAVENOUS PRN
Status: DISCONTINUED | OUTPATIENT
Start: 2025-04-17 | End: 2025-05-12 | Stop reason: HOSPADM

## 2025-04-17 RX ORDER — SODIUM CHLORIDE 9 MG/ML
INJECTION, SOLUTION INTRAVENOUS CONTINUOUS
Status: DISCONTINUED | OUTPATIENT
Start: 2025-04-17 | End: 2025-04-21

## 2025-04-17 RX ORDER — POTASSIUM CHLORIDE 7.45 MG/ML
10 INJECTION INTRAVENOUS PRN
Status: DISCONTINUED | OUTPATIENT
Start: 2025-04-17 | End: 2025-05-12 | Stop reason: HOSPADM

## 2025-04-17 RX ORDER — POLYETHYLENE GLYCOL 3350 17 G/17G
17 POWDER, FOR SOLUTION ORAL DAILY PRN
Status: DISCONTINUED | OUTPATIENT
Start: 2025-04-17 | End: 2025-05-12 | Stop reason: HOSPADM

## 2025-04-17 RX ORDER — ACETAMINOPHEN 650 MG/1
650 SUPPOSITORY RECTAL EVERY 6 HOURS PRN
Status: DISCONTINUED | OUTPATIENT
Start: 2025-04-17 | End: 2025-04-28

## 2025-04-17 RX ORDER — IOPAMIDOL 755 MG/ML
50 INJECTION, SOLUTION INTRAVASCULAR
Status: COMPLETED | OUTPATIENT
Start: 2025-04-17 | End: 2025-04-17

## 2025-04-17 RX ORDER — PROPOFOL 10 MG/ML
5-50 INJECTION, EMULSION INTRAVENOUS CONTINUOUS
Status: DISCONTINUED | OUTPATIENT
Start: 2025-04-17 | End: 2025-04-18

## 2025-04-17 RX ORDER — PROPOFOL 10 MG/ML
5-50 INJECTION, EMULSION INTRAVENOUS
Status: COMPLETED | OUTPATIENT
Start: 2025-04-17 | End: 2025-04-17

## 2025-04-17 RX ORDER — PROPOFOL 10 MG/ML
INJECTION, EMULSION INTRAVENOUS
Status: COMPLETED
Start: 2025-04-17 | End: 2025-04-17

## 2025-04-17 RX ORDER — SODIUM CHLORIDE 0.9 % (FLUSH) 0.9 %
5-40 SYRINGE (ML) INJECTION PRN
Status: DISCONTINUED | OUTPATIENT
Start: 2025-04-17 | End: 2025-05-12 | Stop reason: HOSPADM

## 2025-04-17 RX ORDER — SODIUM CHLORIDE 0.9 % (FLUSH) 0.9 %
5-40 SYRINGE (ML) INJECTION EVERY 12 HOURS SCHEDULED
Status: DISCONTINUED | OUTPATIENT
Start: 2025-04-17 | End: 2025-05-12 | Stop reason: HOSPADM

## 2025-04-17 RX ADMIN — SODIUM CHLORIDE, PRESERVATIVE FREE 10 ML: 5 INJECTION INTRAVENOUS at 21:39

## 2025-04-17 RX ADMIN — SODIUM CHLORIDE: 9 INJECTION, SOLUTION INTRAVENOUS at 21:45

## 2025-04-17 RX ADMIN — IOPAMIDOL 50 ML: 755 INJECTION, SOLUTION INTRAVENOUS at 21:05

## 2025-04-17 RX ADMIN — LEVETIRACETAM 1500 MG: 100 INJECTION INTRAVENOUS at 21:39

## 2025-04-17 RX ADMIN — PROPOFOL 20 MCG/KG/MIN: 10 INJECTION, EMULSION INTRAVENOUS at 20:51

## 2025-04-17 ASSESSMENT — PULMONARY FUNCTION TESTS
PIF_VALUE: 25
PIF_VALUE: 22

## 2025-04-17 ASSESSMENT — LIFESTYLE VARIABLES
HOW MANY STANDARD DRINKS CONTAINING ALCOHOL DO YOU HAVE ON A TYPICAL DAY: PATIENT UNABLE TO ANSWER
HOW OFTEN DO YOU HAVE A DRINK CONTAINING ALCOHOL: PATIENT UNABLE TO ANSWER

## 2025-04-17 ASSESSMENT — PAIN - FUNCTIONAL ASSESSMENT: PAIN_FUNCTIONAL_ASSESSMENT: ADULT NONVERBAL PAIN SCALE (NPVS)

## 2025-04-17 ASSESSMENT — PAIN SCALES - GENERAL: PAINLEVEL_OUTOF10: 0

## 2025-04-18 ENCOUNTER — APPOINTMENT (OUTPATIENT)
Dept: GENERAL RADIOLOGY | Age: 80
DRG: 100 | End: 2025-04-18
Payer: MEDICARE

## 2025-04-18 LAB
ANION GAP SERPL CALC-SCNC: 18 MMOL/L (ref 7–16)
APPEARANCE UR: ABNORMAL
ARTERIAL PATENCY WRIST A: POSITIVE
B PERT DNA SPEC QL NAA+PROBE: NOT DETECTED
BACTERIA URNS QL MICRO: ABNORMAL /HPF
BASE DEFICIT BLD-SCNC: 1.7 MMOL/L
BASOPHILS # BLD: 0.04 K/UL (ref 0–0.2)
BASOPHILS NFR BLD: 0.4 % (ref 0–2)
BDY SITE: ABNORMAL
BILIRUB UR QL: NEGATIVE
BORDETELLA PARAPERTUSSIS BY PCR: NOT DETECTED
BUN SERPL-MCNC: 23 MG/DL (ref 8–23)
C PNEUM DNA SPEC QL NAA+PROBE: NOT DETECTED
CALCIUM SERPL-MCNC: 8.8 MG/DL (ref 8.8–10.2)
CASTS URNS QL MICRO: 0 /LPF
CHLORIDE SERPL-SCNC: 100 MMOL/L (ref 98–107)
CK SERPL-CCNC: 137 U/L (ref 21–215)
CO2 SERPL-SCNC: 20 MMOL/L (ref 20–29)
COLOR UR: ABNORMAL
CREAT SERPL-MCNC: 4.96 MG/DL (ref 0.6–1.1)
CRP SERPL-MCNC: 1.4 MG/DL (ref 0–0.4)
CRYSTALS URNS QL MICRO: 0 /LPF
DIFFERENTIAL METHOD BLD: ABNORMAL
EKG ATRIAL RATE: 72 BPM
EKG DIAGNOSIS: NORMAL
EKG P AXIS: 26 DEGREES
EKG P-R INTERVAL: 151 MS
EKG Q-T INTERVAL: 445 MS
EKG QRS DURATION: 91 MS
EKG QTC CALCULATION (BAZETT): 481 MS
EKG R AXIS: -51 DEGREES
EKG T AXIS: 17 DEGREES
EKG VENTRICULAR RATE: 70 BPM
EOSINOPHIL # BLD: 0.02 K/UL (ref 0–0.8)
EOSINOPHIL NFR BLD: 0.2 % (ref 0.5–7.8)
EPI CELLS #/AREA URNS HPF: ABNORMAL /HPF
ERYTHROCYTE [DISTWIDTH] IN BLOOD BY AUTOMATED COUNT: 14.5 % (ref 11.9–14.6)
FLUAV SUBTYP SPEC NAA+PROBE: NOT DETECTED
FLUBV RNA SPEC QL NAA+PROBE: NOT DETECTED
GAS FLOW.O2 O2 DELIVERY SYS: ABNORMAL
GLUCOSE SERPL-MCNC: 211 MG/DL (ref 70–99)
GLUCOSE UR STRIP.AUTO-MCNC: 250 MG/DL
HADV DNA SPEC QL NAA+PROBE: NOT DETECTED
HBV SURFACE AB SERPL IA-ACNC: NORMAL MIU/ML
HBV SURFACE AG SER QL: NONREACTIVE
HCO3 BLD-SCNC: 22.5 MMOL/L (ref 21–28)
HCOV 229E RNA SPEC QL NAA+PROBE: NOT DETECTED
HCOV HKU1 RNA SPEC QL NAA+PROBE: NOT DETECTED
HCOV NL63 RNA SPEC QL NAA+PROBE: NOT DETECTED
HCOV OC43 RNA SPEC QL NAA+PROBE: NOT DETECTED
HCT VFR BLD AUTO: 38.7 % (ref 35.8–46.3)
HGB BLD-MCNC: 12.1 G/DL (ref 11.7–15.4)
HGB UR QL STRIP: ABNORMAL
HMPV RNA SPEC QL NAA+PROBE: NOT DETECTED
HPIV1 RNA SPEC QL NAA+PROBE: NOT DETECTED
HPIV2 RNA SPEC QL NAA+PROBE: NOT DETECTED
HPIV3 RNA SPEC QL NAA+PROBE: NOT DETECTED
HPIV4 RNA SPEC QL NAA+PROBE: NOT DETECTED
IMM GRANULOCYTES # BLD AUTO: 0.04 K/UL (ref 0–0.5)
IMM GRANULOCYTES NFR BLD AUTO: 0.4 % (ref 0–5)
INSPIRATION.DURATION SETTING TIME VENT: 0.8 SEC
KETONES UR QL STRIP.AUTO: 15 MG/DL
LACTATE SERPL-SCNC: 1.3 MMOL/L (ref 0.5–2)
LEUKOCYTE ESTERASE UR QL STRIP.AUTO: ABNORMAL
LYMPHOCYTES # BLD: 0.74 K/UL (ref 0.5–4.6)
LYMPHOCYTES NFR BLD: 7.7 % (ref 13–44)
M PNEUMO DNA SPEC QL NAA+PROBE: NOT DETECTED
MAGNESIUM SERPL-MCNC: 2.1 MG/DL (ref 1.8–2.4)
MCH RBC QN AUTO: 28.9 PG (ref 26.1–32.9)
MCHC RBC AUTO-ENTMCNC: 31.3 G/DL (ref 31.4–35)
MCV RBC AUTO: 92.6 FL (ref 82–102)
MONOCYTES # BLD: 0.81 K/UL (ref 0.1–1.3)
MONOCYTES NFR BLD: 8.4 % (ref 4–12)
MUCOUS THREADS URNS QL MICRO: 0 /LPF
NEUTS SEG # BLD: 7.98 K/UL (ref 1.7–8.2)
NEUTS SEG NFR BLD: 82.9 % (ref 43–78)
NITRITE UR QL STRIP.AUTO: NEGATIVE
NRBC # BLD: 0 K/UL (ref 0–0.2)
O2/TOTAL GAS SETTING VFR VENT: 40 %
OTHER OBSERVATIONS: ABNORMAL
PCO2 BLD: 35.7 MMHG (ref 35–45)
PEEP RESPIRATORY: 8 CMH2O
PH BLD: 7.41 (ref 7.35–7.45)
PH UR STRIP: 7 (ref 5–9)
PLATELET # BLD AUTO: 141 K/UL (ref 150–450)
PMV BLD AUTO: 11.4 FL (ref 9.4–12.3)
PO2 BLD: 185 MMHG (ref 75–100)
POTASSIUM SERPL-SCNC: 4.6 MMOL/L (ref 3.5–5.1)
PROCALCITONIN SERPL-MCNC: 1.14 NG/ML (ref 0–0.1)
PROT UR STRIP-MCNC: 100 MG/DL
RBC # BLD AUTO: 4.18 M/UL (ref 4.05–5.2)
RBC #/AREA URNS HPF: ABNORMAL /HPF
RSV RNA SPEC QL NAA+PROBE: NOT DETECTED
RV+EV RNA SPEC QL NAA+PROBE: NOT DETECTED
SAO2 % BLD: 99.6 % (ref 94–98)
SARS-COV-2 RNA RESP QL NAA+PROBE: NOT DETECTED
SERVICE CMNT-IMP: ABNORMAL
SERVICE CMNT-IMP: ABNORMAL
SODIUM SERPL-SCNC: 137 MMOL/L (ref 136–145)
SP GR UR REFRACTOMETRY: 1.01 (ref 1–1.02)
SPECIMEN TYPE: ABNORMAL
TROPONIN T SERPL HS-MCNC: 339 NG/L (ref 0–14)
URINE CULTURE IF INDICATED: ABNORMAL
UROBILINOGEN UR QL STRIP.AUTO: 1 EU/DL (ref 0.2–1)
VENTILATION MODE VENT: ABNORMAL
VT SETTING VENT: 400 ML
WBC # BLD AUTO: 9.6 K/UL (ref 4.3–11.1)
WBC URNS QL MICRO: ABNORMAL /HPF

## 2025-04-18 PROCEDURE — 5A1D70Z PERFORMANCE OF URINARY FILTRATION, INTERMITTENT, LESS THAN 6 HOURS PER DAY: ICD-10-PCS | Performed by: INTERNAL MEDICINE

## 2025-04-18 PROCEDURE — 6360000002 HC RX W HCPCS: Performed by: INTERNAL MEDICINE

## 2025-04-18 PROCEDURE — 2500000003 HC RX 250 WO HCPCS

## 2025-04-18 PROCEDURE — 6370000000 HC RX 637 (ALT 250 FOR IP): Performed by: INTERNAL MEDICINE

## 2025-04-18 PROCEDURE — 6370000000 HC RX 637 (ALT 250 FOR IP)

## 2025-04-18 PROCEDURE — 87186 SC STD MICRODIL/AGAR DIL: CPT

## 2025-04-18 PROCEDURE — 6360000002 HC RX W HCPCS

## 2025-04-18 PROCEDURE — 87040 BLOOD CULTURE FOR BACTERIA: CPT

## 2025-04-18 PROCEDURE — 84207 ASSAY OF VITAMIN B-6: CPT

## 2025-04-18 PROCEDURE — 80048 BASIC METABOLIC PNL TOTAL CA: CPT

## 2025-04-18 PROCEDURE — 84145 PROCALCITONIN (PCT): CPT

## 2025-04-18 PROCEDURE — 95713 VEEG 2-12 HR CONT MNTR: CPT

## 2025-04-18 PROCEDURE — 99291 CRITICAL CARE FIRST HOUR: CPT | Performed by: INTERNAL MEDICINE

## 2025-04-18 PROCEDURE — 93010 ELECTROCARDIOGRAM REPORT: CPT | Performed by: INTERNAL MEDICINE

## 2025-04-18 PROCEDURE — 99291 CRITICAL CARE FIRST HOUR: CPT | Performed by: STUDENT IN AN ORGANIZED HEALTH CARE EDUCATION/TRAINING PROGRAM

## 2025-04-18 PROCEDURE — 2580000003 HC RX 258

## 2025-04-18 PROCEDURE — 36600 WITHDRAWAL OF ARTERIAL BLOOD: CPT

## 2025-04-18 PROCEDURE — 90935 HEMODIALYSIS ONE EVALUATION: CPT

## 2025-04-18 PROCEDURE — 86140 C-REACTIVE PROTEIN: CPT

## 2025-04-18 PROCEDURE — 82550 ASSAY OF CK (CPK): CPT

## 2025-04-18 PROCEDURE — 83605 ASSAY OF LACTIC ACID: CPT

## 2025-04-18 PROCEDURE — 87340 HEPATITIS B SURFACE AG IA: CPT

## 2025-04-18 PROCEDURE — 87205 SMEAR GRAM STAIN: CPT

## 2025-04-18 PROCEDURE — 87070 CULTURE OTHR SPECIMN AEROBIC: CPT

## 2025-04-18 PROCEDURE — 94003 VENT MGMT INPAT SUBQ DAY: CPT

## 2025-04-18 PROCEDURE — 83735 ASSAY OF MAGNESIUM: CPT

## 2025-04-18 PROCEDURE — 82803 BLOOD GASES ANY COMBINATION: CPT

## 2025-04-18 PROCEDURE — 71045 X-RAY EXAM CHEST 1 VIEW: CPT

## 2025-04-18 PROCEDURE — 6360000002 HC RX W HCPCS: Performed by: STUDENT IN AN ORGANIZED HEALTH CARE EDUCATION/TRAINING PROGRAM

## 2025-04-18 PROCEDURE — 87086 URINE CULTURE/COLONY COUNT: CPT

## 2025-04-18 PROCEDURE — 36415 COLL VENOUS BLD VENIPUNCTURE: CPT

## 2025-04-18 PROCEDURE — 84484 ASSAY OF TROPONIN QUANT: CPT

## 2025-04-18 PROCEDURE — C9254 INJECTION, LACOSAMIDE: HCPCS | Performed by: STUDENT IN AN ORGANIZED HEALTH CARE EDUCATION/TRAINING PROGRAM

## 2025-04-18 PROCEDURE — 2000000000 HC ICU R&B

## 2025-04-18 PROCEDURE — 85025 COMPLETE CBC W/AUTO DIFF WBC: CPT

## 2025-04-18 PROCEDURE — 95700 EEG CONT REC W/VID EEG TECH: CPT

## 2025-04-18 PROCEDURE — 87088 URINE BACTERIA CULTURE: CPT

## 2025-04-18 PROCEDURE — 81001 URINALYSIS AUTO W/SCOPE: CPT

## 2025-04-18 PROCEDURE — 86706 HEP B SURFACE ANTIBODY: CPT

## 2025-04-18 PROCEDURE — 0202U NFCT DS 22 TRGT SARS-COV-2: CPT

## 2025-04-18 RX ORDER — LACOSAMIDE 10 MG/ML
100 INJECTION, SOLUTION INTRAVENOUS 2 TIMES DAILY
Status: DISCONTINUED | OUTPATIENT
Start: 2025-04-18 | End: 2025-05-12 | Stop reason: HOSPADM

## 2025-04-18 RX ORDER — HYDRALAZINE HYDROCHLORIDE 20 MG/ML
20 INJECTION INTRAMUSCULAR; INTRAVENOUS ONCE
Status: COMPLETED | OUTPATIENT
Start: 2025-04-18 | End: 2025-04-18

## 2025-04-18 RX ORDER — MENTHOL/CAMPHOR/ALLANTOIN/PHE 0.6-0.5-1%
OINTMENT(EA) TOPICAL PRN
Status: DISCONTINUED | OUTPATIENT
Start: 2025-04-18 | End: 2025-05-12 | Stop reason: HOSPADM

## 2025-04-18 RX ORDER — HEPARIN SODIUM 5000 [USP'U]/ML
5000 INJECTION, SOLUTION INTRAVENOUS; SUBCUTANEOUS EVERY 8 HOURS SCHEDULED
Status: DISCONTINUED | OUTPATIENT
Start: 2025-04-18 | End: 2025-04-23

## 2025-04-18 RX ORDER — LEVETIRACETAM 500 MG/5ML
500 INJECTION, SOLUTION, CONCENTRATE INTRAVENOUS EVERY 24 HOURS
Status: DISCONTINUED | OUTPATIENT
Start: 2025-04-18 | End: 2025-04-18

## 2025-04-18 RX ADMIN — SODIUM CHLORIDE: 9 INJECTION, SOLUTION INTRAVENOUS at 06:14

## 2025-04-18 RX ADMIN — SODIUM CHLORIDE: 9 INJECTION, SOLUTION INTRAVENOUS at 15:29

## 2025-04-18 RX ADMIN — NICARDIPINE HYDROCHLORIDE 5 MG/HR: 25 INJECTION INTRAVENOUS at 22:52

## 2025-04-18 RX ADMIN — PIPERACILLIN AND TAZOBACTAM 3375 MG: 3; .375 INJECTION, POWDER, LYOPHILIZED, FOR SOLUTION INTRAVENOUS at 16:43

## 2025-04-18 RX ADMIN — ACETAMINOPHEN 650 MG: 650 SUPPOSITORY RECTAL at 05:10

## 2025-04-18 RX ADMIN — NICARDIPINE HYDROCHLORIDE 2.5 MG/HR: 25 INJECTION INTRAVENOUS at 10:08

## 2025-04-18 RX ADMIN — NICARDIPINE HYDROCHLORIDE 5 MG/HR: 25 INJECTION INTRAVENOUS at 03:34

## 2025-04-18 RX ADMIN — Medication 7 G: at 18:00

## 2025-04-18 RX ADMIN — SODIUM CHLORIDE, PRESERVATIVE FREE 10 ML: 5 INJECTION INTRAVENOUS at 07:43

## 2025-04-18 RX ADMIN — SODIUM CHLORIDE, PRESERVATIVE FREE 10 ML: 5 INJECTION INTRAVENOUS at 21:24

## 2025-04-18 RX ADMIN — HYDRALAZINE HYDROCHLORIDE 20 MG: 20 INJECTION INTRAMUSCULAR; INTRAVENOUS at 00:19

## 2025-04-18 RX ADMIN — PIPERACILLIN AND TAZOBACTAM 4500 MG: 4; .5 INJECTION, POWDER, LYOPHILIZED, FOR SOLUTION INTRAVENOUS at 07:49

## 2025-04-18 RX ADMIN — LACOSAMIDE 100 MG: 10 INJECTION INTRAVENOUS at 09:33

## 2025-04-18 RX ADMIN — ACETAMINOPHEN 650 MG: 650 SUPPOSITORY RECTAL at 13:32

## 2025-04-18 RX ADMIN — LACOSAMIDE 100 MG: 10 INJECTION INTRAVENOUS at 21:24

## 2025-04-18 RX ADMIN — HEPARIN SODIUM 5000 UNITS: 5000 INJECTION INTRAVENOUS; SUBCUTANEOUS at 21:30

## 2025-04-18 ASSESSMENT — PAIN SCALES - GENERAL
PAINLEVEL_OUTOF10: 0
PAINLEVEL_OUTOF10: 0
PAINLEVEL_OUTOF10: 1
PAINLEVEL_OUTOF10: 0

## 2025-04-18 ASSESSMENT — PULMONARY FUNCTION TESTS
PIF_VALUE: 27
PIF_VALUE: 18
PIF_VALUE: 18
PIF_VALUE: 20
PIF_VALUE: 23
PIF_VALUE: 15

## 2025-04-18 NOTE — INTERDISCIPLINARY ROUNDS
Multi-D Rounds/Checklist (leapfrog):  Lines: can any be removed?: None   ETT  (Active)     NG/OG/NJ/NE Tube Orogastric 16 fr Left mouth (Active)       Urinary Catheter 04/17/25 Chin-Temperature (Active)     Hemodialysis Central Access - Permanent/Tunneled Right Subclavian (Active)       Hemodialysis Fistula/Graft Superior Arm (Active)     DVT Prophylaxis: Ordered  Vent: HOB elevated? Yes ;  mL/kg: N/A ; Vent day 2  Nutrition Ordered/appropriate: Contraindicated-    Can antibiotics or other drugs be stopped? Yes/End Date set   MRSA swab:   Inpat Anti-Infectives (From admission, onward)       Start     Ordered Stop    04/18/25 1600  piperacillin-tazobactam (ZOSYN) 3,375 mg in sodium chloride 0.9 % 50 mL IVPB (addEASE)  3,375 mg,   IntraVENous,   EVERY 12 HOURS        Placed in \"Followed by\" Linked Group    04/18/25 0720 04/25/25 0359    04/18/25 0715  piperacillin-tazobactam (ZOSYN) 4,500 mg in sodium chloride 0.9 % 100 mL IVPB (addEASE)  4,500 mg,   IntraVENous,   ONCE        Placed in \"Followed by\" Linked Group    04/18/25 0656 --                  Consults needed: None  A: Is pain control adequate? (has PRNs? Stop drip?) Yes  B: Sedation break and SBT? N/A  C: Is sedation choice appropriate? N/A  D: Delirium/CAM-ICU? Unable to screen  E: Mobility goals/appropriateness? N/A  F: Family update and plan? Daughter is surrogate decision maker and is being updated daily by primary attending and nursing staff.    Elvira Zaidi, APRN - NP

## 2025-04-18 NOTE — ED TRIAGE NOTES
Patient arrives via EMS from home. Called out for seizures. 3 seizures back to back. 10mg IM versed given with no response. RSI en route. 20 etomidate given, 200mcg fentanyl, and 10 more versed given. Patient arrives intubated. GCS 6.

## 2025-04-18 NOTE — CONSULTS
Nephrology consult    Admission Date:  4/17/2025    Admission Diagnosis  Status epilepticus (HCC) [G40.901]  HTN (hypertension), malignant [I10]  ESRD on hemodialysis (HCC) [N18.6, Z99.2]  Seizure-like activity (HCC) [R56.9]  Altered mental status, unspecified altered mental status type [R41.82]    Consult request by Dr. Garcia    History of Present Illness:    Nataliia Pradhan is an 79 y.o. female who is being evaluated for end-stage renal disease.  She is typically on dialysis at [facility] Monday Wednesday Friday with a left upper arm AV fistula.  Additionally with type 2 diabetes, hypertension, hyperlipidemia.  She was found to have witnessed seizures and status epilepticus at home, thus brought to the hospital.  She was intubated in the field secondary to airway protection.  she is currently in the ICU getting a 24-hour EEG.  Has required Cardene for blood pressure management        Allergies   Allergen Reactions    Prednisone Other (See Comments)     Elevated blood sugar        No current facility-administered medications on file prior to encounter.     Current Outpatient Medications on File Prior to Encounter   Medication Sig Dispense Refill    doxazosin (CARDURA) 4 MG tablet TAKE 1 TABLET BY MOUTH IN THE MORNING AND AT BEDTIME 180 tablet 3    atorvastatin (LIPITOR) 40 MG tablet Take 1 tablet by mouth at bedtime 90 tablet 3    olmesartan (BENICAR) 40 MG tablet TAKE 1 TABLET BY MOUTH DAILY 90 tablet 3    bumetanide (BUMEX) 2 MG tablet Take 2 tablets by mouth 2 times daily      carvedilol (COREG) 25 MG tablet Take 1 tablet by mouth 2 times daily 180 tablet 3    hydrALAZINE (APRESOLINE) 50 MG tablet Take 1 tablet by mouth 3 times daily      Carboxymethylcellul-Glycerin 0.5-0.9 % SOLN Apply to eye as needed      levothyroxine (SYNTHROID) 75 MCG tablet Take 1 tablet by mouth Daily 90 tablet 3    calcitRIOL (ROCALTROL) 0.25 MCG capsule Take 1 capsule by mouth daily      Lancets MISC Check BS

## 2025-04-18 NOTE — ED PROVIDER NOTES
Granulocytes Absolute 0.02 0.0 - 0.5 K/UL   Comprehensive Metabolic Panel   Result Value Ref Range    Sodium 139 136 - 145 mmol/L    Potassium 4.5 3.5 - 5.1 mmol/L    Chloride 101 98 - 107 mmol/L    CO2 23 20 - 29 mmol/L    Anion Gap 16 7 - 16 mmol/L    Glucose 146 (H) 70 - 99 mg/dL    BUN 20 8 - 23 MG/DL    Creatinine 4.82 (H) 0.60 - 1.10 MG/DL    Est, Glom Filt Rate 9 (L) >60 ml/min/1.73m2    Calcium 8.8 8.8 - 10.2 MG/DL    Total Bilirubin 0.6 0.0 - 1.2 MG/DL    ALT 9 8 - 45 U/L    AST 34 15 - 37 U/L    Alk Phosphatase 140 (H) 35 - 104 U/L    Total Protein 6.3 6.3 - 8.2 g/dL    Albumin 2.9 (L) 3.2 - 4.6 g/dL    Globulin 3.5 2.3 - 3.5 g/dL    Albumin/Globulin Ratio 0.8 (L) 1.0 - 1.9     Protime-INR   Result Value Ref Range    Protime 14.2 11.3 - 14.9 sec    INR 1.1     Alcohol   Result Value Ref Range    Ethanol Lvl <11 MG/DL   Troponin   Result Value Ref Range    Troponin T 294.0 (HH) 0 - 14 ng/L   Urine Drug Screen   Result Value Ref Range    Phencyclidine, Urine Negative NEG      Benzodiazepines, Urine Positive (A) NEG      Cocaine, Urine Negative NEG      Amphetamine, Urine Negative NEG      Methadone, Urine Negative NEG      THC, TH-Cannabinol, Urine Negative NEG      Opiates, Urine Negative NEG      Barbiturates, Urine Negative NEG     Troponin   Result Value Ref Range    Troponin T 236.0 (HH) 0 - 14 ng/L   POCT Glucose   Result Value Ref Range    POC Glucose 155 (H) 65 - 100 mg/dL    Performed by: Mirta    POCT Blood Gas & Electrolytes   Result Value Ref Range    POC pH 7.51 (H) 7.35 - 7.45      POC pCO2 34.4 (L) 35 - 45 MMHG    POC PO2 268 (H) 75 - 100 MMHG    POC Sodium 140 136 - 145 MMOL/L    POC Potassium 3.6 3.5 - 5.1 MMOL/L    POC Ionized Calcium 1.11 (L) 1.12 - 1.32 mmol/L    Base Excess 4.3 mmol/L    POC HCO3 27.3 21 - 28 MMOL/L    POC TCO2 26 (H) 13 - 23 MMOL/L    POC O2  %    Source ARTERIAL      Site RIGHT RADIAL      Malachi Test Positive      DEVICE ADULT VENT      Mode Pressure

## 2025-04-18 NOTE — CARE COORDINATION
Case Management Assessment  Initial Evaluation    Date/Time of Evaluation: 4/18/2025 3:03 PM  Assessment Completed by: Benita Marie RN    If patient is discharged prior to next notation, then this note serves as note for discharge by case management.    Patient Name: Nataliia Pradhan                   YOB: 1945  Diagnosis: Status epilepticus (HCC) [G40.901]  HTN (hypertension), malignant [I10]  ESRD on hemodialysis (HCC) [N18.6, Z99.2]  Seizure-like activity (HCC) [R56.9]  Altered mental status, unspecified altered mental status type [R41.82]                   Date / Time: 4/17/2025  8:38 PM    Patient Admission Status: Inpatient   Readmission Risk (Low < 19, Mod (19-27), High > 27): Readmission Risk Score: 16.3    Current PCP: Melo Pope MD  PCP verified by CM? (P) No (retire/poss referral for [facility])    Chart Reviewed: Yes      History Provided by: (P) Child/Family  Patient Orientation: (P) Unable to Assess (intubated/vent)    Patient Cognition: (P) Other (see comment) (intubated/vent)    Hospitalization in the last 30 days (Readmission):  No    If yes, Readmission Assessment in CM Navigator will be completed.    Advance Directives:      Code Status: Full Code   Patient's Primary Decision Maker is: (P) Legal Next of Kin    Primary Decision Maker: Mita Pradhan - Child - [phone]    Secondary Decision Maker: Molly Graham - Other - [phone]    Discharge Planning:    Patient lives with: (P) Alone Type of Home: (P) Other (Comment) (Pending)  Primary Care Giver: (P) Self  Patient Support Systems include: (P) Children, Family Members   Current Financial resources: (P) Medicare ([facility])  Current community resources: (P) ECF/Home Care ([facility])  Current services prior to admission: (P) Durable Medical Equipment, Home Care            Current DME: (P) Walker, Glucometer            Type of Home Care services:  PT    ADLS  Prior functional level: (P) Independent in

## 2025-04-18 NOTE — ACP (ADVANCE CARE PLANNING)
Advance Care Planning     Advance Care Planning Activator (Inpatient)  Conversation Note      Date of ACP Conversation: 4/18/2025     ACP Activator: Benita Marie RN    {When Decision Maker makes decisions on behalf of the incapacitated patient: Decision Maker is asked to consider and make decisions based on patient values, known preferences, or best interests.     Health Care Decision Maker: NO LW/HCPOA on file. PT . Two adult children are legal NOK unless document presented stating otherwise.     Current Designated Health Care Decision Maker:     Click here to complete Healthcare Decision Makers including section of the Healthcare Decision Maker Relationship (ie \"Primary\")  Today we documented Decision Maker(s) consistent with Legal Next of Kin hierarchy.    Care Preferences  Full code per MD orders.

## 2025-04-18 NOTE — DIALYSIS
Hemodialysis treatment completed, Pt ran 3.5 hours,  without complications.     Patient baseline and VS stable  /78  P 104 , patient on vent    2 Kg removed    Flushed both ports with 10 mL of NS.  CVC dressing clean, dry, and intact, tego caps intact.     Patient remains in 3304.

## 2025-04-18 NOTE — DIALYSIS
HD initiated using R tunneled CVC.  Consent verified for renal replacement therapy.       Patient baseline , /84,  P119  , SPO2 100 via vent    Aspirated and flushed both ports without difficulty. Dressing clean dry and intact, Dated: 4/18/25 . Machine settings per MD order.    Heparin 0 unit bolus and 0 units/hr.      Will monitor during treatment.

## 2025-04-18 NOTE — WOUND CARE
Assessment of sacral area. Coccyx in 3x4cm area with dark on dark pigmentation,  2 less than 3 pink fragile scars and signs of new epithelization. Left buttock with blister/ vesicle, flaccid and ruptures when touched, fluid is milky, concern for mild infection or even viral cause.  Has catheter now, may be Incontinence associated dermatitis. Discussed with Dr. Sanchez, no order for culture etc at this time. Recommend silicone foam dressing, frequent diligent offloading and frequent turning.

## 2025-04-18 NOTE — CONSULTS
Neurology Consult Note       History:   79-year-old female with, PVD, hypertension, diabetes presents with new onset seizure.  History per family at bedside, witnessed shaking of the right arm while body healed over to the left.  EMS arrived who administered midazolam, with some improvement of shaking.  There was tongue laceration.  Thus far workup notable for hyperthermia, UTI.  No prior history of seizures.      Exam: Pertinent positives and negatives include:  Intubated, sedated.  Tongue laceration.  No involuntary movements observed.    Imaging and review of data:   CT head without acute pathology.  Severe atrophy, diffuse.     cEEG with transient periods of generalized theta activity.  No seizures.    Assessment and Plan:   79-year-old female with new onset seizure in the setting of febrile illness, UTI.    Continue lacosamide. Antibiotics per primary.     Completion of workup with MRI brain w/wo. cEEG to be discontinued today.    Wean sedation as tolerated.      Rene Terrell, DO  Neurology      Patient is critically ill.  Without intervention, there is a high probability of imminent acute organ impairment or life-threatening deterioration.  Total critical care time spent: 45 minutes.

## 2025-04-18 NOTE — ED NOTES
TRANSFER - OUT REPORT:    Verbal report given to Maria Fernanda ADAMS on Nataliia Pradhan  being transferred to [facility] for routine progression of patient care       Report consisted of patient's Situation, Background, Assessment and   Recommendations(SBAR).     Information from the following report(s) ED Encounter Summary was reviewed with the receiving nurse.    [place] Fall Assessment:    Presents to emergency department  because of falls (Syncope, seizure, or loss of consciousness): No  Age > 70: No  Altered Mental Status, Intoxication with alcohol or substance confusion (Disorientation, impaired judgment, poor safety awaremess, or inability to follow instructions): Yes  Impaired Mobility: Ambulates or transfers with assistive devices or assistance; Unable to ambulate or transer.: Yes             Lines:   Peripheral IV 04/17/25 Right Hand (Active)       Peripheral IV 04/17/25 Proximal;Right Forearm (Active)       Intraosseous Line 04/17/25 Right Humerus (Active)       Hemodialysis Central Access - Permanent/Tunneled Right Subclavian (Active)        Opportunity for questions and clarification was provided.      Patient transported with:  Registered Nurse           Fariba Dowd RN  04/17/25 6978

## 2025-04-19 ENCOUNTER — APPOINTMENT (OUTPATIENT)
Dept: GENERAL RADIOLOGY | Age: 80
DRG: 100 | End: 2025-04-19
Payer: MEDICARE

## 2025-04-19 LAB
ANION GAP SERPL CALC-SCNC: 25 MMOL/L (ref 7–16)
ARTERIAL PATENCY WRIST A: POSITIVE
BASE DEFICIT BLD-SCNC: 7.2 MMOL/L
BASOPHILS # BLD: 0.03 K/UL (ref 0–0.2)
BASOPHILS NFR BLD: 0.3 % (ref 0–2)
BDY SITE: ABNORMAL
BUN SERPL-MCNC: 19 MG/DL (ref 8–23)
CALCIUM SERPL-MCNC: 7.9 MG/DL (ref 8.8–10.2)
CHLORIDE SERPL-SCNC: 99 MMOL/L (ref 98–107)
CO2 SERPL-SCNC: 15 MMOL/L (ref 20–29)
CREAT SERPL-MCNC: 3.03 MG/DL (ref 0.6–1.1)
DIFFERENTIAL METHOD BLD: ABNORMAL
EOSINOPHIL # BLD: 0 K/UL (ref 0–0.8)
EOSINOPHIL NFR BLD: 0 % (ref 0.5–7.8)
ERYTHROCYTE [DISTWIDTH] IN BLOOD BY AUTOMATED COUNT: 14.6 % (ref 11.9–14.6)
GAS FLOW.O2 O2 DELIVERY SYS: ABNORMAL
GLUCOSE BLD STRIP.AUTO-MCNC: 144 MG/DL (ref 65–100)
GLUCOSE BLD STRIP.AUTO-MCNC: 199 MG/DL (ref 65–100)
GLUCOSE BLD STRIP.AUTO-MCNC: 225 MG/DL (ref 65–100)
GLUCOSE SERPL-MCNC: 216 MG/DL (ref 70–99)
HCO3 BLD-SCNC: 16.8 MMOL/L (ref 21–28)
HCT VFR BLD AUTO: 37.5 % (ref 35.8–46.3)
HGB BLD-MCNC: 11.9 G/DL (ref 11.7–15.4)
IMM GRANULOCYTES # BLD AUTO: 0.03 K/UL (ref 0–0.5)
IMM GRANULOCYTES NFR BLD AUTO: 0.3 % (ref 0–5)
IPAP/PIP/HIGH PEEP: 18
LYMPHOCYTES # BLD: 0.86 K/UL (ref 0.5–4.6)
LYMPHOCYTES NFR BLD: 8.9 % (ref 13–44)
MAGNESIUM SERPL-MCNC: 1.8 MG/DL (ref 1.8–2.4)
MCH RBC QN AUTO: 29.1 PG (ref 26.1–32.9)
MCHC RBC AUTO-ENTMCNC: 31.7 G/DL (ref 31.4–35)
MCV RBC AUTO: 91.7 FL (ref 82–102)
MONOCYTES # BLD: 0.37 K/UL (ref 0.1–1.3)
MONOCYTES NFR BLD: 3.8 % (ref 4–12)
NEUTS SEG # BLD: 8.38 K/UL (ref 1.7–8.2)
NEUTS SEG NFR BLD: 86.7 % (ref 43–78)
NRBC # BLD: 0 K/UL (ref 0–0.2)
O2/TOTAL GAS SETTING VFR VENT: 21 %
PAW @ MEAN EXP FLOW ON VENT: 12 CMH2O
PCO2 BLD: 28.9 MMHG (ref 35–45)
PEEP RESPIRATORY: 8 CMH2O
PH BLD: 7.37 (ref 7.35–7.45)
PHOSPHATE SERPL-MCNC: 4.3 MG/DL (ref 2.5–4.5)
PLATELET # BLD AUTO: 125 K/UL (ref 150–450)
PMV BLD AUTO: 10.7 FL (ref 9.4–12.3)
PO2 BLD: 95 MMHG (ref 75–100)
POTASSIUM SERPL-SCNC: 4.2 MMOL/L (ref 3.5–5.1)
PRESSURE SUPPORT SETTING VENT: 10 CMH2O
RBC # BLD AUTO: 4.09 M/UL (ref 4.05–5.2)
SAO2 % BLD: 97.3 % (ref 94–98)
SERVICE CMNT-IMP: ABNORMAL
SODIUM SERPL-SCNC: 139 MMOL/L (ref 136–145)
SPECIMEN TYPE: ABNORMAL
VENTILATION MODE VENT: ABNORMAL
WBC # BLD AUTO: 9.7 K/UL (ref 4.3–11.1)

## 2025-04-19 PROCEDURE — 2700000000 HC OXYGEN THERAPY PER DAY

## 2025-04-19 PROCEDURE — 6370000000 HC RX 637 (ALT 250 FOR IP): Performed by: INTERNAL MEDICINE

## 2025-04-19 PROCEDURE — 82962 GLUCOSE BLOOD TEST: CPT

## 2025-04-19 PROCEDURE — 2500000003 HC RX 250 WO HCPCS: Performed by: EMERGENCY MEDICINE

## 2025-04-19 PROCEDURE — 2000000000 HC ICU R&B

## 2025-04-19 PROCEDURE — 6360000002 HC RX W HCPCS: Performed by: STUDENT IN AN ORGANIZED HEALTH CARE EDUCATION/TRAINING PROGRAM

## 2025-04-19 PROCEDURE — 71045 X-RAY EXAM CHEST 1 VIEW: CPT

## 2025-04-19 PROCEDURE — 2580000003 HC RX 258: Performed by: INTERNAL MEDICINE

## 2025-04-19 PROCEDURE — 85025 COMPLETE CBC W/AUTO DIFF WBC: CPT

## 2025-04-19 PROCEDURE — 36600 WITHDRAWAL OF ARTERIAL BLOOD: CPT

## 2025-04-19 PROCEDURE — 84100 ASSAY OF PHOSPHORUS: CPT

## 2025-04-19 PROCEDURE — 99291 CRITICAL CARE FIRST HOUR: CPT | Performed by: INTERNAL MEDICINE

## 2025-04-19 PROCEDURE — 94003 VENT MGMT INPAT SUBQ DAY: CPT

## 2025-04-19 PROCEDURE — C9254 INJECTION, LACOSAMIDE: HCPCS | Performed by: STUDENT IN AN ORGANIZED HEALTH CARE EDUCATION/TRAINING PROGRAM

## 2025-04-19 PROCEDURE — 94761 N-INVAS EAR/PLS OXIMETRY MLT: CPT

## 2025-04-19 PROCEDURE — 82803 BLOOD GASES ANY COMBINATION: CPT

## 2025-04-19 PROCEDURE — 6360000002 HC RX W HCPCS: Performed by: INTERNAL MEDICINE

## 2025-04-19 PROCEDURE — 83735 ASSAY OF MAGNESIUM: CPT

## 2025-04-19 PROCEDURE — 2580000003 HC RX 258

## 2025-04-19 PROCEDURE — 6360000002 HC RX W HCPCS

## 2025-04-19 PROCEDURE — 2500000003 HC RX 250 WO HCPCS

## 2025-04-19 PROCEDURE — 80048 BASIC METABOLIC PNL TOTAL CA: CPT

## 2025-04-19 PROCEDURE — 36415 COLL VENOUS BLD VENIPUNCTURE: CPT

## 2025-04-19 RX ORDER — CARVEDILOL 25 MG/1
25 TABLET ORAL 2 TIMES DAILY
Status: DISCONTINUED | OUTPATIENT
Start: 2025-04-19 | End: 2025-04-19

## 2025-04-19 RX ORDER — HYDRALAZINE HYDROCHLORIDE 50 MG/1
25 TABLET, FILM COATED ORAL EVERY 8 HOURS SCHEDULED
Status: DISCONTINUED | OUTPATIENT
Start: 2025-04-19 | End: 2025-04-20

## 2025-04-19 RX ORDER — INDOMETHACIN 25 MG/1
50 CAPSULE ORAL ONCE
Status: COMPLETED | OUTPATIENT
Start: 2025-04-19 | End: 2025-04-19

## 2025-04-19 RX ORDER — INSULIN LISPRO 100 [IU]/ML
0-8 INJECTION, SOLUTION INTRAVENOUS; SUBCUTANEOUS EVERY 6 HOURS
Status: DISCONTINUED | OUTPATIENT
Start: 2025-04-19 | End: 2025-05-12 | Stop reason: HOSPADM

## 2025-04-19 RX ORDER — HYDRALAZINE HYDROCHLORIDE 50 MG/1
25 TABLET, FILM COATED ORAL EVERY 8 HOURS SCHEDULED
Status: DISCONTINUED | OUTPATIENT
Start: 2025-04-19 | End: 2025-04-19

## 2025-04-19 RX ORDER — CARVEDILOL 25 MG/1
25 TABLET ORAL 2 TIMES DAILY
Status: DISCONTINUED | OUTPATIENT
Start: 2025-04-19 | End: 2025-04-28

## 2025-04-19 RX ADMIN — PANTOPRAZOLE SODIUM 40 MG: 40 INJECTION, POWDER, LYOPHILIZED, FOR SOLUTION INTRAVENOUS at 13:16

## 2025-04-19 RX ADMIN — CARVEDILOL 25 MG: 25 TABLET, FILM COATED ORAL at 21:27

## 2025-04-19 RX ADMIN — HEPARIN SODIUM 5000 UNITS: 5000 INJECTION INTRAVENOUS; SUBCUTANEOUS at 21:31

## 2025-04-19 RX ADMIN — SODIUM CHLORIDE, PRESERVATIVE FREE 10 ML: 5 INJECTION INTRAVENOUS at 21:27

## 2025-04-19 RX ADMIN — PIPERACILLIN AND TAZOBACTAM 3375 MG: 3; .375 INJECTION, POWDER, LYOPHILIZED, FOR SOLUTION INTRAVENOUS at 15:42

## 2025-04-19 RX ADMIN — Medication 7 G: at 15:48

## 2025-04-19 RX ADMIN — SODIUM CHLORIDE: 9 INJECTION, SOLUTION INTRAVENOUS at 18:21

## 2025-04-19 RX ADMIN — SODIUM CHLORIDE: 9 INJECTION, SOLUTION INTRAVENOUS at 00:20

## 2025-04-19 RX ADMIN — SODIUM BICARBONATE 50 MEQ: 84 INJECTION, SOLUTION INTRAVENOUS at 04:33

## 2025-04-19 RX ADMIN — HEPARIN SODIUM 5000 UNITS: 5000 INJECTION INTRAVENOUS; SUBCUTANEOUS at 06:22

## 2025-04-19 RX ADMIN — HYDRALAZINE HYDROCHLORIDE 25 MG: 50 TABLET ORAL at 13:16

## 2025-04-19 RX ADMIN — HEPARIN SODIUM 5000 UNITS: 5000 INJECTION INTRAVENOUS; SUBCUTANEOUS at 13:30

## 2025-04-19 RX ADMIN — PIPERACILLIN AND TAZOBACTAM 3375 MG: 3; .375 INJECTION, POWDER, LYOPHILIZED, FOR SOLUTION INTRAVENOUS at 03:59

## 2025-04-19 RX ADMIN — LACOSAMIDE 100 MG: 10 INJECTION INTRAVENOUS at 21:27

## 2025-04-19 RX ADMIN — HYDRALAZINE HYDROCHLORIDE 25 MG: 50 TABLET ORAL at 21:27

## 2025-04-19 RX ADMIN — INSULIN LISPRO 2 UNITS: 100 INJECTION, SOLUTION INTRAVENOUS; SUBCUTANEOUS at 23:40

## 2025-04-19 RX ADMIN — SODIUM CHLORIDE, PRESERVATIVE FREE 10 ML: 5 INJECTION INTRAVENOUS at 07:50

## 2025-04-19 RX ADMIN — Medication 7 G: at 21:34

## 2025-04-19 RX ADMIN — SODIUM CHLORIDE: 9 INJECTION, SOLUTION INTRAVENOUS at 10:38

## 2025-04-19 RX ADMIN — Medication 7 G: at 03:33

## 2025-04-19 RX ADMIN — CARVEDILOL 25 MG: 25 TABLET, FILM COATED ORAL at 10:30

## 2025-04-19 RX ADMIN — LACOSAMIDE 100 MG: 10 INJECTION INTRAVENOUS at 08:30

## 2025-04-19 RX ADMIN — NICARDIPINE HYDROCHLORIDE 2.5 MG/HR: 25 INJECTION INTRAVENOUS at 10:39

## 2025-04-19 RX ADMIN — Medication 7 G: at 10:38

## 2025-04-19 RX ADMIN — INSULIN LISPRO 2 UNITS: 100 INJECTION, SOLUTION INTRAVENOUS; SUBCUTANEOUS at 13:32

## 2025-04-19 ASSESSMENT — PULMONARY FUNCTION TESTS
PIF_VALUE: 18

## 2025-04-19 ASSESSMENT — PAIN SCALES - GENERAL
PAINLEVEL_OUTOF10: 0

## 2025-04-19 NOTE — CONSULTS
Nutrition Assessment  Assessment Type: Initial, Consult  Reason for visit:  Tube Feeding Management (Pulmonology)  Malnutrition Screening Tool Score: 1    Nutrition Intervention:   Food and/or Nutrient Delivery:   Enteral Nutrition:   Enteral Access: Orogastric  Initiate  Formula: Peptide Based (Vital AF 1.2 Robert)  Goal Rate: Continuous 60 ml/hr  Initiate  Water flush  85 ml every 4 hours  Modulars: None not indicated at this time   Enteral regimen at above goal to provide per 24 hours:  1584 calories, 99 grams protein and 1580 ml free fluid.    Above regimen: Intended to meet macronutrient goals  Labs:   Basic Metabolic Panel, Magnesium and Phosphorus active per nutrition parameters  POC Glucoses/SSI Activate: AM glucose >200 mg/dL   Nutrition Related Medication Management:  Electrolyte Replacement:   Deferred prn protocol  CrCl less than 30mL/min   Continue prn potassium protocol per MD  Intravenous fluids:  Continue per provider order  Thiamine Not indicated  Bowel Regimen Active prn  Meals and Snacks:  Diet: Continue NPO and advance as medically appropriate  Medical Food Supplements:   Medical food supplement therapy:  None Deferred pt is NPO   Coordination of Nutrition Care:  Coordination with health care provider Yenifer ADAMS       Malnutrition Assessment:  [facility]S.[facility] Clinical Malnutrition Criteria  Malnutrition Status: No malnutrition  Nutrition Focused Physical Exam: Deferred due to intubation    Nutrition Assessment:  Food/Nutrition Related History:   Daughter provides history. States that patient typically has a good appetite, eating 3 meals/day on average. Breakfast is typically her best meal. She eats a light lunch & dinner. She watches her potassium/phosphorus intake. No issues chewing/swallowing at baseline. Denies n/v/c/d prior to admit.      Do You Have Any Cultural, Ep[employer]opalian, or Ethnic Food Preferences?: No     Weight History:   EMR weight history review: 134# 8/1/24 (endocrinology), 136#

## 2025-04-19 NOTE — CONSULTS
Neurology Consult Note       History:   79-year-old female with, PVD, hypertension, diabetes presents with new onset seizure.  History per family at bedside, witnessed shaking of the right arm while body healed over to the left.  EMS arrived who administered midazolam, with some improvement of shaking.  There was tongue laceration.  Thus far workup notable for hyperthermia, UTI.  No prior history of seizures.    Interim:  No events overnight.     Exam: Pertinent positives and negatives include:  Intubated, off sedated.  No response to voice stimulation. Tongue laceration.  No involuntary movements observed.    Imaging and review of data:   CT head without acute pathology.  Severe atrophy, diffuse.     cEEG with transient periods of generalized theta activity.  No seizures.    Assessment and Plan:   79-year-old female with new onset seizure in the setting of UTI.    Continue lacosamide. Antibiotics per primary.     SBTs per ICU.     Will follow up MRI brain results when available. Otherwise will be on standby, available for questions.       Rene Terrell, DO  Neurology      Patient is critically ill.  Without intervention, there is a high probability of imminent acute organ impairment or life-threatening deterioration.  Total critical care time spent: 25 minutes.

## 2025-04-20 ENCOUNTER — APPOINTMENT (OUTPATIENT)
Dept: GENERAL RADIOLOGY | Age: 80
DRG: 100 | End: 2025-04-20
Payer: MEDICARE

## 2025-04-20 ENCOUNTER — APPOINTMENT (OUTPATIENT)
Dept: MRI IMAGING | Age: 80
DRG: 100 | End: 2025-04-20
Payer: MEDICARE

## 2025-04-20 LAB
ANION GAP SERPL CALC-SCNC: 18 MMOL/L (ref 7–16)
BACTERIA SPEC CULT: ABNORMAL
BACTERIA SPEC CULT: NORMAL
BASE DEFICIT BLD-SCNC: 4.5 MMOL/L
BASOPHILS # BLD: 0.02 K/UL (ref 0–0.2)
BASOPHILS NFR BLD: 0.2 % (ref 0–2)
BDY SITE: ABNORMAL
BUN SERPL-MCNC: 34 MG/DL (ref 8–23)
CALCIUM SERPL-MCNC: 7.6 MG/DL (ref 8.8–10.2)
CHLORIDE SERPL-SCNC: 102 MMOL/L (ref 98–107)
CO2 SERPL-SCNC: 16 MMOL/L (ref 20–29)
CREAT SERPL-MCNC: 4.14 MG/DL (ref 0.6–1.1)
DIFFERENTIAL METHOD BLD: ABNORMAL
EOSINOPHIL # BLD: 0.02 K/UL (ref 0–0.8)
EOSINOPHIL NFR BLD: 0.2 % (ref 0.5–7.8)
ERYTHROCYTE [DISTWIDTH] IN BLOOD BY AUTOMATED COUNT: 14.6 % (ref 11.9–14.6)
EST. AVERAGE GLUCOSE BLD GHB EST-MCNC: 181 MG/DL
GAS FLOW.O2 O2 DELIVERY SYS: ABNORMAL
GLUCOSE BLD STRIP.AUTO-MCNC: 230 MG/DL (ref 65–100)
GLUCOSE BLD STRIP.AUTO-MCNC: 282 MG/DL (ref 65–100)
GLUCOSE BLD STRIP.AUTO-MCNC: 306 MG/DL (ref 65–100)
GLUCOSE SERPL-MCNC: 235 MG/DL (ref 70–99)
GRAM STN SPEC: NORMAL
HBA1C MFR BLD: 7.9 % (ref 0–5.6)
HCO3 BLD-SCNC: 20.1 MMOL/L (ref 21–28)
HCT VFR BLD AUTO: 34.5 % (ref 35.8–46.3)
HGB BLD-MCNC: 10.8 G/DL (ref 11.7–15.4)
IMM GRANULOCYTES # BLD AUTO: 0.05 K/UL (ref 0–0.5)
IMM GRANULOCYTES NFR BLD AUTO: 0.5 % (ref 0–5)
LYMPHOCYTES # BLD: 1.12 K/UL (ref 0.5–4.6)
LYMPHOCYTES NFR BLD: 11.3 % (ref 13–44)
MAGNESIUM SERPL-MCNC: 1.9 MG/DL (ref 1.8–2.4)
MCH RBC QN AUTO: 29.3 PG (ref 26.1–32.9)
MCHC RBC AUTO-ENTMCNC: 31.3 G/DL (ref 31.4–35)
MCV RBC AUTO: 93.5 FL (ref 82–102)
MONOCYTES # BLD: 0.73 K/UL (ref 0.1–1.3)
MONOCYTES NFR BLD: 7.3 % (ref 4–12)
NEUTS SEG # BLD: 8.01 K/UL (ref 1.7–8.2)
NEUTS SEG NFR BLD: 80.5 % (ref 43–78)
NRBC # BLD: 0 K/UL (ref 0–0.2)
O2/TOTAL GAS SETTING VFR VENT: 21 %
PCO2 BLD: 34.8 MMHG (ref 35–45)
PEEP RESPIRATORY: 8 CMH2O
PH BLD: 7.37 (ref 7.35–7.45)
PHOSPHATE SERPL-MCNC: 5.7 MG/DL (ref 2.5–4.5)
PLATELET # BLD AUTO: 121 K/UL (ref 150–450)
PMV BLD AUTO: 11.4 FL (ref 9.4–12.3)
PO2 BLD: 94 MMHG (ref 75–100)
POTASSIUM SERPL-SCNC: 4.4 MMOL/L (ref 3.5–5.1)
PRESSURE SUPPORT SETTING VENT: 10 CMH2O
RBC # BLD AUTO: 3.69 M/UL (ref 4.05–5.2)
RESPIRATORY RATE, POC: 17 (ref 5–40)
SAO2 % BLD: 97.2 % (ref 94–98)
SERVICE CMNT-IMP: ABNORMAL
SERVICE CMNT-IMP: NORMAL
SODIUM SERPL-SCNC: 136 MMOL/L (ref 136–145)
SPECIMEN TYPE: ABNORMAL
VENTILATION MODE VENT: ABNORMAL
WBC # BLD AUTO: 10 K/UL (ref 4.3–11.1)

## 2025-04-20 PROCEDURE — 2000000000 HC ICU R&B

## 2025-04-20 PROCEDURE — 2500000003 HC RX 250 WO HCPCS

## 2025-04-20 PROCEDURE — 6360000002 HC RX W HCPCS: Performed by: STUDENT IN AN ORGANIZED HEALTH CARE EDUCATION/TRAINING PROGRAM

## 2025-04-20 PROCEDURE — 82962 GLUCOSE BLOOD TEST: CPT

## 2025-04-20 PROCEDURE — 2580000003 HC RX 258: Performed by: INTERNAL MEDICINE

## 2025-04-20 PROCEDURE — 2580000003 HC RX 258

## 2025-04-20 PROCEDURE — 3E0G76Z INTRODUCTION OF NUTRITIONAL SUBSTANCE INTO UPPER GI, VIA NATURAL OR ARTIFICIAL OPENING: ICD-10-PCS | Performed by: INTERNAL MEDICINE

## 2025-04-20 PROCEDURE — 6360000002 HC RX W HCPCS: Performed by: INTERNAL MEDICINE

## 2025-04-20 PROCEDURE — 80048 BASIC METABOLIC PNL TOTAL CA: CPT

## 2025-04-20 PROCEDURE — 6370000000 HC RX 637 (ALT 250 FOR IP): Performed by: INTERNAL MEDICINE

## 2025-04-20 PROCEDURE — 99291 CRITICAL CARE FIRST HOUR: CPT | Performed by: INTERNAL MEDICINE

## 2025-04-20 PROCEDURE — 36600 WITHDRAWAL OF ARTERIAL BLOOD: CPT

## 2025-04-20 PROCEDURE — 82803 BLOOD GASES ANY COMBINATION: CPT

## 2025-04-20 PROCEDURE — A9579 GAD-BASE MR CONTRAST NOS,1ML: HCPCS | Performed by: STUDENT IN AN ORGANIZED HEALTH CARE EDUCATION/TRAINING PROGRAM

## 2025-04-20 PROCEDURE — 94761 N-INVAS EAR/PLS OXIMETRY MLT: CPT

## 2025-04-20 PROCEDURE — 71045 X-RAY EXAM CHEST 1 VIEW: CPT

## 2025-04-20 PROCEDURE — 36415 COLL VENOUS BLD VENIPUNCTURE: CPT

## 2025-04-20 PROCEDURE — 84100 ASSAY OF PHOSPHORUS: CPT

## 2025-04-20 PROCEDURE — 6360000002 HC RX W HCPCS

## 2025-04-20 PROCEDURE — 70553 MRI BRAIN STEM W/O & W/DYE: CPT

## 2025-04-20 PROCEDURE — C9254 INJECTION, LACOSAMIDE: HCPCS | Performed by: STUDENT IN AN ORGANIZED HEALTH CARE EDUCATION/TRAINING PROGRAM

## 2025-04-20 PROCEDURE — 83735 ASSAY OF MAGNESIUM: CPT

## 2025-04-20 PROCEDURE — 83036 HEMOGLOBIN GLYCOSYLATED A1C: CPT

## 2025-04-20 PROCEDURE — 6360000004 HC RX CONTRAST MEDICATION: Performed by: STUDENT IN AN ORGANIZED HEALTH CARE EDUCATION/TRAINING PROGRAM

## 2025-04-20 PROCEDURE — 94003 VENT MGMT INPAT SUBQ DAY: CPT

## 2025-04-20 PROCEDURE — 2500000003 HC RX 250 WO HCPCS: Performed by: INTERNAL MEDICINE

## 2025-04-20 PROCEDURE — 0DH67UZ INSERTION OF FEEDING DEVICE INTO STOMACH, VIA NATURAL OR ARTIFICIAL OPENING: ICD-10-PCS | Performed by: INTERNAL MEDICINE

## 2025-04-20 PROCEDURE — 85025 COMPLETE CBC W/AUTO DIFF WBC: CPT

## 2025-04-20 RX ORDER — HYDRALAZINE HYDROCHLORIDE 50 MG/1
50 TABLET, FILM COATED ORAL EVERY 8 HOURS SCHEDULED
Status: DISCONTINUED | OUTPATIENT
Start: 2025-04-20 | End: 2025-04-28

## 2025-04-20 RX ADMIN — SODIUM CHLORIDE, PRESERVATIVE FREE 10 ML: 5 INJECTION INTRAVENOUS at 09:35

## 2025-04-20 RX ADMIN — LACOSAMIDE 100 MG: 10 INJECTION INTRAVENOUS at 09:34

## 2025-04-20 RX ADMIN — CARVEDILOL 25 MG: 25 TABLET, FILM COATED ORAL at 22:24

## 2025-04-20 RX ADMIN — GADOTERIDOL 13 ML: 279.3 INJECTION, SOLUTION INTRAVENOUS at 11:23

## 2025-04-20 RX ADMIN — LACOSAMIDE 100 MG: 10 INJECTION INTRAVENOUS at 22:25

## 2025-04-20 RX ADMIN — INSULIN LISPRO 6 UNITS: 100 INJECTION, SOLUTION INTRAVENOUS; SUBCUTANEOUS at 20:04

## 2025-04-20 RX ADMIN — HYDRALAZINE HYDROCHLORIDE 50 MG: 50 TABLET ORAL at 22:24

## 2025-04-20 RX ADMIN — INSULIN LISPRO 2 UNITS: 100 INJECTION, SOLUTION INTRAVENOUS; SUBCUTANEOUS at 06:01

## 2025-04-20 RX ADMIN — SODIUM CHLORIDE: 9 INJECTION, SOLUTION INTRAVENOUS at 03:02

## 2025-04-20 RX ADMIN — HYDRALAZINE HYDROCHLORIDE 50 MG: 50 TABLET ORAL at 13:02

## 2025-04-20 RX ADMIN — WATER 1000 MG: 1 INJECTION INTRAMUSCULAR; INTRAVENOUS; SUBCUTANEOUS at 13:01

## 2025-04-20 RX ADMIN — SODIUM CHLORIDE, PRESERVATIVE FREE 10 ML: 5 INJECTION INTRAVENOUS at 22:25

## 2025-04-20 RX ADMIN — CARVEDILOL 25 MG: 25 TABLET, FILM COATED ORAL at 09:34

## 2025-04-20 RX ADMIN — PANTOPRAZOLE SODIUM 40 MG: 40 INJECTION, POWDER, LYOPHILIZED, FOR SOLUTION INTRAVENOUS at 09:34

## 2025-04-20 RX ADMIN — HYDRALAZINE HYDROCHLORIDE 25 MG: 50 TABLET ORAL at 06:02

## 2025-04-20 RX ADMIN — PIPERACILLIN AND TAZOBACTAM 3375 MG: 3; .375 INJECTION, POWDER, LYOPHILIZED, FOR SOLUTION INTRAVENOUS at 03:56

## 2025-04-20 RX ADMIN — HEPARIN SODIUM 5000 UNITS: 5000 INJECTION INTRAVENOUS; SUBCUTANEOUS at 06:01

## 2025-04-20 RX ADMIN — SODIUM CHLORIDE: 9 INJECTION, SOLUTION INTRAVENOUS at 14:54

## 2025-04-20 RX ADMIN — HEPARIN SODIUM 5000 UNITS: 5000 INJECTION INTRAVENOUS; SUBCUTANEOUS at 22:24

## 2025-04-20 RX ADMIN — HEPARIN SODIUM 5000 UNITS: 5000 INJECTION INTRAVENOUS; SUBCUTANEOUS at 13:01

## 2025-04-20 ASSESSMENT — PULMONARY FUNCTION TESTS
PIF_VALUE: 23
PIF_VALUE: 31
PIF_VALUE: 18
PIF_VALUE: 23
PIF_VALUE: 18
PIF_VALUE: 25

## 2025-04-20 ASSESSMENT — PAIN SCALES - GENERAL
PAINLEVEL_OUTOF10: 0
PAINLEVEL_OUTOF10: 0

## 2025-04-21 PROBLEM — N18.4 CKD (CHRONIC KIDNEY DISEASE) STAGE 4, GFR 15-29 ML/MIN (HCC): Status: RESOLVED | Noted: 2021-01-20 | Resolved: 2025-04-21

## 2025-04-21 LAB
ANION GAP SERPL CALC-SCNC: 13 MMOL/L (ref 7–16)
ARTERIAL PATENCY WRIST A: POSITIVE
BASE DEFICIT BLD-SCNC: 4.6 MMOL/L
BASOPHILS # BLD: 0.02 K/UL (ref 0–0.2)
BASOPHILS NFR BLD: 0.4 % (ref 0–2)
BDY SITE: ABNORMAL
BUN SERPL-MCNC: 46 MG/DL (ref 8–23)
CALCIUM SERPL-MCNC: 6.6 MG/DL (ref 8.8–10.2)
CHLORIDE SERPL-SCNC: 105 MMOL/L (ref 98–107)
CO2 SERPL-SCNC: 17 MMOL/L (ref 20–29)
CREAT SERPL-MCNC: 4.61 MG/DL (ref 0.6–1.1)
DIFFERENTIAL METHOD BLD: ABNORMAL
EOSINOPHIL # BLD: 0.08 K/UL (ref 0–0.8)
EOSINOPHIL NFR BLD: 1.7 % (ref 0.5–7.8)
ERYTHROCYTE [DISTWIDTH] IN BLOOD BY AUTOMATED COUNT: 14.7 % (ref 11.9–14.6)
GAS FLOW.O2 O2 DELIVERY SYS: ABNORMAL
GLUCOSE BLD STRIP.AUTO-MCNC: 188 MG/DL (ref 65–100)
GLUCOSE BLD STRIP.AUTO-MCNC: 211 MG/DL (ref 65–100)
GLUCOSE BLD STRIP.AUTO-MCNC: 293 MG/DL (ref 65–100)
GLUCOSE BLD STRIP.AUTO-MCNC: 300 MG/DL (ref 65–100)
GLUCOSE SERPL-MCNC: 300 MG/DL (ref 70–99)
HCO3 BLD-SCNC: 20.2 MMOL/L (ref 21–28)
HCT VFR BLD AUTO: 27.5 % (ref 35.8–46.3)
HGB BLD-MCNC: 8.7 G/DL (ref 11.7–15.4)
IMM GRANULOCYTES # BLD AUTO: 0.01 K/UL (ref 0–0.5)
IMM GRANULOCYTES NFR BLD AUTO: 0.2 % (ref 0–5)
INSPIRATION.DURATION SETTING TIME VENT: 0.8 SEC
IPAP/PIP/HIGH PEEP: 27
LYMPHOCYTES # BLD: 0.78 K/UL (ref 0.5–4.6)
LYMPHOCYTES NFR BLD: 16.8 % (ref 13–44)
MAGNESIUM SERPL-MCNC: 1.7 MG/DL (ref 1.8–2.4)
MCH RBC QN AUTO: 29.4 PG (ref 26.1–32.9)
MCHC RBC AUTO-ENTMCNC: 31.6 G/DL (ref 31.4–35)
MCV RBC AUTO: 92.9 FL (ref 82–102)
MONOCYTES # BLD: 0.42 K/UL (ref 0.1–1.3)
MONOCYTES NFR BLD: 9.1 % (ref 4–12)
NEUTS SEG # BLD: 3.33 K/UL (ref 1.7–8.2)
NEUTS SEG NFR BLD: 71.8 % (ref 43–78)
NRBC # BLD: 0 K/UL (ref 0–0.2)
O2/TOTAL GAS SETTING VFR VENT: 21 %
PAW @ MEAN EXP FLOW ON VENT: 12 CMH2O
PCO2 BLD: 35.3 MMHG (ref 35–45)
PEEP RESPIRATORY: 8 CMH2O
PH BLD: 7.37 (ref 7.35–7.45)
PHOSPHATE SERPL-MCNC: 4.5 MG/DL (ref 2.5–4.5)
PLATELET # BLD AUTO: 96 K/UL (ref 150–450)
PMV BLD AUTO: 11.1 FL (ref 9.4–12.3)
PO2 BLD: 94 MMHG (ref 75–100)
POTASSIUM SERPL-SCNC: 3.7 MMOL/L (ref 3.5–5.1)
PRESSURE SUPPORT SETTING VENT: 10 CMH2O
RBC # BLD AUTO: 2.96 M/UL (ref 4.05–5.2)
RESPIRATORY RATE, POC: 16 (ref 5–40)
SAO2 % BLD: 97.1 % (ref 94–98)
SERVICE CMNT-IMP: ABNORMAL
SODIUM SERPL-SCNC: 135 MMOL/L (ref 136–145)
SPECIMEN TYPE: ABNORMAL
VENTILATION MODE VENT: ABNORMAL
VT SETTING VENT: 400 ML
WBC # BLD AUTO: 4.6 K/UL (ref 4.3–11.1)

## 2025-04-21 PROCEDURE — C9254 INJECTION, LACOSAMIDE: HCPCS | Performed by: STUDENT IN AN ORGANIZED HEALTH CARE EDUCATION/TRAINING PROGRAM

## 2025-04-21 PROCEDURE — 2580000003 HC RX 258: Performed by: INTERNAL MEDICINE

## 2025-04-21 PROCEDURE — 83735 ASSAY OF MAGNESIUM: CPT

## 2025-04-21 PROCEDURE — 90935 HEMODIALYSIS ONE EVALUATION: CPT

## 2025-04-21 PROCEDURE — 36600 WITHDRAWAL OF ARTERIAL BLOOD: CPT

## 2025-04-21 PROCEDURE — 86022 PLATELET ANTIBODIES: CPT

## 2025-04-21 PROCEDURE — 2500000003 HC RX 250 WO HCPCS

## 2025-04-21 PROCEDURE — 6360000002 HC RX W HCPCS: Performed by: STUDENT IN AN ORGANIZED HEALTH CARE EDUCATION/TRAINING PROGRAM

## 2025-04-21 PROCEDURE — 6360000002 HC RX W HCPCS: Performed by: INTERNAL MEDICINE

## 2025-04-21 PROCEDURE — 82962 GLUCOSE BLOOD TEST: CPT

## 2025-04-21 PROCEDURE — 84100 ASSAY OF PHOSPHORUS: CPT

## 2025-04-21 PROCEDURE — 2000000000 HC ICU R&B

## 2025-04-21 PROCEDURE — 6370000000 HC RX 637 (ALT 250 FOR IP): Performed by: INTERNAL MEDICINE

## 2025-04-21 PROCEDURE — 2500000003 HC RX 250 WO HCPCS: Performed by: INTERNAL MEDICINE

## 2025-04-21 PROCEDURE — 99231 SBSQ HOSP IP/OBS SF/LOW 25: CPT | Performed by: PSYCHIATRY & NEUROLOGY

## 2025-04-21 PROCEDURE — 82803 BLOOD GASES ANY COMBINATION: CPT

## 2025-04-21 PROCEDURE — 94003 VENT MGMT INPAT SUBQ DAY: CPT

## 2025-04-21 PROCEDURE — 85025 COMPLETE CBC W/AUTO DIFF WBC: CPT

## 2025-04-21 PROCEDURE — 99291 CRITICAL CARE FIRST HOUR: CPT | Performed by: INTERNAL MEDICINE

## 2025-04-21 PROCEDURE — 36415 COLL VENOUS BLD VENIPUNCTURE: CPT

## 2025-04-21 PROCEDURE — 80048 BASIC METABOLIC PNL TOTAL CA: CPT

## 2025-04-21 RX ORDER — DEXTROSE MONOHYDRATE 100 MG/ML
INJECTION, SOLUTION INTRAVENOUS CONTINUOUS PRN
Status: DISCONTINUED | OUTPATIENT
Start: 2025-04-21 | End: 2025-05-12 | Stop reason: HOSPADM

## 2025-04-21 RX ORDER — IBUPROFEN 600 MG/1
1 TABLET ORAL PRN
Status: DISCONTINUED | OUTPATIENT
Start: 2025-04-21 | End: 2025-05-12 | Stop reason: HOSPADM

## 2025-04-21 RX ADMIN — PANTOPRAZOLE SODIUM 40 MG: 40 INJECTION, POWDER, LYOPHILIZED, FOR SOLUTION INTRAVENOUS at 10:06

## 2025-04-21 RX ADMIN — LACOSAMIDE 100 MG: 10 INJECTION INTRAVENOUS at 21:40

## 2025-04-21 RX ADMIN — INSULIN LISPRO 4 UNITS: 100 INJECTION, SOLUTION INTRAVENOUS; SUBCUTANEOUS at 01:36

## 2025-04-21 RX ADMIN — CARVEDILOL 25 MG: 25 TABLET, FILM COATED ORAL at 21:46

## 2025-04-21 RX ADMIN — HYDRALAZINE HYDROCHLORIDE 50 MG: 50 TABLET ORAL at 05:37

## 2025-04-21 RX ADMIN — SODIUM CHLORIDE, PRESERVATIVE FREE 10 ML: 5 INJECTION INTRAVENOUS at 10:06

## 2025-04-21 RX ADMIN — LACOSAMIDE 100 MG: 10 INJECTION INTRAVENOUS at 10:06

## 2025-04-21 RX ADMIN — INSULIN LISPRO 2 UNITS: 100 INJECTION, SOLUTION INTRAVENOUS; SUBCUTANEOUS at 21:40

## 2025-04-21 RX ADMIN — SODIUM CHLORIDE, PRESERVATIVE FREE 10 ML: 5 INJECTION INTRAVENOUS at 21:48

## 2025-04-21 RX ADMIN — INSULIN LISPRO 2 UNITS: 100 INJECTION, SOLUTION INTRAVENOUS; SUBCUTANEOUS at 13:48

## 2025-04-21 RX ADMIN — HEPARIN SODIUM 5000 UNITS: 5000 INJECTION INTRAVENOUS; SUBCUTANEOUS at 21:47

## 2025-04-21 RX ADMIN — HYDRALAZINE HYDROCHLORIDE 50 MG: 50 TABLET ORAL at 13:49

## 2025-04-21 RX ADMIN — INSULIN LISPRO 6 UNITS: 100 INJECTION, SOLUTION INTRAVENOUS; SUBCUTANEOUS at 10:05

## 2025-04-21 RX ADMIN — HEPARIN SODIUM 5000 UNITS: 5000 INJECTION INTRAVENOUS; SUBCUTANEOUS at 05:36

## 2025-04-21 RX ADMIN — HYDRALAZINE HYDROCHLORIDE 50 MG: 50 TABLET ORAL at 21:46

## 2025-04-21 RX ADMIN — CARVEDILOL 25 MG: 25 TABLET, FILM COATED ORAL at 10:05

## 2025-04-21 RX ADMIN — WATER 1000 MG: 1 INJECTION INTRAMUSCULAR; INTRAVENOUS; SUBCUTANEOUS at 10:06

## 2025-04-21 ASSESSMENT — PAIN SCALES - GENERAL
PAINLEVEL_OUTOF10: 0

## 2025-04-21 ASSESSMENT — PULMONARY FUNCTION TESTS
PIF_VALUE: 27
PIF_VALUE: 36
PIF_VALUE: 23
PIF_VALUE: 31
PIF_VALUE: 32
PIF_VALUE: 33

## 2025-04-21 NOTE — CARE COORDINATION
Chart reviewed and pt discussed in am IDR. Continues CCU. Intubated, vent -21% fio2. IVF but off gtts. Nephrology following with Intensivist. CM will continue following. LOS 4  days.

## 2025-04-21 NOTE — DIALYSIS
HD initiated using R CVC.  Consent verified for renal replacement therapy.       Patient unresponsive except pain , /86,  P64  , SPO2 100 via vent    Aspirated and flushed both ports without difficulty. Dressing clean dry intact, Dated: 4/18/25 . Machine settings per MD order.    Heparin 0 unit bolus and 0 units/hr.      Will monitor during treatment.

## 2025-04-21 NOTE — DIALYSIS
Hemodialysis treatment completed, Pt ran 3.5 hours,  without complications.     Patient a/ox3 and VS stable  /87  P 64 , SPO2 100 on vent.    2.5 Kg removed    Flushed both ports with 10 mL of NS.  CVC dressing clean, dry, and intact, tego caps intact.     Patient remains in 3304.

## 2025-04-21 NOTE — INTERDISCIPLINARY ROUNDS
Multi-D Rounds/Checklist (leapfrog):  Lines: can any be removed?: None   ETT  (Active)     NG/OG/NJ/NE Tube Orogastric 16 fr Left mouth (Active)       External Urinary Catheter (Active)     Hemodialysis Central Access - Permanent/Tunneled Right Subclavian (Active)       Hemodialysis Fistula/Graft Superior Arm (Active)     DVT Prophylaxis: Ordered  Vent: HOB elevated? Yes ;  mL/k ; Vent day 3  Nutrition Ordered/appropriate: Ordered  Can antibiotics or other drugs be stopped? Yes/End Date set Yes/No  MRSA swab:   Inpat Anti-Infectives (From admission, onward)       Start     Ordered Stop    25 1100  cefTRIAXone (ROCEPHIN) 1,000 mg in sterile water 10 mL IV syringe  1,000 mg,   IntraVENous,   EVERY 24 HOURS         25 1000 25 1059                  Consults needed: None  A: Is pain control adequate? (has PRNs? Stop drip?) Yes  B: Sedation break and SBT? N/A  C: Is sedation choice appropriate? N/A  D: Delirium/CAM-ICU? Yes  E: Mobility goals/appropriateness? Yes  F: Family update and plan? daughter is surrogate decision maker and is being updated daily by primary attending and nursing staff.    Denise Martell, APRN - CNP

## 2025-04-22 LAB
ANION GAP SERPL CALC-SCNC: 11 MMOL/L (ref 7–16)
ARTERIAL PATENCY WRIST A: POSITIVE
BASE EXCESS BLD CALC-SCNC: 2.7 MMOL/L
BDY SITE: ABNORMAL
BUN SERPL-MCNC: 24 MG/DL (ref 8–23)
CALCIUM SERPL-MCNC: 7.7 MG/DL (ref 8.8–10.2)
CHLORIDE SERPL-SCNC: 100 MMOL/L (ref 98–107)
CO2 SERPL-SCNC: 22 MMOL/L (ref 20–29)
CREAT SERPL-MCNC: 3.29 MG/DL (ref 0.6–1.1)
GAS FLOW.O2 O2 DELIVERY SYS: ABNORMAL
GLUCOSE BLD STRIP.AUTO-MCNC: 178 MG/DL (ref 65–100)
GLUCOSE BLD STRIP.AUTO-MCNC: 215 MG/DL (ref 65–100)
GLUCOSE BLD STRIP.AUTO-MCNC: 222 MG/DL (ref 65–100)
GLUCOSE BLD STRIP.AUTO-MCNC: 246 MG/DL (ref 65–100)
GLUCOSE SERPL-MCNC: 228 MG/DL (ref 70–99)
HCO3 BLD-SCNC: 26.5 MMOL/L (ref 21–28)
HEPARIN INDUCED PLATELET ANTIBODY: POSITIVE
HIT INTERPRETATION: POSITIVE
HIT OPTICAL DENSITY: 0.59 ABS
HIT PROFILE: ABNORMAL
INSPIRATION.DURATION SETTING TIME VENT: 0.8 SEC
IPAP/PIP/HIGH PEEP: 32
MAGNESIUM SERPL-MCNC: 1.9 MG/DL (ref 1.8–2.4)
O2/TOTAL GAS SETTING VFR VENT: 21 %
PAW @ MEAN EXP FLOW ON VENT: 12 CMH2O
PCO2 BLD: 37.3 MMHG (ref 35–45)
PEEP RESPIRATORY: 8 CMH2O
PH BLD: 7.46 (ref 7.35–7.45)
PHOSPHATE SERPL-MCNC: 2.5 MG/DL (ref 2.5–4.5)
PO2 BLD: 85 MMHG (ref 75–100)
POTASSIUM SERPL-SCNC: 3.5 MMOL/L (ref 3.5–5.1)
RESPIRATORY RATE, POC: 14 (ref 5–40)
SAO2 % BLD: 97 % (ref 94–98)
SERVICE CMNT-IMP: ABNORMAL
SODIUM SERPL-SCNC: 133 MMOL/L (ref 136–145)
SPECIMEN TYPE: ABNORMAL
VENTILATION MODE VENT: ABNORMAL
VIT B6 SERPL-MCNC: 4.6 UG/L (ref 3.4–65.2)
VT SETTING VENT: 400 ML

## 2025-04-22 PROCEDURE — 94003 VENT MGMT INPAT SUBQ DAY: CPT

## 2025-04-22 PROCEDURE — C9254 INJECTION, LACOSAMIDE: HCPCS | Performed by: STUDENT IN AN ORGANIZED HEALTH CARE EDUCATION/TRAINING PROGRAM

## 2025-04-22 PROCEDURE — 6360000002 HC RX W HCPCS: Performed by: STUDENT IN AN ORGANIZED HEALTH CARE EDUCATION/TRAINING PROGRAM

## 2025-04-22 PROCEDURE — 2000000000 HC ICU R&B

## 2025-04-22 PROCEDURE — 99291 CRITICAL CARE FIRST HOUR: CPT | Performed by: INTERNAL MEDICINE

## 2025-04-22 PROCEDURE — 6360000002 HC RX W HCPCS: Performed by: INTERNAL MEDICINE

## 2025-04-22 PROCEDURE — 2500000003 HC RX 250 WO HCPCS: Performed by: INTERNAL MEDICINE

## 2025-04-22 PROCEDURE — 36600 WITHDRAWAL OF ARTERIAL BLOOD: CPT

## 2025-04-22 PROCEDURE — 6370000000 HC RX 637 (ALT 250 FOR IP): Performed by: INTERNAL MEDICINE

## 2025-04-22 PROCEDURE — 90935 HEMODIALYSIS ONE EVALUATION: CPT

## 2025-04-22 PROCEDURE — 36415 COLL VENOUS BLD VENIPUNCTURE: CPT

## 2025-04-22 PROCEDURE — 84100 ASSAY OF PHOSPHORUS: CPT

## 2025-04-22 PROCEDURE — 6360000002 HC RX W HCPCS

## 2025-04-22 PROCEDURE — 2580000003 HC RX 258: Performed by: INTERNAL MEDICINE

## 2025-04-22 PROCEDURE — 82962 GLUCOSE BLOOD TEST: CPT

## 2025-04-22 PROCEDURE — 82803 BLOOD GASES ANY COMBINATION: CPT

## 2025-04-22 PROCEDURE — 80048 BASIC METABOLIC PNL TOTAL CA: CPT

## 2025-04-22 PROCEDURE — 2500000003 HC RX 250 WO HCPCS

## 2025-04-22 PROCEDURE — 83735 ASSAY OF MAGNESIUM: CPT

## 2025-04-22 RX ORDER — HYDRALAZINE HYDROCHLORIDE 20 MG/ML
20 INJECTION INTRAMUSCULAR; INTRAVENOUS EVERY 4 HOURS PRN
Status: DISCONTINUED | OUTPATIENT
Start: 2025-04-22 | End: 2025-05-12 | Stop reason: HOSPADM

## 2025-04-22 RX ADMIN — INSULIN LISPRO 2 UNITS: 100 INJECTION, SOLUTION INTRAVENOUS; SUBCUTANEOUS at 02:34

## 2025-04-22 RX ADMIN — INSULIN LISPRO 2 UNITS: 100 INJECTION, SOLUTION INTRAVENOUS; SUBCUTANEOUS at 20:13

## 2025-04-22 RX ADMIN — SODIUM CHLORIDE, PRESERVATIVE FREE 10 ML: 5 INJECTION INTRAVENOUS at 21:47

## 2025-04-22 RX ADMIN — INSULIN LISPRO 2 UNITS: 100 INJECTION, SOLUTION INTRAVENOUS; SUBCUTANEOUS at 07:58

## 2025-04-22 RX ADMIN — SODIUM CHLORIDE, PRESERVATIVE FREE 10 ML: 5 INJECTION INTRAVENOUS at 07:59

## 2025-04-22 RX ADMIN — PANTOPRAZOLE SODIUM 40 MG: 40 INJECTION, POWDER, LYOPHILIZED, FOR SOLUTION INTRAVENOUS at 07:59

## 2025-04-22 RX ADMIN — LACOSAMIDE 100 MG: 10 INJECTION INTRAVENOUS at 21:45

## 2025-04-22 RX ADMIN — HYDRALAZINE HYDROCHLORIDE 50 MG: 50 TABLET ORAL at 21:46

## 2025-04-22 RX ADMIN — WATER 1000 MG: 1 INJECTION INTRAMUSCULAR; INTRAVENOUS; SUBCUTANEOUS at 11:55

## 2025-04-22 RX ADMIN — HEPARIN SODIUM 5000 UNITS: 5000 INJECTION INTRAVENOUS; SUBCUTANEOUS at 06:46

## 2025-04-22 RX ADMIN — HYDRALAZINE HYDROCHLORIDE 50 MG: 50 TABLET ORAL at 13:08

## 2025-04-22 RX ADMIN — HYDRALAZINE HYDROCHLORIDE 50 MG: 50 TABLET ORAL at 06:45

## 2025-04-22 RX ADMIN — CARVEDILOL 25 MG: 25 TABLET, FILM COATED ORAL at 07:59

## 2025-04-22 RX ADMIN — HEPARIN SODIUM 5000 UNITS: 5000 INJECTION INTRAVENOUS; SUBCUTANEOUS at 21:45

## 2025-04-22 RX ADMIN — LACOSAMIDE 100 MG: 10 INJECTION INTRAVENOUS at 08:36

## 2025-04-22 RX ADMIN — POTASSIUM CHLORIDE 10 MEQ: 7.46 INJECTION, SOLUTION INTRAVENOUS at 08:42

## 2025-04-22 RX ADMIN — CARVEDILOL 25 MG: 25 TABLET, FILM COATED ORAL at 21:46

## 2025-04-22 RX ADMIN — HYDRALAZINE HYDROCHLORIDE 20 MG: 20 INJECTION INTRAMUSCULAR; INTRAVENOUS at 13:54

## 2025-04-22 RX ADMIN — HEPARIN SODIUM 5000 UNITS: 5000 INJECTION INTRAVENOUS; SUBCUTANEOUS at 13:09

## 2025-04-22 ASSESSMENT — PAIN SCALES - GENERAL
PAINLEVEL_OUTOF10: 0

## 2025-04-22 ASSESSMENT — PULMONARY FUNCTION TESTS
PIF_VALUE: 34
PIF_VALUE: 26
PIF_VALUE: 26
PIF_VALUE: 25

## 2025-04-22 NOTE — INTERDISCIPLINARY ROUNDS
Multi-D Rounds/Checklist (leapfrog):  Lines: can any be removed?: None   ETT  (Active)     NG/OG/NJ/NE Tube Orogastric 16 fr Left mouth (Active)       External Urinary Catheter (Active)     Hemodialysis Central Access - Permanent/Tunneled Right Subclavian (Active)       Hemodialysis Fistula/Graft Superior Arm (Active)     DVT Prophylaxis: Ordered  Vent: HOB elevated? Yes ;  mL/k ; Vent day 4  Nutrition Ordered/appropriate: Ordered  Can antibiotics or other drugs be stopped? Yes/End Date set Yes/No  MRSA swab:   Inpat Anti-Infectives (From admission, onward)       Start     Ordered Stop    25 1100  cefTRIAXone (ROCEPHIN) 1,000 mg in sterile water 10 mL IV syringe  1,000 mg,   IntraVENous,   EVERY 24 HOURS         25 1000 25 1059                  Consults needed: None  A: Is pain control adequate? (has PRNs? Stop drip?) Yes  B: Sedation break and SBT? N/A  C: Is sedation choice appropriate? N/A  D: Delirium/CAM-ICU? Yes  E: Mobility goals/appropriateness? Yes  F: Family update and plan? daughter is surrogate decision maker and is being updated daily by primary attending and nursing staff.    Aylin Wooten, MATTHEW - CNP

## 2025-04-22 NOTE — DIALYSIS
HD initiated using R tunneled CVC.  Consent verified for renal replacement therapy.       Patient baseline , /70,  P62  , patient on vent    Aspirated and flushed both ports without difficulty. Dressing clean, dry, intact, Dated: 4/18/25, dressing change due today. . Machine settings per MD order.    Heparin 0 unit bolus and 0 units/hr.      Will monitor during treatment.

## 2025-04-22 NOTE — DIALYSIS
Hemodialysis treatment completed, Pt ran 2 hours,  without complications.     Patient baseline and VS stable  /84  P 72 , patient on vent    1 Kg removed    Flushed both ports with 10 mL of NS.  CVC dressing clean, dry, and intact, tego caps intact.     Patient remains in 3304.

## 2025-04-23 LAB
ANION GAP SERPL CALC-SCNC: 10 MMOL/L (ref 7–16)
ARTERIAL PATENCY WRIST A: POSITIVE
BACTERIA SPEC CULT: NORMAL
BACTERIA SPEC CULT: NORMAL
BASE EXCESS BLD CALC-SCNC: 3.6 MMOL/L
BASOPHILS # BLD: 0.02 K/UL (ref 0–0.2)
BASOPHILS NFR BLD: 0.4 % (ref 0–2)
BDY SITE: ABNORMAL
BUN SERPL-MCNC: 20 MG/DL (ref 8–23)
CALCIUM SERPL-MCNC: 7.8 MG/DL (ref 8.8–10.2)
CHLORIDE SERPL-SCNC: 99 MMOL/L (ref 98–107)
CO2 SERPL-SCNC: 24 MMOL/L (ref 20–29)
CREAT SERPL-MCNC: 3.19 MG/DL (ref 0.6–1.1)
DIFFERENTIAL METHOD BLD: ABNORMAL
EOSINOPHIL # BLD: 0.18 K/UL (ref 0–0.8)
EOSINOPHIL NFR BLD: 3.6 % (ref 0.5–7.8)
ERYTHROCYTE [DISTWIDTH] IN BLOOD BY AUTOMATED COUNT: 14.5 % (ref 11.9–14.6)
GAS FLOW.O2 O2 DELIVERY SYS: ABNORMAL
GLUCOSE BLD STRIP.AUTO-MCNC: 194 MG/DL (ref 65–100)
GLUCOSE BLD STRIP.AUTO-MCNC: 211 MG/DL (ref 65–100)
GLUCOSE BLD STRIP.AUTO-MCNC: 225 MG/DL (ref 65–100)
GLUCOSE BLD STRIP.AUTO-MCNC: 228 MG/DL (ref 65–100)
GLUCOSE BLD STRIP.AUTO-MCNC: 252 MG/DL (ref 65–100)
GLUCOSE SERPL-MCNC: 207 MG/DL (ref 70–99)
HCO3 BLD-SCNC: 27.1 MMOL/L (ref 21–28)
HCT VFR BLD AUTO: 31.5 % (ref 35.8–46.3)
HGB BLD-MCNC: 10 G/DL (ref 11.7–15.4)
IMM GRANULOCYTES # BLD AUTO: 0.02 K/UL (ref 0–0.5)
IMM GRANULOCYTES NFR BLD AUTO: 0.4 % (ref 0–5)
INSPIRATION.DURATION SETTING TIME VENT: 1 SEC
IPAP/PIP/HIGH PEEP: 24
LYMPHOCYTES # BLD: 0.99 K/UL (ref 0.5–4.6)
LYMPHOCYTES NFR BLD: 20 % (ref 13–44)
MAGNESIUM SERPL-MCNC: 2 MG/DL (ref 1.8–2.4)
MCH RBC QN AUTO: 28.8 PG (ref 26.1–32.9)
MCHC RBC AUTO-ENTMCNC: 31.7 G/DL (ref 31.4–35)
MCV RBC AUTO: 90.8 FL (ref 82–102)
MONOCYTES # BLD: 0.55 K/UL (ref 0.1–1.3)
MONOCYTES NFR BLD: 11.1 % (ref 4–12)
NEUTS SEG # BLD: 3.2 K/UL (ref 1.7–8.2)
NEUTS SEG NFR BLD: 64.5 % (ref 43–78)
NRBC # BLD: 0 K/UL (ref 0–0.2)
O2/TOTAL GAS SETTING VFR VENT: 21 %
PAW @ MEAN EXP FLOW ON VENT: 12 CMH2O
PCO2 BLD: 36.1 MMHG (ref 35–45)
PEEP RESPIRATORY: 8 CMH2O
PH BLD: 7.48 (ref 7.35–7.45)
PHOSPHATE SERPL-MCNC: 1.7 MG/DL (ref 2.5–4.5)
PHOSPHATE SERPL-MCNC: 2.6 MG/DL (ref 2.5–4.5)
PLATELET # BLD AUTO: 101 K/UL (ref 150–450)
PMV BLD AUTO: 11.6 FL (ref 9.4–12.3)
PO2 BLD: 81 MMHG (ref 75–100)
POTASSIUM SERPL-SCNC: 3.2 MMOL/L (ref 3.5–5.1)
POTASSIUM SERPL-SCNC: 3.8 MMOL/L (ref 3.5–5.1)
PRESSURE SUPPORT SETTING VENT: 10 CMH2O
RBC # BLD AUTO: 3.47 M/UL (ref 4.05–5.2)
RESPIRATORY RATE, POC: 14 (ref 5–40)
SAO2 % BLD: 96.8 % (ref 94–98)
SERVICE CMNT-IMP: ABNORMAL
SERVICE CMNT-IMP: NORMAL
SERVICE CMNT-IMP: NORMAL
SODIUM SERPL-SCNC: 134 MMOL/L (ref 136–145)
SPECIMEN TYPE: ABNORMAL
VENTILATION MODE VENT: ABNORMAL
VT SETTING VENT: 400 ML
WBC # BLD AUTO: 5 K/UL (ref 4.3–11.1)

## 2025-04-23 PROCEDURE — 6360000002 HC RX W HCPCS

## 2025-04-23 PROCEDURE — 6360000002 HC RX W HCPCS: Performed by: STUDENT IN AN ORGANIZED HEALTH CARE EDUCATION/TRAINING PROGRAM

## 2025-04-23 PROCEDURE — 2500000003 HC RX 250 WO HCPCS

## 2025-04-23 PROCEDURE — 82803 BLOOD GASES ANY COMBINATION: CPT

## 2025-04-23 PROCEDURE — 94003 VENT MGMT INPAT SUBQ DAY: CPT

## 2025-04-23 PROCEDURE — 2580000003 HC RX 258: Performed by: INTERNAL MEDICINE

## 2025-04-23 PROCEDURE — C9254 INJECTION, LACOSAMIDE: HCPCS | Performed by: STUDENT IN AN ORGANIZED HEALTH CARE EDUCATION/TRAINING PROGRAM

## 2025-04-23 PROCEDURE — 2500000003 HC RX 250 WO HCPCS: Performed by: INTERNAL MEDICINE

## 2025-04-23 PROCEDURE — 2000000000 HC ICU R&B

## 2025-04-23 PROCEDURE — 82962 GLUCOSE BLOOD TEST: CPT

## 2025-04-23 PROCEDURE — 83735 ASSAY OF MAGNESIUM: CPT

## 2025-04-23 PROCEDURE — 84132 ASSAY OF SERUM POTASSIUM: CPT

## 2025-04-23 PROCEDURE — 36600 WITHDRAWAL OF ARTERIAL BLOOD: CPT

## 2025-04-23 PROCEDURE — 82542 COL CHROMOTOGRAPHY QUAL/QUAN: CPT

## 2025-04-23 PROCEDURE — 36415 COLL VENOUS BLD VENIPUNCTURE: CPT

## 2025-04-23 PROCEDURE — 80048 BASIC METABOLIC PNL TOTAL CA: CPT

## 2025-04-23 PROCEDURE — 99291 CRITICAL CARE FIRST HOUR: CPT | Performed by: INTERNAL MEDICINE

## 2025-04-23 PROCEDURE — 85025 COMPLETE CBC W/AUTO DIFF WBC: CPT

## 2025-04-23 PROCEDURE — 6360000002 HC RX W HCPCS: Performed by: INTERNAL MEDICINE

## 2025-04-23 PROCEDURE — 6370000000 HC RX 637 (ALT 250 FOR IP): Performed by: INTERNAL MEDICINE

## 2025-04-23 PROCEDURE — 84100 ASSAY OF PHOSPHORUS: CPT

## 2025-04-23 RX ORDER — INSULIN GLARGINE 100 [IU]/ML
5 INJECTION, SOLUTION SUBCUTANEOUS DAILY
Status: DISCONTINUED | OUTPATIENT
Start: 2025-04-23 | End: 2025-04-25

## 2025-04-23 RX ADMIN — CARVEDILOL 25 MG: 25 TABLET, FILM COATED ORAL at 09:17

## 2025-04-23 RX ADMIN — HYDRALAZINE HYDROCHLORIDE 50 MG: 50 TABLET ORAL at 20:03

## 2025-04-23 RX ADMIN — CARVEDILOL 25 MG: 25 TABLET, FILM COATED ORAL at 20:03

## 2025-04-23 RX ADMIN — HEPARIN SODIUM 5000 UNITS: 5000 INJECTION INTRAVENOUS; SUBCUTANEOUS at 06:45

## 2025-04-23 RX ADMIN — SODIUM CHLORIDE, PRESERVATIVE FREE 10 ML: 5 INJECTION INTRAVENOUS at 21:09

## 2025-04-23 RX ADMIN — POTASSIUM CHLORIDE 10 MEQ: 7.46 INJECTION, SOLUTION INTRAVENOUS at 06:51

## 2025-04-23 RX ADMIN — SODIUM CHLORIDE, PRESERVATIVE FREE 10 ML: 5 INJECTION INTRAVENOUS at 09:17

## 2025-04-23 RX ADMIN — INSULIN LISPRO 4 UNITS: 100 INJECTION, SOLUTION INTRAVENOUS; SUBCUTANEOUS at 02:27

## 2025-04-23 RX ADMIN — PANTOPRAZOLE SODIUM 40 MG: 40 INJECTION, POWDER, LYOPHILIZED, FOR SOLUTION INTRAVENOUS at 09:14

## 2025-04-23 RX ADMIN — WATER 1000 MG: 1 INJECTION INTRAMUSCULAR; INTRAVENOUS; SUBCUTANEOUS at 10:16

## 2025-04-23 RX ADMIN — INSULIN GLARGINE 5 UNITS: 100 INJECTION, SOLUTION SUBCUTANEOUS at 09:13

## 2025-04-23 RX ADMIN — HYDRALAZINE HYDROCHLORIDE 50 MG: 50 TABLET ORAL at 07:02

## 2025-04-23 RX ADMIN — SODIUM PHOSPHATE, MONOBASIC, MONOHYDRATE AND SODIUM PHOSPHATE, DIBASIC, ANHYDROUS 15 MMOL: 142; 276 INJECTION, SOLUTION INTRAVENOUS at 09:20

## 2025-04-23 RX ADMIN — INSULIN LISPRO 2 UNITS: 100 INJECTION, SOLUTION INTRAVENOUS; SUBCUTANEOUS at 23:47

## 2025-04-23 RX ADMIN — LACOSAMIDE 100 MG: 10 INJECTION INTRAVENOUS at 21:09

## 2025-04-23 RX ADMIN — HYDRALAZINE HYDROCHLORIDE 50 MG: 50 TABLET ORAL at 14:13

## 2025-04-23 RX ADMIN — INSULIN LISPRO 2 UNITS: 100 INJECTION, SOLUTION INTRAVENOUS; SUBCUTANEOUS at 09:13

## 2025-04-23 RX ADMIN — LACOSAMIDE 100 MG: 10 INJECTION INTRAVENOUS at 09:17

## 2025-04-23 RX ADMIN — POTASSIUM CHLORIDE 10 MEQ: 7.46 INJECTION, SOLUTION INTRAVENOUS at 09:18

## 2025-04-23 RX ADMIN — APIXABAN 2.5 MG: 5 TABLET, FILM COATED ORAL at 18:18

## 2025-04-23 RX ADMIN — POTASSIUM CHLORIDE 10 MEQ: 7.46 INJECTION, SOLUTION INTRAVENOUS at 11:24

## 2025-04-23 RX ADMIN — POTASSIUM CHLORIDE 10 MEQ: 7.46 INJECTION, SOLUTION INTRAVENOUS at 10:16

## 2025-04-23 RX ADMIN — INSULIN LISPRO 2 UNITS: 100 INJECTION, SOLUTION INTRAVENOUS; SUBCUTANEOUS at 11:30

## 2025-04-23 RX ADMIN — INSULIN LISPRO 2 UNITS: 100 INJECTION, SOLUTION INTRAVENOUS; SUBCUTANEOUS at 18:18

## 2025-04-23 ASSESSMENT — PULMONARY FUNCTION TESTS
PIF_VALUE: 25
PIF_VALUE: 23
PIF_VALUE: 25
PIF_VALUE: 27

## 2025-04-23 ASSESSMENT — PAIN SCALES - GENERAL
PAINLEVEL_OUTOF10: 0

## 2025-04-23 NOTE — INTERDISCIPLINARY ROUNDS
Multi-D Rounds/Checklist (leapfrog):  Lines: can any be removed?: None   ETT  (Active)     NG/OG/NJ/NE Tube Orogastric 16 fr Left mouth (Active)       External Urinary Catheter (Active)     Hemodialysis Central Access - Permanent/Tunneled Right Subclavian (Active)       Hemodialysis Fistula/Graft Superior Arm (Active)     DVT Prophylaxis: Ordered  Vent: HOB elevated? Yes ;  mL/k ; Vent day 6  Nutrition Ordered/appropriate: Ordered  Can antibiotics or other drugs be stopped? Yes/End Date set Yes/No  MRSA swab:   Inpat Anti-Infectives (From admission, onward)       Start     Ordered Stop    25 1100  cefTRIAXone (ROCEPHIN) 1,000 mg in sterile water 10 mL IV syringe  1,000 mg,   IntraVENous,   EVERY 24 HOURS         25 1000 25 1059                  Consults needed: None  A: Is pain control adequate? (has PRNs? Stop drip?) Yes  B: Sedation break and SBT? N/A  C: Is sedation choice appropriate? N/A  D: Delirium/CAM-ICU? No  E: Mobility goals/appropriateness? Yes  F: Family update and plan? daughter is surrogate decision maker and is being updated daily by primary attending and nursing staff.    Denise Martell, APRN - CNP

## 2025-04-23 NOTE — CARE COORDINATION
Chart reviewed and pt discussed in am IDR. Continues CCU intubated/vent -21% fio2. Off gtts currently. Slowly responding per staff now.  HD per Nephrology. CM following for MARIPOSA needs/POC when stable per MD. LOS 6 days.

## 2025-04-24 ENCOUNTER — APPOINTMENT (OUTPATIENT)
Dept: GENERAL RADIOLOGY | Age: 80
DRG: 100 | End: 2025-04-24
Payer: MEDICARE

## 2025-04-24 LAB
ANION GAP SERPL CALC-SCNC: 10 MMOL/L (ref 7–16)
ARTERIAL PATENCY WRIST A: POSITIVE
BASE EXCESS BLD CALC-SCNC: 1.6 MMOL/L
BDY SITE: NORMAL
BUN SERPL-MCNC: 27 MG/DL (ref 8–23)
CALCIUM SERPL-MCNC: 7.8 MG/DL (ref 8.8–10.2)
CHLORIDE SERPL-SCNC: 98 MMOL/L (ref 98–107)
CO2 SERPL-SCNC: 24 MMOL/L (ref 20–29)
CREAT SERPL-MCNC: 3.95 MG/DL (ref 0.6–1.1)
GAS FLOW.O2 O2 DELIVERY SYS: NORMAL
GLUCOSE BLD STRIP.AUTO-MCNC: 197 MG/DL (ref 65–100)
GLUCOSE BLD STRIP.AUTO-MCNC: 212 MG/DL (ref 65–100)
GLUCOSE BLD STRIP.AUTO-MCNC: 274 MG/DL (ref 65–100)
GLUCOSE SERPL-MCNC: 195 MG/DL (ref 70–99)
HCO3 BLD-SCNC: 25.7 MMOL/L (ref 21–28)
MAGNESIUM SERPL-MCNC: 2 MG/DL (ref 1.8–2.4)
O2/TOTAL GAS SETTING VFR VENT: 21 %
PCO2 BLD: 37.8 MMHG (ref 35–45)
PEEP RESPIRATORY: 8 CMH2O
PH BLD: 7.44 (ref 7.35–7.45)
PHOSPHATE SERPL-MCNC: 2.9 MG/DL (ref 2.5–4.5)
PO2 BLD: 82 MMHG (ref 75–100)
POTASSIUM SERPL-SCNC: 3.6 MMOL/L (ref 3.5–5.1)
PRESSURE SUPPORT SETTING VENT: 10 CMH2O
SAO2 % BLD: 96.5 % (ref 94–98)
SERVICE CMNT-IMP: ABNORMAL
SERVICE CMNT-IMP: NORMAL
SERVICE CMNT-IMP: NORMAL
SODIUM SERPL-SCNC: 132 MMOL/L (ref 136–145)
SPECIMEN TYPE: NORMAL
VENTILATION MODE VENT: NORMAL
VT SETTING VENT: 400 ML

## 2025-04-24 PROCEDURE — 74018 RADEX ABDOMEN 1 VIEW: CPT

## 2025-04-24 PROCEDURE — 36415 COLL VENOUS BLD VENIPUNCTURE: CPT

## 2025-04-24 PROCEDURE — C9254 INJECTION, LACOSAMIDE: HCPCS | Performed by: STUDENT IN AN ORGANIZED HEALTH CARE EDUCATION/TRAINING PROGRAM

## 2025-04-24 PROCEDURE — 2580000003 HC RX 258: Performed by: INTERNAL MEDICINE

## 2025-04-24 PROCEDURE — 99291 CRITICAL CARE FIRST HOUR: CPT | Performed by: INTERNAL MEDICINE

## 2025-04-24 PROCEDURE — 6360000002 HC RX W HCPCS: Performed by: STUDENT IN AN ORGANIZED HEALTH CARE EDUCATION/TRAINING PROGRAM

## 2025-04-24 PROCEDURE — 82803 BLOOD GASES ANY COMBINATION: CPT

## 2025-04-24 PROCEDURE — 84100 ASSAY OF PHOSPHORUS: CPT

## 2025-04-24 PROCEDURE — 2709999900 HC NON-CHARGEABLE SUPPLY

## 2025-04-24 PROCEDURE — 90935 HEMODIALYSIS ONE EVALUATION: CPT

## 2025-04-24 PROCEDURE — 94003 VENT MGMT INPAT SUBQ DAY: CPT

## 2025-04-24 PROCEDURE — 83735 ASSAY OF MAGNESIUM: CPT

## 2025-04-24 PROCEDURE — 2000000000 HC ICU R&B

## 2025-04-24 PROCEDURE — 71045 X-RAY EXAM CHEST 1 VIEW: CPT

## 2025-04-24 PROCEDURE — 80048 BASIC METABOLIC PNL TOTAL CA: CPT

## 2025-04-24 PROCEDURE — 6360000002 HC RX W HCPCS: Performed by: INTERNAL MEDICINE

## 2025-04-24 PROCEDURE — 2500000003 HC RX 250 WO HCPCS

## 2025-04-24 PROCEDURE — 2500000003 HC RX 250 WO HCPCS: Performed by: INTERNAL MEDICINE

## 2025-04-24 PROCEDURE — 6370000000 HC RX 637 (ALT 250 FOR IP): Performed by: INTERNAL MEDICINE

## 2025-04-24 PROCEDURE — 36600 WITHDRAWAL OF ARTERIAL BLOOD: CPT

## 2025-04-24 PROCEDURE — 82962 GLUCOSE BLOOD TEST: CPT

## 2025-04-24 RX ADMIN — LACOSAMIDE 100 MG: 10 INJECTION INTRAVENOUS at 09:44

## 2025-04-24 RX ADMIN — LACOSAMIDE 100 MG: 10 INJECTION INTRAVENOUS at 20:30

## 2025-04-24 RX ADMIN — INSULIN GLARGINE 5 UNITS: 100 INJECTION, SOLUTION SUBCUTANEOUS at 09:44

## 2025-04-24 RX ADMIN — HYDRALAZINE HYDROCHLORIDE 20 MG: 20 INJECTION INTRAMUSCULAR; INTRAVENOUS at 04:46

## 2025-04-24 RX ADMIN — SODIUM CHLORIDE, PRESERVATIVE FREE 10 ML: 5 INJECTION INTRAVENOUS at 22:59

## 2025-04-24 RX ADMIN — INSULIN LISPRO 4 UNITS: 100 INJECTION, SOLUTION INTRAVENOUS; SUBCUTANEOUS at 13:37

## 2025-04-24 RX ADMIN — INSULIN LISPRO 2 UNITS: 100 INJECTION, SOLUTION INTRAVENOUS; SUBCUTANEOUS at 20:29

## 2025-04-24 RX ADMIN — HYDRALAZINE HYDROCHLORIDE 50 MG: 50 TABLET ORAL at 05:39

## 2025-04-24 RX ADMIN — INSULIN LISPRO 2 UNITS: 100 INJECTION, SOLUTION INTRAVENOUS; SUBCUTANEOUS at 05:38

## 2025-04-24 RX ADMIN — SODIUM CHLORIDE, PRESERVATIVE FREE 10 ML: 5 INJECTION INTRAVENOUS at 09:44

## 2025-04-24 RX ADMIN — PANTOPRAZOLE SODIUM 40 MG: 40 INJECTION, POWDER, LYOPHILIZED, FOR SOLUTION INTRAVENOUS at 09:44

## 2025-04-24 RX ADMIN — CARVEDILOL 25 MG: 25 TABLET, FILM COATED ORAL at 22:59

## 2025-04-24 RX ADMIN — HYDRALAZINE HYDROCHLORIDE 50 MG: 50 TABLET ORAL at 22:59

## 2025-04-24 RX ADMIN — WATER 1000 MG: 1 INJECTION INTRAMUSCULAR; INTRAVENOUS; SUBCUTANEOUS at 12:06

## 2025-04-24 RX ADMIN — APIXABAN 2.5 MG: 5 TABLET, FILM COATED ORAL at 18:38

## 2025-04-24 RX ADMIN — CARVEDILOL 25 MG: 25 TABLET, FILM COATED ORAL at 09:44

## 2025-04-24 RX ADMIN — HYDRALAZINE HYDROCHLORIDE 50 MG: 50 TABLET ORAL at 13:38

## 2025-04-24 RX ADMIN — APIXABAN 2.5 MG: 5 TABLET, FILM COATED ORAL at 05:39

## 2025-04-24 ASSESSMENT — PAIN SCALES - GENERAL
PAINLEVEL_OUTOF10: 0

## 2025-04-24 ASSESSMENT — PULMONARY FUNCTION TESTS
PIF_VALUE: 20
PIF_VALUE: 33
PIF_VALUE: 20
PIF_VALUE: 23
PIF_VALUE: 29

## 2025-04-24 NOTE — DIALYSIS
Hemodialysis treatment completed, Pt ran 3.5 hours,  without complications.     Patient alert and VS stable  /74  P 76 , SPO2 100 on Vent    2 Kg removed    Flushed both ports with 10 mL of NS.  CVC dressing clean, dry, and intact, tego caps intact.     Patient remains in 3304.

## 2025-04-24 NOTE — INTERDISCIPLINARY ROUNDS
Multi-D Rounds/Checklist (leapfrog):  Lines: can any be removed?: None   ETT  (Active)     NG/OG/NJ/NE Tube Orogastric 16 fr Left mouth (Active)     Hemodialysis Central Access - Permanent/Tunneled Right Subclavian (Active)       Hemodialysis Fistula/Graft Superior Arm (Active)     DVT Prophylaxis: Ordered  Vent: HOB elevated? Yes ;  mL/k ; Vent day 6  Nutrition Ordered/appropriate: Ordered  Can antibiotics or other drugs be stopped? Yes/End Date set Yes/No  MRSA swab:   Inpat Anti-Infectives (From admission, onward)       Start     Ordered Stop    25 1100  cefTRIAXone (ROCEPHIN) 1,000 mg in sterile water 10 mL IV syringe  1,000 mg,   IntraVENous,   EVERY 24 HOURS         25 1000 25 1059             Consults needed: None  A: Is pain control adequate? (has PRNs? Stop drip?) Yes  B: Sedation break and SBT? N/A  C: Is sedation choice appropriate? N/A  D: Delirium/CAM-ICU? No  E: Mobility goals/appropriateness? Yes  F: Family update and plan? daughter is surrogate decision maker and is being updated daily by primary attending and nursing staff.    Hina Petersen MD

## 2025-04-24 NOTE — DIALYSIS
HD initiated using right CVC.  Consent verified for renal replacement therapy.       BP (!) 146/73   Pulse 72   Temp 98.2 °F (36.8 °C) (Axillary)   Resp 18   Ht 1.626 m (5' 4.02\")   Wt 68.1 kg (150 lb 2.1 oz)   SpO2 99%   BMI 25.76 kg/m²       Aspirated and flushed both ports without difficulty. Dressing clean, dry, intact, Dated: 4/22 . Machine settings per MD order.    Heparin 0 unit bolus and 0 units/hr.      Will monitor during treatment.

## 2025-04-25 ENCOUNTER — APPOINTMENT (OUTPATIENT)
Dept: GENERAL RADIOLOGY | Age: 80
DRG: 100 | End: 2025-04-25
Payer: MEDICARE

## 2025-04-25 LAB
ANION GAP SERPL CALC-SCNC: 11 MMOL/L (ref 7–16)
ARTERIAL PATENCY WRIST A: POSITIVE
BASE EXCESS BLD CALC-SCNC: 4.6 MMOL/L
BASOPHILS # BLD: 0.02 K/UL (ref 0–0.2)
BASOPHILS NFR BLD: 0.3 % (ref 0–2)
BDY SITE: ABNORMAL
BUN SERPL-MCNC: 19 MG/DL (ref 8–23)
CALCIUM SERPL-MCNC: 8.2 MG/DL (ref 8.8–10.2)
CHLORIDE SERPL-SCNC: 99 MMOL/L (ref 98–107)
CO2 SERPL-SCNC: 26 MMOL/L (ref 20–29)
CREAT SERPL-MCNC: 2.95 MG/DL (ref 0.6–1.1)
DIFFERENTIAL METHOD BLD: ABNORMAL
EOSINOPHIL # BLD: 0.2 K/UL (ref 0–0.8)
EOSINOPHIL NFR BLD: 3.4 % (ref 0.5–7.8)
ERYTHROCYTE [DISTWIDTH] IN BLOOD BY AUTOMATED COUNT: 14.5 % (ref 11.9–14.6)
GAS FLOW.O2 O2 DELIVERY SYS: ABNORMAL
GLUCOSE BLD STRIP.AUTO-MCNC: 196 MG/DL (ref 65–100)
GLUCOSE BLD STRIP.AUTO-MCNC: 226 MG/DL (ref 65–100)
GLUCOSE BLD STRIP.AUTO-MCNC: 277 MG/DL (ref 65–100)
GLUCOSE BLD STRIP.AUTO-MCNC: 317 MG/DL (ref 65–100)
GLUCOSE SERPL-MCNC: 258 MG/DL (ref 70–99)
HCO3 BLD-SCNC: 27.8 MMOL/L (ref 21–28)
HCT VFR BLD AUTO: 32.3 % (ref 35.8–46.3)
HGB BLD-MCNC: 10.4 G/DL (ref 11.7–15.4)
IMM GRANULOCYTES # BLD AUTO: 0.03 K/UL (ref 0–0.5)
IMM GRANULOCYTES NFR BLD AUTO: 0.5 % (ref 0–5)
INSPIRATION.DURATION SETTING TIME VENT: 1 SEC
IPAP/PIP/HIGH PEEP: 26
LYMPHOCYTES # BLD: 1.11 K/UL (ref 0.5–4.6)
LYMPHOCYTES NFR BLD: 18.8 % (ref 13–44)
MAGNESIUM SERPL-MCNC: 1.9 MG/DL (ref 1.8–2.4)
MCH RBC QN AUTO: 28.9 PG (ref 26.1–32.9)
MCHC RBC AUTO-ENTMCNC: 32.2 G/DL (ref 31.4–35)
MCV RBC AUTO: 89.7 FL (ref 82–102)
MONOCYTES # BLD: 0.77 K/UL (ref 0.1–1.3)
MONOCYTES NFR BLD: 13 % (ref 4–12)
NEUTS SEG # BLD: 3.78 K/UL (ref 1.7–8.2)
NEUTS SEG NFR BLD: 64 % (ref 43–78)
NRBC # BLD: 0 K/UL (ref 0–0.2)
O2/TOTAL GAS SETTING VFR VENT: 21 %
PAW @ MEAN EXP FLOW ON VENT: 12 CMH2O
PCO2 BLD: 35.5 MMHG (ref 35–45)
PEEP RESPIRATORY: 8 CMH2O
PH BLD: 7.5 (ref 7.35–7.45)
PHOSPHATE SERPL-MCNC: 2.2 MG/DL (ref 2.5–4.5)
PHOSPHATE SERPL-MCNC: 3.9 MG/DL (ref 2.5–4.5)
PLATELET # BLD AUTO: 121 K/UL (ref 150–450)
PMV BLD AUTO: 12.6 FL (ref 9.4–12.3)
PO2 BLD: 79 MMHG (ref 75–100)
POTASSIUM SERPL-SCNC: 3.6 MMOL/L (ref 3.5–5.1)
PRESSURE SUPPORT SETTING VENT: 10 CMH2O
RBC # BLD AUTO: 3.6 M/UL (ref 4.05–5.2)
RESPIRATORY RATE, POC: 14 (ref 5–40)
SAO2 % BLD: 96.8 % (ref 94–98)
SERVICE CMNT-IMP: ABNORMAL
SODIUM SERPL-SCNC: 135 MMOL/L (ref 136–145)
SPECIMEN TYPE: ABNORMAL
VENTILATION MODE VENT: ABNORMAL
VT SETTING VENT: 400 ML
WBC # BLD AUTO: 5.9 K/UL (ref 4.3–11.1)

## 2025-04-25 PROCEDURE — 6360000002 HC RX W HCPCS: Performed by: STUDENT IN AN ORGANIZED HEALTH CARE EDUCATION/TRAINING PROGRAM

## 2025-04-25 PROCEDURE — 2580000003 HC RX 258: Performed by: INTERNAL MEDICINE

## 2025-04-25 PROCEDURE — 99291 CRITICAL CARE FIRST HOUR: CPT | Performed by: INTERNAL MEDICINE

## 2025-04-25 PROCEDURE — C9254 INJECTION, LACOSAMIDE: HCPCS | Performed by: STUDENT IN AN ORGANIZED HEALTH CARE EDUCATION/TRAINING PROGRAM

## 2025-04-25 PROCEDURE — 85025 COMPLETE CBC W/AUTO DIFF WBC: CPT

## 2025-04-25 PROCEDURE — 2500000003 HC RX 250 WO HCPCS

## 2025-04-25 PROCEDURE — 80048 BASIC METABOLIC PNL TOTAL CA: CPT

## 2025-04-25 PROCEDURE — 84100 ASSAY OF PHOSPHORUS: CPT

## 2025-04-25 PROCEDURE — 36415 COLL VENOUS BLD VENIPUNCTURE: CPT

## 2025-04-25 PROCEDURE — 71045 X-RAY EXAM CHEST 1 VIEW: CPT

## 2025-04-25 PROCEDURE — 6370000000 HC RX 637 (ALT 250 FOR IP): Performed by: INTERNAL MEDICINE

## 2025-04-25 PROCEDURE — 36600 WITHDRAWAL OF ARTERIAL BLOOD: CPT

## 2025-04-25 PROCEDURE — 82803 BLOOD GASES ANY COMBINATION: CPT

## 2025-04-25 PROCEDURE — 6360000002 HC RX W HCPCS: Performed by: INTERNAL MEDICINE

## 2025-04-25 PROCEDURE — 94003 VENT MGMT INPAT SUBQ DAY: CPT

## 2025-04-25 PROCEDURE — 83735 ASSAY OF MAGNESIUM: CPT

## 2025-04-25 PROCEDURE — 2000000000 HC ICU R&B

## 2025-04-25 PROCEDURE — 2500000003 HC RX 250 WO HCPCS: Performed by: INTERNAL MEDICINE

## 2025-04-25 PROCEDURE — 82962 GLUCOSE BLOOD TEST: CPT

## 2025-04-25 RX ORDER — INSULIN GLARGINE 100 [IU]/ML
10 INJECTION, SOLUTION SUBCUTANEOUS ONCE
Status: COMPLETED | OUTPATIENT
Start: 2025-04-25 | End: 2025-04-25

## 2025-04-25 RX ORDER — INSULIN GLARGINE 100 [IU]/ML
15 INJECTION, SOLUTION SUBCUTANEOUS DAILY
Status: DISCONTINUED | OUTPATIENT
Start: 2025-04-26 | End: 2025-05-06

## 2025-04-25 RX ADMIN — HYDRALAZINE HYDROCHLORIDE 50 MG: 50 TABLET ORAL at 06:23

## 2025-04-25 RX ADMIN — INSULIN LISPRO 2 UNITS: 100 INJECTION, SOLUTION INTRAVENOUS; SUBCUTANEOUS at 19:54

## 2025-04-25 RX ADMIN — INSULIN LISPRO 2 UNITS: 100 INJECTION, SOLUTION INTRAVENOUS; SUBCUTANEOUS at 01:24

## 2025-04-25 RX ADMIN — LACOSAMIDE 100 MG: 10 INJECTION INTRAVENOUS at 09:00

## 2025-04-25 RX ADMIN — INSULIN LISPRO 6 UNITS: 100 INJECTION, SOLUTION INTRAVENOUS; SUBCUTANEOUS at 09:00

## 2025-04-25 RX ADMIN — INSULIN GLARGINE 10 UNITS: 100 INJECTION, SOLUTION SUBCUTANEOUS at 12:00

## 2025-04-25 RX ADMIN — CARVEDILOL 25 MG: 25 TABLET, FILM COATED ORAL at 09:13

## 2025-04-25 RX ADMIN — CARVEDILOL 25 MG: 25 TABLET, FILM COATED ORAL at 21:48

## 2025-04-25 RX ADMIN — SODIUM CHLORIDE, PRESERVATIVE FREE 10 ML: 5 INJECTION INTRAVENOUS at 21:50

## 2025-04-25 RX ADMIN — INSULIN GLARGINE 5 UNITS: 100 INJECTION, SOLUTION SUBCUTANEOUS at 09:00

## 2025-04-25 RX ADMIN — LACOSAMIDE 100 MG: 10 INJECTION INTRAVENOUS at 21:49

## 2025-04-25 RX ADMIN — APIXABAN 2.5 MG: 5 TABLET, FILM COATED ORAL at 19:55

## 2025-04-25 RX ADMIN — INSULIN LISPRO 4 UNITS: 100 INJECTION, SOLUTION INTRAVENOUS; SUBCUTANEOUS at 14:00

## 2025-04-25 RX ADMIN — APIXABAN 2.5 MG: 5 TABLET, FILM COATED ORAL at 06:23

## 2025-04-25 RX ADMIN — PANTOPRAZOLE SODIUM 40 MG: 40 INJECTION, POWDER, LYOPHILIZED, FOR SOLUTION INTRAVENOUS at 09:13

## 2025-04-25 RX ADMIN — HYDRALAZINE HYDROCHLORIDE 50 MG: 50 TABLET ORAL at 14:00

## 2025-04-25 RX ADMIN — HYDRALAZINE HYDROCHLORIDE 50 MG: 50 TABLET ORAL at 21:48

## 2025-04-25 RX ADMIN — SODIUM PHOSPHATE, MONOBASIC, MONOHYDRATE AND SODIUM PHOSPHATE, DIBASIC, ANHYDROUS 15 MMOL: 142; 276 INJECTION, SOLUTION INTRAVENOUS at 06:24

## 2025-04-25 RX ADMIN — SODIUM CHLORIDE, PRESERVATIVE FREE 10 ML: 5 INJECTION INTRAVENOUS at 09:38

## 2025-04-25 ASSESSMENT — PULMONARY FUNCTION TESTS
PIF_VALUE: 24
PIF_VALUE: 20
PIF_VALUE: 26

## 2025-04-25 ASSESSMENT — PAIN SCALES - GENERAL
PAINLEVEL_OUTOF10: 0
PAINLEVEL_OUTOF10: 0

## 2025-04-25 NOTE — CARE COORDINATION
Chart reviewed and pt discussed in am IDR as continues CCU s/p admission status epilepticus. Intubated/vent. PS 21% fio2. No gtts. Poss extubate per MD. Has had slow mental improvement but more alert now per staff. HD per Nephrology. PT/OT pending. Most likely STR. CM will need to follow for MARIPOSA needs/POC. LOS 8 days.

## 2025-04-25 NOTE — INTERDISCIPLINARY ROUNDS
Multi-D Rounds/Checklist (leapfrog):  Lines: can any be removed?: Endotracheal tube   ETT  (Active)     NG/OG/NJ/NE Tube Orogastric 16 fr Left mouth (Active)     Hemodialysis Central Access - Permanent/Tunneled Right Subclavian (Active)       Hemodialysis Fistula/Graft Superior Arm (Active)     DVT Prophylaxis: Ordered  Vent: HOB elevated? Yes ;  mL/k ; Vent day 9  Nutrition Ordered/appropriate: Ordered  Can antibiotics or other drugs be stopped? Yes/End Date set Yes/No  MRSA swab:   Inpat Anti-Infectives (From admission, onward)       Start     Ordered Stop    25 1100  cefTRIAXone (ROCEPHIN) 1,000 mg in sterile water 10 mL IV syringe  1,000 mg,   IntraVENous,   EVERY 24 HOURS         25 1000 25 1059             Consults needed: None  A: Is pain control adequate? (has PRNs? Stop drip?) Yes  B: Sedation break and SBT? N/A  C: Is sedation choice appropriate? N/A  D: Delirium/CAM-ICU? No  E: Mobility goals/appropriateness? Yes  F: Family update and plan? daughter is surrogate decision maker and is being updated daily by primary attending and nursing staff.    Denise Martell, APRN - CNP

## 2025-04-26 ENCOUNTER — APPOINTMENT (OUTPATIENT)
Dept: GENERAL RADIOLOGY | Age: 80
DRG: 100 | End: 2025-04-26
Payer: MEDICARE

## 2025-04-26 LAB
ANION GAP SERPL CALC-SCNC: 13 MMOL/L (ref 7–16)
ARTERIAL PATENCY WRIST A: POSITIVE
BASE EXCESS BLD CALC-SCNC: 3.3 MMOL/L
BDY SITE: NORMAL
BUN SERPL-MCNC: 35 MG/DL (ref 8–23)
CALCIUM SERPL-MCNC: 8.5 MG/DL (ref 8.8–10.2)
CHLORIDE SERPL-SCNC: 95 MMOL/L (ref 98–107)
CO2 SERPL-SCNC: 24 MMOL/L (ref 20–29)
CREAT SERPL-MCNC: 3.92 MG/DL (ref 0.6–1.1)
GAS FLOW.O2 O2 DELIVERY SYS: NORMAL
GLUCOSE BLD STRIP.AUTO-MCNC: 191 MG/DL (ref 65–100)
GLUCOSE BLD STRIP.AUTO-MCNC: 231 MG/DL (ref 65–100)
GLUCOSE BLD STRIP.AUTO-MCNC: 247 MG/DL (ref 65–100)
GLUCOSE BLD STRIP.AUTO-MCNC: 285 MG/DL (ref 65–100)
GLUCOSE SERPL-MCNC: 285 MG/DL (ref 70–99)
HCO3 BLD-SCNC: 27.6 MMOL/L (ref 21–28)
IPAP/PIP/HIGH PEEP: 20
MAGNESIUM SERPL-MCNC: 2 MG/DL (ref 1.8–2.4)
O2/TOTAL GAS SETTING VFR VENT: 21 %
PAW @ MEAN EXP FLOW ON VENT: 12 CMH2O
PCO2 BLD: 39.7 MMHG (ref 35–45)
PEEP RESPIRATORY: 8 CMH2O
PH BLD: 7.45 (ref 7.35–7.45)
PHOSPHATE SERPL-MCNC: 4.3 MG/DL (ref 2.5–4.5)
PO2 BLD: 83 MMHG (ref 75–100)
POTASSIUM SERPL-SCNC: 4 MMOL/L (ref 3.5–5.1)
PRESSURE SUPPORT SETTING VENT: 12 CMH2O
SAO2 % BLD: 96.6 % (ref 94–98)
SERVICE CMNT-IMP: ABNORMAL
SERVICE CMNT-IMP: NORMAL
SODIUM SERPL-SCNC: 133 MMOL/L (ref 136–145)
SPECIMEN TYPE: NORMAL
VENTILATION MODE VENT: NORMAL

## 2025-04-26 PROCEDURE — 36600 WITHDRAWAL OF ARTERIAL BLOOD: CPT

## 2025-04-26 PROCEDURE — 6370000000 HC RX 637 (ALT 250 FOR IP): Performed by: INTERNAL MEDICINE

## 2025-04-26 PROCEDURE — 2580000003 HC RX 258: Performed by: INTERNAL MEDICINE

## 2025-04-26 PROCEDURE — 83735 ASSAY OF MAGNESIUM: CPT

## 2025-04-26 PROCEDURE — 82803 BLOOD GASES ANY COMBINATION: CPT

## 2025-04-26 PROCEDURE — 6360000002 HC RX W HCPCS: Performed by: INTERNAL MEDICINE

## 2025-04-26 PROCEDURE — 71045 X-RAY EXAM CHEST 1 VIEW: CPT

## 2025-04-26 PROCEDURE — 6360000002 HC RX W HCPCS: Performed by: STUDENT IN AN ORGANIZED HEALTH CARE EDUCATION/TRAINING PROGRAM

## 2025-04-26 PROCEDURE — 80048 BASIC METABOLIC PNL TOTAL CA: CPT

## 2025-04-26 PROCEDURE — 36415 COLL VENOUS BLD VENIPUNCTURE: CPT

## 2025-04-26 PROCEDURE — 99291 CRITICAL CARE FIRST HOUR: CPT | Performed by: INTERNAL MEDICINE

## 2025-04-26 PROCEDURE — 2500000003 HC RX 250 WO HCPCS

## 2025-04-26 PROCEDURE — 2000000000 HC ICU R&B

## 2025-04-26 PROCEDURE — 82962 GLUCOSE BLOOD TEST: CPT

## 2025-04-26 PROCEDURE — 94003 VENT MGMT INPAT SUBQ DAY: CPT

## 2025-04-26 PROCEDURE — C9254 INJECTION, LACOSAMIDE: HCPCS | Performed by: STUDENT IN AN ORGANIZED HEALTH CARE EDUCATION/TRAINING PROGRAM

## 2025-04-26 PROCEDURE — 84100 ASSAY OF PHOSPHORUS: CPT

## 2025-04-26 RX ADMIN — HYDRALAZINE HYDROCHLORIDE 50 MG: 50 TABLET ORAL at 20:35

## 2025-04-26 RX ADMIN — INSULIN LISPRO 4 UNITS: 100 INJECTION, SOLUTION INTRAVENOUS; SUBCUTANEOUS at 09:04

## 2025-04-26 RX ADMIN — SODIUM CHLORIDE, PRESERVATIVE FREE 10 ML: 5 INJECTION INTRAVENOUS at 08:51

## 2025-04-26 RX ADMIN — HYDRALAZINE HYDROCHLORIDE 50 MG: 50 TABLET ORAL at 13:05

## 2025-04-26 RX ADMIN — CARVEDILOL 25 MG: 25 TABLET, FILM COATED ORAL at 08:48

## 2025-04-26 RX ADMIN — LACOSAMIDE 100 MG: 10 INJECTION INTRAVENOUS at 20:34

## 2025-04-26 RX ADMIN — HYDRALAZINE HYDROCHLORIDE 50 MG: 50 TABLET ORAL at 06:13

## 2025-04-26 RX ADMIN — INSULIN GLARGINE 15 UNITS: 100 INJECTION, SOLUTION SUBCUTANEOUS at 08:57

## 2025-04-26 RX ADMIN — INSULIN LISPRO 2 UNITS: 100 INJECTION, SOLUTION INTRAVENOUS; SUBCUTANEOUS at 19:29

## 2025-04-26 RX ADMIN — INSULIN LISPRO 2 UNITS: 100 INJECTION, SOLUTION INTRAVENOUS; SUBCUTANEOUS at 13:08

## 2025-04-26 RX ADMIN — APIXABAN 2.5 MG: 5 TABLET, FILM COATED ORAL at 17:54

## 2025-04-26 RX ADMIN — INSULIN LISPRO 2 UNITS: 100 INJECTION, SOLUTION INTRAVENOUS; SUBCUTANEOUS at 02:03

## 2025-04-26 RX ADMIN — PANTOPRAZOLE SODIUM 40 MG: 40 INJECTION, POWDER, LYOPHILIZED, FOR SOLUTION INTRAVENOUS at 08:48

## 2025-04-26 RX ADMIN — CARVEDILOL 25 MG: 25 TABLET, FILM COATED ORAL at 20:35

## 2025-04-26 RX ADMIN — SODIUM CHLORIDE, PRESERVATIVE FREE 10 ML: 5 INJECTION INTRAVENOUS at 20:35

## 2025-04-26 RX ADMIN — APIXABAN 2.5 MG: 5 TABLET, FILM COATED ORAL at 06:13

## 2025-04-26 RX ADMIN — LACOSAMIDE 100 MG: 10 INJECTION INTRAVENOUS at 08:48

## 2025-04-26 ASSESSMENT — PULMONARY FUNCTION TESTS
PIF_VALUE: 20

## 2025-04-26 ASSESSMENT — PAIN SCALES - GENERAL
PAINLEVEL_OUTOF10: 0

## 2025-04-26 NOTE — DIALYSIS
Attempted to run Dialysis, however Portable RO was unable to connect to water source.  MD notified and stated pt was not emergent and ok not to run dialysis today.  MD will reevaluate patient in the AM.  Yenifer primary RN notified.

## 2025-04-27 LAB
ANION GAP SERPL CALC-SCNC: 13 MMOL/L (ref 7–16)
ARTERIAL PATENCY WRIST A: POSITIVE
BASE EXCESS BLD CALC-SCNC: 2.2 MMOL/L
BDY SITE: NORMAL
BUN SERPL-MCNC: 46 MG/DL (ref 8–23)
CALCIUM SERPL-MCNC: 8.2 MG/DL (ref 8.8–10.2)
CHLORIDE SERPL-SCNC: 97 MMOL/L (ref 98–107)
CO2 SERPL-SCNC: 23 MMOL/L (ref 20–29)
CREAT SERPL-MCNC: 4.49 MG/DL (ref 0.6–1.1)
GAS FLOW.O2 O2 DELIVERY SYS: NORMAL
GLUCOSE BLD STRIP.AUTO-MCNC: 160 MG/DL (ref 65–100)
GLUCOSE BLD STRIP.AUTO-MCNC: 197 MG/DL (ref 65–100)
GLUCOSE BLD STRIP.AUTO-MCNC: 252 MG/DL (ref 65–100)
GLUCOSE BLD STRIP.AUTO-MCNC: 255 MG/DL (ref 65–100)
GLUCOSE SERPL-MCNC: 215 MG/DL (ref 70–99)
HCO3 BLD-SCNC: 26.4 MMOL/L (ref 21–28)
MAGNESIUM SERPL-MCNC: 2 MG/DL (ref 1.8–2.4)
O2/TOTAL GAS SETTING VFR VENT: 21 %
PCO2 BLD: 39 MMHG (ref 35–45)
PEEP RESPIRATORY: 8 CMH2O
PH BLD: 7.44 (ref 7.35–7.45)
PHOSPHATE SERPL-MCNC: 4.6 MG/DL (ref 2.5–4.5)
PO2 BLD: 86 MMHG (ref 75–100)
POTASSIUM SERPL-SCNC: 3.9 MMOL/L (ref 3.5–5.1)
PRESSURE SUPPORT SETTING VENT: 12 CMH2O
RESPIRATORY RATE, POC: 17 (ref 5–40)
SAO2 % BLD: 96.8 % (ref 94–98)
SERVICE CMNT-IMP: ABNORMAL
SERVICE CMNT-IMP: NORMAL
SODIUM SERPL-SCNC: 133 MMOL/L (ref 136–145)
SPECIMEN TYPE: NORMAL
VENTILATION MODE VENT: NORMAL
VT SETTING VENT: 325 ML

## 2025-04-27 PROCEDURE — 82803 BLOOD GASES ANY COMBINATION: CPT

## 2025-04-27 PROCEDURE — C9254 INJECTION, LACOSAMIDE: HCPCS | Performed by: STUDENT IN AN ORGANIZED HEALTH CARE EDUCATION/TRAINING PROGRAM

## 2025-04-27 PROCEDURE — 2580000003 HC RX 258: Performed by: INTERNAL MEDICINE

## 2025-04-27 PROCEDURE — 2000000000 HC ICU R&B

## 2025-04-27 PROCEDURE — 84100 ASSAY OF PHOSPHORUS: CPT

## 2025-04-27 PROCEDURE — 36415 COLL VENOUS BLD VENIPUNCTURE: CPT

## 2025-04-27 PROCEDURE — 6360000002 HC RX W HCPCS: Performed by: STUDENT IN AN ORGANIZED HEALTH CARE EDUCATION/TRAINING PROGRAM

## 2025-04-27 PROCEDURE — 6370000000 HC RX 637 (ALT 250 FOR IP): Performed by: INTERNAL MEDICINE

## 2025-04-27 PROCEDURE — 80048 BASIC METABOLIC PNL TOTAL CA: CPT

## 2025-04-27 PROCEDURE — 99291 CRITICAL CARE FIRST HOUR: CPT | Performed by: INTERNAL MEDICINE

## 2025-04-27 PROCEDURE — 82962 GLUCOSE BLOOD TEST: CPT

## 2025-04-27 PROCEDURE — 2500000003 HC RX 250 WO HCPCS

## 2025-04-27 PROCEDURE — 94003 VENT MGMT INPAT SUBQ DAY: CPT

## 2025-04-27 PROCEDURE — 36600 WITHDRAWAL OF ARTERIAL BLOOD: CPT

## 2025-04-27 PROCEDURE — 6360000002 HC RX W HCPCS: Performed by: INTERNAL MEDICINE

## 2025-04-27 PROCEDURE — 83735 ASSAY OF MAGNESIUM: CPT

## 2025-04-27 RX ORDER — SCOPOLAMINE 1 MG/3D
1 PATCH, EXTENDED RELEASE TRANSDERMAL
Status: DISCONTINUED | OUTPATIENT
Start: 2025-04-27 | End: 2025-05-12 | Stop reason: HOSPADM

## 2025-04-27 RX ADMIN — CARVEDILOL 25 MG: 25 TABLET, FILM COATED ORAL at 20:40

## 2025-04-27 RX ADMIN — INSULIN LISPRO 4 UNITS: 100 INJECTION, SOLUTION INTRAVENOUS; SUBCUTANEOUS at 01:38

## 2025-04-27 RX ADMIN — APIXABAN 2.5 MG: 5 TABLET, FILM COATED ORAL at 17:39

## 2025-04-27 RX ADMIN — INSULIN LISPRO 4 UNITS: 100 INJECTION, SOLUTION INTRAVENOUS; SUBCUTANEOUS at 08:14

## 2025-04-27 RX ADMIN — CARVEDILOL 25 MG: 25 TABLET, FILM COATED ORAL at 08:16

## 2025-04-27 RX ADMIN — INSULIN GLARGINE 15 UNITS: 100 INJECTION, SOLUTION SUBCUTANEOUS at 08:30

## 2025-04-27 RX ADMIN — HYDRALAZINE HYDROCHLORIDE 50 MG: 50 TABLET ORAL at 05:58

## 2025-04-27 RX ADMIN — APIXABAN 2.5 MG: 5 TABLET, FILM COATED ORAL at 05:58

## 2025-04-27 RX ADMIN — PANTOPRAZOLE SODIUM 40 MG: 40 INJECTION, POWDER, LYOPHILIZED, FOR SOLUTION INTRAVENOUS at 08:19

## 2025-04-27 RX ADMIN — LACOSAMIDE 100 MG: 10 INJECTION INTRAVENOUS at 20:40

## 2025-04-27 RX ADMIN — SODIUM CHLORIDE, PRESERVATIVE FREE 10 ML: 5 INJECTION INTRAVENOUS at 08:20

## 2025-04-27 RX ADMIN — SODIUM CHLORIDE, PRESERVATIVE FREE 10 ML: 5 INJECTION INTRAVENOUS at 20:41

## 2025-04-27 RX ADMIN — LACOSAMIDE 100 MG: 10 INJECTION INTRAVENOUS at 08:30

## 2025-04-27 RX ADMIN — HYDRALAZINE HYDROCHLORIDE 50 MG: 50 TABLET ORAL at 20:41

## 2025-04-27 RX ADMIN — INSULIN LISPRO 2 UNITS: 100 INJECTION, SOLUTION INTRAVENOUS; SUBCUTANEOUS at 12:09

## 2025-04-27 RX ADMIN — HYDRALAZINE HYDROCHLORIDE 50 MG: 50 TABLET ORAL at 13:30

## 2025-04-27 ASSESSMENT — PULMONARY FUNCTION TESTS
PIF_VALUE: 16
PIF_VALUE: 17
PIF_VALUE: 20
PIF_VALUE: 20

## 2025-04-27 ASSESSMENT — PAIN SCALES - GENERAL
PAINLEVEL_OUTOF10: 0
PAINLEVEL_OUTOF10: 0

## 2025-04-28 ENCOUNTER — APPOINTMENT (OUTPATIENT)
Dept: GENERAL RADIOLOGY | Age: 80
DRG: 100 | End: 2025-04-28
Payer: MEDICARE

## 2025-04-28 LAB
ANION GAP SERPL CALC-SCNC: 14 MMOL/L (ref 7–16)
ARTERIAL PATENCY WRIST A: POSITIVE
BASE EXCESS BLD CALC-SCNC: 0.9 MMOL/L
BASOPHILS # BLD: 0.03 K/UL (ref 0–0.2)
BASOPHILS NFR BLD: 0.5 % (ref 0–2)
BDY SITE: NORMAL
BUN SERPL-MCNC: 59 MG/DL (ref 8–23)
CALCIUM SERPL-MCNC: 8.1 MG/DL (ref 8.8–10.2)
CHLORIDE SERPL-SCNC: 94 MMOL/L (ref 98–107)
CO2 SERPL-SCNC: 23 MMOL/L (ref 20–29)
CREAT SERPL-MCNC: 5.17 MG/DL (ref 0.6–1.1)
DIFFERENTIAL METHOD BLD: ABNORMAL
EOSINOPHIL # BLD: 0.19 K/UL (ref 0–0.8)
EOSINOPHIL NFR BLD: 2.9 % (ref 0.5–7.8)
ERYTHROCYTE [DISTWIDTH] IN BLOOD BY AUTOMATED COUNT: 15 % (ref 11.9–14.6)
GAS FLOW.O2 O2 DELIVERY SYS: NORMAL
GLUCOSE BLD STRIP.AUTO-MCNC: 255 MG/DL (ref 65–100)
GLUCOSE BLD STRIP.AUTO-MCNC: 258 MG/DL (ref 65–100)
GLUCOSE BLD STRIP.AUTO-MCNC: 276 MG/DL (ref 65–100)
GLUCOSE BLD STRIP.AUTO-MCNC: 283 MG/DL (ref 65–100)
GLUCOSE BLD STRIP.AUTO-MCNC: 90 MG/DL (ref 65–100)
GLUCOSE SERPL-MCNC: 246 MG/DL (ref 70–99)
HCO3 BLD-SCNC: 25.7 MMOL/L (ref 21–28)
HCT VFR BLD AUTO: 28.9 % (ref 35.8–46.3)
HGB BLD-MCNC: 9.2 G/DL (ref 11.7–15.4)
IMM GRANULOCYTES # BLD AUTO: 0.02 K/UL (ref 0–0.5)
IMM GRANULOCYTES NFR BLD AUTO: 0.3 % (ref 0–5)
LYMPHOCYTES # BLD: 1.07 K/UL (ref 0.5–4.6)
LYMPHOCYTES NFR BLD: 16.2 % (ref 13–44)
MAGNESIUM SERPL-MCNC: 2 MG/DL (ref 1.8–2.4)
MCH RBC QN AUTO: 28.5 PG (ref 26.1–32.9)
MCHC RBC AUTO-ENTMCNC: 31.8 G/DL (ref 31.4–35)
MCV RBC AUTO: 89.5 FL (ref 82–102)
MONOCYTES # BLD: 0.92 K/UL (ref 0.1–1.3)
MONOCYTES NFR BLD: 14 % (ref 4–12)
NEUTS SEG # BLD: 4.36 K/UL (ref 1.7–8.2)
NEUTS SEG NFR BLD: 66.1 % (ref 43–78)
NRBC # BLD: 0 K/UL (ref 0–0.2)
O2/TOTAL GAS SETTING VFR VENT: 21 %
PCO2 BLD: 40.8 MMHG (ref 35–45)
PEEP RESPIRATORY: 8 CMH2O
PH BLD: 7.41 (ref 7.35–7.45)
PHOSPHATE SERPL-MCNC: 5.4 MG/DL (ref 2.5–4.5)
PLATELET # BLD AUTO: 162 K/UL (ref 150–450)
PMV BLD AUTO: 12.3 FL (ref 9.4–12.3)
PO2 BLD: 78 MMHG (ref 75–100)
POTASSIUM SERPL-SCNC: 4.4 MMOL/L (ref 3.5–5.1)
PRESSURE SUPPORT SETTING VENT: 8 CMH2O
RBC # BLD AUTO: 3.23 M/UL (ref 4.05–5.2)
SAO2 % BLD: 95.5 % (ref 94–98)
SERVICE CMNT-IMP: ABNORMAL
SERVICE CMNT-IMP: NORMAL
SODIUM SERPL-SCNC: 131 MMOL/L (ref 136–145)
SPECIMEN TYPE: NORMAL
SRA .2 IU/ML UFH SER-ACNC: <1 % (ref 0–20)
SRA 100IU/ML UFH SER-ACNC: <1 % (ref 0–20)
SRA UFH SER-IMP: NORMAL
VENTILATION MODE VENT: NORMAL
WBC # BLD AUTO: 6.6 K/UL (ref 4.3–11.1)

## 2025-04-28 PROCEDURE — 74018 RADEX ABDOMEN 1 VIEW: CPT

## 2025-04-28 PROCEDURE — 80048 BASIC METABOLIC PNL TOTAL CA: CPT

## 2025-04-28 PROCEDURE — C9254 INJECTION, LACOSAMIDE: HCPCS | Performed by: STUDENT IN AN ORGANIZED HEALTH CARE EDUCATION/TRAINING PROGRAM

## 2025-04-28 PROCEDURE — 6360000002 HC RX W HCPCS: Performed by: INTERNAL MEDICINE

## 2025-04-28 PROCEDURE — 2000000000 HC ICU R&B

## 2025-04-28 PROCEDURE — 6370000000 HC RX 637 (ALT 250 FOR IP): Performed by: INTERNAL MEDICINE

## 2025-04-28 PROCEDURE — 90945 DIALYSIS ONE EVALUATION: CPT

## 2025-04-28 PROCEDURE — 85025 COMPLETE CBC W/AUTO DIFF WBC: CPT

## 2025-04-28 PROCEDURE — 6360000002 HC RX W HCPCS: Performed by: STUDENT IN AN ORGANIZED HEALTH CARE EDUCATION/TRAINING PROGRAM

## 2025-04-28 PROCEDURE — 82803 BLOOD GASES ANY COMBINATION: CPT

## 2025-04-28 PROCEDURE — 83735 ASSAY OF MAGNESIUM: CPT

## 2025-04-28 PROCEDURE — 2700000000 HC OXYGEN THERAPY PER DAY

## 2025-04-28 PROCEDURE — 2500000003 HC RX 250 WO HCPCS

## 2025-04-28 PROCEDURE — 2580000003 HC RX 258: Performed by: INTERNAL MEDICINE

## 2025-04-28 PROCEDURE — 36600 WITHDRAWAL OF ARTERIAL BLOOD: CPT

## 2025-04-28 PROCEDURE — 84100 ASSAY OF PHOSPHORUS: CPT

## 2025-04-28 PROCEDURE — 94003 VENT MGMT INPAT SUBQ DAY: CPT

## 2025-04-28 PROCEDURE — 36415 COLL VENOUS BLD VENIPUNCTURE: CPT

## 2025-04-28 PROCEDURE — 99291 CRITICAL CARE FIRST HOUR: CPT | Performed by: INTERNAL MEDICINE

## 2025-04-28 PROCEDURE — 82962 GLUCOSE BLOOD TEST: CPT

## 2025-04-28 RX ORDER — HYDRALAZINE HYDROCHLORIDE 50 MG/1
50 TABLET, FILM COATED ORAL EVERY 8 HOURS SCHEDULED
Status: DISCONTINUED | OUTPATIENT
Start: 2025-04-28 | End: 2025-04-28

## 2025-04-28 RX ORDER — ACETAMINOPHEN 650 MG/1
650 SUPPOSITORY RECTAL EVERY 6 HOURS PRN
Status: DISCONTINUED | OUTPATIENT
Start: 2025-04-28 | End: 2025-05-03

## 2025-04-28 RX ORDER — ACETAMINOPHEN 325 MG/1
650 TABLET ORAL EVERY 6 HOURS PRN
Status: DISCONTINUED | OUTPATIENT
Start: 2025-04-28 | End: 2025-05-03

## 2025-04-28 RX ORDER — CARVEDILOL 25 MG/1
25 TABLET ORAL 2 TIMES DAILY
Status: DISCONTINUED | OUTPATIENT
Start: 2025-04-28 | End: 2025-04-28

## 2025-04-28 RX ORDER — HYDRALAZINE HYDROCHLORIDE 50 MG/1
50 TABLET, FILM COATED ORAL EVERY 8 HOURS SCHEDULED
Status: DISCONTINUED | OUTPATIENT
Start: 2025-04-28 | End: 2025-05-03

## 2025-04-28 RX ORDER — CARVEDILOL 25 MG/1
25 TABLET ORAL 2 TIMES DAILY
Status: DISCONTINUED | OUTPATIENT
Start: 2025-04-28 | End: 2025-05-03

## 2025-04-28 RX ADMIN — SODIUM CHLORIDE, PRESERVATIVE FREE 10 ML: 5 INJECTION INTRAVENOUS at 09:21

## 2025-04-28 RX ADMIN — APIXABAN 2.5 MG: 5 TABLET, FILM COATED ORAL at 17:56

## 2025-04-28 RX ADMIN — INSULIN GLARGINE 15 UNITS: 100 INJECTION, SOLUTION SUBCUTANEOUS at 09:20

## 2025-04-28 RX ADMIN — PANTOPRAZOLE SODIUM 40 MG: 40 INJECTION, POWDER, LYOPHILIZED, FOR SOLUTION INTRAVENOUS at 09:20

## 2025-04-28 RX ADMIN — CARVEDILOL 25 MG: 25 TABLET, FILM COATED ORAL at 20:48

## 2025-04-28 RX ADMIN — SODIUM CHLORIDE, PRESERVATIVE FREE 10 ML: 5 INJECTION INTRAVENOUS at 20:48

## 2025-04-28 RX ADMIN — HYDRALAZINE HYDROCHLORIDE 20 MG: 20 INJECTION INTRAMUSCULAR; INTRAVENOUS at 17:19

## 2025-04-28 RX ADMIN — LACOSAMIDE 100 MG: 10 INJECTION INTRAVENOUS at 09:20

## 2025-04-28 RX ADMIN — INSULIN LISPRO 4 UNITS: 100 INJECTION, SOLUTION INTRAVENOUS; SUBCUTANEOUS at 00:38

## 2025-04-28 RX ADMIN — HYDRALAZINE HYDROCHLORIDE 50 MG: 50 TABLET ORAL at 15:47

## 2025-04-28 RX ADMIN — ACETAMINOPHEN 650 MG: 325 TABLET ORAL at 16:05

## 2025-04-28 RX ADMIN — HYDRALAZINE HYDROCHLORIDE 50 MG: 50 TABLET ORAL at 06:21

## 2025-04-28 RX ADMIN — APIXABAN 2.5 MG: 5 TABLET, FILM COATED ORAL at 06:21

## 2025-04-28 RX ADMIN — INSULIN LISPRO 2 UNITS: 100 INJECTION, SOLUTION INTRAVENOUS; SUBCUTANEOUS at 12:59

## 2025-04-28 RX ADMIN — INSULIN LISPRO 4 UNITS: 100 INJECTION, SOLUTION INTRAVENOUS; SUBCUTANEOUS at 06:21

## 2025-04-28 RX ADMIN — HYDRALAZINE HYDROCHLORIDE 50 MG: 50 TABLET ORAL at 20:47

## 2025-04-28 RX ADMIN — LACOSAMIDE 100 MG: 10 INJECTION INTRAVENOUS at 20:48

## 2025-04-28 RX ADMIN — CARVEDILOL 25 MG: 25 TABLET, FILM COATED ORAL at 09:21

## 2025-04-28 ASSESSMENT — PAIN SCALES - GENERAL
PAINLEVEL_OUTOF10: 0
PAINLEVEL_OUTOF10: 3

## 2025-04-28 ASSESSMENT — PULMONARY FUNCTION TESTS
PIF_VALUE: 16
PIF_VALUE: 16

## 2025-04-28 ASSESSMENT — PAIN DESCRIPTION - ORIENTATION: ORIENTATION: ANTERIOR

## 2025-04-28 ASSESSMENT — PAIN DESCRIPTION - DESCRIPTORS: DESCRIPTORS: SORE

## 2025-04-28 ASSESSMENT — PAIN DESCRIPTION - LOCATION: LOCATION: HEAD

## 2025-04-28 ASSESSMENT — PAIN - FUNCTIONAL ASSESSMENT: PAIN_FUNCTIONAL_ASSESSMENT: ACTIVITIES ARE NOT PREVENTED

## 2025-04-28 NOTE — INTERDISCIPLINARY ROUNDS
Multi-D Rounds/Checklist (leapfrog):  Lines: can any be removed?: ETT  ETT  (Active)     NG/OG/NJ/NE Tube Orogastric 16 fr Left mouth (Active)     Hemodialysis Central Access - Permanent/Tunneled Right Subclavian (Active)       Hemodialysis Fistula/Graft Superior Arm (Active)     DVT Prophylaxis: Ordered  Vent: HOB elevated? Yes ;  mL/kg: N/A ; Vent day 12  Nutrition Ordered/appropriate: Ordered  Can antibiotics or other drugs be stopped? N/A Yes/No  MRSA swab:   Inpat Anti-Infectives (From admission, onward)      None          Consults needed: None  A: Is pain control adequate? (has PRNs? Stop drip?) Yes  B: Sedation break and SBT? N/A  C: Is sedation choice appropriate? N/A  D: Delirium/CAM-ICU? No  E: Mobility goals/appropriateness? Yes  F: Family update and plan? spouse is surrogate decision maker and is being updated daily by primary attending and nursing staff.    Mishel Whitfield, APRN - CNP

## 2025-04-28 NOTE — DIALYSIS
8 hr SLED initiated using  CVC.  Verified consent on file.     Aspirated and flushed both ports without problem. Machine settings per MD order.  150 BFR,  300 DFR,  500 UF Rate.     Heparin 0 unit bolus and 0 units/hr programmed.     Dialysis nurse available as needed. Report given to ANGIE Calzada and machine settings verified at bedside.

## 2025-04-28 NOTE — CARE COORDINATION
Chart reviewed and pt discussed in am IDR. Continues CCU intubated/vent -21% fio2. PS trials per RT. Concern due to tongue still swollen per staff. Off gtts now. PT/OT pending per MD. CM following for MARIPOSA needs/POC. LOS 11 days.

## 2025-04-29 LAB
ANION GAP SERPL CALC-SCNC: 11 MMOL/L (ref 7–16)
BUN SERPL-MCNC: 16 MG/DL (ref 8–23)
CALCIUM SERPL-MCNC: 8.3 MG/DL (ref 8.8–10.2)
CHLORIDE SERPL-SCNC: 99 MMOL/L (ref 98–107)
CO2 SERPL-SCNC: 27 MMOL/L (ref 20–29)
CREAT SERPL-MCNC: 2.16 MG/DL (ref 0.6–1.1)
GLUCOSE BLD STRIP.AUTO-MCNC: 149 MG/DL (ref 65–100)
GLUCOSE BLD STRIP.AUTO-MCNC: 157 MG/DL (ref 65–100)
GLUCOSE BLD STRIP.AUTO-MCNC: 200 MG/DL (ref 65–100)
GLUCOSE BLD STRIP.AUTO-MCNC: 231 MG/DL (ref 65–100)
GLUCOSE SERPL-MCNC: 184 MG/DL (ref 70–99)
POTASSIUM SERPL-SCNC: 3.9 MMOL/L (ref 3.5–5.1)
SERVICE CMNT-IMP: ABNORMAL
SODIUM SERPL-SCNC: 137 MMOL/L (ref 136–145)

## 2025-04-29 PROCEDURE — 97162 PT EVAL MOD COMPLEX 30 MIN: CPT

## 2025-04-29 PROCEDURE — C9254 INJECTION, LACOSAMIDE: HCPCS | Performed by: STUDENT IN AN ORGANIZED HEALTH CARE EDUCATION/TRAINING PROGRAM

## 2025-04-29 PROCEDURE — 82962 GLUCOSE BLOOD TEST: CPT

## 2025-04-29 PROCEDURE — 6360000002 HC RX W HCPCS: Performed by: INTERNAL MEDICINE

## 2025-04-29 PROCEDURE — 6360000002 HC RX W HCPCS: Performed by: STUDENT IN AN ORGANIZED HEALTH CARE EDUCATION/TRAINING PROGRAM

## 2025-04-29 PROCEDURE — 2580000003 HC RX 258: Performed by: INTERNAL MEDICINE

## 2025-04-29 PROCEDURE — 36415 COLL VENOUS BLD VENIPUNCTURE: CPT

## 2025-04-29 PROCEDURE — 2500000003 HC RX 250 WO HCPCS

## 2025-04-29 PROCEDURE — 97530 THERAPEUTIC ACTIVITIES: CPT

## 2025-04-29 PROCEDURE — 6370000000 HC RX 637 (ALT 250 FOR IP): Performed by: INTERNAL MEDICINE

## 2025-04-29 PROCEDURE — 97112 NEUROMUSCULAR REEDUCATION: CPT

## 2025-04-29 PROCEDURE — 1100000000 HC RM PRIVATE

## 2025-04-29 PROCEDURE — 97535 SELF CARE MNGMENT TRAINING: CPT

## 2025-04-29 PROCEDURE — 80048 BASIC METABOLIC PNL TOTAL CA: CPT

## 2025-04-29 PROCEDURE — 99233 SBSQ HOSP IP/OBS HIGH 50: CPT | Performed by: INTERNAL MEDICINE

## 2025-04-29 PROCEDURE — 97165 OT EVAL LOW COMPLEX 30 MIN: CPT

## 2025-04-29 RX ORDER — FAMOTIDINE 20 MG/1
20 TABLET, FILM COATED ORAL DAILY
Status: DISCONTINUED | OUTPATIENT
Start: 2025-04-29 | End: 2025-04-30

## 2025-04-29 RX ADMIN — SODIUM CHLORIDE, PRESERVATIVE FREE 5 ML: 5 INJECTION INTRAVENOUS at 21:08

## 2025-04-29 RX ADMIN — LACOSAMIDE 100 MG: 10 INJECTION INTRAVENOUS at 21:07

## 2025-04-29 RX ADMIN — HYDRALAZINE HYDROCHLORIDE 50 MG: 50 TABLET ORAL at 06:02

## 2025-04-29 RX ADMIN — CARVEDILOL 25 MG: 25 TABLET, FILM COATED ORAL at 21:07

## 2025-04-29 RX ADMIN — ACETAMINOPHEN 650 MG: 325 TABLET ORAL at 06:01

## 2025-04-29 RX ADMIN — HYDRALAZINE HYDROCHLORIDE 50 MG: 50 TABLET ORAL at 21:07

## 2025-04-29 RX ADMIN — PANTOPRAZOLE SODIUM 40 MG: 40 INJECTION, POWDER, LYOPHILIZED, FOR SOLUTION INTRAVENOUS at 08:59

## 2025-04-29 RX ADMIN — SODIUM CHLORIDE, PRESERVATIVE FREE 10 ML: 5 INJECTION INTRAVENOUS at 10:47

## 2025-04-29 RX ADMIN — CARVEDILOL 25 MG: 25 TABLET, FILM COATED ORAL at 08:59

## 2025-04-29 RX ADMIN — INSULIN GLARGINE 15 UNITS: 100 INJECTION, SOLUTION SUBCUTANEOUS at 08:59

## 2025-04-29 RX ADMIN — INSULIN LISPRO 2 UNITS: 100 INJECTION, SOLUTION INTRAVENOUS; SUBCUTANEOUS at 07:33

## 2025-04-29 RX ADMIN — APIXABAN 2.5 MG: 5 TABLET, FILM COATED ORAL at 18:14

## 2025-04-29 RX ADMIN — INSULIN LISPRO 2 UNITS: 100 INJECTION, SOLUTION INTRAVENOUS; SUBCUTANEOUS at 12:12

## 2025-04-29 RX ADMIN — APIXABAN 2.5 MG: 5 TABLET, FILM COATED ORAL at 06:02

## 2025-04-29 RX ADMIN — LACOSAMIDE 100 MG: 10 INJECTION INTRAVENOUS at 08:59

## 2025-04-29 ASSESSMENT — PAIN SCALES - GENERAL
PAINLEVEL_OUTOF10: 0

## 2025-04-29 NOTE — CONSULTS
[facility] Hospitalist Consult   Admit Date:  2025  8:38 PM   Name:  Nataliia Pradhan   Age:  80 y.o.  Sex:  female  :  1945   MRN:  [id number]   Room:  [address] [place] [address]    Presenting/Chief Complaint: Seizures    Reason(s) for Admission: Status epilepticus (HCC) [G40.901]  HTN (hypertension), malignant [I10]  ESRD on hemodialysis (HCC) [N18.6, Z99.2]  Seizure-like activity (HCC) [R56.9]  Altered mental status, unspecified altered mental status type [R41.82]     Hospitalists consulted by Yeyo Solis MD for: Assume care    History of Presenting Illness:   Nataliia Pradhan is a 80 y.o. 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  Patient was admitted for status epilepticus.  She was intubated and admitted to the ICU on propofol drip.  Neurology was consulted and started patient on Vimpat.  Hospital course complicated by persistent encephalopathy.  Was found to have E. coli UTI and completed treatment with Rocephin while inpatient.  Patient extubated on 2025.  Hospitalist service consulted to assume care.    Patient is able to tell me her name and that she is in the hospital.  Was unable to tell me the current year or date.  Following simple commands.  Denied any other complaints.  Was working with physical therapy when I entered the room.      Assessment & Plan:     Principal Problem:    Status epilepticus (HCC)  -Status epilepticus is resolved  - Neurology consulted, now signed off  - Continue with lacosamide  - Seizure precautions    Metabolic encephalopathy  ICU delirium  - Frequent reorientation  - On soft restraints  - On tube feeds.  Will consult speech in a.m. to assist with advancing diet.    ESRD on HD  - HD through tunneled cath  - Nephrology consulted for routine dialysis    Ileus  - Resolved, currently on tube feeds    Type 2

## 2025-04-29 NOTE — CARE COORDINATION
Pt with tx orders to floor now. PT/OT recommending STR. Daughter and son at bedside. List of options provided and they are aware to chose 3. CM to following.

## 2025-04-29 NOTE — THERAPY EVALUATION
ACUTE OCCUPATIONAL THERAPY GOALS:   (Developed with and agreed upon by patient and/or caregiver.)  1. Patient will complete upper body bathing and dressing with MINIMAL ASSIST and adaptive equipment as needed.   2. Patient will complete self-grooming tasks in unsupported sitting with MINIMAL ASSIST and adaptive equipment as needed.  3. Patient will tolerate 25 minutes of OT treatment with 1-2 rest breaks to increase activity tolerance for ADLs.   4. Patient will complete functional transfers with MODERATE ASSIST and adaptive equipment as needed.   5. Patient will complete functional activity while seated edge of bed with MINIMAL ASSIST and adaptive equipment as needed.   7. Patient will tolerate 10 minutes BUE therapeutic activities to increase use of BUE during ADL performance.      Timeframe: 7 visits       OCCUPATIONAL THERAPY Initial Assessment, Daily Note, and AM       OT Visit Days: 1  Acknowledge Orders  Time  OT Charge Capture  Rehab Caseload Tracker      Nataliia Pradhan is a 80 y.o. female   PRIMARY DIAGNOSIS: Status epilepticus (HCC)  Status epilepticus (HCC) [G40.901]  HTN (hypertension), malignant [I10]  ESRD on hemodialysis (HCC) [N18.6, Z99.2]  Seizure-like activity (HCC) [R56.9]  Altered mental status, unspecified altered mental status type [R41.82]       Reason for Referral: Generalized Muscle Weakness (M62.81)  Other lack of cordination (R27.8)  Inpatient: Payor: [facility] MEDICARE / Plan: [facility] MEDICARE COMPLETE / Product Type: *No Product type* /     ASSESSMENT:     REHAB RECOMMENDATIONS:   Recommendation to date pending progress:  Setting:  Short-term Rehab    Equipment:    To Be Determined     ASSESSMENT:  Ms. Pradhan is a 79 y/o F who presents to the hospital with lethargy and new onset seizure. Intubated in the field by EMS for airway protection on 4/17. Extubated on 4/28.    Today, pt is received supine in bed, agreeable to participate in the session. She is a poor historian. Patient exhibits

## 2025-04-30 LAB
ANION GAP SERPL CALC-SCNC: 11 MMOL/L (ref 7–16)
BASOPHILS # BLD: 0.04 K/UL (ref 0–0.2)
BASOPHILS NFR BLD: 0.7 % (ref 0–2)
BUN SERPL-MCNC: 27 MG/DL (ref 8–23)
CALCIUM SERPL-MCNC: 8.5 MG/DL (ref 8.8–10.2)
CHLORIDE SERPL-SCNC: 98 MMOL/L (ref 98–107)
CO2 SERPL-SCNC: 27 MMOL/L (ref 20–29)
CREAT SERPL-MCNC: 3.31 MG/DL (ref 0.6–1.1)
DIFFERENTIAL METHOD BLD: ABNORMAL
EOSINOPHIL # BLD: 0.24 K/UL (ref 0–0.8)
EOSINOPHIL NFR BLD: 4.4 % (ref 0.5–7.8)
ERYTHROCYTE [DISTWIDTH] IN BLOOD BY AUTOMATED COUNT: 15.1 % (ref 11.9–14.6)
GLUCOSE BLD STRIP.AUTO-MCNC: 114 MG/DL (ref 65–100)
GLUCOSE BLD STRIP.AUTO-MCNC: 123 MG/DL (ref 65–100)
GLUCOSE BLD STRIP.AUTO-MCNC: 156 MG/DL (ref 65–100)
GLUCOSE BLD STRIP.AUTO-MCNC: 190 MG/DL (ref 65–100)
GLUCOSE SERPL-MCNC: 160 MG/DL (ref 70–99)
HCT VFR BLD AUTO: 27.6 % (ref 35.8–46.3)
HGB BLD-MCNC: 8.4 G/DL (ref 11.7–15.4)
IMM GRANULOCYTES # BLD AUTO: 0.05 K/UL (ref 0–0.5)
IMM GRANULOCYTES NFR BLD AUTO: 0.9 % (ref 0–5)
LYMPHOCYTES # BLD: 1.15 K/UL (ref 0.5–4.6)
LYMPHOCYTES NFR BLD: 21.3 % (ref 13–44)
MAGNESIUM SERPL-MCNC: 2 MG/DL (ref 1.8–2.4)
MCH RBC QN AUTO: 28.6 PG (ref 26.1–32.9)
MCHC RBC AUTO-ENTMCNC: 30.4 G/DL (ref 31.4–35)
MCV RBC AUTO: 93.9 FL (ref 82–102)
MONOCYTES # BLD: 0.65 K/UL (ref 0.1–1.3)
MONOCYTES NFR BLD: 12 % (ref 4–12)
NEUTS SEG # BLD: 3.28 K/UL (ref 1.7–8.2)
NEUTS SEG NFR BLD: 60.7 % (ref 43–78)
NRBC # BLD: 0 K/UL (ref 0–0.2)
PHOSPHATE SERPL-MCNC: 3.7 MG/DL (ref 2.5–4.5)
PLATELET # BLD AUTO: 173 K/UL (ref 150–450)
PMV BLD AUTO: 11.7 FL (ref 9.4–12.3)
POTASSIUM SERPL-SCNC: 4.3 MMOL/L (ref 3.5–5.1)
RBC # BLD AUTO: 2.94 M/UL (ref 4.05–5.2)
SERVICE CMNT-IMP: ABNORMAL
SODIUM SERPL-SCNC: 136 MMOL/L (ref 136–145)
WBC # BLD AUTO: 5.4 K/UL (ref 4.3–11.1)

## 2025-04-30 PROCEDURE — 36415 COLL VENOUS BLD VENIPUNCTURE: CPT

## 2025-04-30 PROCEDURE — 80048 BASIC METABOLIC PNL TOTAL CA: CPT

## 2025-04-30 PROCEDURE — 83735 ASSAY OF MAGNESIUM: CPT

## 2025-04-30 PROCEDURE — 2500000003 HC RX 250 WO HCPCS

## 2025-04-30 PROCEDURE — 6370000000 HC RX 637 (ALT 250 FOR IP): Performed by: INTERNAL MEDICINE

## 2025-04-30 PROCEDURE — C9254 INJECTION, LACOSAMIDE: HCPCS | Performed by: STUDENT IN AN ORGANIZED HEALTH CARE EDUCATION/TRAINING PROGRAM

## 2025-04-30 PROCEDURE — 82962 GLUCOSE BLOOD TEST: CPT

## 2025-04-30 PROCEDURE — 84100 ASSAY OF PHOSPHORUS: CPT

## 2025-04-30 PROCEDURE — 92610 EVALUATE SWALLOWING FUNCTION: CPT

## 2025-04-30 PROCEDURE — 90935 HEMODIALYSIS ONE EVALUATION: CPT

## 2025-04-30 PROCEDURE — 6360000002 HC RX W HCPCS: Performed by: STUDENT IN AN ORGANIZED HEALTH CARE EDUCATION/TRAINING PROGRAM

## 2025-04-30 PROCEDURE — 6370000000 HC RX 637 (ALT 250 FOR IP)

## 2025-04-30 PROCEDURE — 1100000000 HC RM PRIVATE

## 2025-04-30 PROCEDURE — 85025 COMPLETE CBC W/AUTO DIFF WBC: CPT

## 2025-04-30 RX ORDER — FAMOTIDINE 20 MG/1
20 TABLET, FILM COATED ORAL DAILY
Status: DISCONTINUED | OUTPATIENT
Start: 2025-05-01 | End: 2025-05-03

## 2025-04-30 RX ADMIN — LACOSAMIDE 100 MG: 10 INJECTION INTRAVENOUS at 20:42

## 2025-04-30 RX ADMIN — LACOSAMIDE 100 MG: 10 INJECTION INTRAVENOUS at 08:03

## 2025-04-30 RX ADMIN — APIXABAN 2.5 MG: 5 TABLET, FILM COATED ORAL at 17:32

## 2025-04-30 RX ADMIN — FAMOTIDINE 20 MG: 20 TABLET, FILM COATED ORAL at 08:03

## 2025-04-30 RX ADMIN — CARVEDILOL 25 MG: 25 TABLET, FILM COATED ORAL at 08:03

## 2025-04-30 RX ADMIN — SODIUM CHLORIDE, PRESERVATIVE FREE 10 ML: 5 INJECTION INTRAVENOUS at 08:06

## 2025-04-30 RX ADMIN — SODIUM CHLORIDE, PRESERVATIVE FREE 5 ML: 5 INJECTION INTRAVENOUS at 20:42

## 2025-04-30 RX ADMIN — HYDRALAZINE HYDROCHLORIDE 50 MG: 50 TABLET ORAL at 06:39

## 2025-04-30 RX ADMIN — CARVEDILOL 25 MG: 25 TABLET, FILM COATED ORAL at 20:43

## 2025-04-30 RX ADMIN — APIXABAN 2.5 MG: 5 TABLET, FILM COATED ORAL at 06:39

## 2025-04-30 RX ADMIN — HYDRALAZINE HYDROCHLORIDE 50 MG: 50 TABLET ORAL at 20:43

## 2025-04-30 RX ADMIN — INSULIN GLARGINE 15 UNITS: 100 INJECTION, SOLUTION SUBCUTANEOUS at 08:03

## 2025-04-30 NOTE — DIALYSIS
TRANSFER OUT- DIALYSIS    Hemodialysis treatment completed. PT ran 3.5 hours, without complications.    Patient alert and VS stable  BP (!) 146/63   Pulse 73   Temp 98.8 °F (37.1 °C) (Oral)   Resp 16   Ht 1.626 m (5' 4\")   Wt 72 kg (158 lb 12.8 oz)   SpO2 97%   BMI 27.26 kg/m²        2 Kg removed.      Flushed both ports with 10 mL of NS.  CVC dressing clean, dry, and intact, tego caps intact, curos caps placed.      Meds given: 00.    RBCs given during dialysis: 0    Patient to 705 after dialysis.

## 2025-04-30 NOTE — DIALYSIS
TRANSFER IN - DIALYSIS    Received patient in dialysis unit  from [facility] (unit) for ordered procedure.    Consent verified for renal replacement therapy. Procedure explained to patient, opportunity for Q&A provided. Call light given.     Patient baseline and vital signs stable.  /75,  P81  , patient on room air    Hemodialysis initiated using cvc.  Aspirated and flushed both ports without difficulty. Dressing clean, dry and intact.  Machine settings per MD order.    Heparin 0 unit bolus and 0 units/hr.      Will monitor during treatment.

## 2025-04-30 NOTE — ICUWATCH
[facility] Clinical Rounding Nurse Progress Report      SUBJECTIVE: Patient assessed secondary to transfer from critical care.      Vitals:    04/29/25 1600 04/29/25 1630 04/29/25 1705 04/29/25 2007   BP: 138/66 (!) 154/73 (!) 164/65 (!) 155/73   Pulse: 78 78 79 81   Resp: 20 17 17   Temp: 98.8 °F (37.1 °C)  99 °F (37.2 °C) 98.2 °F (36.8 °C)   TempSrc: Oral  Axillary Axillary   SpO2: 100% 100% 97% 96%   Weight:       Height:            DETERIORATION INDEX SCORE: 34    ASSESSMENT:  Upon arrival to room, pt in bed resting quietly in mitts, wakes to voice and follows commands. Respirations even and unlabored. Tube feeds infusing. No seizure-like activity seen overnight per nursing.     PLAN:  Will discharge from [facility] Clinical Rounding Program per protocol. Please call if needed.    Destini Bain RN  [facility]: [phone]  [facility]: [phone]

## 2025-05-01 LAB
ANION GAP SERPL CALC-SCNC: 12 MMOL/L (ref 7–16)
BUN SERPL-MCNC: 21 MG/DL (ref 8–23)
CALCIUM SERPL-MCNC: 8.6 MG/DL (ref 8.8–10.2)
CHLORIDE SERPL-SCNC: 96 MMOL/L (ref 98–107)
CO2 SERPL-SCNC: 26 MMOL/L (ref 20–29)
CREAT SERPL-MCNC: 2.66 MG/DL (ref 0.6–1.1)
GLUCOSE BLD STRIP.AUTO-MCNC: 127 MG/DL (ref 65–100)
GLUCOSE BLD STRIP.AUTO-MCNC: 146 MG/DL (ref 65–100)
GLUCOSE BLD STRIP.AUTO-MCNC: 163 MG/DL (ref 65–100)
GLUCOSE BLD STRIP.AUTO-MCNC: 188 MG/DL (ref 65–100)
GLUCOSE SERPL-MCNC: 164 MG/DL (ref 70–99)
POTASSIUM SERPL-SCNC: 3.9 MMOL/L (ref 3.5–5.1)
SERVICE CMNT-IMP: ABNORMAL
SODIUM SERPL-SCNC: 134 MMOL/L (ref 136–145)

## 2025-05-01 PROCEDURE — 97112 NEUROMUSCULAR REEDUCATION: CPT

## 2025-05-01 PROCEDURE — 6370000000 HC RX 637 (ALT 250 FOR IP): Performed by: INTERNAL MEDICINE

## 2025-05-01 PROCEDURE — 82962 GLUCOSE BLOOD TEST: CPT

## 2025-05-01 PROCEDURE — 2500000003 HC RX 250 WO HCPCS

## 2025-05-01 PROCEDURE — C9254 INJECTION, LACOSAMIDE: HCPCS | Performed by: STUDENT IN AN ORGANIZED HEALTH CARE EDUCATION/TRAINING PROGRAM

## 2025-05-01 PROCEDURE — 97530 THERAPEUTIC ACTIVITIES: CPT

## 2025-05-01 PROCEDURE — 6370000000 HC RX 637 (ALT 250 FOR IP)

## 2025-05-01 PROCEDURE — 1100000000 HC RM PRIVATE

## 2025-05-01 PROCEDURE — 80048 BASIC METABOLIC PNL TOTAL CA: CPT

## 2025-05-01 PROCEDURE — 6360000002 HC RX W HCPCS: Performed by: INTERNAL MEDICINE

## 2025-05-01 PROCEDURE — 92526 ORAL FUNCTION THERAPY: CPT

## 2025-05-01 PROCEDURE — 6360000002 HC RX W HCPCS: Performed by: STUDENT IN AN ORGANIZED HEALTH CARE EDUCATION/TRAINING PROGRAM

## 2025-05-01 PROCEDURE — 36415 COLL VENOUS BLD VENIPUNCTURE: CPT

## 2025-05-01 RX ADMIN — FAMOTIDINE 20 MG: 20 TABLET, FILM COATED ORAL at 07:28

## 2025-05-01 RX ADMIN — SODIUM CHLORIDE, PRESERVATIVE FREE 5 ML: 5 INJECTION INTRAVENOUS at 07:31

## 2025-05-01 RX ADMIN — CARVEDILOL 25 MG: 25 TABLET, FILM COATED ORAL at 20:26

## 2025-05-01 RX ADMIN — INSULIN GLARGINE 15 UNITS: 100 INJECTION, SOLUTION SUBCUTANEOUS at 07:28

## 2025-05-01 RX ADMIN — LACOSAMIDE 100 MG: 10 INJECTION INTRAVENOUS at 07:28

## 2025-05-01 RX ADMIN — SODIUM CHLORIDE, PRESERVATIVE FREE 10 ML: 5 INJECTION INTRAVENOUS at 20:26

## 2025-05-01 RX ADMIN — CARVEDILOL 25 MG: 25 TABLET, FILM COATED ORAL at 07:28

## 2025-05-01 RX ADMIN — HYDRALAZINE HYDROCHLORIDE 50 MG: 50 TABLET ORAL at 05:58

## 2025-05-01 RX ADMIN — INSULIN LISPRO 2 UNITS: 100 INJECTION, SOLUTION INTRAVENOUS; SUBCUTANEOUS at 03:17

## 2025-05-01 RX ADMIN — HYDRALAZINE HYDROCHLORIDE 20 MG: 20 INJECTION INTRAMUSCULAR; INTRAVENOUS at 15:42

## 2025-05-01 RX ADMIN — LACOSAMIDE 100 MG: 10 INJECTION INTRAVENOUS at 20:26

## 2025-05-01 RX ADMIN — HYDRALAZINE HYDROCHLORIDE 50 MG: 50 TABLET ORAL at 20:33

## 2025-05-01 RX ADMIN — APIXABAN 2.5 MG: 5 TABLET, FILM COATED ORAL at 17:56

## 2025-05-01 RX ADMIN — APIXABAN 2.5 MG: 5 TABLET, FILM COATED ORAL at 05:58

## 2025-05-01 NOTE — H&P
[facility] Critical Care Note:: 4/18/2025  Nataliia Pradhan  Admission Date: 4/17/2025     Length of Stay: 1 days    Background: 79 y.o. female with ESRD, T2DM, HTN, HLD, hyperparathyroidism, chronic anemia who lives at home with her family and they reported finding her in status epilepticus prior to calling the ambulance.  Patient was in her bed and there is no report of fall or trauma.  EMS gave her several rounds of Versed without successful break of the seizure.  Due to airway compromise patient was intubated in the field without difficulty.  She arrives in the emergency department obtunded, intubated and sedated.  Critical care was asked see patient for admission to ICU for further workup and management of her ventilator.  We will load her with Keppra and keep her on a propofol drip for now.  Hypertension may need to be addressed if continue to run high, pending CT head results.    Recent surgery: Vascular surgery (03/19/2025) acute thrombus left arm declot and balloon angioplasty of the cephalic-subclavian junction.    Notable PMH:  has a past medical history of Anemia, DM (diabetes mellitus) (HCC), End stage kidney disease (HCC), HTN (hypertension), Hyperlipemia, Hyperparathyroidism, and Thyroid disease.    24 Hour events:   Patient had a normal day today no complaints was found in her bed at home in status epilepticus.  Patient had to be intubated in the field by EMS for airway protection.  She will be admitted to the ICU for further observation completion of her studies and neurology evaluation in the a.m.    Review of Systems: Comprehensive ROS negative except in HPI    Lines:   ETT  (Active)       Drips: current dose (range)  Dose (mcg/kg/min) Propofol : 10 mcg/kg/min     Pertinent Exam:         Blood pressure (!) 215/113, pulse 73, temperature 97.7 °F (36.5 °C), temperature source Bladder, resp. rate 14, weight 58.5 kg (129 lb), SpO2 100%. No intake or output data in the 24 hours ending 
  Student notes are for training only, and should not be used to guide medical decision making.      Medical Student Progress Note   Admit Date:  2025  8:38 PM   Name:  Nataliia Pradhan   Age:  80 y.o.  Sex:  female  :  1945   MRN:  [id number]   Room:  [facility]    Presenting Complaint: Seizures    Reason(s) for Admission: Status epilepticus (HCC) [G40.901]  HTN (hypertension), malignant [I10]  ESRD on hemodialysis (HCC) [N18.6, Z99.2]  Seizure-like activity (HCC) [R56.9]  Altered mental status, unspecified altered mental status type [R41.82]     Hospital Course:   80 yo AA female with status epilepticus, liver cirrhosis, ESRD, T2DM, HTN,  and PVD presented to the ED on 25 in status. She was found by her family with seizure like activity and ambulance was called.  Several rounds of versed were given by EMS without success so pt was intubated. Pt started on propofol and loaded with Keppra.ESRD managed with HD. HTN controlled with carvedilol 25 mg BID and hydralazine 50mg (25 BP:146/63). Pt currently on VIMPAT 100mg BID for seizure prophylaxis. No new seizures reported since admission.  25 MRI significant for chronic microvascular changes and remote microhemorrhages. Today patient is alert and confused. NG tube was placed 25 due to mild ileus found on 25 KUB. KUB show appropriately placed NG tube.  Pt knows what state she is in but not what hospital. She thinks the year is . Pt able to report name and birthday but not oriented beyond that. She has been able to sit on the edge of the bed with assistance. Speech therapy met with her today and she was unable to tolerate any po safely so patient to remain NPO thus she is going to have a swallow study to evaluate.     Subjective & 24-hour events:  Patient examined at bedside. On tube feeds. In no acute distress. Confused.    Assessment & Plan:   NEURO:   Seizures- Continue VIMPAT 100mg BID- improving mental status  Encephalopathy vs 
  Student notes are for training only, and should not be used to guide medical decision making.      Medical Student Progress Note   Admit Date:  2025  8:38 PM   Name:  Nataliia Pradhan   Age:  80 y.o.  Sex:  female  :  1945   MRN:  [id number]   Room:  [facility]    Presenting Complaint: Seizures    Reason(s) for Admission: Status epilepticus (HCC) [G40.901]  HTN (hypertension), malignant [I10]  ESRD on hemodialysis (HCC) [N18.6, Z99.2]  Seizure-like activity (HCC) [R56.9]  Altered mental status, unspecified altered mental status type [R41.82]     Hospital Course:   80 yo AA female with status epilepticus, liver cirrhosis, ESRD, T2DM, HTN,  and PVD presented to the ED on 25 in status. She was found by her family with seizure like activity and ambulance was called.  Several rounds of versed were given by EMS without success so pt was intubated. Pt started on propofol and loaded with Keppra.ESRD controlled with dialysis(25 BUN,Cr,GFR: 16,2.16,23) respectively. HTN controlled with carvedilol 25 mg BID and hydralazine 50mg (25 BP:124/58). Pt currently on VIMPAT 100mg BID for seizure prophylaxis. No new seizures reported since admission.  25 MRI significant for chronic microvascular changes and remote microhemorrhages. Today patient is alert and confused. NG tube was placed 25 due to mild ileus found on 25 KUB. KUB show appropriately placed NG tube.  Pt knows what state she is in but not what hospital. She thinks the year is . Pt able to report name and birthday but not oriented beyond that. She has been able to sit on the edge of the bed with assistance. Speech therapy met with her today and she was unable to tolerate any po safely so patient to remain NPO.  Subjective & 24-hour events:  Pt examined at bedside. No acute distress. Pt confused but cooperative with safety mittens on. NG tube visualized with no concerns.     Assessment & Plan:   NEURO:   Seizures- Continue VIMPAT 
brain     INDICATION: Seizures     COMPARISON: CT brain March 12, 2025     Standard MRI sequences were obtained through the brain in multiple planes.  Images were obtained before and after intravenous infusion of ProHance.     FINDINGS: No restricted diffusion. No acute infarction, hemorrhage, or mass.  Intrinsic T1 hyperintensity involving the right lentiform nucleus measuring 2.7  cm without corresponding T2*hypointensity. This could be some gadolinium  deposition from prior contrasted MRIs. Asymmetric mineralization is also  considered. Numerous remote microhemorrhages involving the dentate nuclei of  both cerebellar hemispheres. There is also prominent T2 FLAIR hyperintensity in  these regions. Findings likely represent sequela of chronic small vessel disease  although this is a somewhat unusual location. The findings are bilateral and  symmetric. A few remote microhemorrhages in the left parieto-occipital white  matter also noted. Diffuse moderate atrophy without hydrocephalus.     No pathologic enhancement. No dural thickening. CPA/IACs are unremarkable. Dural  venous sinuses show patency without thrombus or stenosis. Calvarial marrow  signal is normal. Trace mucosal thickening throughout the paranasal sinuses.  Mild fluid signal in both mastoids.           IMPRESSION:     1. No acute infarction, hemorrhage, or mass.  2. Unusual chronic microvascular ischemic changes and remote microhemorrhages of  both cerebellar hemispheres at the dentate nuclei.  3. T1 signal changes of the right lentiform nucleus as noted, could represent  asymmetric mineralization or asymmetric deposition of gadolinium. Uncertain  clinical significance.                Thu Apr 17, 2025 2120 CT head IMPRESSION:  1. No acute intracranial process. If clinical concern for acute ischemia, MRI is  a more sensitive exam.  2. Chronic small vessel ischemic disease.  3. Atrophy.      [DF]   2120 CXR FINDINGS:  Endotracheal tube tip is

## 2025-05-02 ENCOUNTER — APPOINTMENT (OUTPATIENT)
Dept: GENERAL RADIOLOGY | Age: 80
DRG: 100 | End: 2025-05-02
Payer: MEDICARE

## 2025-05-02 LAB
ANION GAP SERPL CALC-SCNC: 11 MMOL/L (ref 7–16)
BASOPHILS # BLD: 0.04 K/UL (ref 0–0.2)
BASOPHILS NFR BLD: 0.7 % (ref 0–2)
BUN SERPL-MCNC: 34 MG/DL (ref 8–23)
CALCIUM SERPL-MCNC: 8.6 MG/DL (ref 8.8–10.2)
CHLORIDE SERPL-SCNC: 95 MMOL/L (ref 98–107)
CO2 SERPL-SCNC: 27 MMOL/L (ref 20–29)
CREAT SERPL-MCNC: 3.7 MG/DL (ref 0.6–1.1)
DIFFERENTIAL METHOD BLD: ABNORMAL
EOSINOPHIL # BLD: 0.28 K/UL (ref 0–0.8)
EOSINOPHIL NFR BLD: 4.6 % (ref 0.5–7.8)
ERYTHROCYTE [DISTWIDTH] IN BLOOD BY AUTOMATED COUNT: 14.8 % (ref 11.9–14.6)
GLUCOSE BLD STRIP.AUTO-MCNC: 149 MG/DL (ref 65–100)
GLUCOSE BLD STRIP.AUTO-MCNC: 193 MG/DL (ref 65–100)
GLUCOSE BLD STRIP.AUTO-MCNC: 207 MG/DL (ref 65–100)
GLUCOSE BLD STRIP.AUTO-MCNC: 87 MG/DL (ref 65–100)
GLUCOSE SERPL-MCNC: 147 MG/DL (ref 70–99)
HCT VFR BLD AUTO: 26.4 % (ref 35.8–46.3)
HGB BLD-MCNC: 8.7 G/DL (ref 11.7–15.4)
IMM GRANULOCYTES # BLD AUTO: 0.02 K/UL (ref 0–0.5)
IMM GRANULOCYTES NFR BLD AUTO: 0.3 % (ref 0–5)
LYMPHOCYTES # BLD: 1.21 K/UL (ref 0.5–4.6)
LYMPHOCYTES NFR BLD: 20.1 % (ref 13–44)
MAGNESIUM SERPL-MCNC: 2.2 MG/DL (ref 1.8–2.4)
MCH RBC QN AUTO: 29.6 PG (ref 26.1–32.9)
MCHC RBC AUTO-ENTMCNC: 33 G/DL (ref 31.4–35)
MCV RBC AUTO: 89.8 FL (ref 82–102)
MONOCYTES # BLD: 0.58 K/UL (ref 0.1–1.3)
MONOCYTES NFR BLD: 9.6 % (ref 4–12)
NEUTS SEG # BLD: 3.9 K/UL (ref 1.7–8.2)
NEUTS SEG NFR BLD: 64.7 % (ref 43–78)
NRBC # BLD: 0 K/UL (ref 0–0.2)
PHOSPHATE SERPL-MCNC: 3.5 MG/DL (ref 2.5–4.5)
PLATELET # BLD AUTO: 150 K/UL (ref 150–450)
PMV BLD AUTO: 11 FL (ref 9.4–12.3)
POTASSIUM SERPL-SCNC: 4 MMOL/L (ref 3.5–5.1)
RBC # BLD AUTO: 2.94 M/UL (ref 4.05–5.2)
SERVICE CMNT-IMP: ABNORMAL
SERVICE CMNT-IMP: NORMAL
SODIUM SERPL-SCNC: 134 MMOL/L (ref 136–145)
WBC # BLD AUTO: 6 K/UL (ref 4.3–11.1)

## 2025-05-02 PROCEDURE — 97112 NEUROMUSCULAR REEDUCATION: CPT

## 2025-05-02 PROCEDURE — 6370000000 HC RX 637 (ALT 250 FOR IP)

## 2025-05-02 PROCEDURE — 1100000000 HC RM PRIVATE

## 2025-05-02 PROCEDURE — 6370000000 HC RX 637 (ALT 250 FOR IP): Performed by: INTERNAL MEDICINE

## 2025-05-02 PROCEDURE — 2500000003 HC RX 250 WO HCPCS

## 2025-05-02 PROCEDURE — 90935 HEMODIALYSIS ONE EVALUATION: CPT

## 2025-05-02 PROCEDURE — 80048 BASIC METABOLIC PNL TOTAL CA: CPT

## 2025-05-02 PROCEDURE — 36415 COLL VENOUS BLD VENIPUNCTURE: CPT

## 2025-05-02 PROCEDURE — 74230 X-RAY XM SWLNG FUNCJ C+: CPT

## 2025-05-02 PROCEDURE — C9254 INJECTION, LACOSAMIDE: HCPCS | Performed by: STUDENT IN AN ORGANIZED HEALTH CARE EDUCATION/TRAINING PROGRAM

## 2025-05-02 PROCEDURE — 97530 THERAPEUTIC ACTIVITIES: CPT

## 2025-05-02 PROCEDURE — 84100 ASSAY OF PHOSPHORUS: CPT

## 2025-05-02 PROCEDURE — 6360000002 HC RX W HCPCS: Performed by: STUDENT IN AN ORGANIZED HEALTH CARE EDUCATION/TRAINING PROGRAM

## 2025-05-02 PROCEDURE — 82962 GLUCOSE BLOOD TEST: CPT

## 2025-05-02 PROCEDURE — 85025 COMPLETE CBC W/AUTO DIFF WBC: CPT

## 2025-05-02 PROCEDURE — 83735 ASSAY OF MAGNESIUM: CPT

## 2025-05-02 PROCEDURE — 92611 MOTION FLUOROSCOPY/SWALLOW: CPT

## 2025-05-02 PROCEDURE — 97535 SELF CARE MNGMENT TRAINING: CPT

## 2025-05-02 PROCEDURE — 92612 ENDOSCOPY SWALLOW (FEES) VID: CPT

## 2025-05-02 RX ADMIN — FAMOTIDINE 20 MG: 20 TABLET, FILM COATED ORAL at 12:52

## 2025-05-02 RX ADMIN — SODIUM CHLORIDE, PRESERVATIVE FREE 10 ML: 5 INJECTION INTRAVENOUS at 21:44

## 2025-05-02 RX ADMIN — APIXABAN 2.5 MG: 5 TABLET, FILM COATED ORAL at 05:20

## 2025-05-02 RX ADMIN — APIXABAN 2.5 MG: 5 TABLET, FILM COATED ORAL at 17:05

## 2025-05-02 RX ADMIN — INSULIN LISPRO 2 UNITS: 100 INJECTION, SOLUTION INTRAVENOUS; SUBCUTANEOUS at 21:44

## 2025-05-02 RX ADMIN — HYDRALAZINE HYDROCHLORIDE 50 MG: 50 TABLET ORAL at 05:19

## 2025-05-02 RX ADMIN — HYDRALAZINE HYDROCHLORIDE 50 MG: 50 TABLET ORAL at 16:16

## 2025-05-02 RX ADMIN — LACOSAMIDE 100 MG: 10 INJECTION INTRAVENOUS at 21:43

## 2025-05-02 RX ADMIN — CARVEDILOL 25 MG: 25 TABLET, FILM COATED ORAL at 21:44

## 2025-05-02 RX ADMIN — INSULIN GLARGINE 15 UNITS: 100 INJECTION, SOLUTION SUBCUTANEOUS at 12:51

## 2025-05-02 RX ADMIN — BARIUM SULFATE 15 ML: 0.81 POWDER, FOR SUSPENSION ORAL at 12:34

## 2025-05-02 RX ADMIN — BARIUM SULFATE 15 ML: 400 PASTE ORAL at 12:34

## 2025-05-02 RX ADMIN — CARVEDILOL 25 MG: 25 TABLET, FILM COATED ORAL at 12:52

## 2025-05-02 RX ADMIN — HYDRALAZINE HYDROCHLORIDE 50 MG: 50 TABLET ORAL at 21:43

## 2025-05-02 RX ADMIN — LACOSAMIDE 100 MG: 10 INJECTION INTRAVENOUS at 12:51

## 2025-05-02 NOTE — DIALYSIS
TRANSFER IN - DIALYSIS    Received patient in dialysis unit  from [facility] (unit) for ordered procedure.    Consent verified for renal replacement therapy. Procedure explained to patient, opportunity for Q&A provided. Call light given.     Patient baseline and vital signs stable.  /64,  P73  , patient on room air    Hemodialysis initiated using cvc.  Aspirated and flushed both ports without difficulty. Dressing clean, dry and intact.  Machine settings per MD order.    Heparin 0 unit bolus and 0 units/hr.      Will monitor during treatment.

## 2025-05-02 NOTE — CARE COORDINATION
Pt is s/p Fiberoptic Endoscopic Evaluation of Swallowing (FEES) today.  Pt cleared for a diet (pureed and thin liquids) and NGT will be removed. CM will follow up with family over the weekend to discuss choice of rehab facilities.

## 2025-05-02 NOTE — DIALYSIS
TRANSFER OUT- DIALYSIS    Hemodialysis treatment completed. PT ran 4 hours, without complications.    Patient baseline and VS stable  /62  P 73       2 Kg removed.      Flushed both ports with 10 mL of NS.  CVC dressing clean, dry, and intact, tego caps intact, curos caps placed.      Meds given: 0.    RBCs given during dialysis: 0    Patient to 705 after dialysis.

## 2025-05-03 LAB
GLUCOSE BLD STRIP.AUTO-MCNC: 114 MG/DL (ref 65–100)
GLUCOSE BLD STRIP.AUTO-MCNC: 126 MG/DL (ref 65–100)
GLUCOSE BLD STRIP.AUTO-MCNC: 144 MG/DL (ref 65–100)
GLUCOSE BLD STRIP.AUTO-MCNC: 166 MG/DL (ref 65–100)
GLUCOSE BLD STRIP.AUTO-MCNC: 76 MG/DL (ref 65–100)
SERVICE CMNT-IMP: ABNORMAL
SERVICE CMNT-IMP: NORMAL

## 2025-05-03 PROCEDURE — 6370000000 HC RX 637 (ALT 250 FOR IP): Performed by: INTERNAL MEDICINE

## 2025-05-03 PROCEDURE — 1100000000 HC RM PRIVATE

## 2025-05-03 PROCEDURE — 6370000000 HC RX 637 (ALT 250 FOR IP)

## 2025-05-03 PROCEDURE — 2500000003 HC RX 250 WO HCPCS

## 2025-05-03 PROCEDURE — 6360000002 HC RX W HCPCS: Performed by: STUDENT IN AN ORGANIZED HEALTH CARE EDUCATION/TRAINING PROGRAM

## 2025-05-03 PROCEDURE — C9254 INJECTION, LACOSAMIDE: HCPCS | Performed by: STUDENT IN AN ORGANIZED HEALTH CARE EDUCATION/TRAINING PROGRAM

## 2025-05-03 PROCEDURE — 82962 GLUCOSE BLOOD TEST: CPT

## 2025-05-03 RX ORDER — ACETAMINOPHEN 650 MG/1
650 SUPPOSITORY RECTAL EVERY 6 HOURS PRN
Status: DISCONTINUED | OUTPATIENT
Start: 2025-05-03 | End: 2025-05-12 | Stop reason: HOSPADM

## 2025-05-03 RX ORDER — FAMOTIDINE 20 MG/1
20 TABLET, FILM COATED ORAL DAILY
Status: DISCONTINUED | OUTPATIENT
Start: 2025-05-03 | End: 2025-05-12 | Stop reason: HOSPADM

## 2025-05-03 RX ORDER — ACETAMINOPHEN 325 MG/1
650 TABLET ORAL EVERY 6 HOURS PRN
Status: DISCONTINUED | OUTPATIENT
Start: 2025-05-03 | End: 2025-05-12 | Stop reason: HOSPADM

## 2025-05-03 RX ORDER — CARVEDILOL 25 MG/1
25 TABLET ORAL 2 TIMES DAILY
Status: DISCONTINUED | OUTPATIENT
Start: 2025-05-03 | End: 2025-05-05

## 2025-05-03 RX ORDER — HYDRALAZINE HYDROCHLORIDE 50 MG/1
50 TABLET, FILM COATED ORAL EVERY 8 HOURS SCHEDULED
Status: DISCONTINUED | OUTPATIENT
Start: 2025-05-03 | End: 2025-05-05

## 2025-05-03 RX ADMIN — CARVEDILOL 25 MG: 25 TABLET, FILM COATED ORAL at 09:09

## 2025-05-03 RX ADMIN — HYDRALAZINE HYDROCHLORIDE 50 MG: 50 TABLET ORAL at 14:10

## 2025-05-03 RX ADMIN — CARVEDILOL 25 MG: 25 TABLET, FILM COATED ORAL at 21:30

## 2025-05-03 RX ADMIN — APIXABAN 2.5 MG: 5 TABLET, FILM COATED ORAL at 06:24

## 2025-05-03 RX ADMIN — HYDRALAZINE HYDROCHLORIDE 50 MG: 50 TABLET ORAL at 06:24

## 2025-05-03 RX ADMIN — SODIUM CHLORIDE, PRESERVATIVE FREE 10 ML: 5 INJECTION INTRAVENOUS at 21:30

## 2025-05-03 RX ADMIN — APIXABAN 2.5 MG: 5 TABLET, FILM COATED ORAL at 18:05

## 2025-05-03 RX ADMIN — SODIUM CHLORIDE, PRESERVATIVE FREE 10 ML: 5 INJECTION INTRAVENOUS at 08:10

## 2025-05-03 RX ADMIN — FAMOTIDINE 20 MG: 20 TABLET, FILM COATED ORAL at 08:52

## 2025-05-03 RX ADMIN — LACOSAMIDE 100 MG: 10 INJECTION INTRAVENOUS at 08:52

## 2025-05-03 RX ADMIN — LACOSAMIDE 100 MG: 10 INJECTION INTRAVENOUS at 21:30

## 2025-05-03 NOTE — CARE COORDINATION
LMSW follow up with pt and daughter at bedside.  They have requested pt referrals to [facility] as first choice, [facility] and [facility] as alternates options.  Referrals placed.  Pt insurance will require a precert for any rehab placement.

## 2025-05-04 LAB
GLUCOSE BLD STRIP.AUTO-MCNC: 121 MG/DL (ref 65–100)
GLUCOSE BLD STRIP.AUTO-MCNC: 148 MG/DL (ref 65–100)
GLUCOSE BLD STRIP.AUTO-MCNC: 244 MG/DL (ref 65–100)
GLUCOSE BLD STRIP.AUTO-MCNC: 268 MG/DL (ref 65–100)
SERVICE CMNT-IMP: ABNORMAL

## 2025-05-04 PROCEDURE — 2500000003 HC RX 250 WO HCPCS

## 2025-05-04 PROCEDURE — 1100000000 HC RM PRIVATE

## 2025-05-04 PROCEDURE — 82962 GLUCOSE BLOOD TEST: CPT

## 2025-05-04 PROCEDURE — 6370000000 HC RX 637 (ALT 250 FOR IP): Performed by: INTERNAL MEDICINE

## 2025-05-04 PROCEDURE — C9254 INJECTION, LACOSAMIDE: HCPCS | Performed by: STUDENT IN AN ORGANIZED HEALTH CARE EDUCATION/TRAINING PROGRAM

## 2025-05-04 PROCEDURE — 6370000000 HC RX 637 (ALT 250 FOR IP)

## 2025-05-04 PROCEDURE — 6360000002 HC RX W HCPCS: Performed by: STUDENT IN AN ORGANIZED HEALTH CARE EDUCATION/TRAINING PROGRAM

## 2025-05-04 RX ADMIN — HYDRALAZINE HYDROCHLORIDE 50 MG: 50 TABLET ORAL at 00:39

## 2025-05-04 RX ADMIN — CARVEDILOL 25 MG: 25 TABLET, FILM COATED ORAL at 21:39

## 2025-05-04 RX ADMIN — APIXABAN 2.5 MG: 5 TABLET, FILM COATED ORAL at 18:30

## 2025-05-04 RX ADMIN — SODIUM CHLORIDE, PRESERVATIVE FREE 10 ML: 5 INJECTION INTRAVENOUS at 09:05

## 2025-05-04 RX ADMIN — SODIUM CHLORIDE, PRESERVATIVE FREE 10 ML: 5 INJECTION INTRAVENOUS at 21:40

## 2025-05-04 RX ADMIN — APIXABAN 2.5 MG: 5 TABLET, FILM COATED ORAL at 06:31

## 2025-05-04 RX ADMIN — LACOSAMIDE 100 MG: 10 INJECTION INTRAVENOUS at 21:39

## 2025-05-04 RX ADMIN — HYDRALAZINE HYDROCHLORIDE 50 MG: 50 TABLET ORAL at 22:35

## 2025-05-04 RX ADMIN — HYDRALAZINE HYDROCHLORIDE 50 MG: 50 TABLET ORAL at 06:31

## 2025-05-04 RX ADMIN — INSULIN LISPRO 2 UNITS: 100 INJECTION, SOLUTION INTRAVENOUS; SUBCUTANEOUS at 21:39

## 2025-05-04 RX ADMIN — LACOSAMIDE 100 MG: 10 INJECTION INTRAVENOUS at 09:04

## 2025-05-04 RX ADMIN — HYDRALAZINE HYDROCHLORIDE 50 MG: 50 TABLET ORAL at 12:53

## 2025-05-04 RX ADMIN — CARVEDILOL 25 MG: 25 TABLET, FILM COATED ORAL at 09:04

## 2025-05-04 RX ADMIN — FAMOTIDINE 20 MG: 20 TABLET, FILM COATED ORAL at 09:04

## 2025-05-04 NOTE — ICUWATCH
RRT Clinical Rounding Nurse Assistance    Called to assist primary RN with IV start.    Jamie Bryant RN  [facility]: [phone]  [facility]: [phone]

## 2025-05-05 LAB
ANION GAP SERPL CALC-SCNC: 12 MMOL/L (ref 7–16)
BASOPHILS # BLD: 0.03 K/UL (ref 0–0.2)
BASOPHILS NFR BLD: 0.5 % (ref 0–2)
BUN SERPL-MCNC: 50 MG/DL (ref 8–23)
CALCIUM SERPL-MCNC: 8.9 MG/DL (ref 8.8–10.2)
CHLORIDE SERPL-SCNC: 98 MMOL/L (ref 98–107)
CO2 SERPL-SCNC: 24 MMOL/L (ref 20–29)
CREAT SERPL-MCNC: 4.72 MG/DL (ref 0.6–1.1)
DIFFERENTIAL METHOD BLD: ABNORMAL
EOSINOPHIL # BLD: 0.34 K/UL (ref 0–0.8)
EOSINOPHIL NFR BLD: 6.1 % (ref 0.5–7.8)
ERYTHROCYTE [DISTWIDTH] IN BLOOD BY AUTOMATED COUNT: 14.6 % (ref 11.9–14.6)
GLUCOSE BLD STRIP.AUTO-MCNC: 127 MG/DL (ref 65–100)
GLUCOSE BLD STRIP.AUTO-MCNC: 149 MG/DL (ref 65–100)
GLUCOSE BLD STRIP.AUTO-MCNC: 223 MG/DL (ref 65–100)
GLUCOSE SERPL-MCNC: 136 MG/DL (ref 70–99)
HCT VFR BLD AUTO: 28.9 % (ref 35.8–46.3)
HGB BLD-MCNC: 9.3 G/DL (ref 11.7–15.4)
IMM GRANULOCYTES # BLD AUTO: 0.02 K/UL (ref 0–0.5)
IMM GRANULOCYTES NFR BLD AUTO: 0.4 % (ref 0–5)
LYMPHOCYTES # BLD: 1.42 K/UL (ref 0.5–4.6)
LYMPHOCYTES NFR BLD: 25.7 % (ref 13–44)
MAGNESIUM SERPL-MCNC: 2.4 MG/DL (ref 1.8–2.4)
MCH RBC QN AUTO: 29 PG (ref 26.1–32.9)
MCHC RBC AUTO-ENTMCNC: 32.2 G/DL (ref 31.4–35)
MCV RBC AUTO: 90 FL (ref 82–102)
MONOCYTES # BLD: 0.43 K/UL (ref 0.1–1.3)
MONOCYTES NFR BLD: 7.8 % (ref 4–12)
NEUTS SEG # BLD: 3.29 K/UL (ref 1.7–8.2)
NEUTS SEG NFR BLD: 59.5 % (ref 43–78)
NRBC # BLD: 0 K/UL (ref 0–0.2)
PHOSPHATE SERPL-MCNC: 3.6 MG/DL (ref 2.5–4.5)
PLATELET # BLD AUTO: 138 K/UL (ref 150–450)
PMV BLD AUTO: 10.6 FL (ref 9.4–12.3)
POTASSIUM SERPL-SCNC: 4.1 MMOL/L (ref 3.5–5.1)
RBC # BLD AUTO: 3.21 M/UL (ref 4.05–5.2)
SERVICE CMNT-IMP: ABNORMAL
SODIUM SERPL-SCNC: 134 MMOL/L (ref 136–145)
WBC # BLD AUTO: 5.5 K/UL (ref 4.3–11.1)

## 2025-05-05 PROCEDURE — C9254 INJECTION, LACOSAMIDE: HCPCS | Performed by: STUDENT IN AN ORGANIZED HEALTH CARE EDUCATION/TRAINING PROGRAM

## 2025-05-05 PROCEDURE — 6360000002 HC RX W HCPCS: Performed by: STUDENT IN AN ORGANIZED HEALTH CARE EDUCATION/TRAINING PROGRAM

## 2025-05-05 PROCEDURE — 82962 GLUCOSE BLOOD TEST: CPT

## 2025-05-05 PROCEDURE — 2500000003 HC RX 250 WO HCPCS

## 2025-05-05 PROCEDURE — 90935 HEMODIALYSIS ONE EVALUATION: CPT

## 2025-05-05 PROCEDURE — 1100000000 HC RM PRIVATE

## 2025-05-05 PROCEDURE — 84100 ASSAY OF PHOSPHORUS: CPT

## 2025-05-05 PROCEDURE — 85025 COMPLETE CBC W/AUTO DIFF WBC: CPT

## 2025-05-05 PROCEDURE — 83735 ASSAY OF MAGNESIUM: CPT

## 2025-05-05 PROCEDURE — 36415 COLL VENOUS BLD VENIPUNCTURE: CPT

## 2025-05-05 PROCEDURE — 80048 BASIC METABOLIC PNL TOTAL CA: CPT

## 2025-05-05 PROCEDURE — 6370000000 HC RX 637 (ALT 250 FOR IP): Performed by: INTERNAL MEDICINE

## 2025-05-05 PROCEDURE — 6370000000 HC RX 637 (ALT 250 FOR IP)

## 2025-05-05 PROCEDURE — 92526 ORAL FUNCTION THERAPY: CPT

## 2025-05-05 RX ORDER — HYDRALAZINE HYDROCHLORIDE 50 MG/1
25 TABLET, FILM COATED ORAL EVERY 8 HOURS SCHEDULED
Status: DISCONTINUED | OUTPATIENT
Start: 2025-05-05 | End: 2025-05-06

## 2025-05-05 RX ORDER — CARVEDILOL 12.5 MG/1
12.5 TABLET ORAL 2 TIMES DAILY
Status: DISCONTINUED | OUTPATIENT
Start: 2025-05-05 | End: 2025-05-06

## 2025-05-05 RX ADMIN — FAMOTIDINE 20 MG: 20 TABLET, FILM COATED ORAL at 08:35

## 2025-05-05 RX ADMIN — SODIUM CHLORIDE, PRESERVATIVE FREE 5 ML: 5 INJECTION INTRAVENOUS at 08:39

## 2025-05-05 RX ADMIN — SODIUM CHLORIDE, PRESERVATIVE FREE 5 ML: 5 INJECTION INTRAVENOUS at 20:39

## 2025-05-05 RX ADMIN — CARVEDILOL 12.5 MG: 12.5 TABLET, FILM COATED ORAL at 21:56

## 2025-05-05 RX ADMIN — INSULIN LISPRO 2 UNITS: 100 INJECTION, SOLUTION INTRAVENOUS; SUBCUTANEOUS at 13:28

## 2025-05-05 RX ADMIN — CARVEDILOL 12.5 MG: 12.5 TABLET, FILM COATED ORAL at 08:42

## 2025-05-05 RX ADMIN — LACOSAMIDE 100 MG: 10 INJECTION INTRAVENOUS at 20:39

## 2025-05-05 RX ADMIN — LACOSAMIDE 100 MG: 10 INJECTION INTRAVENOUS at 08:35

## 2025-05-05 RX ADMIN — HYDRALAZINE HYDROCHLORIDE 50 MG: 50 TABLET ORAL at 06:04

## 2025-05-05 RX ADMIN — APIXABAN 2.5 MG: 5 TABLET, FILM COATED ORAL at 17:07

## 2025-05-05 RX ADMIN — APIXABAN 2.5 MG: 5 TABLET, FILM COATED ORAL at 06:04

## 2025-05-05 ASSESSMENT — PAIN SCALES - GENERAL
PAINLEVEL_OUTOF10: 0
PAINLEVEL_OUTOF10: 0

## 2025-05-05 NOTE — DIALYSIS
TRANSFER IN - DIALYSIS    Received patient in dialysis unit  from [facility] (unit) for ordered procedure.    Consent verified for renal replacement therapy. Procedure explained to patient, opportunity for Q&A provided. Call light given.     Patient alert and vital signs stable.  /71,  P66  , room air.    Hemodialysis initiated using right CVC.  Aspirated and flushed both ports without difficulty. Dressing clean, dry and intact.  Machine settings per MD order.    Heparin 0 unit bolus and 0 units/hr.      Will monitor during treatment.   Topical Clindamycin Counseling: Patient counseled that this medication may cause skin irritation or allergic reactions.  In the event of skin irritation, the patient was advised to reduce the amount of the drug applied or use it less frequently.   The patient verbalized understanding of the proper use and possible adverse effects of clindamycin.  All of the patient's questions and concerns were addressed.

## 2025-05-05 NOTE — ICUWATCH
RRT Clinical Rounding Nurse Progress Report      SUBJECTIVE: Patient assessed secondary to recent rapid response.      Vitals:    05/05/25 1210 05/05/25 1328 05/05/25 1600 05/05/25 1926   BP: (!) 148/73 (!) 125/52 (!) 164/78 (!) 157/53   Pulse: 68  90 65   Resp:   17 17   Temp:   98.8 °F (37.1 °C) 99 °F (37.2 °C)   TempSrc:   Oral Axillary   SpO2:   97% 97%   Weight:       Height:            DETERIORATION INDEX SCORE: 38    ASSESSMENT:  Pt is A&O x1 and appears to be in NAD at this time. O2 sat 97% on RA. Pt denies any pain and voices no complaints. BP now 157/53. Chart reviewed.      PLAN:  Will discharge from RRT Clinical Rounding Program per protocol. Please call if needed.    Myra Paredes RN  [facility]: [phone]  [facility]: [phone]

## 2025-05-05 NOTE — CARE COORDINATION
[facility] all declined to accept the pt.  CM will follow up with pt/family tomorrow to obtain additional choices.

## 2025-05-05 NOTE — DIALYSIS
TRANSFER OUT- DIALYSIS    Hemodialysis treatment completed. PT ran 0.5 hours, With hypotension and Rapid Response called due to slow response to recover.      Patient alert and VS stable  /76  P 56       +0.5 Kg removed.      Flushed both ports with 10 mL of NS.  CVC dressing clean, dry, and intact, tego caps intact, curos caps placed.      Meds given: None.    RBCs given during dialysis: No    Patient to 705 after dialysis.

## 2025-05-06 LAB
ANION GAP SERPL CALC-SCNC: 12 MMOL/L (ref 7–16)
BUN SERPL-MCNC: 56 MG/DL (ref 8–23)
CALCIUM SERPL-MCNC: 8.7 MG/DL (ref 8.8–10.2)
CHLORIDE SERPL-SCNC: 98 MMOL/L (ref 98–107)
CO2 SERPL-SCNC: 24 MMOL/L (ref 20–29)
CREAT SERPL-MCNC: 4.84 MG/DL (ref 0.6–1.1)
GLUCOSE BLD STRIP.AUTO-MCNC: 151 MG/DL (ref 65–100)
GLUCOSE BLD STRIP.AUTO-MCNC: 166 MG/DL (ref 65–100)
GLUCOSE BLD STRIP.AUTO-MCNC: 215 MG/DL (ref 65–100)
GLUCOSE SERPL-MCNC: 149 MG/DL (ref 70–99)
POTASSIUM SERPL-SCNC: 4.5 MMOL/L (ref 3.5–5.1)
SERVICE CMNT-IMP: ABNORMAL
SODIUM SERPL-SCNC: 133 MMOL/L (ref 136–145)

## 2025-05-06 PROCEDURE — 2500000003 HC RX 250 WO HCPCS

## 2025-05-06 PROCEDURE — 6360000002 HC RX W HCPCS: Performed by: STUDENT IN AN ORGANIZED HEALTH CARE EDUCATION/TRAINING PROGRAM

## 2025-05-06 PROCEDURE — C9254 INJECTION, LACOSAMIDE: HCPCS | Performed by: STUDENT IN AN ORGANIZED HEALTH CARE EDUCATION/TRAINING PROGRAM

## 2025-05-06 PROCEDURE — 92526 ORAL FUNCTION THERAPY: CPT

## 2025-05-06 PROCEDURE — 6360000002 HC RX W HCPCS: Performed by: INTERNAL MEDICINE

## 2025-05-06 PROCEDURE — 36415 COLL VENOUS BLD VENIPUNCTURE: CPT

## 2025-05-06 PROCEDURE — 97112 NEUROMUSCULAR REEDUCATION: CPT

## 2025-05-06 PROCEDURE — 97530 THERAPEUTIC ACTIVITIES: CPT

## 2025-05-06 PROCEDURE — 92507 TX SP LANG VOICE COMM INDIV: CPT

## 2025-05-06 PROCEDURE — 80048 BASIC METABOLIC PNL TOTAL CA: CPT

## 2025-05-06 PROCEDURE — 6370000000 HC RX 637 (ALT 250 FOR IP): Performed by: INTERNAL MEDICINE

## 2025-05-06 PROCEDURE — 82962 GLUCOSE BLOOD TEST: CPT

## 2025-05-06 PROCEDURE — 6370000000 HC RX 637 (ALT 250 FOR IP): Performed by: HOSPITALIST

## 2025-05-06 PROCEDURE — 1100000000 HC RM PRIVATE

## 2025-05-06 PROCEDURE — 6370000000 HC RX 637 (ALT 250 FOR IP)

## 2025-05-06 RX ORDER — CARVEDILOL 6.25 MG/1
6.25 TABLET ORAL 2 TIMES DAILY
Status: DISCONTINUED | OUTPATIENT
Start: 2025-05-06 | End: 2025-05-07

## 2025-05-06 RX ORDER — HYDRALAZINE HYDROCHLORIDE 50 MG/1
25 TABLET, FILM COATED ORAL EVERY 12 HOURS SCHEDULED
Status: DISCONTINUED | OUTPATIENT
Start: 2025-05-06 | End: 2025-05-12 | Stop reason: HOSPADM

## 2025-05-06 RX ORDER — INSULIN GLARGINE 100 [IU]/ML
5 INJECTION, SOLUTION SUBCUTANEOUS NIGHTLY
Status: DISCONTINUED | OUTPATIENT
Start: 2025-05-06 | End: 2025-05-12 | Stop reason: HOSPADM

## 2025-05-06 RX ADMIN — FAMOTIDINE 20 MG: 20 TABLET, FILM COATED ORAL at 08:46

## 2025-05-06 RX ADMIN — APIXABAN 2.5 MG: 5 TABLET, FILM COATED ORAL at 17:59

## 2025-05-06 RX ADMIN — INSULIN GLARGINE 5 UNITS: 100 INJECTION, SOLUTION SUBCUTANEOUS at 20:27

## 2025-05-06 RX ADMIN — LACOSAMIDE 100 MG: 10 INJECTION INTRAVENOUS at 20:27

## 2025-05-06 RX ADMIN — HYDRALAZINE HYDROCHLORIDE 20 MG: 20 INJECTION INTRAMUSCULAR; INTRAVENOUS at 15:35

## 2025-05-06 RX ADMIN — LACOSAMIDE 100 MG: 10 INJECTION INTRAVENOUS at 08:54

## 2025-05-06 RX ADMIN — CARVEDILOL 12.5 MG: 12.5 TABLET, FILM COATED ORAL at 09:16

## 2025-05-06 RX ADMIN — SODIUM CHLORIDE, PRESERVATIVE FREE 5 ML: 5 INJECTION INTRAVENOUS at 20:28

## 2025-05-06 RX ADMIN — INSULIN LISPRO 2 UNITS: 100 INJECTION, SOLUTION INTRAVENOUS; SUBCUTANEOUS at 18:38

## 2025-05-06 RX ADMIN — SODIUM CHLORIDE, PRESERVATIVE FREE 10 ML: 5 INJECTION INTRAVENOUS at 08:59

## 2025-05-06 RX ADMIN — APIXABAN 2.5 MG: 5 TABLET, FILM COATED ORAL at 04:30

## 2025-05-07 LAB
ANION GAP SERPL CALC-SCNC: 9 MMOL/L (ref 7–16)
BUN SERPL-MCNC: 69 MG/DL (ref 8–23)
CALCIUM SERPL-MCNC: 8.9 MG/DL (ref 8.8–10.2)
CHLORIDE SERPL-SCNC: 100 MMOL/L (ref 98–107)
CO2 SERPL-SCNC: 24 MMOL/L (ref 20–29)
CREAT SERPL-MCNC: 5.69 MG/DL (ref 0.6–1.1)
GLUCOSE BLD STRIP.AUTO-MCNC: 119 MG/DL (ref 65–100)
GLUCOSE BLD STRIP.AUTO-MCNC: 128 MG/DL (ref 65–100)
GLUCOSE BLD STRIP.AUTO-MCNC: 211 MG/DL (ref 65–100)
GLUCOSE SERPL-MCNC: 128 MG/DL (ref 70–99)
MAGNESIUM SERPL-MCNC: 2.5 MG/DL (ref 1.8–2.4)
PHOSPHATE SERPL-MCNC: 3.9 MG/DL (ref 2.5–4.5)
POTASSIUM SERPL-SCNC: 4.9 MMOL/L (ref 3.5–5.1)
SERVICE CMNT-IMP: ABNORMAL
SODIUM SERPL-SCNC: 133 MMOL/L (ref 136–145)

## 2025-05-07 PROCEDURE — 36415 COLL VENOUS BLD VENIPUNCTURE: CPT

## 2025-05-07 PROCEDURE — 90935 HEMODIALYSIS ONE EVALUATION: CPT

## 2025-05-07 PROCEDURE — 92526 ORAL FUNCTION THERAPY: CPT

## 2025-05-07 PROCEDURE — 6370000000 HC RX 637 (ALT 250 FOR IP): Performed by: HOSPITALIST

## 2025-05-07 PROCEDURE — 92507 TX SP LANG VOICE COMM INDIV: CPT

## 2025-05-07 PROCEDURE — 82962 GLUCOSE BLOOD TEST: CPT

## 2025-05-07 PROCEDURE — 6360000002 HC RX W HCPCS: Performed by: STUDENT IN AN ORGANIZED HEALTH CARE EDUCATION/TRAINING PROGRAM

## 2025-05-07 PROCEDURE — 80048 BASIC METABOLIC PNL TOTAL CA: CPT

## 2025-05-07 PROCEDURE — C9254 INJECTION, LACOSAMIDE: HCPCS | Performed by: STUDENT IN AN ORGANIZED HEALTH CARE EDUCATION/TRAINING PROGRAM

## 2025-05-07 PROCEDURE — 6370000000 HC RX 637 (ALT 250 FOR IP): Performed by: INTERNAL MEDICINE

## 2025-05-07 PROCEDURE — 83735 ASSAY OF MAGNESIUM: CPT

## 2025-05-07 PROCEDURE — 84100 ASSAY OF PHOSPHORUS: CPT

## 2025-05-07 PROCEDURE — 2500000003 HC RX 250 WO HCPCS

## 2025-05-07 PROCEDURE — 6370000000 HC RX 637 (ALT 250 FOR IP)

## 2025-05-07 PROCEDURE — 1100000000 HC RM PRIVATE

## 2025-05-07 RX ORDER — CARVEDILOL 3.12 MG/1
3.12 TABLET ORAL 2 TIMES DAILY
Status: DISCONTINUED | OUTPATIENT
Start: 2025-05-07 | End: 2025-05-12

## 2025-05-07 RX ADMIN — SODIUM CHLORIDE, PRESERVATIVE FREE 5 ML: 5 INJECTION INTRAVENOUS at 20:55

## 2025-05-07 RX ADMIN — FAMOTIDINE 20 MG: 20 TABLET, FILM COATED ORAL at 08:56

## 2025-05-07 RX ADMIN — APIXABAN 2.5 MG: 5 TABLET, FILM COATED ORAL at 05:31

## 2025-05-07 RX ADMIN — INSULIN GLARGINE 5 UNITS: 100 INJECTION, SOLUTION SUBCUTANEOUS at 21:07

## 2025-05-07 RX ADMIN — SODIUM CHLORIDE, PRESERVATIVE FREE 10 ML: 5 INJECTION INTRAVENOUS at 08:57

## 2025-05-07 RX ADMIN — LACOSAMIDE 100 MG: 10 INJECTION INTRAVENOUS at 20:55

## 2025-05-07 RX ADMIN — INSULIN LISPRO 2 UNITS: 100 INJECTION, SOLUTION INTRAVENOUS; SUBCUTANEOUS at 21:08

## 2025-05-07 RX ADMIN — LACOSAMIDE 100 MG: 10 INJECTION INTRAVENOUS at 08:56

## 2025-05-07 RX ADMIN — HYDRALAZINE HYDROCHLORIDE 25 MG: 50 TABLET ORAL at 20:55

## 2025-05-07 RX ADMIN — CARVEDILOL 3.12 MG: 3.12 TABLET, FILM COATED ORAL at 20:55

## 2025-05-07 NOTE — DIALYSIS
TRANSFER OUT- DIALYSIS    Hemodialysis treatment completed. PT ran 3.5 hours, without complications.    Patient a/ox3 and VS stable  /101  P 73       0.5 Kg removed.      Flushed both ports with 10 mL of NS.  CVC dressing clean, dry, and intact, tego caps intact, curos caps placed.      Meds given: 0.    RBCs given during dialysis: 0    Patient to 705 after dialysis.

## 2025-05-07 NOTE — CARE COORDINATION
No bed available at [facility].  [facility] does not accept pt's on dialysis. SONDRA spoke with pt's dtr, Mita, via phone to update her with this information.  She stated she would review the list again and provide additional choices.  CM following.

## 2025-05-07 NOTE — DIALYSIS
TRANSFER IN - DIALYSIS    Received patient in dialysis unit  from [facility] (unit) for ordered procedure.    Consent verified for renal replacement therapy. Procedure explained to patient, opportunity for Q&A provided. Call light given.     Patient a/ox3 and vital signs stable.  /80,  P69  , 0L O2 via RA.    Hemodialysis initiated using R CVC.  Aspirated and flushed both ports without difficulty. Dressing clean, dry and intact.  Machine settings per MD order.    Heparin 0 unit bolus and 0 units/hr.      Will monitor during treatment.

## 2025-05-08 LAB
GLUCOSE BLD STRIP.AUTO-MCNC: 183 MG/DL (ref 65–100)
GLUCOSE BLD STRIP.AUTO-MCNC: 202 MG/DL (ref 65–100)
GLUCOSE BLD STRIP.AUTO-MCNC: 77 MG/DL (ref 65–100)
GLUCOSE BLD STRIP.AUTO-MCNC: 82 MG/DL (ref 65–100)
SERVICE CMNT-IMP: ABNORMAL
SERVICE CMNT-IMP: ABNORMAL
SERVICE CMNT-IMP: NORMAL
SERVICE CMNT-IMP: NORMAL

## 2025-05-08 PROCEDURE — 6360000002 HC RX W HCPCS: Performed by: INTERNAL MEDICINE

## 2025-05-08 PROCEDURE — 97112 NEUROMUSCULAR REEDUCATION: CPT

## 2025-05-08 PROCEDURE — 6370000000 HC RX 637 (ALT 250 FOR IP): Performed by: HOSPITALIST

## 2025-05-08 PROCEDURE — 97530 THERAPEUTIC ACTIVITIES: CPT

## 2025-05-08 PROCEDURE — 92507 TX SP LANG VOICE COMM INDIV: CPT

## 2025-05-08 PROCEDURE — 6370000000 HC RX 637 (ALT 250 FOR IP): Performed by: INTERNAL MEDICINE

## 2025-05-08 PROCEDURE — 92526 ORAL FUNCTION THERAPY: CPT

## 2025-05-08 PROCEDURE — 6370000000 HC RX 637 (ALT 250 FOR IP)

## 2025-05-08 PROCEDURE — 82962 GLUCOSE BLOOD TEST: CPT

## 2025-05-08 PROCEDURE — 2500000003 HC RX 250 WO HCPCS

## 2025-05-08 PROCEDURE — C9254 INJECTION, LACOSAMIDE: HCPCS | Performed by: STUDENT IN AN ORGANIZED HEALTH CARE EDUCATION/TRAINING PROGRAM

## 2025-05-08 PROCEDURE — 6360000002 HC RX W HCPCS: Performed by: STUDENT IN AN ORGANIZED HEALTH CARE EDUCATION/TRAINING PROGRAM

## 2025-05-08 PROCEDURE — 1100000000 HC RM PRIVATE

## 2025-05-08 RX ADMIN — SODIUM CHLORIDE, PRESERVATIVE FREE 10 ML: 5 INJECTION INTRAVENOUS at 21:26

## 2025-05-08 RX ADMIN — LACOSAMIDE 100 MG: 10 INJECTION INTRAVENOUS at 08:42

## 2025-05-08 RX ADMIN — INSULIN LISPRO 2 UNITS: 100 INJECTION, SOLUTION INTRAVENOUS; SUBCUTANEOUS at 21:23

## 2025-05-08 RX ADMIN — CARVEDILOL 3.12 MG: 3.12 TABLET, FILM COATED ORAL at 21:23

## 2025-05-08 RX ADMIN — SODIUM CHLORIDE, PRESERVATIVE FREE 5 ML: 5 INJECTION INTRAVENOUS at 08:42

## 2025-05-08 RX ADMIN — INSULIN GLARGINE 5 UNITS: 100 INJECTION, SOLUTION SUBCUTANEOUS at 21:23

## 2025-05-08 RX ADMIN — HYDRALAZINE HYDROCHLORIDE 20 MG: 20 INJECTION INTRAMUSCULAR; INTRAVENOUS at 16:08

## 2025-05-08 RX ADMIN — INSULIN LISPRO 2 UNITS: 100 INJECTION, SOLUTION INTRAVENOUS; SUBCUTANEOUS at 13:26

## 2025-05-08 RX ADMIN — LACOSAMIDE 100 MG: 10 INJECTION INTRAVENOUS at 21:22

## 2025-05-08 RX ADMIN — CARVEDILOL 3.12 MG: 3.12 TABLET, FILM COATED ORAL at 08:41

## 2025-05-08 RX ADMIN — HYDRALAZINE HYDROCHLORIDE 25 MG: 50 TABLET ORAL at 08:41

## 2025-05-08 RX ADMIN — HYDRALAZINE HYDROCHLORIDE 25 MG: 50 TABLET ORAL at 21:23

## 2025-05-08 RX ADMIN — FAMOTIDINE 20 MG: 20 TABLET, FILM COATED ORAL at 08:41

## 2025-05-08 ASSESSMENT — PAIN SCALES - GENERAL
PAINLEVEL_OUTOF10: 0
PAINLEVEL_OUTOF10: 0

## 2025-05-08 NOTE — CARE COORDINATION
CM spoke with pt's dtr, Mita, via phone.  The family is requesting referrals to [facility] and [facility] [place].  Referrals submitted.  CM contacting the [facility] admissions to clarify if they accept patients on dialysis.  CM following.

## 2025-05-09 LAB
ANION GAP SERPL CALC-SCNC: 11 MMOL/L (ref 7–16)
BUN SERPL-MCNC: 46 MG/DL (ref 8–23)
CALCIUM SERPL-MCNC: 8.5 MG/DL (ref 8.8–10.2)
CHLORIDE SERPL-SCNC: 102 MMOL/L (ref 98–107)
CO2 SERPL-SCNC: 26 MMOL/L (ref 20–29)
CREAT SERPL-MCNC: 4.22 MG/DL (ref 0.6–1.1)
GLUCOSE BLD STRIP.AUTO-MCNC: 117 MG/DL (ref 65–100)
GLUCOSE BLD STRIP.AUTO-MCNC: 163 MG/DL (ref 65–100)
GLUCOSE BLD STRIP.AUTO-MCNC: 69 MG/DL (ref 65–100)
GLUCOSE BLD STRIP.AUTO-MCNC: 90 MG/DL (ref 65–100)
GLUCOSE BLD STRIP.AUTO-MCNC: 97 MG/DL (ref 65–100)
GLUCOSE SERPL-MCNC: 59 MG/DL (ref 70–99)
MAGNESIUM SERPL-MCNC: 2.2 MG/DL (ref 1.8–2.4)
PHOSPHATE SERPL-MCNC: 3 MG/DL (ref 2.5–4.5)
POTASSIUM SERPL-SCNC: 3.8 MMOL/L (ref 3.5–5.1)
SERVICE CMNT-IMP: ABNORMAL
SERVICE CMNT-IMP: ABNORMAL
SERVICE CMNT-IMP: NORMAL
SODIUM SERPL-SCNC: 138 MMOL/L (ref 136–145)

## 2025-05-09 PROCEDURE — 6360000002 HC RX W HCPCS: Performed by: STUDENT IN AN ORGANIZED HEALTH CARE EDUCATION/TRAINING PROGRAM

## 2025-05-09 PROCEDURE — 90935 HEMODIALYSIS ONE EVALUATION: CPT

## 2025-05-09 PROCEDURE — 92526 ORAL FUNCTION THERAPY: CPT

## 2025-05-09 PROCEDURE — 82962 GLUCOSE BLOOD TEST: CPT

## 2025-05-09 PROCEDURE — 6370000000 HC RX 637 (ALT 250 FOR IP): Performed by: INTERNAL MEDICINE

## 2025-05-09 PROCEDURE — 36415 COLL VENOUS BLD VENIPUNCTURE: CPT

## 2025-05-09 PROCEDURE — 92507 TX SP LANG VOICE COMM INDIV: CPT

## 2025-05-09 PROCEDURE — 80048 BASIC METABOLIC PNL TOTAL CA: CPT

## 2025-05-09 PROCEDURE — 6370000000 HC RX 637 (ALT 250 FOR IP)

## 2025-05-09 PROCEDURE — 6370000000 HC RX 637 (ALT 250 FOR IP): Performed by: HOSPITALIST

## 2025-05-09 PROCEDURE — 2500000003 HC RX 250 WO HCPCS

## 2025-05-09 PROCEDURE — 6360000002 HC RX W HCPCS: Performed by: INTERNAL MEDICINE

## 2025-05-09 PROCEDURE — 84100 ASSAY OF PHOSPHORUS: CPT

## 2025-05-09 PROCEDURE — 1100000000 HC RM PRIVATE

## 2025-05-09 PROCEDURE — C9254 INJECTION, LACOSAMIDE: HCPCS | Performed by: STUDENT IN AN ORGANIZED HEALTH CARE EDUCATION/TRAINING PROGRAM

## 2025-05-09 PROCEDURE — 83735 ASSAY OF MAGNESIUM: CPT

## 2025-05-09 RX ADMIN — HYDRALAZINE HYDROCHLORIDE 25 MG: 50 TABLET ORAL at 20:50

## 2025-05-09 RX ADMIN — CARVEDILOL 3.12 MG: 3.12 TABLET, FILM COATED ORAL at 11:43

## 2025-05-09 RX ADMIN — HYDRALAZINE HYDROCHLORIDE 25 MG: 50 TABLET ORAL at 11:43

## 2025-05-09 RX ADMIN — HYDRALAZINE HYDROCHLORIDE 20 MG: 20 INJECTION INTRAMUSCULAR; INTRAVENOUS at 03:18

## 2025-05-09 RX ADMIN — SODIUM CHLORIDE, PRESERVATIVE FREE 10 ML: 5 INJECTION INTRAVENOUS at 11:44

## 2025-05-09 RX ADMIN — LACOSAMIDE 100 MG: 10 INJECTION INTRAVENOUS at 20:50

## 2025-05-09 RX ADMIN — CARVEDILOL 3.12 MG: 3.12 TABLET, FILM COATED ORAL at 20:50

## 2025-05-09 RX ADMIN — LACOSAMIDE 100 MG: 10 INJECTION INTRAVENOUS at 11:44

## 2025-05-09 RX ADMIN — FAMOTIDINE 20 MG: 20 TABLET, FILM COATED ORAL at 11:43

## 2025-05-09 RX ADMIN — SODIUM CHLORIDE, PRESERVATIVE FREE 5 ML: 5 INJECTION INTRAVENOUS at 20:50

## 2025-05-09 ASSESSMENT — PAIN SCALES - WONG BAKER: WONGBAKER_NUMERICALRESPONSE: NO HURT

## 2025-05-09 ASSESSMENT — PAIN SCALES - GENERAL
PAINLEVEL_OUTOF10: 0
PAINLEVEL_OUTOF10: 0

## 2025-05-09 NOTE — DIALYSIS
TRANSFER IN - DIALYSIS    Received patient in dialysis unit  from [facility] (unit) for ordered procedure.    Consent verified for renal replacement therapy. Procedure explained to patient, opportunity for Q&A provided. Call light given.     Patient Sleeping and vital signs stable.  /68,  P71  , 0L O2 via room air.    Hemodialysis initiated using R CVC.  Aspirated and flushed both ports without difficulty. Dressing clean, dry and intact.  Machine settings per MD order.    Heparin 0 unit bolus and 0 units/hr.      Will monitor during treatment.

## 2025-05-09 NOTE — CARE COORDINATION
Pt accepted for admission to both [facility] and [facility].  CM spoke with pt's dtr who accepted the bed at [facility].  Insurance auth request submitted via the [employer] online portal.  Approval received for SOC on 5/10/25.  Pt initially approved for 4 days with the next continued stay review due 5/13/2025.  Pt can transfer to SNF tomorrow if she is medically cleared for dc.  CM notified pt's dtr of approval and plan.  CM following to facilitate pt's transfer to rehab at [facility].

## 2025-05-09 NOTE — DIALYSIS
TRANSFER IN - DIALYSIS    Received patient in dialysis unit  from [facility] (unit) for ordered procedure.    Consent verified for renal replacement therapy. Procedure explained to patient, opportunity for Q&A provided. Call light given.     Patient Alert and vital signs stable.  /75,  P70  , 0L O2 via room air.    Hemodialysis initiated using R CVC.  Aspirated and flushed both ports without difficulty. Dressing clean, dry and intact.  Machine settings per MD order.    Heparin 0 unit bolus and 0 units/hr.      Will monitor during treatment.

## 2025-05-10 LAB
GLUCOSE BLD STRIP.AUTO-MCNC: 124 MG/DL (ref 65–100)
GLUCOSE BLD STRIP.AUTO-MCNC: 138 MG/DL (ref 65–100)
GLUCOSE BLD STRIP.AUTO-MCNC: 155 MG/DL (ref 65–100)
GLUCOSE BLD STRIP.AUTO-MCNC: 200 MG/DL (ref 65–100)
SERVICE CMNT-IMP: ABNORMAL

## 2025-05-10 PROCEDURE — 82962 GLUCOSE BLOOD TEST: CPT

## 2025-05-10 PROCEDURE — 6370000000 HC RX 637 (ALT 250 FOR IP)

## 2025-05-10 PROCEDURE — 6370000000 HC RX 637 (ALT 250 FOR IP): Performed by: HOSPITALIST

## 2025-05-10 PROCEDURE — 2500000003 HC RX 250 WO HCPCS

## 2025-05-10 PROCEDURE — 6370000000 HC RX 637 (ALT 250 FOR IP): Performed by: INTERNAL MEDICINE

## 2025-05-10 PROCEDURE — C9254 INJECTION, LACOSAMIDE: HCPCS | Performed by: STUDENT IN AN ORGANIZED HEALTH CARE EDUCATION/TRAINING PROGRAM

## 2025-05-10 PROCEDURE — 6360000002 HC RX W HCPCS: Performed by: INTERNAL MEDICINE

## 2025-05-10 PROCEDURE — 6360000002 HC RX W HCPCS: Performed by: STUDENT IN AN ORGANIZED HEALTH CARE EDUCATION/TRAINING PROGRAM

## 2025-05-10 PROCEDURE — 1100000000 HC RM PRIVATE

## 2025-05-10 RX ADMIN — HYDRALAZINE HYDROCHLORIDE 25 MG: 50 TABLET ORAL at 08:40

## 2025-05-10 RX ADMIN — INSULIN LISPRO 2 UNITS: 100 INJECTION, SOLUTION INTRAVENOUS; SUBCUTANEOUS at 20:45

## 2025-05-10 RX ADMIN — SODIUM CHLORIDE, PRESERVATIVE FREE 10 ML: 5 INJECTION INTRAVENOUS at 20:46

## 2025-05-10 RX ADMIN — APIXABAN 2.5 MG: 5 TABLET, FILM COATED ORAL at 17:21

## 2025-05-10 RX ADMIN — HYDRALAZINE HYDROCHLORIDE 20 MG: 20 INJECTION INTRAMUSCULAR; INTRAVENOUS at 15:26

## 2025-05-10 RX ADMIN — CARVEDILOL 3.12 MG: 3.12 TABLET, FILM COATED ORAL at 08:34

## 2025-05-10 RX ADMIN — CARVEDILOL 3.12 MG: 3.12 TABLET, FILM COATED ORAL at 20:46

## 2025-05-10 RX ADMIN — FAMOTIDINE 20 MG: 20 TABLET, FILM COATED ORAL at 08:40

## 2025-05-10 RX ADMIN — HYDRALAZINE HYDROCHLORIDE 25 MG: 50 TABLET ORAL at 20:45

## 2025-05-10 RX ADMIN — LACOSAMIDE 100 MG: 10 INJECTION INTRAVENOUS at 08:34

## 2025-05-10 RX ADMIN — SODIUM CHLORIDE, PRESERVATIVE FREE 10 ML: 5 INJECTION INTRAVENOUS at 08:52

## 2025-05-10 RX ADMIN — LACOSAMIDE 100 MG: 10 INJECTION INTRAVENOUS at 20:45

## 2025-05-10 ASSESSMENT — PAIN SCALES - GENERAL: PAINLEVEL_OUTOF10: 0

## 2025-05-10 NOTE — PLAN OF CARE
Problem: Chronic Conditions and Co-morbidities  Goal: Patient's chronic conditions and co-morbidity symptoms are monitored and maintained or improved  4/20/2025 2049 by Chinyere Bowman RN  Outcome: Progressing  4/20/2025 1013 by Princess Blackman RN  Outcome: Progressing  Flowsheets (Taken 4/20/2025 0731)  Care Plan - Patient's Chronic Conditions and Co-Morbidity Symptoms are Monitored and Maintained or Improved: Monitor and assess patient's chronic conditions and comorbid symptoms for stability, deterioration, or improvement     Problem: Discharge Planning  Goal: Discharge to home or other facility with appropriate resources  4/20/2025 2049 by Chinyere Bowman RN  Outcome: Progressing  4/20/2025 1013 by Princess Blackman RN  Outcome: Progressing  Flowsheets (Taken 4/20/2025 0731)  Discharge to home or other facility with appropriate resources: Identify barriers to discharge with patient and caregiver     Problem: Pain  Goal: Verbalizes/displays adequate comfort level or baseline comfort level  4/20/2025 2049 by Chinyere Bowman RN  Outcome: Progressing  4/20/2025 1013 by Princess Blackman RN  Outcome: Progressing  Flowsheets (Taken 4/20/2025 0731)  Verbalizes/displays adequate comfort level or baseline comfort level:   Encourage patient to monitor pain and request assistance   Assess pain using appropriate pain scale     Problem: Safety - Adult  Goal: Free from fall injury  4/20/2025 2049 by Chinyere Bowman RN  Outcome: Progressing  4/20/2025 1013 by Princess Blackman RN  Outcome: Progressing  Flowsheets (Taken 4/20/2025 0731)  Free From Fall Injury: Instruct family/caregiver on patient safety     Problem: Skin/Tissue Integrity  Goal: Skin integrity remains intact  Description: 1.  Monitor for areas of redness and/or skin breakdown2.  Assess vascular access sites hourly3.  Every 4-6 hours minimum:  Change oxygen saturation probe site4.  Every 4-6 hours:  If on nasal continuous positive airway pressure, 
  Problem: Chronic Conditions and Co-morbidities  Goal: Patient's chronic conditions and co-morbidity symptoms are monitored and maintained or improved  4/28/2025 2038 by Mikala Dukes RN  Outcome: Progressing  4/28/2025 0938 by Elsi Waite RN  Outcome: Progressing     Problem: Discharge Planning  Goal: Discharge to home or other facility with appropriate resources  4/28/2025 2038 by Mikala Dukes RN  Outcome: Progressing  4/28/2025 0938 by Elsi Waite RN  Outcome: Progressing  Problem: Safety - Medical Restraint  Goal: Remains free of injury from restraints (Restraint for Interference with Medical Device)  Description: INTERVENTIONS:1. Determine that other, less restrictive measures have been tried or would not be effective before applying the restraint2. Evaluate the patient's condition at the time of restraint application3. Inform patient/family regarding the reason for restraint4. Q2H: Monitor safety, psychosocial status, comfort, nutrition and hydration  4/28/2025 0938 by Elsi Waite RN  Outcome: Completed     Problem: Skin/Tissue Integrity  Goal: Skin integrity remains intact  Description: 1.  Monitor for areas of redness and/or skin breakdown2.  Assess vascular access sites hourly3.  Every 4-6 hours minimum:  Change oxygen saturation probe site4.  Every 4-6 hours:  If on nasal continuous positive airway pressure, respiratory therapy assess nares and determine need for appliance change or resting period  4/28/2025 2038 by Mikala Dukes RN  Outcome: Progressing  4/28/2025 0938 by Elsi Waite RN  Outcome: Progressing        Problem: Pain  Goal: Verbalizes/displays adequate comfort level or baseline comfort level  4/28/2025 2038 by Mikala Dukes RN  Outcome: Progressing  4/28/2025 0938 by Elsi Waite RN  Outcome: Progressing     Problem: Safety - Adult  Goal: Free from fall injury  4/28/2025 2038 by Mikala Dukes RN  Outcome: Progressing  4/28/2025 
  Problem: Chronic Conditions and Co-morbidities  Goal: Patient's chronic conditions and co-morbidity symptoms are monitored and maintained or improved  4/29/2025 1008 by Lee Ann Escalante RN  Outcome: Progressing  Flowsheets (Taken 4/29/2025 0730)  Care Plan - Patient's Chronic Conditions and Co-Morbidity Symptoms are Monitored and Maintained or Improved: Monitor and assess patient's chronic conditions and comorbid symptoms for stability, deterioration, or improvement  4/28/2025 2038 by Mikala Dukes RN  Outcome: Progressing     Problem: Discharge Planning  Goal: Discharge to home or other facility with appropriate resources  4/29/2025 1008 by Lee Ann Escalante RN  Outcome: Progressing  Flowsheets (Taken 4/29/2025 0730)  Discharge to home or other facility with appropriate resources: Identify barriers to discharge with patient and caregiver  4/28/2025 2038 by Mikala Dukes RN  Outcome: Progressing     Problem: Pain  Goal: Verbalizes/displays adequate comfort level or baseline comfort level  4/29/2025 1008 by Lee Ann Escalante RN  Outcome: Progressing  4/28/2025 2038 by Mikala Dukes RN  Outcome: Progressing     Problem: Safety - Adult  Goal: Free from fall injury  4/29/2025 1008 by Lee Ann Escalante RN  Outcome: Progressing  4/28/2025 2038 by Mikala Dukes RN  Outcome: Progressing     Problem: Skin/Tissue Integrity  Goal: Skin integrity remains intact  Description: 1.  Monitor for areas of redness and/or skin breakdown2.  Assess vascular access sites hourly3.  Every 4-6 hours minimum:  Change oxygen saturation probe site4.  Every 4-6 hours:  If on nasal continuous positive airway pressure, respiratory therapy assess nares and determine need for appliance change or resting period  4/29/2025 1008 by Lee Ann Escalante RN  Outcome: Progressing  Flowsheets (Taken 4/29/2025 0730)  Skin Integrity Remains Intact: Monitor for areas of redness and/or skin breakdown  4/28/2025 2038 by Mikala Dukes 
  Problem: Chronic Conditions and Co-morbidities  Goal: Patient's chronic conditions and co-morbidity symptoms are monitored and maintained or improved  4/30/2025 2227 by Charley Javier RN  Outcome: Progressing  4/30/2025 0932 by Mishel Coombs RN  Outcome: Progressing     Problem: Discharge Planning  Goal: Discharge to home or other facility with appropriate resources  4/30/2025 2227 by Charley Javier RN  Outcome: Progressing  4/30/2025 0932 by Mishel Coombs RN  Outcome: Progressing     Problem: Pain  Goal: Verbalizes/displays adequate comfort level or baseline comfort level  4/30/2025 2227 by Charley Javier RN  Outcome: Progressing  4/30/2025 0932 by Mishel Coombs RN  Outcome: Progressing     Problem: Safety - Adult  Goal: Free from fall injury  4/30/2025 2227 by Charley Javier RN  Outcome: Progressing  4/30/2025 0932 by Mishel Coombs RN  Outcome: Progressing     Problem: Skin/Tissue Integrity  Goal: Skin integrity remains intact  Description: 1.  Monitor for areas of redness and/or skin breakdown2.  Assess vascular access sites hourly3.  Every 4-6 hours minimum:  Change oxygen saturation probe site4.  Every 4-6 hours:  If on nasal continuous positive airway pressure, respiratory therapy assess nares and determine need for appliance change or resting period  4/30/2025 2227 by Charley Javier RN  Outcome: Progressing  4/30/2025 0932 by Mishel Coombs RN  Outcome: Progressing     Problem: Neurosensory - Adult  Goal: Achieves stable or improved neurological status  4/30/2025 2227 by Charley Javier RN  Outcome: Progressing  4/30/2025 0932 by Mishel Coombs RN  Outcome: Progressing  Goal: Absence of seizures  4/30/2025 2227 by Charley Javier RN  Outcome: Progressing  4/30/2025 0932 by Mishel Coombs RN  Outcome: Progressing     Problem: Respiratory - Adult  Goal: Achieves optimal ventilation and oxygenation  4/30/2025 2227 by Charley Javier RN  Outcome: Progressing  4/30/2025 0932 by Mishel Coombs RN  Outcome: Progressing     Problem: 
  Problem: Chronic Conditions and Co-morbidities  Goal: Patient's chronic conditions and co-morbidity symptoms are monitored and maintained or improved  5/5/2025 0845 by Dia Glez RN  Outcome: Progressing  5/5/2025 0216 by Arabella Andrade RN  Outcome: Progressing  Flowsheets (Taken 5/4/2025 1952)  Care Plan - Patient's Chronic Conditions and Co-Morbidity Symptoms are Monitored and Maintained or Improved:   Monitor and assess patient's chronic conditions and comorbid symptoms for stability, deterioration, or improvement   Collaborate with multidisciplinary team to address chronic and comorbid conditions and prevent exacerbation or deterioration   Update acute care plan with appropriate goals if chronic or comorbid symptoms are exacerbated and prevent overall improvement and discharge     Problem: Discharge Planning  Goal: Discharge to home or other facility with appropriate resources  5/5/2025 0845 by Dia Glez RN  Outcome: Progressing  5/5/2025 0216 by Arabella Andrade RN  Outcome: Progressing  Flowsheets (Taken 5/4/2025 1952)  Discharge to home or other facility with appropriate resources:   Identify barriers to discharge with patient and caregiver   Arrange for needed discharge resources and transportation as appropriate   Identify discharge learning needs (meds, wound care, etc)   Refer to discharge planning if patient needs post-hospital services based on physician order or complex needs related to functional status, cognitive ability or social support system     Problem: Pain  Goal: Verbalizes/displays adequate comfort level or baseline comfort level  5/5/2025 0845 by Dia Glez RN  Outcome: Progressing  5/5/2025 0216 by Arabella Andrade RN  Outcome: Progressing  Flowsheets (Taken 5/4/2025 1952)  Verbalizes/displays adequate comfort level or baseline comfort level:   Encourage patient to monitor pain and request assistance   Assess pain using appropriate pain scale   Administer analgesics based on 
  Problem: Chronic Conditions and Co-morbidities  Goal: Patient's chronic conditions and co-morbidity symptoms are monitored and maintained or improved  5/6/2025 2148 by Charley Javier RN  Outcome: Progressing  5/6/2025 0921 by Elo Brice RN  Outcome: Progressing     Problem: Discharge Planning  Goal: Discharge to home or other facility with appropriate resources  5/6/2025 2148 by Charley Javier RN  Outcome: Progressing  5/6/2025 0921 by Elo Brice RN  Outcome: Progressing     Problem: Pain  Goal: Verbalizes/displays adequate comfort level or baseline comfort level  5/6/2025 2148 by Charley Javier RN  Outcome: Progressing  5/6/2025 0921 by Elo Brice RN  Outcome: Progressing     Problem: Safety - Adult  Goal: Free from fall injury  5/6/2025 2148 by Charley Javier RN  Outcome: Progressing  5/6/2025 0921 by Elo Brice RN  Outcome: Progressing     Problem: Skin/Tissue Integrity  Goal: Skin integrity remains intact  Description: 1.  Monitor for areas of redness and/or skin breakdown2.  Assess vascular access sites hourly3.  Every 4-6 hours minimum:  Change oxygen saturation probe site4.  Every 4-6 hours:  If on nasal continuous positive airway pressure, respiratory therapy assess nares and determine need for appliance change or resting period  5/6/2025 2148 by Charley Javeir RN  Outcome: Progressing  5/6/2025 0921 by Elo Brice RN  Outcome: Progressing     Problem: Neurosensory - Adult  Goal: Achieves stable or improved neurological status  5/6/2025 2148 by Charley Javier RN  Outcome: Progressing  5/6/2025 0921 by Elo Brice RN  Outcome: Progressing  Goal: Absence of seizures  5/6/2025 2148 by Charley Javier RN  Outcome: Progressing  5/6/2025 0921 by Elo Brice RN  Outcome: Progressing     Problem: Respiratory - Adult  Goal: Achieves optimal ventilation and oxygenation  5/6/2025 2148 by Charley Javier RN  Outcome: Progressing  5/6/2025 0921 by Elo Brice RN  Outcome: Progressing     Problem: Cardiovascular - Adult  Goal: Maintains optimal 
  Problem: Chronic Conditions and Co-morbidities  Goal: Patient's chronic conditions and co-morbidity symptoms are monitored and maintained or improved  Outcome: Progressing     Problem: Discharge Planning  Goal: Discharge to home or other facility with appropriate resources  Outcome: Progressing     Problem: Pain  Goal: Verbalizes/displays adequate comfort level or baseline comfort level  Outcome: Progressing     Problem: Safety - Adult  Goal: Free from fall injury  Outcome: Progressing     Problem: Skin/Tissue Integrity  Goal: Skin integrity remains intact  Description: 1.  Monitor for areas of redness and/or skin breakdown2.  Assess vascular access sites hourly3.  Every 4-6 hours minimum:  Change oxygen saturation probe site4.  Every 4-6 hours:  If on nasal continuous positive airway pressure, respiratory therapy assess nares and determine need for appliance change or resting period  Outcome: Progressing     Problem: Neurosensory - Adult  Goal: Achieves stable or improved neurological status  Outcome: Progressing  Goal: Absence of seizures  Outcome: Progressing     Problem: Respiratory - Adult  Goal: Achieves optimal ventilation and oxygenation  Outcome: Progressing     Problem: Cardiovascular - Adult  Goal: Maintains optimal cardiac output and hemodynamic stability  Outcome: Progressing  Goal: Absence of cardiac dysrhythmias or at baseline  Outcome: Progressing     Problem: Infection - Adult  Goal: Absence of infection at discharge  Outcome: Progressing     Problem: Metabolic/Fluid and Electrolytes - Adult  Goal: Electrolytes maintained within normal limits  Outcome: Progressing  Goal: Hemodynamic stability and optimal renal function maintained  Outcome: Progressing  Goal: Glucose maintained within prescribed range  Outcome: Progressing     
  Problem: Chronic Conditions and Co-morbidities  Goal: Patient's chronic conditions and co-morbidity symptoms are monitored and maintained or improved  Outcome: Progressing  Flowsheets (Taken 5/3/2025 1937)  Care Plan - Patient's Chronic Conditions and Co-Morbidity Symptoms are Monitored and Maintained or Improved:   Collaborate with multidisciplinary team to address chronic and comorbid conditions and prevent exacerbation or deterioration   Monitor and assess patient's chronic conditions and comorbid symptoms for stability, deterioration, or improvement   Update acute care plan with appropriate goals if chronic or comorbid symptoms are exacerbated and prevent overall improvement and discharge     Problem: Discharge Planning  Goal: Discharge to home or other facility with appropriate resources  Outcome: Progressing  Flowsheets (Taken 5/3/2025 1937)  Discharge to home or other facility with appropriate resources:   Identify barriers to discharge with patient and caregiver   Arrange for needed discharge resources and transportation as appropriate   Identify discharge learning needs (meds, wound care, etc)   Refer to discharge planning if patient needs post-hospital services based on physician order or complex needs related to functional status, cognitive ability or social support system     Problem: Pain  Goal: Verbalizes/displays adequate comfort level or baseline comfort level  Outcome: Progressing  Flowsheets (Taken 5/3/2025 1937)  Verbalizes/displays adequate comfort level or baseline comfort level:   Encourage patient to monitor pain and request assistance   Assess pain using appropriate pain scale   Administer analgesics based on type and severity of pain and evaluate response   Implement non-pharmacological measures as appropriate and evaluate response   Consider cultural and social influences on pain and pain management   Notify Licensed Independent Practitioner if interventions unsuccessful or patient 
  Problem: Chronic Conditions and Co-morbidities  Goal: Patient's chronic conditions and co-morbidity symptoms are monitored and maintained or improved  Outcome: Progressing  Flowsheets (Taken 5/4/2025 1952)  Care Plan - Patient's Chronic Conditions and Co-Morbidity Symptoms are Monitored and Maintained or Improved:   Monitor and assess patient's chronic conditions and comorbid symptoms for stability, deterioration, or improvement   Collaborate with multidisciplinary team to address chronic and comorbid conditions and prevent exacerbation or deterioration   Update acute care plan with appropriate goals if chronic or comorbid symptoms are exacerbated and prevent overall improvement and discharge     Problem: Discharge Planning  Goal: Discharge to home or other facility with appropriate resources  Outcome: Progressing  Flowsheets (Taken 5/4/2025 1952)  Discharge to home or other facility with appropriate resources:   Identify barriers to discharge with patient and caregiver   Arrange for needed discharge resources and transportation as appropriate   Identify discharge learning needs (meds, wound care, etc)   Refer to discharge planning if patient needs post-hospital services based on physician order or complex needs related to functional status, cognitive ability or social support system     Problem: Pain  Goal: Verbalizes/displays adequate comfort level or baseline comfort level  Outcome: Progressing  Flowsheets (Taken 5/4/2025 1952)  Verbalizes/displays adequate comfort level or baseline comfort level:   Encourage patient to monitor pain and request assistance   Assess pain using appropriate pain scale   Administer analgesics based on type and severity of pain and evaluate response   Implement non-pharmacological measures as appropriate and evaluate response   Consider cultural and social influences on pain and pain management   Notify Licensed Independent Practitioner if interventions unsuccessful or patient 
  Problem: Chronic Conditions and Co-morbidities  Goal: Patient's chronic conditions and co-morbidity symptoms are monitored and maintained or improved  Outcome: Progressing  Flowsheets (Taken 5/8/2025 1930)  Care Plan - Patient's Chronic Conditions and Co-Morbidity Symptoms are Monitored and Maintained or Improved: Monitor and assess patient's chronic conditions and comorbid symptoms for stability, deterioration, or improvement     Problem: Discharge Planning  Goal: Discharge to home or other facility with appropriate resources  Outcome: Progressing  Flowsheets (Taken 5/8/2025 1930)  Discharge to home or other facility with appropriate resources: Identify barriers to discharge with patient and caregiver     Problem: Pain  Goal: Verbalizes/displays adequate comfort level or baseline comfort level  Outcome: Progressing     Problem: Safety - Adult  Goal: Free from fall injury  Outcome: Progressing  Flowsheets (Taken 5/8/2025 1930)  Free From Fall Injury: Instruct family/caregiver on patient safety     Problem: Skin/Tissue Integrity  Goal: Skin integrity remains intact  Description: 1.  Monitor for areas of redness and/or skin breakdown2.  Assess vascular access sites hourly3.  Every 4-6 hours minimum:  Change oxygen saturation probe site4.  Every 4-6 hours:  If on nasal continuous positive airway pressure, respiratory therapy assess nares and determine need for appliance change or resting period  Outcome: Progressing  Flowsheets (Taken 5/8/2025 1930)  Skin Integrity Remains Intact: Monitor for areas of redness and/or skin breakdown     Problem: Neurosensory - Adult  Goal: Achieves stable or improved neurological status  Outcome: Progressing  Flowsheets (Taken 5/8/2025 1930)  Achieves stable or improved neurological status: Assess for and report changes in neurological status  Goal: Absence of seizures  Outcome: Progressing  Flowsheets (Taken 5/8/2025 1930)  Absence of seizures: Monitor for seizure activity.  If 
  Problem: Discharge Planning  Goal: Discharge to home or other facility with appropriate resources  4/20/2025 1013 by Princess Hiral, RN  Outcome: Progressing  Flowsheets (Taken 4/20/2025 0731)  Discharge to home or other facility with appropriate resources: Identify barriers to discharge with patient and caregiver  4/19/2025 2028 by Tyler Downs, RN  Outcome: Not Progressing  Flowsheets (Taken 4/19/2025 1930)  Discharge to home or other facility with appropriate resources:   Identify barriers to discharge with patient and caregiver   Arrange for needed discharge resources and transportation as appropriate   Identify discharge learning needs (meds, wound care, etc)   Refer to discharge planning if patient needs post-hospital services based on physician order or complex needs related to functional status, cognitive ability or social support system   Arrange for interpreters to assist at discharge as needed     
  Problem: Respiratory - Adult  Goal: Achieves optimal ventilation and oxygenation  4/23/2025 0734 by Mae Foley RCP  Outcome: Progressing  4/22/2025 3054 by Karin Quevedo RN  Outcome: Progressing     
by Lee Ann Escalante RN  Outcome: Progressing  Flowsheets (Taken 4/29/2025 0730)  Skin Integrity Remains Intact: Monitor for areas of redness and/or skin breakdown     Problem: Neurosensory - Adult  Goal: Achieves stable or improved neurological status  4/29/2025 2233 by Charley Javier RN  Outcome: Progressing  4/29/2025 1008 by Lee Ann Escalante RN  Outcome: Progressing  Flowsheets (Taken 4/29/2025 0730)  Achieves stable or improved neurological status: Assess for and report changes in neurological status  Goal: Absence of seizures  4/29/2025 2233 by Charley Javier RN  Outcome: Progressing  4/29/2025 1008 by Lee Ann Escalante RN  Outcome: Progressing  Flowsheets (Taken 4/29/2025 0730)  Absence of seizures: Monitor for seizure activity.  If seizure occurs, document type and location of movements and any associated apnea     Problem: Respiratory - Adult  Goal: Achieves optimal ventilation and oxygenation  4/29/2025 2233 by Charley Javier RN  Outcome: Progressing  4/29/2025 1008 by Lee Ann Escalante RN  Outcome: Progressing     Problem: Cardiovascular - Adult  Goal: Maintains optimal cardiac output and hemodynamic stability  4/29/2025 2233 by Charley Javier RN  Outcome: Progressing  4/29/2025 1008 by Lee Ann Escalante RN  Outcome: Progressing  Flowsheets (Taken 4/29/2025 0730)  Maintains optimal cardiac output and hemodynamic stability: Monitor blood pressure and heart rate  Goal: Absence of cardiac dysrhythmias or at baseline  4/29/2025 2233 by Charley Javier RN  Outcome: Progressing  4/29/2025 1008 by Lee Ann Escalante RN  Outcome: Progressing  Flowsheets (Taken 4/29/2025 0730)  Absence of cardiac dysrhythmias or at baseline: Monitor cardiac rate and rhythm     Problem: Infection - Adult  Goal: Absence of infection at discharge  4/29/2025 2233 by Charley Javier RN  Outcome: Progressing  4/29/2025 1008 by Lee Ann Escalante RN  Outcome: Progressing  Flowsheets (Taken 4/29/2025 0730)  Absence of infection at discharge: Assess and monitor for signs 
lines, tubes and drains     Problem: Metabolic/Fluid and Electrolytes - Adult  Goal: Electrolytes maintained within normal limits  Outcome: Progressing  Flowsheets (Taken 4/24/2025 2000)  Electrolytes maintained within normal limits: Monitor labs and assess patient for signs and symptoms of electrolyte imbalances  Goal: Hemodynamic stability and optimal renal function maintained  Outcome: Progressing  Flowsheets (Taken 4/24/2025 2000)  Hemodynamic stability and optimal renal function maintained: Monitor labs and assess for signs and symptoms of volume excess or deficit     Problem: Safety - Medical Restraint  Goal: Remains free of injury from restraints (Restraint for Interference with Medical Device)  Description: INTERVENTIONS:1. Determine that other, less restrictive measures have been tried or would not be effective before applying the restraint2. Evaluate the patient's condition at the time of restraint application3. Inform patient/family regarding the reason for restraint4. Q2H: Monitor safety, psychosocial status, comfort, nutrition and hydration  Outcome: Progressing  Flowsheets (Taken 4/24/2025 2000)  Remains free of injury from restraints (restraint for interference with medical device):   Determine that other, less restrictive measures have been tried or would not be effective before applying the restraint   Evaluate the patient's condition at the time of restraint application   Inform patient/family regarding the reason for restraint   Every 2 hours: Monitor safety, psychosocial status, comfort, nutrition and hydration     Problem: Skin/Tissue Integrity - Adult  Goal: Skin integrity remains intact  Description: 1.  Monitor for areas of redness and/or skin breakdown2.  Assess vascular access sites hourly3.  Every 4-6 hours minimum:  Change oxygen saturation probe site4.  Every 4-6 hours:  If on nasal continuous positive airway pressure, respiratory therapy assess nares and determine need for appliance 
Intact: Monitor for areas of redness and/or skin breakdown  5/9/2025 1033 by Suma Medina RN  Outcome: Progressing  Goal: Incisions, wounds, or drain sites healing without S/S of infection  5/9/2025 2234 by Melany Richards RN  Outcome: Progressing  Flowsheets (Taken 5/9/2025 2015)  Incisions, Wounds, or Drain Sites Healing Without Sign and Symptoms of Infection: ADMISSION and DAILY: Assess and document risk factors for pressure ulcer development  5/9/2025 1033 by Suma Medina RN  Outcome: Progressing  Goal: Oral mucous membranes remain intact  5/9/2025 2234 by Melany Richards RN  Outcome: Progressing  Flowsheets (Taken 5/9/2025 2015)  Oral Mucous Membranes Remain Intact: Assess oral mucosa and hygiene practices  5/9/2025 1033 by Suma Medina RN  Outcome: Progressing     Problem: Musculoskeletal - Adult  Goal: Return mobility to safest level of function  5/9/2025 2234 by Melany Richards RN  Outcome: Progressing  Flowsheets (Taken 5/9/2025 2015)  Return Mobility to Safest Level of Function:   Assist with transfers and ambulation using safe patient handling equipment as needed   Assess patient stability and activity tolerance for standing, transferring and ambulating with or without assistive devices  5/9/2025 1033 by Suma Medina RN  Outcome: Progressing  Goal: Return ADL status to a safe level of function  5/9/2025 2234 by Melany Richards RN  Outcome: Progressing  Flowsheets (Taken 5/9/2025 2015)  Return ADL Status to a Safe Level of Function: Administer medication as ordered  5/9/2025 1033 by Suma Medina RN  Outcome: Progressing     Problem: Gastrointestinal - Adult  Goal: Minimal or absence of nausea and vomiting  5/9/2025 2234 by Melany Richards RN  Outcome: Progressing  Flowsheets (Taken 5/9/2025 2015)  Minimal or absence of nausea and vomiting: Administer IV fluids as ordered to ensure adequate hydration  5/9/2025 1033 by Suma Medina RN  Outcome: Progressing  Goal: 
indicated3. Provide frequent short contacts to provide reality reorientation, refocusing and direction4. Decrease environmental stimuli, including noise as appropriate5. Monitor and intervene to maintain adequate nutrition, hydration, elimination, sleep and activity6. If unable to ensure safety without constant attention obtain sitter and review sitter guidelines with assigned personnel7. Initiate Psychosocial CNS and Spiritual Care consult, as indicated  INTERVENTIONS:1. Assess for possible contributors to thought disturbance, including medications, impaired vision or hearing, underlying metabolic abnormalities, dehydration, psychiatric diagnoses, and notify attending LIP2. [place] high risk fall precautions, as indicated3. Provide frequent short contacts to provide reality reorientation, refocusing and direction4. Decrease environmental stimuli, including noise as appropriate5. Monitor and intervene to maintain adequate nutrition, hydration, elimination, sleep and activity6. If unable to ensure safety without constant attention obtain sitter and review sitter guidelines with assigned personnel7. Initiate Psychosocial CNS and Spiritual Care consult, as indicated  Outcome: Progressing  Flowsheets (Taken 5/2/2025 1933)  Effect of thought disturbance (confusion, delirium, dementia, or psychosis) are managed with adequate functional status:   Assess for contributors to thought disturbance, including medications, impaired vision or hearing, underlying metabolic abnormalities, dehydration, psychiatric diagnoses, notify LIP   [place] high risk fall precautions, as indicated   Provide frequent short contacts to provide reality reorientation, refocusing and direction   Decrease environmental stimuli, including noise as appropriate   Monitor and intervene to maintain adequate nutrition, hydration, elimination, sleep and activity

## 2025-05-11 LAB
GLUCOSE BLD STRIP.AUTO-MCNC: 110 MG/DL (ref 65–100)
GLUCOSE BLD STRIP.AUTO-MCNC: 122 MG/DL (ref 65–100)
GLUCOSE BLD STRIP.AUTO-MCNC: 161 MG/DL (ref 65–100)
GLUCOSE BLD STRIP.AUTO-MCNC: 166 MG/DL (ref 65–100)
SERVICE CMNT-IMP: ABNORMAL

## 2025-05-11 PROCEDURE — 6370000000 HC RX 637 (ALT 250 FOR IP): Performed by: HOSPITALIST

## 2025-05-11 PROCEDURE — 6360000002 HC RX W HCPCS: Performed by: STUDENT IN AN ORGANIZED HEALTH CARE EDUCATION/TRAINING PROGRAM

## 2025-05-11 PROCEDURE — 6370000000 HC RX 637 (ALT 250 FOR IP)

## 2025-05-11 PROCEDURE — C9254 INJECTION, LACOSAMIDE: HCPCS | Performed by: STUDENT IN AN ORGANIZED HEALTH CARE EDUCATION/TRAINING PROGRAM

## 2025-05-11 PROCEDURE — 1100000000 HC RM PRIVATE

## 2025-05-11 PROCEDURE — 2500000003 HC RX 250 WO HCPCS

## 2025-05-11 PROCEDURE — 6360000002 HC RX W HCPCS: Performed by: INTERNAL MEDICINE

## 2025-05-11 PROCEDURE — 82962 GLUCOSE BLOOD TEST: CPT

## 2025-05-11 RX ADMIN — SODIUM CHLORIDE, PRESERVATIVE FREE 10 ML: 5 INJECTION INTRAVENOUS at 20:47

## 2025-05-11 RX ADMIN — HYDRALAZINE HYDROCHLORIDE 20 MG: 20 INJECTION INTRAMUSCULAR; INTRAVENOUS at 17:58

## 2025-05-11 RX ADMIN — SODIUM CHLORIDE, PRESERVATIVE FREE 5 ML: 5 INJECTION INTRAVENOUS at 09:16

## 2025-05-11 RX ADMIN — APIXABAN 2.5 MG: 5 TABLET, FILM COATED ORAL at 05:37

## 2025-05-11 RX ADMIN — HYDRALAZINE HYDROCHLORIDE 25 MG: 50 TABLET ORAL at 20:47

## 2025-05-11 RX ADMIN — HYDRALAZINE HYDROCHLORIDE 20 MG: 20 INJECTION INTRAMUSCULAR; INTRAVENOUS at 03:54

## 2025-05-11 RX ADMIN — HYDRALAZINE HYDROCHLORIDE 25 MG: 50 TABLET ORAL at 09:06

## 2025-05-11 RX ADMIN — FAMOTIDINE 20 MG: 20 TABLET, FILM COATED ORAL at 09:06

## 2025-05-11 RX ADMIN — CARVEDILOL 3.12 MG: 3.12 TABLET, FILM COATED ORAL at 09:06

## 2025-05-11 RX ADMIN — CARVEDILOL 3.12 MG: 3.12 TABLET, FILM COATED ORAL at 20:46

## 2025-05-11 RX ADMIN — APIXABAN 2.5 MG: 5 TABLET, FILM COATED ORAL at 17:41

## 2025-05-11 RX ADMIN — LACOSAMIDE 100 MG: 10 INJECTION INTRAVENOUS at 20:47

## 2025-05-11 RX ADMIN — LACOSAMIDE 100 MG: 10 INJECTION INTRAVENOUS at 09:15

## 2025-05-11 ASSESSMENT — PAIN SCALES - GENERAL
PAINLEVEL_OUTOF10: 0
PAINLEVEL_OUTOF10: 0

## 2025-05-12 VITALS
OXYGEN SATURATION: 99 % | DIASTOLIC BLOOD PRESSURE: 64 MMHG | RESPIRATION RATE: 16 BRPM | SYSTOLIC BLOOD PRESSURE: 156 MMHG | HEART RATE: 75 BPM | TEMPERATURE: 97.9 F | BODY MASS INDEX: 23.9 KG/M2 | WEIGHT: 140 LBS | HEIGHT: 64 IN

## 2025-05-12 LAB
ANION GAP SERPL CALC-SCNC: 11 MMOL/L (ref 7–16)
BUN SERPL-MCNC: 39 MG/DL (ref 8–23)
CALCIUM SERPL-MCNC: 9 MG/DL (ref 8.8–10.2)
CHLORIDE SERPL-SCNC: 100 MMOL/L (ref 98–107)
CO2 SERPL-SCNC: 26 MMOL/L (ref 20–29)
CREAT SERPL-MCNC: 5.15 MG/DL (ref 0.6–1.1)
GLUCOSE BLD STRIP.AUTO-MCNC: 123 MG/DL (ref 65–100)
GLUCOSE BLD STRIP.AUTO-MCNC: 134 MG/DL (ref 65–100)
GLUCOSE BLD STRIP.AUTO-MCNC: 156 MG/DL (ref 65–100)
GLUCOSE SERPL-MCNC: 118 MG/DL (ref 70–99)
MAGNESIUM SERPL-MCNC: 2.4 MG/DL (ref 1.8–2.4)
PHOSPHATE SERPL-MCNC: 4 MG/DL (ref 2.5–4.5)
POTASSIUM SERPL-SCNC: 3.9 MMOL/L (ref 3.5–5.1)
SERVICE CMNT-IMP: ABNORMAL
SODIUM SERPL-SCNC: 137 MMOL/L (ref 136–145)

## 2025-05-12 PROCEDURE — 80048 BASIC METABOLIC PNL TOTAL CA: CPT

## 2025-05-12 PROCEDURE — 6360000002 HC RX W HCPCS: Performed by: INTERNAL MEDICINE

## 2025-05-12 PROCEDURE — 6370000000 HC RX 637 (ALT 250 FOR IP)

## 2025-05-12 PROCEDURE — 84100 ASSAY OF PHOSPHORUS: CPT

## 2025-05-12 PROCEDURE — 36415 COLL VENOUS BLD VENIPUNCTURE: CPT

## 2025-05-12 PROCEDURE — 82962 GLUCOSE BLOOD TEST: CPT

## 2025-05-12 PROCEDURE — 6370000000 HC RX 637 (ALT 250 FOR IP): Performed by: HOSPITALIST

## 2025-05-12 PROCEDURE — 92507 TX SP LANG VOICE COMM INDIV: CPT

## 2025-05-12 PROCEDURE — 83735 ASSAY OF MAGNESIUM: CPT

## 2025-05-12 PROCEDURE — 92526 ORAL FUNCTION THERAPY: CPT

## 2025-05-12 PROCEDURE — 6360000002 HC RX W HCPCS: Performed by: STUDENT IN AN ORGANIZED HEALTH CARE EDUCATION/TRAINING PROGRAM

## 2025-05-12 PROCEDURE — 90935 HEMODIALYSIS ONE EVALUATION: CPT

## 2025-05-12 PROCEDURE — C9254 INJECTION, LACOSAMIDE: HCPCS | Performed by: STUDENT IN AN ORGANIZED HEALTH CARE EDUCATION/TRAINING PROGRAM

## 2025-05-12 RX ORDER — CARVEDILOL 6.25 MG/1
6.25 TABLET ORAL 2 TIMES DAILY
Status: DISCONTINUED | OUTPATIENT
Start: 2025-05-12 | End: 2025-05-12 | Stop reason: HOSPADM

## 2025-05-12 RX ORDER — BUMETANIDE 1 MG/1
1 TABLET ORAL DAILY
Qty: 30 TABLET | Refills: 0
Start: 2025-05-12 | End: 2025-06-11

## 2025-05-12 RX ORDER — CARVEDILOL 6.25 MG/1
6.25 TABLET ORAL 2 TIMES DAILY
Qty: 60 TABLET | Refills: 3
Start: 2025-05-12

## 2025-05-12 RX ORDER — INSULIN GLARGINE 300 U/ML
3 INJECTION, SOLUTION SUBCUTANEOUS NIGHTLY
Qty: 5 ADJUSTABLE DOSE PRE-FILLED PEN SYRINGE | Refills: 2
Start: 2025-05-12 | End: 2025-06-11

## 2025-05-12 RX ORDER — HYDRALAZINE HYDROCHLORIDE 25 MG/1
25 TABLET, FILM COATED ORAL EVERY 12 HOURS SCHEDULED
Qty: 90 TABLET | Refills: 3
Start: 2025-05-12

## 2025-05-12 RX ORDER — LACOSAMIDE 100 MG/1
100 TABLET ORAL 2 TIMES DAILY
Qty: 120 TABLET | Refills: 3 | Status: SHIPPED | OUTPATIENT
Start: 2025-05-12 | End: 2025-07-11

## 2025-05-12 RX ADMIN — FAMOTIDINE 20 MG: 20 TABLET, FILM COATED ORAL at 12:26

## 2025-05-12 RX ADMIN — HYDRALAZINE HYDROCHLORIDE 20 MG: 20 INJECTION INTRAMUSCULAR; INTRAVENOUS at 03:32

## 2025-05-12 RX ADMIN — CARVEDILOL 6.25 MG: 6.25 TABLET, FILM COATED ORAL at 12:27

## 2025-05-12 RX ADMIN — LACOSAMIDE 100 MG: 10 INJECTION INTRAVENOUS at 11:36

## 2025-05-12 RX ADMIN — HYDRALAZINE HYDROCHLORIDE 25 MG: 50 TABLET ORAL at 12:27

## 2025-05-12 RX ADMIN — APIXABAN 2.5 MG: 5 TABLET, FILM COATED ORAL at 05:55

## 2025-05-12 NOTE — CARE COORDINATION
Pt is medically cleared for dc today and will transfer to room 11W at [facility] for STR services.  RN to call report to #[phone].  Transport scheduled for 1500 through [employer]. CM notified pt's dtr of pt's dc and transport time.  CM remains available to assist as needed.       05/12/25 1250   Service Assessment   Patient Orientation Alert and Oriented   Cognition Alert   History Provided By Patient;Medical Record;Child/Family   Primary Caregiver Self   Accompanied By/Relationship n/a   Support Systems Children;Family Members   Patient's Healthcare Decision Maker is: Legal Next of Kin   PCP Verified by CM No   Prior Functional Level Independent in ADLs/IADLs   Current Functional Level Assistance with the following:;Bathing;Dressing;Toileting;Mobility;Shopping;Housework;Cooking   Can patient return to prior living arrangement No   Ability to make needs known: Fair   Family able to assist with home care needs: Yes   Would you like for me to discuss the discharge plan with any other family members/significant others, and if so, who? Yes  (dtr)   Financial Resources Medicare   Community Resources ECF/Home Care   Social/Functional History   Lives With Alone   Home Equipment Walker - Rolling  (glucometer)   Receives Help From Family   Prior Level of Assist for ADLs Independent   Prior Level of Assist for Homemaking Independent   Homemaking Responsibilities Yes   Ambulation Assistance Independent   Prior Level of Assist for Transfers Independent   Active  No   Patient's  Info family   Occupation Retired   Discharge Planning   Type of Residence Skilled Nursing Facility   Living Arrangements Alone   Current Services Prior To Admission Durable Medical Equipment;Home Care   Current DME Prior to Arrival Glucometer;Walker   Potential Assistance Needed Skilled Nursing Facility;Home Care   DME Ordered? No   Potential Assistance Purchasing Medications No   Type of Home Care Services None   Patient expects

## 2025-05-12 NOTE — DIALYSIS
TRANSFER OUT- DIALYSIS    Hemodialysis treatment completed. PT ran 3.5 hours, without complications.    Patient baseline and VS stable  /59  P 71       1 Kg removed.      Flushed both ports with 10 mL of NS.  CVC dressing clean, dry, and intact, tego caps intact, curos caps placed.      Meds given: 0.    RBCs given during dialysis: 0    Patient to 705 after dialysis.

## 2025-05-12 NOTE — DISCHARGE SUMMARY
Hospitalist Discharge Summary   Admit Date:  2025  8:38 PM   DC Note date: 2025  Name:  Nataliia Pradhan   Age:  80 y.o.  Sex:  female  :  1945   MRN:  [id number]   Room:  [facility]  PCP:  Melo Pope MD    Presenting Complaint: Seizures     Initial Admission Diagnosis: Status epilepticus (HCC) [G40.901]  HTN (hypertension), malignant [I10]  ESRD on hemodialysis (HCC) [N18.6, Z99.2]  Seizure-like activity (HCC) [R56.9]  Altered mental status, unspecified altered mental status type [R41.82]     Problem List for this Hospitalization (present on admission):    Principal Problem:    Status epilepticus (HCC)  Active Problems:    Type 2 diabetes mellitus with nephropathy (HCC)    HTN (hypertension)    PVD (peripheral vascular disease)    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 *      Hospital Course:  Nataliia Pradhan is a 80 y.o. 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 Patient was admitted for status epilepticus. She was intubated and admitted to the ICU on propofol drip. Neurology was consulted and started patient on Vimpat. Hospital course complicated by persistent encephalopathy. Was found to have E. coli UTI and completed treatment with Rocephin while inpatient. Patient extubated on 2025. Hospitalist service consulted to assume care. NGT removed on  and diet advanced.     Status epilepticus (HCC)  -  No breakthrough seizures so far.  Continue Vimpat 100 mg po twice daily.  Continue seizure precaution as ordered.  Neurology signed off.        Metabolic encephalopathy  ICU delirium  -  Patient continues to be with some encephalopathy, with waxing and waning mentation.  Patient responding with poor comprehension

## 2025-05-12 NOTE — DIALYSIS
TRANSFER IN - DIALYSIS    Received patient in dialysis unit  from [facility] (unit) for ordered procedure.    Consent verified for renal replacement therapy. Procedure explained to patient, opportunity for Q&A provided. Call light given.     Patient alert and vital signs stable.  /68,  P 65  , 0 L O2 via RA.    Hemodialysis initiated using Right CVC.  Aspirated and flushed both ports without difficulty. Dressing clean, dry and intact.  Machine settings per MD order.    Heparin 0 unit bolus and 0 units/hr.      Will monitor during treatment.

## 2025-05-12 NOTE — PROGRESS NOTES
Nataliia Pradhan  Admission Date: 4/17/2025         [facility] Progress Note: 4/30/2025    Follow-up for: ESRD    The patient's chart is reviewed and the patient is discussed with the staff.    Subjective:   Pt seen and examined on HD, right  Qb, UF 2500, pt awake, denies pain, NGT with TF, in bilateral mitts.     ROS:  Limited, pt denies pain    Current Facility-Administered Medications   Medication Dose Route Frequency    [START ON 5/1/2025] famotidine (PEPCID) tablet 20 mg  20 mg Per NG tube Daily    apixaban (ELIQUIS) tablet 2.5 mg  2.5 mg Per NG tube BID    carvedilol (COREG) tablet 25 mg  25 mg Per NG tube BID    hydrALAZINE (APRESOLINE) tablet 50 mg  50 mg Per NG tube 3 times per day    acetaminophen (TYLENOL) tablet 650 mg  650 mg Per NG tube Q6H PRN    Or    acetaminophen (TYLENOL) suppository 650 mg  650 mg Rectal Q6H PRN    scopolamine (TRANSDERM-SCOP) transdermal patch 1 patch  1 patch TransDERmal Q72H    insulin glargine (LANTUS) injection vial 15 Units  15 Units SubCUTAneous Daily    sodium phosphate 15 mmol in sodium chloride 0.9 % 250 mL IVPB  15 mmol IntraVENous PRN    hydrALAZINE (APRESOLINE) injection 20 mg  20 mg IntraVENous Q4H PRN    glucose chewable tablet 16 g  4 tablet Oral PRN    dextrose bolus 10% 125 mL  125 mL IntraVENous PRN    Or    dextrose bolus 10% 250 mL  250 mL IntraVENous PRN    Glucagon Emergency KIT 1 mg  1 mg SubCUTAneous PRN    dextrose 10 % infusion   IntraVENous Continuous PRN    insulin lispro (HUMALOG,ADMELOG) injection vial 0-8 Units  0-8 Units SubCUTAneous Q6H    lacosamide (VIMPAT) injection 100 mg  100 mg IntraVENous BID    medicated lip ointment (BLISTEX)   Topical PRN    sodium chloride flush 0.9 % injection 5-40 mL  5-40 mL IntraVENous 2 times per day    sodium chloride flush 0.9 % injection 5-40 mL  5-40 mL IntraVENous PRN    0.9 % sodium chloride infusion   IntraVENous PRN    potassium chloride 20 mEq/50 mL IVPB (Central 
                     Nataliia Pradhan  Admission Date: 4/17/2025         [facility] Progress Note: 5/5/2025    Follow-up for: ESRD    The patient's chart is reviewed and the patient is discussed with the staff.    Subjective:   Pt seen and examined in dialysis, dialysis RN at bedside, pt hypotensive and passed out, rapid response was called and she recovered. She is now alert, BP better, denies pain.     ROS:  Limited, pt denies pain, No CP, No SOB    Current Facility-Administered Medications   Medication Dose Route Frequency    carvedilol (COREG) tablet 12.5 mg  12.5 mg Oral BID    hydrALAZINE (APRESOLINE) tablet 25 mg  25 mg Oral 3 times per day    apixaban (ELIQUIS) tablet 2.5 mg  2.5 mg Oral BID    famotidine (PEPCID) tablet 20 mg  20 mg Oral Daily    acetaminophen (TYLENOL) tablet 650 mg  650 mg Oral Q6H PRN    Or    acetaminophen (TYLENOL) suppository 650 mg  650 mg Rectal Q6H PRN    scopolamine (TRANSDERM-SCOP) transdermal patch 1 patch  1 patch TransDERmal Q72H    [Held by provider] insulin glargine (LANTUS) injection vial 15 Units  15 Units SubCUTAneous Daily    sodium phosphate 15 mmol in sodium chloride 0.9 % 250 mL IVPB  15 mmol IntraVENous PRN    hydrALAZINE (APRESOLINE) injection 20 mg  20 mg IntraVENous Q4H PRN    glucose chewable tablet 16 g  4 tablet Oral PRN    dextrose bolus 10% 125 mL  125 mL IntraVENous PRN    Or    dextrose bolus 10% 250 mL  250 mL IntraVENous PRN    Glucagon Emergency KIT 1 mg  1 mg SubCUTAneous PRN    dextrose 10 % infusion   IntraVENous Continuous PRN    insulin lispro (HUMALOG,ADMELOG) injection vial 0-8 Units  0-8 Units SubCUTAneous Q6H    lacosamide (VIMPAT) injection 100 mg  100 mg IntraVENous BID    medicated lip ointment (BLISTEX)   Topical PRN    sodium chloride flush 0.9 % injection 5-40 mL  5-40 mL IntraVENous 2 times per day    sodium chloride flush 0.9 % injection 5-40 mL  5-40 mL IntraVENous PRN    0.9 % sodium chloride infusion   IntraVENous PRN    
                     Nataliia Pradhan  Admission Date: 4/17/2025         [facility] Progress Note: 5/10/2025    Follow-up for: ESRD      Subjective:   Pt seen and examined.  Did well with HD yesterday.  No complaints this AM.    ROS:  Limited- No CP, No SOB    Current Facility-Administered Medications   Medication Dose Route Frequency    carvedilol (COREG) tablet 3.125 mg  3.125 mg Oral BID    hydrALAZINE (APRESOLINE) tablet 25 mg  25 mg Oral 2 times per day    [Held by provider] insulin glargine (LANTUS) injection vial 5 Units  5 Units SubCUTAneous Nightly    magic (miracle) mouthwash  5 mL Swish & Spit 4x Daily PRN    [Held by provider] apixaban (ELIQUIS) tablet 2.5 mg  2.5 mg Oral BID    famotidine (PEPCID) tablet 20 mg  20 mg Oral Daily    acetaminophen (TYLENOL) tablet 650 mg  650 mg Oral Q6H PRN    Or    acetaminophen (TYLENOL) suppository 650 mg  650 mg Rectal Q6H PRN    scopolamine (TRANSDERM-SCOP) transdermal patch 1 patch  1 patch TransDERmal Q72H    sodium phosphate 15 mmol in sodium chloride 0.9 % 250 mL IVPB  15 mmol IntraVENous PRN    hydrALAZINE (APRESOLINE) injection 20 mg  20 mg IntraVENous Q4H PRN    glucose chewable tablet 16 g  4 tablet Oral PRN    dextrose bolus 10% 125 mL  125 mL IntraVENous PRN    Or    dextrose bolus 10% 250 mL  250 mL IntraVENous PRN    Glucagon Emergency KIT 1 mg  1 mg SubCUTAneous PRN    dextrose 10 % infusion   IntraVENous Continuous PRN    insulin lispro (HUMALOG,ADMELOG) injection vial 0-8 Units  0-8 Units SubCUTAneous Q6H    lacosamide (VIMPAT) injection 100 mg  100 mg IntraVENous BID    medicated lip ointment (BLISTEX)   Topical PRN    sodium chloride flush 0.9 % injection 5-40 mL  5-40 mL IntraVENous 2 times per day    sodium chloride flush 0.9 % injection 5-40 mL  5-40 mL IntraVENous PRN    0.9 % sodium chloride infusion   IntraVENous PRN    potassium chloride 20 mEq/50 mL IVPB (Central Line)  20 mEq IntraVENous PRN    Or    potassium chloride 10 mEq/100 
                     Nataliia Pradhan  Admission Date: 4/17/2025         [facility] Progress Note: 5/8/2025    Follow-up for: ESRD    The patient's chart is reviewed and the patient is discussed with the staff.    Subjective:   Pt seen and examined in room, pt awake, denies pain.     ROS:  Limited- No CP, No SOB    Current Facility-Administered Medications   Medication Dose Route Frequency    carvedilol (COREG) tablet 3.125 mg  3.125 mg Oral BID    hydrALAZINE (APRESOLINE) tablet 25 mg  25 mg Oral 2 times per day    insulin glargine (LANTUS) injection vial 5 Units  5 Units SubCUTAneous Nightly    magic (miracle) mouthwash  5 mL Swish & Spit 4x Daily PRN    [Held by provider] apixaban (ELIQUIS) tablet 2.5 mg  2.5 mg Oral BID    famotidine (PEPCID) tablet 20 mg  20 mg Oral Daily    acetaminophen (TYLENOL) tablet 650 mg  650 mg Oral Q6H PRN    Or    acetaminophen (TYLENOL) suppository 650 mg  650 mg Rectal Q6H PRN    scopolamine (TRANSDERM-SCOP) transdermal patch 1 patch  1 patch TransDERmal Q72H    sodium phosphate 15 mmol in sodium chloride 0.9 % 250 mL IVPB  15 mmol IntraVENous PRN    hydrALAZINE (APRESOLINE) injection 20 mg  20 mg IntraVENous Q4H PRN    glucose chewable tablet 16 g  4 tablet Oral PRN    dextrose bolus 10% 125 mL  125 mL IntraVENous PRN    Or    dextrose bolus 10% 250 mL  250 mL IntraVENous PRN    Glucagon Emergency KIT 1 mg  1 mg SubCUTAneous PRN    dextrose 10 % infusion   IntraVENous Continuous PRN    insulin lispro (HUMALOG,ADMELOG) injection vial 0-8 Units  0-8 Units SubCUTAneous Q6H    lacosamide (VIMPAT) injection 100 mg  100 mg IntraVENous BID    medicated lip ointment (BLISTEX)   Topical PRN    sodium chloride flush 0.9 % injection 5-40 mL  5-40 mL IntraVENous 2 times per day    sodium chloride flush 0.9 % injection 5-40 mL  5-40 mL IntraVENous PRN    0.9 % sodium chloride infusion   IntraVENous PRN    potassium chloride 20 mEq/50 mL IVPB (Central Line)  20 mEq IntraVENous PRN    
                     Nataliia Pradhan  Admission Date: 4/17/2025         [facility] Progress Note: 5/6/2025    Follow-up for: ESRD    The patient's chart is reviewed and the patient is discussed with the staff.    Subjective:   Pt seen and examined in room, RN at the bedside, pt is awake, denies pain.     ROS:  Limited- No CP, No SOB    Current Facility-Administered Medications   Medication Dose Route Frequency    hydrALAZINE (APRESOLINE) tablet 25 mg  25 mg Oral 2 times per day    insulin glargine (LANTUS) injection vial 5 Units  5 Units SubCUTAneous Nightly    carvedilol (COREG) tablet 12.5 mg  12.5 mg Oral BID    magic (miracle) mouthwash  5 mL Swish & Spit 4x Daily PRN    apixaban (ELIQUIS) tablet 2.5 mg  2.5 mg Oral BID    famotidine (PEPCID) tablet 20 mg  20 mg Oral Daily    acetaminophen (TYLENOL) tablet 650 mg  650 mg Oral Q6H PRN    Or    acetaminophen (TYLENOL) suppository 650 mg  650 mg Rectal Q6H PRN    scopolamine (TRANSDERM-SCOP) transdermal patch 1 patch  1 patch TransDERmal Q72H    sodium phosphate 15 mmol in sodium chloride 0.9 % 250 mL IVPB  15 mmol IntraVENous PRN    hydrALAZINE (APRESOLINE) injection 20 mg  20 mg IntraVENous Q4H PRN    glucose chewable tablet 16 g  4 tablet Oral PRN    dextrose bolus 10% 125 mL  125 mL IntraVENous PRN    Or    dextrose bolus 10% 250 mL  250 mL IntraVENous PRN    Glucagon Emergency KIT 1 mg  1 mg SubCUTAneous PRN    dextrose 10 % infusion   IntraVENous Continuous PRN    insulin lispro (HUMALOG,ADMELOG) injection vial 0-8 Units  0-8 Units SubCUTAneous Q6H    lacosamide (VIMPAT) injection 100 mg  100 mg IntraVENous BID    medicated lip ointment (BLISTEX)   Topical PRN    sodium chloride flush 0.9 % injection 5-40 mL  5-40 mL IntraVENous 2 times per day    sodium chloride flush 0.9 % injection 5-40 mL  5-40 mL IntraVENous PRN    0.9 % sodium chloride infusion   IntraVENous PRN    potassium chloride 20 mEq/50 mL IVPB (Central Line)  20 mEq IntraVENous PRN 
                     Nataliia Pradhan  Admission Date: 4/17/2025         [facility] Progress Note: 5/1/2025    Follow-up for: ESRD    The patient's chart is reviewed and the patient is discussed with the staff.    Subjective:   Pt seen and examined pt denies pain, NGT with TF    ROS:  Limited, pt denies pain, No CP, No SOB    Current Facility-Administered Medications   Medication Dose Route Frequency    famotidine (PEPCID) tablet 20 mg  20 mg Per NG tube Daily    apixaban (ELIQUIS) tablet 2.5 mg  2.5 mg Per NG tube BID    carvedilol (COREG) tablet 25 mg  25 mg Per NG tube BID    hydrALAZINE (APRESOLINE) tablet 50 mg  50 mg Per NG tube 3 times per day    acetaminophen (TYLENOL) tablet 650 mg  650 mg Per NG tube Q6H PRN    Or    acetaminophen (TYLENOL) suppository 650 mg  650 mg Rectal Q6H PRN    scopolamine (TRANSDERM-SCOP) transdermal patch 1 patch  1 patch TransDERmal Q72H    insulin glargine (LANTUS) injection vial 15 Units  15 Units SubCUTAneous Daily    sodium phosphate 15 mmol in sodium chloride 0.9 % 250 mL IVPB  15 mmol IntraVENous PRN    hydrALAZINE (APRESOLINE) injection 20 mg  20 mg IntraVENous Q4H PRN    glucose chewable tablet 16 g  4 tablet Oral PRN    dextrose bolus 10% 125 mL  125 mL IntraVENous PRN    Or    dextrose bolus 10% 250 mL  250 mL IntraVENous PRN    Glucagon Emergency KIT 1 mg  1 mg SubCUTAneous PRN    dextrose 10 % infusion   IntraVENous Continuous PRN    insulin lispro (HUMALOG,ADMELOG) injection vial 0-8 Units  0-8 Units SubCUTAneous Q6H    lacosamide (VIMPAT) injection 100 mg  100 mg IntraVENous BID    medicated lip ointment (BLISTEX)   Topical PRN    sodium chloride flush 0.9 % injection 5-40 mL  5-40 mL IntraVENous 2 times per day    sodium chloride flush 0.9 % injection 5-40 mL  5-40 mL IntraVENous PRN    0.9 % sodium chloride infusion   IntraVENous PRN    potassium chloride 20 mEq/50 mL IVPB (Central Line)  20 mEq IntraVENous PRN    Or    potassium chloride 10 mEq/100 mL 
                     Nataliia Pradhan  Admission Date: 4/17/2025         [facility] Progress Note: 5/4/2025    Follow-up for: ESRD    The patient's chart is reviewed and the patient is discussed with the staff.    Subjective:   Pt seen and examined on rounds.    ROS:  Limited, pt denies pain, No CP, No SOB    Current Facility-Administered Medications   Medication Dose Route Frequency    apixaban (ELIQUIS) tablet 2.5 mg  2.5 mg Oral BID    carvedilol (COREG) tablet 25 mg  25 mg Oral BID    famotidine (PEPCID) tablet 20 mg  20 mg Oral Daily    hydrALAZINE (APRESOLINE) tablet 50 mg  50 mg Oral 3 times per day    acetaminophen (TYLENOL) tablet 650 mg  650 mg Oral Q6H PRN    Or    acetaminophen (TYLENOL) suppository 650 mg  650 mg Rectal Q6H PRN    scopolamine (TRANSDERM-SCOP) transdermal patch 1 patch  1 patch TransDERmal Q72H    [Held by provider] insulin glargine (LANTUS) injection vial 15 Units  15 Units SubCUTAneous Daily    sodium phosphate 15 mmol in sodium chloride 0.9 % 250 mL IVPB  15 mmol IntraVENous PRN    hydrALAZINE (APRESOLINE) injection 20 mg  20 mg IntraVENous Q4H PRN    glucose chewable tablet 16 g  4 tablet Oral PRN    dextrose bolus 10% 125 mL  125 mL IntraVENous PRN    Or    dextrose bolus 10% 250 mL  250 mL IntraVENous PRN    Glucagon Emergency KIT 1 mg  1 mg SubCUTAneous PRN    dextrose 10 % infusion   IntraVENous Continuous PRN    insulin lispro (HUMALOG,ADMELOG) injection vial 0-8 Units  0-8 Units SubCUTAneous Q6H    lacosamide (VIMPAT) injection 100 mg  100 mg IntraVENous BID    medicated lip ointment (BLISTEX)   Topical PRN    sodium chloride flush 0.9 % injection 5-40 mL  5-40 mL IntraVENous 2 times per day    sodium chloride flush 0.9 % injection 5-40 mL  5-40 mL IntraVENous PRN    0.9 % sodium chloride infusion   IntraVENous PRN    potassium chloride 20 mEq/50 mL IVPB (Central Line)  20 mEq IntraVENous PRN    Or    potassium chloride 10 mEq/100 mL IVPB (Peripheral Line)  10 mEq 
                     Nataliia Pradhan  Admission Date: 4/17/2025         [facility] Progress Note: 5/3/2025    Follow-up for: ESRD    The patient's chart is reviewed and the patient is discussed with the staff.    Subjective:   Pt seen and examined on rounds.    ROS:  Limited, pt denies pain, No CP, No SOB    Current Facility-Administered Medications   Medication Dose Route Frequency    apixaban (ELIQUIS) tablet 2.5 mg  2.5 mg Oral BID    carvedilol (COREG) tablet 25 mg  25 mg Oral BID    famotidine (PEPCID) tablet 20 mg  20 mg Oral Daily    hydrALAZINE (APRESOLINE) tablet 50 mg  50 mg Oral 3 times per day    acetaminophen (TYLENOL) tablet 650 mg  650 mg Oral Q6H PRN    Or    acetaminophen (TYLENOL) suppository 650 mg  650 mg Rectal Q6H PRN    scopolamine (TRANSDERM-SCOP) transdermal patch 1 patch  1 patch TransDERmal Q72H    [Held by provider] insulin glargine (LANTUS) injection vial 15 Units  15 Units SubCUTAneous Daily    sodium phosphate 15 mmol in sodium chloride 0.9 % 250 mL IVPB  15 mmol IntraVENous PRN    hydrALAZINE (APRESOLINE) injection 20 mg  20 mg IntraVENous Q4H PRN    glucose chewable tablet 16 g  4 tablet Oral PRN    dextrose bolus 10% 125 mL  125 mL IntraVENous PRN    Or    dextrose bolus 10% 250 mL  250 mL IntraVENous PRN    Glucagon Emergency KIT 1 mg  1 mg SubCUTAneous PRN    dextrose 10 % infusion   IntraVENous Continuous PRN    insulin lispro (HUMALOG,ADMELOG) injection vial 0-8 Units  0-8 Units SubCUTAneous Q6H    lacosamide (VIMPAT) injection 100 mg  100 mg IntraVENous BID    medicated lip ointment (BLISTEX)   Topical PRN    sodium chloride flush 0.9 % injection 5-40 mL  5-40 mL IntraVENous 2 times per day    sodium chloride flush 0.9 % injection 5-40 mL  5-40 mL IntraVENous PRN    0.9 % sodium chloride infusion   IntraVENous PRN    potassium chloride 20 mEq/50 mL IVPB (Central Line)  20 mEq IntraVENous PRN    Or    potassium chloride 10 mEq/100 mL IVPB (Peripheral Line)  10 mEq 
                     Nataliia Pradhan  Admission Date: 4/17/2025         [facility] Progress Note: 5/7/2025    Follow-up for: ESRD    The patient's chart is reviewed and the patient is discussed with the staff.    Subjective:   Pt seen and examined in room, pt is awake, denies pain.     ROS:  Limited- No CP, No SOB    Current Facility-Administered Medications   Medication Dose Route Frequency    carvedilol (COREG) tablet 3.125 mg  3.125 mg Oral BID    hydrALAZINE (APRESOLINE) tablet 25 mg  25 mg Oral 2 times per day    insulin glargine (LANTUS) injection vial 5 Units  5 Units SubCUTAneous Nightly    magic (miracle) mouthwash  5 mL Swish & Spit 4x Daily PRN    apixaban (ELIQUIS) tablet 2.5 mg  2.5 mg Oral BID    famotidine (PEPCID) tablet 20 mg  20 mg Oral Daily    acetaminophen (TYLENOL) tablet 650 mg  650 mg Oral Q6H PRN    Or    acetaminophen (TYLENOL) suppository 650 mg  650 mg Rectal Q6H PRN    scopolamine (TRANSDERM-SCOP) transdermal patch 1 patch  1 patch TransDERmal Q72H    sodium phosphate 15 mmol in sodium chloride 0.9 % 250 mL IVPB  15 mmol IntraVENous PRN    hydrALAZINE (APRESOLINE) injection 20 mg  20 mg IntraVENous Q4H PRN    glucose chewable tablet 16 g  4 tablet Oral PRN    dextrose bolus 10% 125 mL  125 mL IntraVENous PRN    Or    dextrose bolus 10% 250 mL  250 mL IntraVENous PRN    Glucagon Emergency KIT 1 mg  1 mg SubCUTAneous PRN    dextrose 10 % infusion   IntraVENous Continuous PRN    insulin lispro (HUMALOG,ADMELOG) injection vial 0-8 Units  0-8 Units SubCUTAneous Q6H    lacosamide (VIMPAT) injection 100 mg  100 mg IntraVENous BID    medicated lip ointment (BLISTEX)   Topical PRN    sodium chloride flush 0.9 % injection 5-40 mL  5-40 mL IntraVENous 2 times per day    sodium chloride flush 0.9 % injection 5-40 mL  5-40 mL IntraVENous PRN    0.9 % sodium chloride infusion   IntraVENous PRN    potassium chloride 20 mEq/50 mL IVPB (Central Line)  20 mEq IntraVENous PRN    Or    potassium 
                     Nataliia Pradhan  Admission Date: 4/17/2025         [facility] Progress Note: 5/9/2025    Follow-up for: ESRD    The patient's chart is reviewed and the patient is discussed with the staff.    Subjective:   Pt seen and examined on HD, right  Qb, UF 1500 drowsy, arouses denies pain, tolerating dialysis.     ROS:  Limited- No CP, No SOB    Current Facility-Administered Medications   Medication Dose Route Frequency    carvedilol (COREG) tablet 3.125 mg  3.125 mg Oral BID    hydrALAZINE (APRESOLINE) tablet 25 mg  25 mg Oral 2 times per day    [Held by provider] insulin glargine (LANTUS) injection vial 5 Units  5 Units SubCUTAneous Nightly    magic (miracle) mouthwash  5 mL Swish & Spit 4x Daily PRN    [Held by provider] apixaban (ELIQUIS) tablet 2.5 mg  2.5 mg Oral BID    famotidine (PEPCID) tablet 20 mg  20 mg Oral Daily    acetaminophen (TYLENOL) tablet 650 mg  650 mg Oral Q6H PRN    Or    acetaminophen (TYLENOL) suppository 650 mg  650 mg Rectal Q6H PRN    scopolamine (TRANSDERM-SCOP) transdermal patch 1 patch  1 patch TransDERmal Q72H    sodium phosphate 15 mmol in sodium chloride 0.9 % 250 mL IVPB  15 mmol IntraVENous PRN    hydrALAZINE (APRESOLINE) injection 20 mg  20 mg IntraVENous Q4H PRN    glucose chewable tablet 16 g  4 tablet Oral PRN    dextrose bolus 10% 125 mL  125 mL IntraVENous PRN    Or    dextrose bolus 10% 250 mL  250 mL IntraVENous PRN    Glucagon Emergency KIT 1 mg  1 mg SubCUTAneous PRN    dextrose 10 % infusion   IntraVENous Continuous PRN    insulin lispro (HUMALOG,ADMELOG) injection vial 0-8 Units  0-8 Units SubCUTAneous Q6H    lacosamide (VIMPAT) injection 100 mg  100 mg IntraVENous BID    medicated lip ointment (BLISTEX)   Topical PRN    sodium chloride flush 0.9 % injection 5-40 mL  5-40 mL IntraVENous 2 times per day    sodium chloride flush 0.9 % injection 5-40 mL  5-40 mL IntraVENous PRN    0.9 % sodium chloride infusion   IntraVENous PRN    
                     Nataliia Pradhan  Admission Date: 4/17/2025         [facility] Progress Note: 5/11/2025    Follow-up for: ESRD      Subjective:   Pt seen and examined. No complaints this AM.    ROS:  Limited- No CP, No SOB    Current Facility-Administered Medications   Medication Dose Route Frequency    carvedilol (COREG) tablet 3.125 mg  3.125 mg Oral BID    hydrALAZINE (APRESOLINE) tablet 25 mg  25 mg Oral 2 times per day    [Held by provider] insulin glargine (LANTUS) injection vial 5 Units  5 Units SubCUTAneous Nightly    magic (miracle) mouthwash  5 mL Swish & Spit 4x Daily PRN    apixaban (ELIQUIS) tablet 2.5 mg  2.5 mg Oral BID    famotidine (PEPCID) tablet 20 mg  20 mg Oral Daily    acetaminophen (TYLENOL) tablet 650 mg  650 mg Oral Q6H PRN    Or    acetaminophen (TYLENOL) suppository 650 mg  650 mg Rectal Q6H PRN    scopolamine (TRANSDERM-SCOP) transdermal patch 1 patch  1 patch TransDERmal Q72H    sodium phosphate 15 mmol in sodium chloride 0.9 % 250 mL IVPB  15 mmol IntraVENous PRN    hydrALAZINE (APRESOLINE) injection 20 mg  20 mg IntraVENous Q4H PRN    glucose chewable tablet 16 g  4 tablet Oral PRN    dextrose bolus 10% 125 mL  125 mL IntraVENous PRN    Or    dextrose bolus 10% 250 mL  250 mL IntraVENous PRN    Glucagon Emergency KIT 1 mg  1 mg SubCUTAneous PRN    dextrose 10 % infusion   IntraVENous Continuous PRN    insulin lispro (HUMALOG,ADMELOG) injection vial 0-8 Units  0-8 Units SubCUTAneous Q6H    lacosamide (VIMPAT) injection 100 mg  100 mg IntraVENous BID    medicated lip ointment (BLISTEX)   Topical PRN    sodium chloride flush 0.9 % injection 5-40 mL  5-40 mL IntraVENous 2 times per day    sodium chloride flush 0.9 % injection 5-40 mL  5-40 mL IntraVENous PRN    0.9 % sodium chloride infusion   IntraVENous PRN    potassium chloride 20 mEq/50 mL IVPB (Central Line)  20 mEq IntraVENous PRN    Or    potassium chloride 10 mEq/100 mL IVPB (Peripheral Line)  10 mEq IntraVENous PRN 
       Hospitalist Progress Note   Admit Date:  2025  8:38 PM   Name:  Nataliia Pradhan   Age:  80 y.o.  Sex:  female  :  1945   MRN:  [id number]   Room:  [facility]    Presenting/Chief Complaint: Seizures     Reason(s) for Admission: Status epilepticus (HCC) [G40.901]  HTN (hypertension), malignant [I10]  ESRD on hemodialysis (HCC) [N18.6, Z99.2]  Seizure-like activity (HCC) [R56.9]  Altered mental status, unspecified altered mental status type [R41.82]     Hospital Course:   Nataliia Pradhan is a 80 y.o. 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 Patient was admitted for status epilepticus. She was intubated and admitted to the ICU on propofol drip. Neurology was consulted and started patient on Vimpat. Hospital course complicated by persistent encephalopathy. Was found to have E. coli UTI and completed treatment with Rocephin while inpatient. Patient extubated on 2025. Hospitalist service consulted to assume care. NGT placed on .       Subjective & 24hr Events:   No overnight events. NGT removed yesterday and diet advanced.   Pleasantly confused. Denied any complaints.       Assessment & Plan:     Principal Problem:    Status epilepticus (HCC)  -Status epilepticus is resolved  - Neurology consulted, now signed off  - Continue with lacosamide  - Seizure precautions     Metabolic encephalopathy  ICU delirium  - Frequent reorientation  - Nutrition, SLP consulted.   -- Mental status stable     ESRD on HD  - HD through tunneled cath  - Nephrology consulted for routine dialysis     Dysphagia  -- SLP following  - Diet advanced     Ileus  - Resolved     Type 2 diabetes with nephropathy  - A1c 7.9  - Lantus 15 units daily, insulin sliding scale. On hold for normal BG     Hypertension  - Coreg, hydralazine     History of DVT/PE  - On reduced 
       Hospitalist Progress Note   Admit Date:  2025  8:38 PM   Name:  Nataliia Pradhan   Age:  80 y.o.  Sex:  female  :  1945   MRN:  [id number]   Room:  [facility]    Presenting/Chief Complaint: Seizures     Reason(s) for Admission: Status epilepticus (HCC) [G40.901]  HTN (hypertension), malignant [I10]  ESRD on hemodialysis (HCC) [N18.6, Z99.2]  Seizure-like activity (HCC) [R56.9]  Altered mental status, unspecified altered mental status type [R41.82]     Hospital Course:   Nataliia Pradhan is a 80 y.o. 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 Patient was admitted for status epilepticus. She was intubated and admitted to the ICU on propofol drip. Neurology was consulted and started patient on Vimpat. Hospital course complicated by persistent encephalopathy. Was found to have E. coli UTI and completed treatment with Rocephin while inpatient. Patient extubated on 2025. Hospitalist service consulted to assume care. NGT removed on  and diet advanced.       Subjective & 24hr Events:   Patient seen and examined at bedside.  She is alert and awake, AO x 2, lethargic, completed HD this morning.  Patient continues to answer questions appropriately mostly as for now.  No overnight events including fever, chest pain, vomiting or diarrhea.    ROS:  10 point review of system is negative except for what mentioned above.      Assessment & Plan:       Status epilepticus (HCC)  -  No breakthrough seizures so far.  Plan to continue Vimpat 100 mg IV twice daily.  Continue seizure precaution as ordered.  Neurology signed off.       Metabolic encephalopathy  ICU delirium  -  Patient continues to be with some encephalopathy,  Patient responding with poor comprehension and memory.  - Frequent reorientation  - Nutrition, SLP consulted.   -- Mental 
       Hospitalist Progress Note   Admit Date:  2025  8:38 PM   Name:  Nataliia Pradhan   Age:  80 y.o.  Sex:  female  :  1945   MRN:  [id number]   Room:  [facility] [place]    Presenting/Chief Complaint: Seizures     Reason(s) for Admission: Status epilepticus (HCC) [G40.901]  HTN (hypertension), malignant [I10]  ESRD on hemodialysis (HCC) [N18.6, Z99.2]  Seizure-like activity (HCC) [R56.9]  Altered mental status, unspecified altered mental status type [R41.82]     Hospital Course:   Nataliia Pradhan is a 80 y.o. 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 Patient was admitted for status epilepticus. She was intubated and admitted to the ICU on propofol drip. Neurology was consulted and started patient on Vimpat. Hospital course complicated by persistent encephalopathy. Was found to have E. coli UTI and completed treatment with Rocephin while inpatient. Patient extubated on 2025. Hospitalist service consulted to assume care. NGT removed on  and diet advanced.       Subjective & 24hr Events:   Patient seen and examined at bedside.  She is alert and awake, AO x 2, on room air.  Patient resting comfortably in bed.  Answers to yes or no questions.    ROS:  10 point review of system is negative except for what mentioned above.      Assessment & Plan:       Status epilepticus (HCC)  5/10-  No breakthrough seizures so far.  Plan to continue Vimpat 100 mg IV twice daily.  Continue seizure precaution as ordered.  Neurology signed off.       Metabolic encephalopathy  ICU delirium  5/10-  Patient continues to be with some encephalopathy,  Patient responding with poor comprehension and memory.  - Frequent reorientation  -- Mental status stable     ESRD on HD  - HD through tunneled cath  - Nephrology consulted for routine dialysis, MWF.     Dysphagia  -- SLP 
       Hospitalist Progress Note   Admit Date:  2025  8:38 PM   Name:  Nataliia Pradhan   Age:  80 y.o.  Sex:  female  :  1945   MRN:  [id number]   Room:  [facility]    Presenting/Chief Complaint: Seizures     Reason(s) for Admission: Status epilepticus (HCC) [G40.901]  HTN (hypertension), malignant [I10]  ESRD on hemodialysis (HCC) [N18.6, Z99.2]  Seizure-like activity (HCC) [R56.9]  Altered mental status, unspecified altered mental status type [R41.82]     Hospital Course:   Nataliia Pradhan is a 80 y.o. 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 Patient was admitted for status epilepticus. She was intubated and admitted to the ICU on propofol drip. Neurology was consulted and started patient on Vimpat. Hospital course complicated by persistent encephalopathy. Was found to have E. coli UTI and completed treatment with Rocephin while inpatient. Patient extubated on 2025. Hospitalist service consulted to assume care. NGT removed on  and diet advanced.       Subjective & 24hr Events:   Pt seen and examined at bedside.  Pt resting in bed comfortably. Pt is much more alert/awake and mentally coherent this AM.  No overnight events reported by nursing staff.  No chest pain, abdominal pain, nausea/vomiting, fever, chills, headache.    ROS:  10 point review of system is negative except for what mentioned above.      Assessment & Plan:       Status epilepticus (HCC)  -  No breakthrough seizures so far.  Plan to continue Vimpat 100 mg IV twice daily.  Continue seizure precaution as ordered.  Neurology signed off.       Metabolic encephalopathy  ICU delirium  -  Patient continues to be with some encephalopathy,  Patient responding with poor comprehension and memory.  - Frequent reorientation  - Nutrition, SLP consulted.   -- Mental status 
       Hospitalist Progress Note   Admit Date:  2025  8:38 PM   Name:  Nataliia Pradhan   Age:  80 y.o.  Sex:  female  :  1945   MRN:  [id number]   Room:  [facility]    Presenting/Chief Complaint: Seizures     Reason(s) for Admission: Status epilepticus (HCC) [G40.901]  HTN (hypertension), malignant [I10]  ESRD on hemodialysis (HCC) [N18.6, Z99.2]  Seizure-like activity (HCC) [R56.9]  Altered mental status, unspecified altered mental status type [R41.82]     Hospital Course:   Nataliia Pradhan is a 80 y.o. 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 Patient was admitted for status epilepticus. She was intubated and admitted to the ICU on propofol drip. Neurology was consulted and started patient on Vimpat. Hospital course complicated by persistent encephalopathy. Was found to have E. coli UTI and completed treatment with Rocephin while inpatient. Patient extubated on 2025. Hospitalist service consulted to assume care. NGT removed on  and diet advanced.       Subjective & 24hr Events:   Patient seen and examined at bedside.  She is alert and awake good continues to be sluggish and responding to questions.  Patient only able to answer yes or no questions.  Later after my evaluation, patient reported that patient started having bleeding from her mouth, it appeared a blood clot was dislodged from patient's tongue.  It appears patient might have had a tongue bite during prior seizure episode.      ROS:  10 point review of system is negative except for what mentioned above.      Assessment & Plan:       Status epilepticus (HCC)  -  No breakthrough seizures so far.  Plan to continue Vimpat 100 mg IV twice daily.  Continue seizure precaution as ordered.  Neurology signed off.       Metabolic encephalopathy  ICU delirium  -  Patient continues 
       Hospitalist Progress Note   Admit Date:  2025  8:38 PM   Name:  Nataliia Pradhan   Age:  80 y.o.  Sex:  female  :  1945   MRN:  [id number]   Room:  [facility]    Presenting/Chief Complaint: Seizures     Reason(s) for Admission: Status epilepticus (HCC) [G40.901]  HTN (hypertension), malignant [I10]  ESRD on hemodialysis (HCC) [N18.6, Z99.2]  Seizure-like activity (HCC) [R56.9]  Altered mental status, unspecified altered mental status type [R41.82]     Hospital Course:   Nataliia Pradhan is a 80 y.o. 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 Patient was admitted for status epilepticus. She was intubated and admitted to the ICU on propofol drip. Neurology was consulted and started patient on Vimpat. Hospital course complicated by persistent encephalopathy. Was found to have E. coli UTI and completed treatment with Rocephin while inpatient. Patient extubated on 2025. Hospitalist service consulted to assume care. NGT removed on  and diet advanced.       Subjective & 24hr Events:   Patient seen and examined at bedside.  Patient is alert and awake, sitting up in bed, eating breakfast without any assistance.  Patient is sluggish in responding to questions, answers mostly to yes or no questions.  No breakthrough seizures so far.  No family at bedside.  No overnight events reported by nursing staff.    ROS:  10 point review of system is negative except for what mentioned above.      Assessment & Plan:       Status epilepticus (HCC)  5/6-  No breakthrough seizures so far.  Plan to continue Vimpat 100 mg IV twice daily.  Continue seizure precaution as ordered.  Neurology signed off.       Metabolic encephalopathy  ICU delirium  5/6-  Patient continues to be with some encephalopathy,  Patient responding with poor comprehension and 
       Hospitalist Progress Note   Admit Date:  2025  8:38 PM   Name:  Nataliia Pradhan   Age:  80 y.o.  Sex:  female  :  1945   MRN:  [id number]   Room:  [facility] [place]    Presenting/Chief Complaint: Seizures     Reason(s) for Admission: Status epilepticus (HCC) [G40.901]  HTN (hypertension), malignant [I10]  ESRD on hemodialysis (HCC) [N18.6, Z99.2]  Seizure-like activity (HCC) [R56.9]  Altered mental status, unspecified altered mental status type [R41.82]     Hospital Course:   aNtaliia Pradhan is a 80 y.o. 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 Patient was admitted for status epilepticus. She was intubated and admitted to the ICU on propofol drip. Neurology was consulted and started patient on Vimpat. Hospital course complicated by persistent encephalopathy. Was found to have E. coli UTI and completed treatment with Rocephin while inpatient. Patient extubated on 2025. Hospitalist service consulted to assume care. NGT removed on  and diet advanced.       Subjective & 24hr Events:   No overnight events reported.  Rapid response was called this morning in dialysis for hypotension.  Dialysis session stopped early and IV fluid bolus given in HD with improvement in blood pressure.  Patient was seen in dialysis this morning.  She was asymptomatic and denied any complaints.  Antihypertensives were adjusted.      Assessment & Plan:       Status epilepticus (HCC)  -Status epilepticus is resolved  - Neurology consulted, now signed off  - Continue with lacosamide  - Seizure precautions     Metabolic encephalopathy  ICU delirium  - Frequent reorientation  - Nutrition, SLP consulted.   -- Mental status stable     ESRD on HD  - HD through tunneled cath  - Nephrology consulted for routine dialysis     Dysphagia  -- SLP following  - Diet advanced   
   04/20/25 1921   Patient Observation   Pulse 69   SpO2 100 %   Breath Sounds   Respiratory Pattern Regular   Breath Sounds Bilateral Diminished   Vent Information   Vent Mode SIMV/PRVC   Ventilator Settings   Vt (Set, mL) 400 mL   Resp Rate (Set) 14 bpm   PEEP/CPAP (cmH2O) 8   FiO2  21 %   Pressure Support (cm H2O) 10 cm H2O   Insp Time (sec) 0.8 sec   Vent Patient Data (Readings)   Vt Spont (mL) 449 mL   Vt (Measured) 388 mL   Peak Inspiratory Pressure (cmH2O) 23 cmH2O   Rate Measured 17 br/min   Minute Volume (L/min) 6.57 Liters   Peak Expiratory Flow (lpm) 31 L/min   Mean Airway Pressure (cmH2O) 11 cmH20   Inspiratory Time 0.8 sec   I:E Ratio 1:3.7   Flow Sensitivity 3 L/min   Expiratory Sensitivity (%) 25 %   Dynamic Compliance (L/cm H2O) 59 L/cm H2O   I Time/ I Time % 0.8 s   Backup Apnea On   Backup Rate 18 Breaths Per Minute   Backup Vt 400   Backup I Time 20   Vent Alarm Settings   High Pressure (cmH2O) 50 cmH2O   Low Minute Volume (lpm) 5 L/min   High Minute Volume (lpm) 30 L/min   Low Exhaled Vt (ml) 210 mL   High Exhaled Vt (ml) 1040 mL   RR High (bpm) 40 br/min   Apnea (secs) 20 secs   Additional Respiratoray Assessments   Humidification Source HME   Ambu Bag With Mask At Bedside Yes   ETT    Placement Date/Time: 04/17/25 2041   Present on Admission/Arrival: Yes  Placed By: (c) Other (comment)  Placement Verified By: Auscultation;Capnometry;Chest X-ray;Colorimetric ETCO2 device  Mask Ventilation: Ventilated by mask (1)  Technique: Rapid se...   Secured At 23 cm   Measured From Lips   ETT Placement Right   Secured By Commercial tube junior   Site Assessment Other (comment)  (tongue and lip swollen)   Cuff Pressure 23 cm H2O   Tie/Junior Changed No     Ventilator check complete; patient has a #7.0 ET tube secured at the 23 at the lip.  Patient is not sedated.  Patient is not able to follow commands.  Breath sounds are diminished.  Trachea is midline, Negative for subcutaneous air, and chest excursion is 
  [facility] Mcginnis/[facility] Critical Care Note:: 4/23/2025  Nataliia Pradhan  Admission Date: 4/17/2025     Length of Stay: 6 days    Background: : 79 y.o. female with ESRD, T2DM, HTN, HLD, hyperparathyroidism, chronic anemia who lives at home with her family and they reported finding her in status epilepticus prior to calling the ambulance.  Patient was in her bed and there is no report of fall or trauma.  EMS gave her several rounds of Versed without successful break of the seizure.  Due to airway compromise patient was intubated in the field without difficulty.  She arrives in the emergency department obtunded, intubated and sedated.  Critical care was asked see patient for admission to ICU for further workup and management of her ventilator.  We will load her with Keppra and keep her on a propofol drip for now.  Hypertension may need to be addressed if continue to run high, pending CT head results.     Notable PMH:  has a past medical history of Anemia, DM (diabetes mellitus) (HCC), End stage kidney disease (HCC), HTN (hypertension), Hyperlipemia, Hyperparathyroidism, and Thyroid disease.    24 Hour events: Still on vent, MRI  completed yesterday, off drips. A little less awake this morning. Lots of oral secretions, clear. FiO2 21%.     Review of Systems: Unable to obtain due to patient factors.     Lines:   ETT  (Active)     NG/OG/NJ/NE Tube Orogastric 16 fr Left mouth (Active)       External Urinary Catheter (Active)     Hemodialysis Central Access - Permanent/Tunneled Right Subclavian (Active)       Hemodialysis Fistula/Graft Superior Arm (Active)     Drips: current dose (range)  Dose (mcg/kg/min) Propofol : 0 mcg/kg/min  Dose (mg/hr) Nicardipine: 0 mg/hr     Pertinent Exam:         Blood pressure (!) 145/69, pulse 70, temperature 98.5 °F (36.9 °C), temperature source Axillary, resp. rate 14, height 1.626 m (5' 4.02\"), weight 68.2 kg (150 lb 5.7 oz), SpO2 100%.   Intake/Output Summary (Last 24 hours) at 
  Juan Carlos Mcginnis/[facility] Critical Care Note:: 4/25/2025  Nataliia Pradhan  Admission Date: 4/17/2025     Length of Stay: 8 days    Background: : 79 y.o. female with ESRD, T2DM, HTN, HLD, hyperparathyroidism, chronic anemia who lives at home with her family and they reported finding her in status epilepticus prior to calling the ambulance.  Patient was in her bed and there is no report of fall or trauma.  EMS gave her several rounds of Versed without successful break of the seizure.  Due to airway compromise patient was intubated in the field without difficulty.  She arrives in the emergency department obtunded, intubated and sedated.  Critical care was asked see patient for admission to ICU for further workup and management of her ventilator.  We will load her with Keppra and keep her on a propofol drip for now.  Hypertension may need to be addressed if continue to run high, pending CT head results.     Notable PMH:  has a past medical history of Anemia, DM (diabetes mellitus) (HCC), End stage kidney disease (HCC), HTN (hypertension), Hyperlipemia, Hyperparathyroidism, and Thyroid disease.    24 Hour events: Still on vent, Hd yesterday. More awake, but still slow and weak. Gave thumbs up, wiggled toes. Attempted to lift head off bed.     Review of Systems: Unable to obtain due to patient factors.     Lines:   ETT  (Active)     NG/OG/NJ/NE Tube Orogastric 16 fr Left mouth (Active)     Hemodialysis Central Access - Permanent/Tunneled Right Subclavian (Active)       Hemodialysis Fistula/Graft Superior Arm (Active)     Drips: current dose (range)  Dose (mcg/kg/min) Propofol : 0 mcg/kg/min  Dose (mg/hr) Nicardipine: 0 mg/hr     Pertinent Exam:         Blood pressure (!) 151/67, pulse 88, temperature 98 °F (36.7 °C), resp. rate 17, height 1.626 m (5' 4\"), weight 68 kg (150 lb), SpO2 96%.   Intake/Output Summary (Last 24 hours) at 4/25/2025 1052  Last data filed at 4/25/2025 0945  Gross per 24 hour   Intake 1818.82 ml 
  Juan Carlos Mcginnis/[facility] Critical Care Note:: 4/22/2025  Nataliia Pradhan  Admission Date: 4/17/2025     Length of Stay: 5 days    Background: : 79 y.o. female with ESRD, T2DM, HTN, HLD, hyperparathyroidism, chronic anemia who lives at home with her family and they reported finding her in status epilepticus prior to calling the ambulance.  Patient was in her bed and there is no report of fall or trauma.  EMS gave her several rounds of Versed without successful break of the seizure.  Due to airway compromise patient was intubated in the field without difficulty.  She arrives in the emergency department obtunded, intubated and sedated.  Critical care was asked see patient for admission to ICU for further workup and management of her ventilator.  We will load her with Keppra and keep her on a propofol drip for now.  Hypertension may need to be addressed if continue to run high, pending CT head results.     Notable PMH:  has a past medical history of Anemia, DM (diabetes mellitus) (HCC), End stage kidney disease (HCC), HTN (hypertension), Hyperlipemia, Hyperparathyroidism, and Thyroid disease.    24 Hour events: Still on vent, MRI  completed yesterday, off drips. Tracking more today. Squeezed hands and gave very weak thumbs up.     Review of Systems: Unable to obtain due to patient factors.     Lines:   ETT  (Active)     NG/OG/NJ/NE Tube Orogastric 16 fr Left mouth (Active)       External Urinary Catheter (Active)     Hemodialysis Central Access - Permanent/Tunneled Right Subclavian (Active)       Hemodialysis Fistula/Graft Superior Arm (Active)     Drips: current dose (range)  Dose (mcg/kg/min) Propofol : 0 mcg/kg/min  Dose (mg/hr) Nicardipine: 0 mg/hr     Pertinent Exam:         Blood pressure (!) 152/85, pulse 64, temperature 98.9 °F (37.2 °C), temperature source Axillary, resp. rate 18, height 1.626 m (5' 4.02\"), weight 68.2 kg (150 lb 5.7 oz), SpO2 100%.   Intake/Output Summary (Last 24 hours) at 4/22/2025 
  Juan Carlos Mcginnis/[facility] Critical Care Note:: 4/24/2025  Nataliia Pradhan  Admission Date: 4/17/2025     Length of Stay: 7 days    Background: : 79 y.o. female with ESRD, T2DM, HTN, HLD, hyperparathyroidism, chronic anemia who lives at home with her family and they reported finding her in status epilepticus prior to calling the ambulance.  Patient was in her bed and there is no report of fall or trauma.  EMS gave her several rounds of Versed without successful break of the seizure.  Due to airway compromise patient was intubated in the field without difficulty.  She arrives in the emergency department obtunded, intubated and sedated.  Critical care was asked see patient for admission to ICU for further workup and management of her ventilator.  We will load her with Keppra and keep her on a propofol drip for now.  Hypertension may need to be addressed if continue to run high, pending CT head results.     Notable PMH:  has a past medical history of Anemia, DM (diabetes mellitus) (HCC), End stage kidney disease (HCC), HTN (hypertension), Hyperlipemia, Hyperparathyroidism, and Thyroid disease.    24 Hour events: Still on vent, Hd again today. A little more awake.     Review of Systems: Unable to obtain due to patient factors.     Lines:   ETT  (Active)     NG/OG/NJ/NE Tube Orogastric 16 fr Left mouth (Active)     Hemodialysis Central Access - Permanent/Tunneled Right Subclavian (Active)       Hemodialysis Fistula/Graft Superior Arm (Active)     Drips: current dose (range)  Dose (mcg/kg/min) Propofol : 0 mcg/kg/min  Dose (mg/hr) Nicardipine: 0 mg/hr     Pertinent Exam:         Blood pressure (!) 165/70, pulse 86, temperature 98.2 °F (36.8 °C), temperature source Axillary, resp. rate 18, height 1.626 m (5' 4.02\"), weight 68.1 kg (150 lb 2.1 oz), SpO2 99%.   Intake/Output Summary (Last 24 hours) at 4/24/2025 0849  Last data filed at 4/24/2025 0541  Gross per 24 hour   Intake 1887.82 ml   Output --   Net 1887.82 ml 
  Juan Carlos Mcginnis/[facility] Critical Care Note:: 4/18/2025  Nataliia Pradhan  Admission Date: 4/17/2025     Length of Stay: 1 days    Background: : 79 y.o. female with ESRD, T2DM, HTN, HLD, hyperparathyroidism, chronic anemia who lives at home with her family and they reported finding her in status epilepticus prior to calling the ambulance.  Patient was in her bed and there is no report of fall or trauma.  EMS gave her several rounds of Versed without successful break of the seizure.  Due to airway compromise patient was intubated in the field without difficulty.  She arrives in the emergency department obtunded, intubated and sedated.  Critical care was asked see patient for admission to ICU for further workup and management of her ventilator.  We will load her with Keppra and keep her on a propofol drip for now.  Hypertension may need to be addressed if continue to run high, pending CT head results.     Notable PMH:  has a past medical history of Anemia, DM (diabetes mellitus) (HCC), End stage kidney disease (HCC), HTN (hypertension), Hyperlipemia, Hyperparathyroidism, and Thyroid disease.    24 Hour events:   On vent   Not sedated  Going for MRI    Review of Systems: Unable to obtain due to patient factors.     Lines:   ETT  (Active)     NG/OG/NJ/NE Tube Orogastric 16 fr Left mouth (Active)       Urinary Catheter 04/17/25 Chin-Temperature (Active)     Hemodialysis Central Access - Permanent/Tunneled Right Subclavian (Active)       Hemodialysis Fistula/Graft Superior Arm (Active)     Drips: current dose (range)  Dose (mcg/kg/min) Propofol : 0 mcg/kg/min  Dose (mg/hr) Nicardipine: 2.5 mg/hr     Pertinent Exam:         Blood pressure (!) 154/73, pulse 99, temperature (!) 101.1 °F (38.4 °C), temperature source Bladder, resp. rate 13, weight 61.1 kg (134 lb 11.2 oz), SpO2 100%.   Intake/Output Summary (Last 24 hours) at 4/18/2025 1120  Last data filed at 4/18/2025 0743  Gross per 24 hour   Intake 1241.41 ml 
  Juan Carlos Mcginnis/[facility] Critical Care Note:: 4/20/2025  Nataliia Pradhan  Admission Date: 4/17/2025     Length of Stay: 3 days    Background: : 79 y.o. female with ESRD, T2DM, HTN, HLD, hyperparathyroidism, chronic anemia who lives at home with her family and they reported finding her in status epilepticus prior to calling the ambulance.  Patient was in her bed and there is no report of fall or trauma.  EMS gave her several rounds of Versed without successful break of the seizure.  Due to airway compromise patient was intubated in the field without difficulty.  She arrives in the emergency department obtunded, intubated and sedated.  Critical care was asked see patient for admission to ICU for further workup and management of her ventilator.  We will load her with Keppra and keep her on a propofol drip for now.  Hypertension may need to be addressed if continue to run high, pending CT head results.     Notable PMH:  has a past medical history of Anemia, DM (diabetes mellitus) (HCC), End stage kidney disease (HCC), HTN (hypertension), Hyperlipemia, Hyperparathyroidism, and Thyroid disease.    24 Hour events:   Still on vent  MRI not till Sunday  Not responsive   Still on Carzidem  gtt    Review of Systems: Unable to obtain due to patient factors.     Lines:   ETT  (Active)     NG/OG/NJ/NE Tube Orogastric 16 fr Left mouth (Active)       Urinary Catheter 04/17/25 Chin-Temperature (Active)     Hemodialysis Central Access - Permanent/Tunneled Right Subclavian (Active)       Hemodialysis Fistula/Graft Superior Arm (Active)     Drips: current dose (range)  Dose (mcg/kg/min) Propofol : 0 mcg/kg/min  Dose (mg/hr) Nicardipine: 0 mg/hr     Pertinent Exam:         Blood pressure (!) 151/67, pulse 66, temperature 98.1 °F (36.7 °C), temperature source Bladder, resp. rate 16, height 1.626 m (5' 4.02\"), weight 65.5 kg (144 lb 6.4 oz), SpO2 97%.   Intake/Output Summary (Last 24 hours) at 4/20/2025 0846  Last data filed at 
  Juan Carlos Mcginnis/[facility] Critical Care Note:: 4/19/2025  Nataliia Pradhan  Admission Date: 4/17/2025     Length of Stay: 2 days    Background: : 79 y.o. female with ESRD, T2DM, HTN, HLD, hyperparathyroidism, chronic anemia who lives at home with her family and they reported finding her in status epilepticus prior to calling the ambulance.  Patient was in her bed and there is no report of fall or trauma.  EMS gave her several rounds of Versed without successful break of the seizure.  Due to airway compromise patient was intubated in the field without difficulty.  She arrives in the emergency department obtunded, intubated and sedated.  Critical care was asked see patient for admission to ICU for further workup and management of her ventilator.  We will load her with Keppra and keep her on a propofol drip for now.  Hypertension may need to be addressed if continue to run high, pending CT head results.     Notable PMH:  has a past medical history of Anemia, DM (diabetes mellitus) (HCC), End stage kidney disease (HCC), HTN (hypertension), Hyperlipemia, Hyperparathyroidism, and Thyroid disease.    24 Hour events:   Still on vent  MRI not till Sunday  Not responsive   Still on Carzidem  gtt    Review of Systems: Unable to obtain due to patient factors.     Lines:   ETT  (Active)     NG/OG/NJ/NE Tube Orogastric 16 fr Left mouth (Active)       Urinary Catheter 04/17/25 Chin-Temperature (Active)     Hemodialysis Central Access - Permanent/Tunneled Right Subclavian (Active)       Hemodialysis Fistula/Graft Superior Arm (Active)     Drips: current dose (range)  Dose (mcg/kg/min) Propofol : 0 mcg/kg/min  Dose (mg/hr) Nicardipine: 2.5 mg/hr     Pertinent Exam:         Blood pressure (!) 147/70, pulse (!) 103, temperature 99.4 °F (37.4 °C), temperature source Bladder, resp. rate 21, height 1.626 m (5' 4\"), weight 62 kg (136 lb 11 oz), SpO2 100%.   Intake/Output Summary (Last 24 hours) at 4/19/2025 1015  Last data filed at 
  Juan Carlos Mcginnis/[facility] Critical Care Note:: 4/29/2025  Nataliia Pradhan  Admission Date: 4/17/2025     Length of Stay: 12 days    Background: : 79 y.o. female with ESRD, T2DM, HTN, HLD, hyperparathyroidism, chronic anemia who lives at home with her family and they reported finding her in status epilepticus prior to calling the ambulance.  Patient was in her bed and there is no report of fall or trauma.  EMS gave her several rounds of Versed without successful break of the seizure.  Due to airway compromise patient was intubated in the field without difficulty.  She arrives in the emergency department obtunded, intubated and sedated.  Critical care was asked see patient for admission to ICU for further workup and management of her ventilator.  We will load her with Keppra and keep her on a propofol drip for now.  Hypertension may need to be addressed if continue to run high, pending CT head results.     Notable PMH:  has a past medical history of Anemia, DM (diabetes mellitus) (HCC), End stage kidney disease (HCC), HTN (hypertension), Hyperlipemia, Hyperparathyroidism, and Thyroid disease.    24 Hour events:   Extubated yesterday   On RA  Awake ,talking       Review of Systems: Unable to obtain due to patient factors.     Lines:          Hemodialysis Central Access - Permanent/Tunneled Right Subclavian (Active)       Hemodialysis Fistula/Graft Superior Arm (Active)     Drips: current dose (range)  Dose (mcg/kg/min) Propofol : 0 mcg/kg/min  Dose (mg/hr) Nicardipine: 0 mg/hr     Pertinent Exam:         Blood pressure (!) 124/58, pulse 84, temperature 98.9 °F (37.2 °C), temperature source Oral, resp. rate 20, height 1.626 m (5' 4\"), weight 70.4 kg (155 lb 3.3 oz), SpO2 98%.   Intake/Output Summary (Last 24 hours) at 4/29/2025 0922  Last data filed at 4/28/2025 2112  Gross per 24 hour   Intake 150 ml   Output 4040 ml   Net -3890 ml     Constitutional: on vent  EENMT:  Sclera clear, pupils equal, oral mucosa 
  Juan Carlos Mcginnis/[facility] Critical Care Note:: 4/20/2025  Nataliia Pradhan  Admission Date: 4/17/2025     Length of Stay: 3 days    Background: : 79 y.o. female with ESRD, T2DM, HTN, HLD, hyperparathyroidism, chronic anemia who lives at home with her family and they reported finding her in status epilepticus prior to calling the ambulance.  Patient was in her bed and there is no report of fall or trauma.  EMS gave her several rounds of Versed without successful break of the seizure.  Due to airway compromise patient was intubated in the field without difficulty.  She arrives in the emergency department obtunded, intubated and sedated.  Critical care was asked see patient for admission to ICU for further workup and management of her ventilator.  We will load her with Keppra and keep her on a propofol drip for now.  Hypertension may need to be addressed if continue to run high, pending CT head results.     Notable PMH:  has a past medical history of Anemia, DM (diabetes mellitus) (HCC), End stage kidney disease (HCC), HTN (hypertension), Hyperlipemia, Hyperparathyroidism, and Thyroid disease.    24 Hour events:   Still on vent  MRI  today   More awake this AM  Off  cardene    Review of Systems: Unable to obtain due to patient factors.     Lines:   ETT  (Active)     NG/OG/NJ/NE Tube Orogastric 16 fr Left mouth (Active)       Urinary Catheter 04/17/25 Chin-Temperature (Active)     Hemodialysis Central Access - Permanent/Tunneled Right Subclavian (Active)       Hemodialysis Fistula/Graft Superior Arm (Active)     Drips: current dose (range)  Dose (mcg/kg/min) Propofol : 0 mcg/kg/min  Dose (mg/hr) Nicardipine: 0 mg/hr     Pertinent Exam:         Blood pressure (!) 158/72, pulse 68, temperature 98.6 °F (37 °C), temperature source Bladder, resp. rate 16, height 1.626 m (5' 4.02\"), weight 65.5 kg (144 lb 6.4 oz), SpO2 97%.   Intake/Output Summary (Last 24 hours) at 4/20/2025 7735  Last data filed at 4/20/2025 0869  Gross 
  Juan Carlos Mcginnis/[facility] Critical Care Note:: 4/27/2025  Nataliia Pradhan  Admission Date: 4/17/2025     Length of Stay: 10 days    Background: : 79 y.o. female with ESRD, T2DM, HTN, HLD, hyperparathyroidism, chronic anemia who lives at home with her family and they reported finding her in status epilepticus prior to calling the ambulance.  Patient was in her bed and there is no report of fall or trauma.  EMS gave her several rounds of Versed without successful break of the seizure.  Due to airway compromise patient was intubated in the field without difficulty.  She arrives in the emergency department obtunded, intubated and sedated.  Critical care was asked see patient for admission to ICU for further workup and management of her ventilator.  We will load her with Keppra and keep her on a propofol drip for now.  Hypertension may need to be addressed if continue to run high, pending CT head results.     Notable PMH:  has a past medical history of Anemia, DM (diabetes mellitus) (HCC), End stage kidney disease (HCC), HTN (hypertension), Hyperlipemia, Hyperparathyroidism, and Thyroid disease.    24 Hour events: Still on vent, Hd yesterday. More awake, but still slow and weak. Gave thumbs up, wiggled toes. Won't take a vital capacity breath and won't attempt a cough on command.    Review of Systems: Unable to obtain due to patient factors.     Lines:   ETT  (Active)     NG/OG/NJ/NE Tube Orogastric 16 fr Left mouth (Active)     Hemodialysis Central Access - Permanent/Tunneled Right Subclavian (Active)       Hemodialysis Fistula/Graft Superior Arm (Active)     Drips: current dose (range)  Dose (mcg/kg/min) Propofol : 0 mcg/kg/min  Dose (mg/hr) Nicardipine: 0 mg/hr     Pertinent Exam:         Blood pressure 136/63, pulse 82, temperature 98.7 °F (37.1 °C), temperature source Axillary, resp. rate 23, height 1.626 m (5' 4\"), weight 67 kg (147 lb 11.3 oz), SpO2 100%.   Intake/Output Summary (Last 24 hours) at 4/27/2025 
  Juan Carlos Mcginnis/[facility] Critical Care Note:: 4/28/2025  Nataliia Pradhan  Admission Date: 4/17/2025     Length of Stay: 11 days    Background: : 79 y.o. female with ESRD, T2DM, HTN, HLD, hyperparathyroidism, chronic anemia who lives at home with her family and they reported finding her in status epilepticus prior to calling the ambulance.  Patient was in her bed and there is no report of fall or trauma.  EMS gave her several rounds of Versed without successful break of the seizure.  Due to airway compromise patient was intubated in the field without difficulty.  She arrives in the emergency department obtunded, intubated and sedated.  Critical care was asked see patient for admission to ICU for further workup and management of her ventilator.  We will load her with Keppra and keep her on a propofol drip for now.  Hypertension may need to be addressed if continue to run high, pending CT head results.     Notable PMH:  has a past medical history of Anemia, DM (diabetes mellitus) (HCC), End stage kidney disease (HCC), HTN (hypertension), Hyperlipemia, Hyperparathyroidism, and Thyroid disease.    24 Hour events: Still on vent but seems more awake and follow simple commands      Review of Systems: Unable to obtain due to patient factors.     Lines:          Hemodialysis Central Access - Permanent/Tunneled Right Subclavian (Active)       Hemodialysis Fistula/Graft Superior Arm (Active)     Drips: current dose (range)  Dose (mcg/kg/min) Propofol : 0 mcg/kg/min  Dose (mg/hr) Nicardipine: 0 mg/hr     Pertinent Exam:         Blood pressure (!) 153/68, pulse 77, temperature 99 °F (37.2 °C), temperature source Oral, resp. rate 17, height 1.626 m (5' 4\"), weight 70.4 kg (155 lb 3.3 oz), SpO2 100%.   Intake/Output Summary (Last 24 hours) at 4/28/2025 1248  Last data filed at 4/28/2025 0927  Gross per 24 hour   Intake 891 ml   Output --   Net 891 ml     Constitutional: on vent  EENMT:  Sclera clear, pupils equal, oral mucosa 
  Juan Carlos Mcginnis/[facility] Critical Care Note:: 4/21/2025  Nataliia Pradhan  Admission Date: 4/17/2025     Length of Stay: 4 days    Background: : 79 y.o. female with ESRD, T2DM, HTN, HLD, hyperparathyroidism, chronic anemia who lives at home with her family and they reported finding her in status epilepticus prior to calling the ambulance.  Patient was in her bed and there is no report of fall or trauma.  EMS gave her several rounds of Versed without successful break of the seizure.  Due to airway compromise patient was intubated in the field without difficulty.  She arrives in the emergency department obtunded, intubated and sedated.  Critical care was asked see patient for admission to ICU for further workup and management of her ventilator.  We will load her with Keppra and keep her on a propofol drip for now.  Hypertension may need to be addressed if continue to run high, pending CT head results.     Notable PMH:  has a past medical history of Anemia, DM (diabetes mellitus) (HCC), End stage kidney disease (HCC), HTN (hypertension), Hyperlipemia, Hyperparathyroidism, and Thyroid disease.    24 Hour events: Still on vent, MRI  completed yesterday, off drips. Blinking, but no clear spontaneous movements or following commands.     Review of Systems: Unable to obtain due to patient factors.     Lines:   ETT  (Active)     NG/OG/NJ/NE Tube Orogastric 16 fr Left mouth (Active)       External Urinary Catheter (Active)     Hemodialysis Central Access - Permanent/Tunneled Right Subclavian (Active)       Hemodialysis Fistula/Graft Superior Arm (Active)     Drips: current dose (range)  Dose (mcg/kg/min) Propofol : 0 mcg/kg/min  Dose (mg/hr) Nicardipine: 0 mg/hr     Pertinent Exam:         Blood pressure (!) 149/67, pulse 72, temperature 98 °F (36.7 °C), temperature source Axillary, resp. rate 14, height 1.626 m (5' 4.02\"), weight 66.9 kg (147 lb 7.8 oz), SpO2 100%.   Intake/Output Summary (Last 24 hours) at 4/21/2025 
  Physical therapy note:  Elected to hold PT today. Pt in dialysis in AM. Pt had episode with very low BP and code rapid called. Later, in PM pt is nauseated. Will continue to treat as pt is able and time/schedule permit.    Dayanna Krishna, PTA    Rehab Caseload Tracker   
  Physical therapy note: Attempted PT this AM. Pt in dialysis.  Will continue to treat as pt is able and time/schedule permit.    Dayanna Krishna, PTA    Rehab Caseload Tracker   
 Latest Reference Range & Units 05/09/25 01:45 05/09/25 04:02 05/09/25 05:09 05/09/25 05:47   Glucose 70 - 99 mg/dL  59 (L)     POC Glucose 65 - 100 mg/dL 90  69 117 (H)     Patient's daily labs resulted this morning at 59. Recheck finger stick was 69. Patient was alert and drank 4oz of orange juice. Rechecked glucose again and increased to 117.  
4 Eyes Skin Assessment     NAME:  Nataliia Pradhan  YOB: 1945  MEDICAL RECORD NUMBER:  [id number]    The patient is being assessed for  Admission    I agree that at least one RN has performed a thorough Head to Toe Skin Assessment on the patient. ALL assessment sites listed below have been assessed.      Areas assessed by both nurses:    Head, Face, Ears, Shoulders, Back, Chest, Arms, Elbows, Hands, Sacrum. Buttock, Coccyx, Ischium, Legs. Feet and Heels, and Under Medical Devices         Does the Patient have a Wound? Yes wound(s) were present on assessment. LDA wound assessment was Initiated and completed by RN       Darrel Prevention initiated by RN: Yes  Wound Care Orders initiated by RN: Yes    Pressure Injury (Stage 3,4, Unstageable, DTI, NWPT, and Complex wounds) if present, place Wound referral order by RN under : No    New Ostomies, if present place, Ostomy referral order under : Yes     Nurse 1 eSignature: {Esignature:[id number]}    **SHARE this note so that the co-signing nurse can place an eSignature**    Nurse 2 eSignature: Electronically signed by OMAR CHEEK RN on 4/29/25 at 5:49 PM EDT   
4 Eyes Skin Assessment     NAME:  Nataliia Pradhan  YOB: 1945  MEDICAL RECORD NUMBER:  [id number]    The patient is being assessed for  Admission    I agree that at least one RN has performed a thorough Head to Toe Skin Assessment on the patient. ALL assessment sites listed below have been assessed.      Areas assessed by both nurses:    Head, Face, Ears, Shoulders, Back, Chest, Arms, Elbows, Hands, Sacrum. Buttock, Coccyx, Ischium, Legs. Feet and Heels, and Under Medical Devices         Does the Patient have a Wound? Yes wound(s) were present on assessment. LDA wound assessment was Initiated and completed by RN, tear to sacrum       Darrel Prevention initiated by RN: Yes  Wound Care Orders initiated by RN: Yes    Pressure Injury (Stage 3,4, Unstageable, DTI, NWPT, and Complex wounds) if present, place Wound referral order by RN under : No    New Ostomies, if present place, Ostomy referral order under : Yes     Nurse 1 eSignature: Electronically signed by Tyler Downs RN on 4/18/25 at 1:46 AM EDT    **SHARE this note so that the co-signing nurse can place an eSignature**    Nurse 2 eSignature: Electronically signed by DAVID PLEITEZ RN on 4/18/25 at 1:48 AM EDT   
ABG RESULTS AT 03:23    PH: 7.40   CO2 : 35.7  pO2 : 184.7  HCO3 : 22.5  BE : -2.2  SO2. 99.6  
ACUTE OCCUPATIONAL THERAPY GOALS:   (Developed with and agreed upon by patient and/or caregiver.)    1. Patient will complete upper body bathing and dressing with MINIMAL ASSIST and adaptive equipment as needed.   2. Patient will complete self-grooming tasks in unsupported sitting with MINIMAL ASSIST and adaptive equipment as needed.  3. Patient will tolerate 25 minutes of OT treatment with 1-2 rest breaks to increase activity tolerance for ADLs.   4. Patient will complete functional transfers with MODERATE ASSIST and adaptive equipment as needed.   5. Patient will complete functional activity while seated edge of bed with MINIMAL ASSIST and adaptive equipment as needed.   7. Patient will tolerate 10 minutes BUE therapeutic activities to increase use of BUE during ADL performance.      Timeframe: 7 visits      OCCUPATIONAL THERAPY: Daily Note AM   OT Visit Days: 2   Time In/Out  OT Charge Capture  Rehab Caseload Tracker  OT Orders    Nataliia Pradhan is a 80 y.o. female   PRIMARY DIAGNOSIS: Status epilepticus (HCC)  Status epilepticus (HCC) [G40.901]  HTN (hypertension), malignant [I10]  ESRD on hemodialysis (HCC) [N18.6, Z99.2]  Seizure-like activity (HCC) [R56.9]  Altered mental status, unspecified altered mental status type [R41.82]       Inpatient: Payor: [facility] MEDICARE / Plan: [facility] MEDICARE COMPLETE / Product Type: *No Product type* /     ASSESSMENT:     REHAB RECOMMENDATIONS:   Recommendation to date pending progress:  Setting:  Short-term Rehab    Equipment:    To Be Determined     ASSESSMENT:  Ms. Pradhan presents with generalized weakness, confusion, decreased activity tolerance, decreased sitting balance, decreased independence with mobility, L side weakness, and decreased independence with self care. Pt required max a x 2 with bed mobility and sat with overall poor balance with heavy posterior and R side lean. Pt's LUE is mostly flaccid and presents with decreased command following. Pt washed her face 
ACUTE OCCUPATIONAL THERAPY GOALS:   (Developed with and agreed upon by patient and/or caregiver.)    1. Patient will complete upper body bathing and dressing with MINIMAL ASSIST and adaptive equipment as needed.   2. Patient will complete self-grooming tasks in unsupported sitting with MINIMAL ASSIST and adaptive equipment as needed.  3. Patient will tolerate 25 minutes of OT treatment with 1-2 rest breaks to increase activity tolerance for ADLs.   4. Patient will complete functional transfers with MODERATE ASSIST and adaptive equipment as needed.   5. Patient will complete functional activity while seated edge of bed with MINIMAL ASSIST and adaptive equipment as needed.   7. Patient will tolerate 10 minutes BUE therapeutic activities to increase use of BUE during ADL performance.      Timeframe: 7 visits      OCCUPATIONAL THERAPY: Daily Note PM   OT Visit Days: 3   Time In/Out  OT Charge Capture  Rehab Caseload Tracker  OT Orders    Nataliia Pradhan is a 80 y.o. female   PRIMARY DIAGNOSIS: Status epilepticus (HCC)  Status epilepticus (HCC) [G40.901]  HTN (hypertension), malignant [I10]  ESRD on hemodialysis (HCC) [N18.6, Z99.2]  Seizure-like activity (HCC) [R56.9]  Altered mental status, unspecified altered mental status type [R41.82]       Inpatient: Payor: [facility] MEDICARE / Plan: [facility] MEDICARE COMPLETE / Product Type: *No Product type* /     ASSESSMENT:     REHAB RECOMMENDATIONS:   Recommendation to date pending progress:  Setting:  Short-term Rehab    Equipment:    To Be Determined     ASSESSMENT:  Ms. Pradhan continues to present with generalized weakness, confusion, decreased activity tolerance, decreased sitting balance, decreased independence with mobility, L side weakness, and decreased independence with self care. Pt required max a x 2 with bed mobility and sat with overall poor balance with heavy posterior and R side lean. Pt was soiled and assisted with bathing and dressing with max/total assist. Pt was 
ACUTE OCCUPATIONAL THERAPY GOALS:   (Developed with and agreed upon by patient and/or caregiver.)    1. Patient will complete upper body bathing and dressing with MINIMAL ASSIST and adaptive equipment as needed.   2. Patient will complete self-grooming tasks in unsupported sitting with MINIMAL ASSIST and adaptive equipment as needed.  3. Patient will tolerate 25 minutes of OT treatment with 1-2 rest breaks to increase activity tolerance for ADLs.   4. Patient will complete functional transfers with MODERATE ASSIST and adaptive equipment as needed.   5. Patient will complete functional activity while seated edge of bed with MINIMAL ASSIST and adaptive equipment as needed.   7. Patient will tolerate 10 minutes BUE therapeutic activities to increase use of BUE during ADL performance.      Timeframe: 7 visits      OCCUPATIONAL THERAPY: Daily Note PM   OT Visit Days: 4   Time In/Out  OT Charge Capture  Rehab Caseload Tracker  OT Orders    Nataliia Pradhan is a 80 y.o. female   PRIMARY DIAGNOSIS: Status epilepticus (HCC)  Status epilepticus (HCC) [G40.901]  HTN (hypertension), malignant [I10]  ESRD on hemodialysis (HCC) [N18.6, Z99.2]  Seizure-like activity (HCC) [R56.9]  Altered mental status, unspecified altered mental status type [R41.82]       Inpatient: Payor: [facility] MEDICARE / Plan: [facility] MEDICARE COMPLETE / Product Type: *No Product type* /     ASSESSMENT:     REHAB RECOMMENDATIONS:   Recommendation to date pending progress:  Setting:  Short-term Rehab    Equipment:    To Be Determined     ASSESSMENT:  Ms. Pradhan is doing fair today. Pt presents supine upon arrival. Pt continue to require max A x2 for bed mobility and sit to stand transfers (2x). Poor sitting balance noted at edge of bed. Not able to correct posture at this time without physical assistance. Used standing frame to transfer patient to chair today. Left in chair with all needs in reach. Making minimal to no progress at this time. Will continue to 
ACUTE OCCUPATIONAL THERAPY GOALS:   (Developed with and agreed upon by patient and/or caregiver.)    1. Patient will complete upper body bathing and dressing with MINIMAL ASSIST and adaptive equipment as needed.   2. Patient will complete self-grooming tasks in unsupported sitting with MINIMAL ASSIST and adaptive equipment as needed.  3. Patient will tolerate 25 minutes of OT treatment with 1-2 rest breaks to increase activity tolerance for ADLs.   4. Patient will complete functional transfers with MODERATE ASSIST and adaptive equipment as needed.   5. Patient will complete functional activity while seated edge of bed with MINIMAL ASSIST and adaptive equipment as needed.   7. Patient will tolerate 10 minutes BUE therapeutic activities to increase use of BUE during ADL performance.      Timeframe: 7 visits      OCCUPATIONAL THERAPY: Daily Note PM   OT Visit Days: 5   Time In/Out  OT Charge Capture  Rehab Caseload Tracker  OT Orders    Nataliia Pradhan is a 80 y.o. female   PRIMARY DIAGNOSIS: Status epilepticus (HCC)  Status epilepticus (HCC) [G40.901]  HTN (hypertension), malignant [I10]  ESRD on hemodialysis (HCC) [N18.6, Z99.2]  Seizure-like activity (HCC) [R56.9]  Altered mental status, unspecified altered mental status type [R41.82]       Inpatient: Payor: [facility] MEDICARE / Plan: [facility] MEDICARE COMPLETE / Product Type: *No Product type* /     ASSESSMENT:     REHAB RECOMMENDATIONS:   Recommendation to date pending progress:  Setting:  Short-term Rehab    Equipment:    To Be Determined     ASSESSMENT:  Ms. Pradhan is doing fair today. A little more alert but still having trouble with following commands. Pt presents supine upon arrival. Pt continue to require max A for bed mobility and sit to stand transfers (2x). Poor sitting balance noted at edge of bed. Not able to correct posture at this time without physical assistance. Used standing frame to transfer patient to chair today. Left in chair with all needs in reach. 
ACUTE PHYSICAL THERAPY GOALS:   (Developed with and agreed upon by patient and/or caregiver.)  (1.) Nataliia Pradhan  will move from supine to sit and sit to supine  with MINIMAL ASSIST within 7 treatment day(s).    (2.) Nataliia Pradhan will transfer from bed to chair and chair to bed with MODERATE ASSIST using the least restrictive device within 7 treatment day(s).    (3.) Nataliia Pradhan will perform seated static and dynamic balance activities x 20 minutes with CONTACT GUARD ASSIST to improve safety within 7 treatment day(s).  (4.) Nataliia Pradhan will transfer from sit to stand and stand to sit with MINIMAL ASSIST using the least restrictive device within 7 treatment day(s).      PHYSICAL THERAPY: Daily Note AM   (Link to Caseload Tracking: PT Visit Days : 2  Time In/Out PT Charge Capture  Rehab Caseload Tracker  Orders      FALL RISK    Nataliia Pradhan is a 80 y.o. female   PRIMARY DIAGNOSIS: Status epilepticus (HCC)  Status epilepticus (HCC) [G40.901]  HTN (hypertension), malignant [I10]  ESRD on hemodialysis (HCC) [N18.6, Z99.2]  Seizure-like activity (HCC) [R56.9]  Altered mental status, unspecified altered mental status type [R41.82]       Inpatient: Payor: [facility] MEDICARE / Plan: [facility] MEDICARE COMPLETE / Product Type: *No Product type* /     ASSESSMENT:     REHAB RECOMMENDATIONS:   Recommendation to date pending progress:  Setting:  Short-term Rehab    Equipment:    To Be Determined     ASSESSMENT:  Ms. Pradhan was supine in bed on RA upon entering the room and agreeable to therapy. Today, pt demonstrates minimal progress toward established goals. This session, pt required Max (A)x2 for supine to sit/ sit to supine transfer(s) while requiring Min/Mod (A) for maintaining sitting balance and performance of EOB activities. Once positioned at EOB, pt worked on L elbow prop sitting, dynamic trunk control activities, trunk roation, BLE AROM and postural training to facilitate return to mid-line 
ACUTE PHYSICAL THERAPY GOALS:   (Developed with and agreed upon by patient and/or caregiver.)  (1.) Nataliia Pradhan  will move from supine to sit and sit to supine  with MINIMAL ASSIST within 7 treatment day(s).    (2.) Nataliia Pradhan will transfer from bed to chair and chair to bed with MODERATE ASSIST using the least restrictive device within 7 treatment day(s).    (3.) Nataliia Pradhan will perform seated static and dynamic balance activities x 20 minutes with CONTACT GUARD ASSIST to improve safety within 7 treatment day(s).  (4.) Nataliia Pradhan will transfer from sit to stand and stand to sit with MINIMAL ASSIST using the least restrictive device within 7 treatment day(s).      PHYSICAL THERAPY: Daily Note PM   (Link to Caseload Tracking: PT Visit Days : 3  Time In/Out PT Charge Capture  Rehab Caseload Tracker  Orders      FALL RISK    Nataliia Pradhan is a 80 y.o. female   PRIMARY DIAGNOSIS: Status epilepticus (HCC)  Status epilepticus (HCC) [G40.901]  HTN (hypertension), malignant [I10]  ESRD on hemodialysis (HCC) [N18.6, Z99.2]  Seizure-like activity (HCC) [R56.9]  Altered mental status, unspecified altered mental status type [R41.82]       Inpatient: Payor: [facility] MEDICARE / Plan: [facility] MEDICARE COMPLETE / Product Type: *No Product type* /     ASSESSMENT:     REHAB RECOMMENDATIONS:   Recommendation to date pending progress:  Setting:  Short-term Rehab    Equipment:    To Be Determined     ASSESSMENT:  Ms. Pradhan was supine on contact and agreeable to work with therapy. The PT session today focused on therapeutic activities including bed mobility and sitting balance . Pt needed maximal assistance, x2 to sit up and max x 1 - 2 and cues for maintaining balance. Progress toward goals is slow and pt did not do as well today as last treatment. Pt still functioning below their baseline with deficits in balance, endurance, and strength and will benefit from continued physical therapy.       
ACUTE PHYSICAL THERAPY GOALS:   (Developed with and agreed upon by patient and/or caregiver.)  (1.) Nataliia Pradhan  will move from supine to sit and sit to supine  with MINIMAL ASSIST within 7 treatment day(s).    (2.) Nataliia Pradhan will transfer from bed to chair and chair to bed with MODERATE ASSIST using the least restrictive device within 7 treatment day(s).    (3.) Nataliia Pradhan will perform seated static and dynamic balance activities x 20 minutes with CONTACT GUARD ASSIST to improve safety within 7 treatment day(s).  (4.) Nataliia Pradhan will transfer from sit to stand and stand to sit with MINIMAL ASSIST using the least restrictive device within 7 treatment day(s).      PHYSICAL THERAPY: Daily Note PM   (Link to Caseload Tracking: PT Visit Days : 4  Time In/Out PT Charge Capture  Rehab Caseload Tracker  Orders      FALL RISK    Nataliia Pradhan is a 80 y.o. female   PRIMARY DIAGNOSIS: Status epilepticus (HCC)  Status epilepticus (HCC) [G40.901]  HTN (hypertension), malignant [I10]  ESRD on hemodialysis (HCC) [N18.6, Z99.2]  Seizure-like activity (HCC) [R56.9]  Altered mental status, unspecified altered mental status type [R41.82]       Inpatient: Payor: [facility] MEDICARE / Plan: [facility] MEDICARE COMPLETE / Product Type: *No Product type* /     ASSESSMENT:     REHAB RECOMMENDATIONS:   Recommendation to date pending progress:  Setting:  Short-term Rehab    Equipment:    To Be Determined     ASSESSMENT:  Ms. Pradhan was supine in bed on RA upon entering the room and agreeable to therapy. Today, pt demonstrates slow progress toward established goals. This session, pt required Max (A)x2 for supine to sit and sit-to-stand transfer(s) while requiring Min/Mod (A) for maintaining sitting balance and performance of EOB activities. Once positioned at EOB, worked on; cervical AROM/ PROM, dynamic trunk control activities including trunk rotation and weight shifting, BLE AROM and postural training to facilitate 
ACUTE PHYSICAL THERAPY GOALS:   (Developed with and agreed upon by patient and/or caregiver.)  (1.) Nataliia Pradhan  will move from supine to sit and sit to supine  with MINIMAL ASSIST within 7 treatment day(s).    (2.) aNtaliia Pradhan will transfer from bed to chair and chair to bed with MODERATE ASSIST using the least restrictive device within 7 treatment day(s).    (3.) Nataliia Pradhan will perform seated static and dynamic balance activities x 20 minutes with CONTACT GUARD ASSIST to improve safety within 7 treatment day(s).  (4.) Nataliia Pradhan will transfer from sit to stand and stand to sit with MINIMAL ASSIST using the least restrictive device within 7 treatment day(s).      PHYSICAL THERAPY: Daily Note PM   (Link to Caseload Tracking: PT Visit Days : 5  Time In/Out PT Charge Capture  Rehab Caseload Tracker  Orders      FALL RISK    Nataliia Pradhan is a 80 y.o. female   PRIMARY DIAGNOSIS: Status epilepticus (HCC)  Status epilepticus (HCC) [G40.901]  HTN (hypertension), malignant [I10]  ESRD on hemodialysis (HCC) [N18.6, Z99.2]  Seizure-like activity (HCC) [R56.9]  Altered mental status, unspecified altered mental status type [R41.82]       Inpatient: Payor: [facility] MEDICARE / Plan: [facility] MEDICARE COMPLETE / Product Type: *No Product type* /     ASSESSMENT:     REHAB RECOMMENDATIONS:   Recommendation to date pending progress:  Setting:  Short-term Rehab    Equipment:    To Be Determined     ASSESSMENT:  Ms. Pradhan continues to be limited by impaired coordination and poor command follow, exhibiting difficulty with sequencing and initiating gross motor movements.  Today, she performed bed mobility with max A x 2 and transferred with max A x 2 and Debbie Steady. She also was able to perform static and dynamic seated balance tasks with min - mod A x 2 and frequent postural cues.  She is unsafe to return home at this time and will require STR.      SUBJECTIVE:   Ms. Pradhan \"Ok I'll 
Admission questions completed as able with daughter, Mita, at bedside   
CEEG discontinued per Dr. Terrell.  Electrodes taken off.    Patti Murphy,  EEG Tech  
CEEG set up per Dr. Garcia.  [employer] providing remote continuous monitoring.  Interpreting physician, Dr. Huerta due to time of EEG set up.    Patti Murphy,  EEG Tech  
[facility] Health History and Assessment/Progress Note  [facility] [place]      Room # 3304/01    Name: Nataliia Pradhan           Age: 80 y.o.    Gender: female          MRN: [id number]  Latter day: Spiritism       Preferred Language: English      Date: 04/23/25  Visit Time: Begin Time: 1010 End Time : 1020 Complexity: Complexity of Encounter: Low      Visit Summary:  provided follow up visit with patient, after attending IDR to assess for spiritual care needs. Patient is intubated and is unable to communicate, but made eye contact when  offered prayer.  Family and Worship members visit often, but were not present at this time.   provided prayer at bedside and is available to follow up.      Referral/Consult From: Referral/Consult From: Rounding  Encounter Overview/Reason: Encounter Overview/Reason: Follow-up  Service Provided For: Service Provided For: Patient     Patient was available.    Elo, Belief, Meaning:   Patient is connected with a elo tradition or spiritual practice  Family/Friends are connected with a elo tradition or spiritual practice      Importance and Influence:  Patient has no beliefs influential to healthcare decision-making identified during this visit  Family/Friends has no beliefs influential to healthcare decision-making identified during this visit    Community:  Patient   Support system includes  and Family members, Jewish/elo community       Assessment and Plan of Care:   Emotions Expressed by Patient:   Assessment: Unable to assess    Interventions by :   Intervention: Sustaining Presence/Ministry of presence, Prayer (assurance of)/[place]     Result/ Response by Patient:   Outcome: Comfort, Receptive    Patient Plan of Care:   Plan and Referrals  Plan/Referrals: Continue to visit, (comment)         Electronically signed by Chaplain Kathy, on 4/23/2025 at 3:53 PM.  Spiritual Health  [facility] [place]  [phone]   
Flexible Endoscopic Evaluation of Swallowing (FEES) Consent Form    Explanation:  A FEES exam entails passing a flexible fiberoptic endoscope through the nose into the hypopharynx achieving a panoramic view of the pharynx and larynx. The scope remains above the level of the true vocal folds and trachea throughout the examination. A variety of foods and liquids will be given in order to observe the swallow function and address any problems. The patient's medical history has been reviewed and need for the FEES examination was judged as necessary and appropriate by the patient's physician with medical order received.     Possible adverse reactions: Epistasis, Fainting, laryngospasm, Aspiration, Sneezing, runny nose, Vasovagal episode    Benefits of FEES exam:  FEES assessment may identify a loss of airway protective reflexes as well as swallowing problems. Based on this information, recommendations will be made regarding diet modifications to prevent food and liquid from entering the airway during the swallow.     Alternatives:   Alternatives to the FEES exam are the modified barium swallow study, which entails administration of food and liquids coated with barium while undergoing x-ray. The other alternative is the bedside swallow evaluation, which involves presentation of foods and liquids while observing various symptoms suggestive of a swallowing problem and possible aspiration (food/liquid entering the trachea). A bedside swallowing evaluation is not able to fully confirm or rule out the presence of aspiration.     SLP discussed the risks, benefits, expected outcome, and alternative to the recommended procedure with the patient.  Patient was given the opportunity to ask questions about the procedure. Patient expresses understanding and wants to proceed.      Africa Seo M.Ed, CCC-SLP  Speech Language Pathologist  Acute Rehabilitation Services  Contact: Kirti       
GOALS:  LTG: Patient will maintain adequate hydration/nutrition with optimum safety and efficiency of swallowing function with PO intake without overt signs or symptoms of aspiration for the highest appropriate diet level.  STG:   Patient will consume puree diet with thin liquids without adverse pulmonary events. PROGRESSING 2025  Patient will complete a Modified Barium Swallow study to fully assess physiology and anatomy of the swallow and determine safest appropriate diet and/or rehabilitation strategies, as medically indicated. COMPLETED 2025 FEES THEN COMPLETED 25    SPEECH LANGUAGE PATHOLOGY: DYSPHAGIA Daily Note #1    Acknowledge Order  I  Therapy Time  I   Charges     I  Rehab Caseload Tracker      NAME: Nataliia Pradhan  : 1945  MRN: [id number]    ADMISSION DATE: 2025  PRIMARY DIAGNOSIS: Status epilepticus (HCC)    ICD-10: Treatment Diagnosis: R13.12 Dysphagia, Oropharyngeal Phase    RECOMMENDATIONS   Diet:    Pureed  Thin Liquids    Medication: crushed in puree or applesauce, as permitted by pharmacy   Compensatory Swallowing Strategies:   Small bites/sips  Upright as possible for all oral intake   Therapeutic Intervention:   Patient/family education  Dysphagia treatment   Patient continues to require skilled intervention:  Yes. Recommend ongoing speech therapy services during this hospitalization.     Anticipated Discharge Needs: Ongoing speech therapy is recommended at next level of care.      ASSESSMENT    Patient tolerating current diet of pureed solids and thin liquids without overt indications of airway compromise. Patient exhibits self feeding requiring intermittent assisting for postioning of spoon.  Left lingual tissues observed- appears bite is improving, though suspected beginning of thrush. RN informed MD for nystatin order. Educated daughter on lingual/oral care and positioning for PO intake. All questions answered.     Recommend puree diet with thin liquids. Crush PO 
GOALS:  LTG: Patient will maintain adequate hydration/nutrition with optimum safety and efficiency of swallowing function with PO intake without overt signs or symptoms of aspiration for the highest appropriate diet level.  STG:   Patient will consume puree diet with thin liquids without adverse pulmonary events. PROGRESSING 2025  Patient will complete a Modified Barium Swallow study to fully assess physiology and anatomy of the swallow and determine safest appropriate diet and/or rehabilitation strategies, as medically indicated. COMPLETED 2025 Fiberoptic Endoscopic Evaluation of Swallowing and Modified Barium Swallow Study     LTG: Patient will increase neuro-linguistic abilities to increase safety and awareness in functional living environment   STG: NEW 25  Patient will follow simple verbal commands with 80% accuracy with minimal assistance   Patient will answer yes/no questions with 80% accuracy with moderate assistance.   Patient will display orientation to location/temporal information with 80% accuracy given moderate assistance/visual supports.     SPEECH LANGUAGE PATHOLOGY: DYSPHAGIA Daily Note #2 and COGNITIVE-COMMUNICATION Initial Assessment    Acknowledge Order  I  Therapy Time  I   Charges     I  Rehab Caseload Tracker    NAME: Nataliia Pradhan  : 1945  MRN: [id number]    ADMISSION DATE: 2025  PRIMARY DIAGNOSIS: Status epilepticus (HCC)    ICD-10: Treatment Diagnosis: R13.12 Dysphagia, Oropharyngeal Phase    RECOMMENDATIONS   Diet:    Pureed  Thin Liquids    Medication: crushed in puree or applesauce, as permitted by pharmacy   Compensatory Swallowing Strategies:   Small bites/sips  Upright as possible for all oral intake  Total feeding assistance/supervision    Therapeutic Intervention:   Patient/family education  Dysphagia treatment   Patient continues to require skilled intervention:  Yes. Recommend ongoing speech therapy services during this hospitalization.     Anticipated 
GOALS:  LTG: Patient will maintain adequate hydration/nutrition with optimum safety and efficiency of swallowing function with PO intake without overt signs or symptoms of aspiration for the highest appropriate diet level.  STG:  Patient will safely ingest diet trials during therapeutic feedings with SLP without overt signs or symptoms of respiratory compromise in efforts to advance diet.    SPEECH LANGUAGE PATHOLOGY: DYSPHAGIA Initial Assessment    Acknowledge Order  I  Therapy Time  I   Charges     I  Rehab Caseload Tracker    NAME: Nataliia Pradhan  : 1945  MRN: [id number]    ADMISSION DATE: 2025  PRIMARY DIAGNOSIS: Status epilepticus (HCC)    ICD-10: Treatment Diagnosis: R13.12 Dysphagia, Oropharyngeal Phase    RECOMMENDATIONS   Diet:    NPO  Recommend utilize NG for nutrition, hydration, and medication needs  Q4 Oral Care    Medication: non-oral   Compensatory Swallowing Strategies:   To be determined    Therapeutic Intervention:   Patient/family education  Dysphagia treatment   Patient continues to require skilled intervention:  Yes. Recommend ongoing speech therapy services during this hospitalization.     Anticipated Discharge Needs: Ongoing speech therapy is recommended at next level of care.      ASSESSMENT    Patient presents with oral and suspected pharyngeal dysphagia. Anterior loss of bolus. No cough noted at baseline. Coughing and throat clearing observed in response to nearly all trials of thin liquid via teaspoon and ice. Cough is perceptually weak. Deferred further trials. Patient participated well in oral care. Reviewed recommendations with GINGER RN.     Recommend continue NPO status. Speech therapy will continue to follow for reassessment.   GENERAL    Subjective: ANGIE MILES cleared patient to participate with speech therapy. Patient upright in bed, following commands. Able to report name and birthday though not oriented beyond that. Not able to participate in conversation though can 
GOALS:  LTG: Patient will maintain adequate hydration/nutrition with optimum safety and efficiency of swallowing function with PO intake without overt signs or symptoms of aspiration for the highest appropriate diet level.  STG: PROGRESSING 25  Patient will consume puree diet with thin liquids without adverse pulmonary events. PROGRESSING 2025  Patient will complete a Modified Barium Swallow study to fully assess physiology and anatomy of the swallow and determine safest appropriate diet and/or rehabilitation strategies, as medically indicated. COMPLETED 2025 Fiberoptic Endoscopic Evaluation of Swallowing and Modified Barium Swallow Study     LTG: Patient will increase neuro-linguistic abilities to increase safety and awareness in functional living environment   STG: PROGRESSING 25  Patient will follow simple verbal commands with 80% accuracy with minimal assistance   Patient will answer yes/no questions with 80% accuracy with moderate assistance.   Patient will display orientation to location/temporal information with 80% accuracy given moderate assistance/visual supports.     SPEECH LANGUAGE PATHOLOGY: DYSPHAGIA and COGNITIVE-COMMUNICATION Daily Note #2    Acknowledge Order  I  Therapy Time  I   Charges     I  Rehab Caseload Tracker    NAME: Nataliia Pradhan  : 1945  MRN: [id number]    ADMISSION DATE: 2025  PRIMARY DIAGNOSIS: Status epilepticus (HCC)    ICD-10: Treatment Diagnosis: R13.12 Dysphagia, Oropharyngeal Phase    RECOMMENDATIONS   Diet:    Pureed  Thin Liquids    Medication: crushed in puree or applesauce, as permitted by pharmacy   Compensatory Swallowing Strategies:   Small bites/sips  Upright as possible for all oral intake  Alternate bites/sips   Total feeding assistance/supervision    Therapeutic Intervention:   Patient/family education  Dysphagia treatment   Patient continues to require skilled intervention:  Yes. Recommend ongoing speech therapy services during this 
GOALS:  LTG: Patient will maintain adequate hydration/nutrition with optimum safety and efficiency of swallowing function with PO intake without overt signs or symptoms of aspiration for the highest appropriate diet level.  STG: PROGRESSING 25  Patient will consume puree diet with thin liquids without adverse pulmonary events. PROGRESSING 2025  Patient will complete a Modified Barium Swallow study to fully assess physiology and anatomy of the swallow and determine safest appropriate diet and/or rehabilitation strategies, as medically indicated. COMPLETED 2025 Fiberoptic Endoscopic Evaluation of Swallowing and Modified Barium Swallow Study     LTG: Patient will increase neuro-linguistic abilities to increase safety and awareness in functional living environment   STG: PROGRESSING 25  Patient will follow simple verbal commands with 80% accuracy with minimal assistance   Patient will answer yes/no questions with 80% accuracy with moderate assistance.   Patient will display orientation to location/temporal information with 80% accuracy given moderate assistance/visual supports.     SPEECH LANGUAGE PATHOLOGY: DYSPHAGIA and COGNITIVE-COMMUNICATION Daily Note #3    Acknowledge Order  I  Therapy Time  I   Charges     I  Rehab Caseload Tracker    NAME: Nataliia Pradhan  : 1945  MRN: [id number]    ADMISSION DATE: 2025  PRIMARY DIAGNOSIS: Status epilepticus (HCC)    ICD-10: Treatment Diagnosis: R13.12 Dysphagia, Oropharyngeal Phase    RECOMMENDATIONS   Diet:    Pureed  Thin Liquids    Medication: crushed in puree or applesauce, as permitted by pharmacy   Compensatory Swallowing Strategies:   Small bites/sips  Upright as possible for all oral intake  Alternate bites/sips   Total feeding assistance/supervision    Therapeutic Intervention:   Patient/family education  Dysphagia treatment   Patient continues to require skilled intervention:  Yes. Recommend ongoing speech therapy services during this 
GOALS:  LTG: Patient will maintain adequate hydration/nutrition with optimum safety and efficiency of swallowing function with PO intake without overt signs or symptoms of aspiration for the highest appropriate diet level.  STG: UPGRADED/PROGRESSING 25  Patient will consume  minced and moist diet with thin liquids without adverse pulmonary events. PROGRESSING 2025  Patient will complete a Modified Barium Swallow study to fully assess physiology and anatomy of the swallow and determine safest appropriate diet and/or rehabilitation strategies, as medically indicated. COMPLETED 2025 Fiberoptic Endoscopic Evaluation of Swallowing and Modified Barium Swallow Study     LTG: Patient will increase neuro-linguistic abilities to increase safety and awareness in functional living environment   STG: PROGRESSING 25  Patient will follow simple verbal commands with 80% accuracy with minimal assistance   Patient will answer yes/no questions with 80% accuracy with moderate assistance.   Patient will display orientation to location/temporal information with 80% accuracy given moderate assistance/visual supports.     SPEECH LANGUAGE PATHOLOGY: DYSPHAGIA and COGNITIVE-COMMUNICATION Daily Note #4    Acknowledge Order  I  Therapy Time  I   Charges     I  Rehab Caseload Tracker    NAME: Nataliia Pradhan  : 1945  MRN: [id number]    ADMISSION DATE: 2025  PRIMARY DIAGNOSIS: Status epilepticus (HCC)    ICD-10: Treatment Diagnosis: R13.12 Dysphagia, Oropharyngeal Phase    RECOMMENDATIONS   Diet:    Minced and Moist  Thin Liquids    Medication: crushed in puree or applesauce, as permitted by pharmacy   Compensatory Swallowing Strategies:   Small bites/sips  Upright as possible for all oral intake  Alternate bites/sips   Total feeding assistance/supervision    Therapeutic Intervention:   Patient/family education  Dysphagia treatment   Patient continues to require skilled intervention:  Yes. Recommend ongoing speech 
[facility] Progress Note    Subjective:    Seen in ICU  - Intubated with mechanical ventilation.      Objective:    Current Facility-Administered Medications   Medication Dose Route Frequency    glucose chewable tablet 16 g  4 tablet Oral PRN    dextrose bolus 10% 125 mL  125 mL IntraVENous PRN    Or    dextrose bolus 10% 250 mL  250 mL IntraVENous PRN    Glucagon Emergency KIT 1 mg  1 mg SubCUTAneous PRN    dextrose 10 % infusion   IntraVENous Continuous PRN    hydrALAZINE (APRESOLINE) tablet 50 mg  50 mg Per NG tube 3 times per day    cefTRIAXone (ROCEPHIN) 1,000 mg in sterile water 10 mL IV syringe  1,000 mg IntraVENous Q24H    carvedilol (COREG) tablet 25 mg  25 mg Per NG tube BID    pantoprazole (PROTONIX) 40 mg in sodium chloride (PF) 0.9 % 10 mL injection  40 mg IntraVENous Daily    insulin lispro (HUMALOG,ADMELOG) injection vial 0-8 Units  0-8 Units SubCUTAneous Q6H    lacosamide (VIMPAT) injection 100 mg  100 mg IntraVENous BID    heparin (porcine) injection 5,000 Units  5,000 Units SubCUTAneous 3 times per day    medicated lip ointment (BLISTEX)   Topical PRN    sodium chloride flush 0.9 % injection 5-40 mL  5-40 mL IntraVENous 2 times per day    sodium chloride flush 0.9 % injection 5-40 mL  5-40 mL IntraVENous PRN    0.9 % sodium chloride infusion   IntraVENous PRN    potassium chloride 20 mEq/50 mL IVPB (Central Line)  20 mEq IntraVENous PRN    Or    potassium chloride 10 mEq/100 mL IVPB (Peripheral Line)  10 mEq IntraVENous PRN    magnesium sulfate 2000 mg in 50 mL IVPB premix  2,000 mg IntraVENous PRN    ondansetron (ZOFRAN-ODT) disintegrating tablet 4 mg  4 mg Oral Q8H PRN    Or    ondansetron (ZOFRAN) injection 4 mg  4 mg IntraVENous Q6H PRN    polyethylene glycol (GLYCOLAX) packet 17 g  17 g Oral Daily PRN    acetaminophen (TYLENOL) tablet 650 mg  650 mg Oral Q6H PRN    Or    acetaminophen (TYLENOL) suppository 650 mg  650 mg Rectal Q6H PRN       Exam:  Vitals:    04/22/25 0700 04/22/25 
[facility] Progress Note    Subjective:    Seen in ICU  - Intubated with mechanical ventilation.      Objective:    Current Facility-Administered Medications   Medication Dose Route Frequency    hydrALAZINE (APRESOLINE) tablet 25 mg  25 mg Per NG tube 3 times per day    carvedilol (COREG) tablet 25 mg  25 mg Per NG tube BID    pantoprazole (PROTONIX) 40 mg in sodium chloride (PF) 0.9 % 10 mL injection  40 mg IntraVENous Daily    insulin lispro (HUMALOG,ADMELOG) injection vial 0-8 Units  0-8 Units SubCUTAneous Q6H    piperacillin-tazobactam (ZOSYN) 3,375 mg in sodium chloride 0.9 % 50 mL IVPB (addEASE)  3,375 mg IntraVENous Q12H    lacosamide (VIMPAT) injection 100 mg  100 mg IntraVENous BID    heparin (porcine) injection 5,000 Units  5,000 Units SubCUTAneous 3 times per day    medicated lip ointment (BLISTEX)   Topical PRN    sodium chloride flush 0.9 % injection 5-40 mL  5-40 mL IntraVENous 2 times per day    sodium chloride flush 0.9 % injection 5-40 mL  5-40 mL IntraVENous PRN    0.9 % sodium chloride infusion   IntraVENous PRN    potassium chloride 20 mEq/50 mL IVPB (Central Line)  20 mEq IntraVENous PRN    Or    potassium chloride 10 mEq/100 mL IVPB (Peripheral Line)  10 mEq IntraVENous PRN    magnesium sulfate 2000 mg in 50 mL IVPB premix  2,000 mg IntraVENous PRN    ondansetron (ZOFRAN-ODT) disintegrating tablet 4 mg  4 mg Oral Q8H PRN    Or    ondansetron (ZOFRAN) injection 4 mg  4 mg IntraVENous Q6H PRN    polyethylene glycol (GLYCOLAX) packet 17 g  17 g Oral Daily PRN    acetaminophen (TYLENOL) tablet 650 mg  650 mg Oral Q6H PRN    Or    acetaminophen (TYLENOL) suppository 650 mg  650 mg Rectal Q6H PRN    0.9 % sodium chloride infusion   IntraVENous Continuous       Exam:  Vitals:    04/20/25 0740 04/20/25 0746 04/20/25 0801 04/20/25 0934   BP:   (!) 151/67 (!) 158/72   Pulse:  68 66 68   Resp: 16      Temp:       TempSrc:       SpO2:  100% 97%    Weight:       Height:             Intake/Output 
[facility] Progress Note    Subjective:    Seen in ICU  - Intubated with mechanical ventilation.      Objective:    Current Facility-Administered Medications   Medication Dose Route Frequency    hydrALAZINE (APRESOLINE) tablet 50 mg  50 mg Per NG tube 3 times per day    cefTRIAXone (ROCEPHIN) 1,000 mg in sterile water 10 mL IV syringe  1,000 mg IntraVENous Q24H    carvedilol (COREG) tablet 25 mg  25 mg Per NG tube BID    pantoprazole (PROTONIX) 40 mg in sodium chloride (PF) 0.9 % 10 mL injection  40 mg IntraVENous Daily    insulin lispro (HUMALOG,ADMELOG) injection vial 0-8 Units  0-8 Units SubCUTAneous Q6H    lacosamide (VIMPAT) injection 100 mg  100 mg IntraVENous BID    heparin (porcine) injection 5,000 Units  5,000 Units SubCUTAneous 3 times per day    medicated lip ointment (BLISTEX)   Topical PRN    sodium chloride flush 0.9 % injection 5-40 mL  5-40 mL IntraVENous 2 times per day    sodium chloride flush 0.9 % injection 5-40 mL  5-40 mL IntraVENous PRN    0.9 % sodium chloride infusion   IntraVENous PRN    potassium chloride 20 mEq/50 mL IVPB (Central Line)  20 mEq IntraVENous PRN    Or    potassium chloride 10 mEq/100 mL IVPB (Peripheral Line)  10 mEq IntraVENous PRN    magnesium sulfate 2000 mg in 50 mL IVPB premix  2,000 mg IntraVENous PRN    ondansetron (ZOFRAN-ODT) disintegrating tablet 4 mg  4 mg Oral Q8H PRN    Or    ondansetron (ZOFRAN) injection 4 mg  4 mg IntraVENous Q6H PRN    polyethylene glycol (GLYCOLAX) packet 17 g  17 g Oral Daily PRN    acetaminophen (TYLENOL) tablet 650 mg  650 mg Oral Q6H PRN    Or    acetaminophen (TYLENOL) suppository 650 mg  650 mg Rectal Q6H PRN    0.9 % sodium chloride infusion   IntraVENous Continuous       Exam:  Vitals:    04/21/25 0645 04/21/25 0700 04/21/25 0730 04/21/25 0734   BP:  133/67 (!) 149/67    Pulse: 67 69 68 72   Resp:   14    Temp:   98 °F (36.7 °C)    TempSrc:   Axillary    SpO2: 100% 100% 100% 100%   Weight:       Height:     
[facility] Progress Note    Subjective:    Seen in ICU  - Intubated with mechanical ventilation.      Objective:    Current Facility-Administered Medications   Medication Dose Route Frequency    niCARdipine (CARDENE) 25 mg in sodium chloride 0.9 % 250 mL infusion ([id number])  2.5-15 mg/hr IntraVENous Continuous    piperacillin-tazobactam (ZOSYN) 3,375 mg in sodium chloride 0.9 % 50 mL IVPB (addEASE)  3,375 mg IntraVENous Q12H    lacosamide (VIMPAT) injection 100 mg  100 mg IntraVENous BID    heparin (porcine) injection 5,000 Units  5,000 Units SubCUTAneous 3 times per day    medicated lip ointment (BLISTEX)   Topical PRN    sodium chloride flush 0.9 % injection 5-40 mL  5-40 mL IntraVENous 2 times per day    sodium chloride flush 0.9 % injection 5-40 mL  5-40 mL IntraVENous PRN    0.9 % sodium chloride infusion   IntraVENous PRN    potassium chloride 20 mEq/50 mL IVPB (Central Line)  20 mEq IntraVENous PRN    Or    potassium chloride 10 mEq/100 mL IVPB (Peripheral Line)  10 mEq IntraVENous PRN    magnesium sulfate 2000 mg in 50 mL IVPB premix  2,000 mg IntraVENous PRN    ondansetron (ZOFRAN-ODT) disintegrating tablet 4 mg  4 mg Oral Q8H PRN    Or    ondansetron (ZOFRAN) injection 4 mg  4 mg IntraVENous Q6H PRN    polyethylene glycol (GLYCOLAX) packet 17 g  17 g Oral Daily PRN    acetaminophen (TYLENOL) tablet 650 mg  650 mg Oral Q6H PRN    Or    acetaminophen (TYLENOL) suppository 650 mg  650 mg Rectal Q6H PRN    0.9 % sodium chloride infusion   IntraVENous Continuous       Exam:  Vitals:    04/19/25 0715 04/19/25 0730 04/19/25 0745 04/19/25 0753   BP: (!) 160/74 (!) 150/63 (!) 147/70    Pulse: 99 (!) 104 100 (!) 103   Resp: 20 20 19 21   Temp:       TempSrc:       SpO2: 100% 100% 100% 100%   Weight:       Height:    1.626 m (5' 4\")         Intake/Output Summary (Last 24 hours) at 4/19/2025 0834  Last data filed at 4/19/2025 0750  Gross per 24 hour   Intake 4217.53 ml   Output 2945 ml   Net 1272.53 ml 
[facility] Progress Note    Subjective:    Seen in ICU  - Intubated with mechanical ventilation.  Excessive secretion.  Slow improvement and following commands.      Objective:    Current Facility-Administered Medications   Medication Dose Route Frequency    sodium phosphate 15 mmol in sodium chloride 0.9 % 250 mL IVPB ([id number])  15 mmol IntraVENous PRN    insulin glargine (LANTUS) injection vial 5 Units  5 Units SubCUTAneous Daily    apixaban (ELIQUIS) tablet 2.5 mg  2.5 mg Oral BID    hydrALAZINE (APRESOLINE) injection 20 mg  20 mg IntraVENous Q4H PRN    glucose chewable tablet 16 g  4 tablet Oral PRN    dextrose bolus 10% 125 mL  125 mL IntraVENous PRN    Or    dextrose bolus 10% 250 mL  250 mL IntraVENous PRN    Glucagon Emergency KIT 1 mg  1 mg SubCUTAneous PRN    dextrose 10 % infusion   IntraVENous Continuous PRN    hydrALAZINE (APRESOLINE) tablet 50 mg  50 mg Per NG tube 3 times per day    cefTRIAXone (ROCEPHIN) 1,000 mg in sterile water 10 mL IV syringe  1,000 mg IntraVENous Q24H    carvedilol (COREG) tablet 25 mg  25 mg Per NG tube BID    pantoprazole (PROTONIX) 40 mg in sodium chloride (PF) 0.9 % 10 mL injection  40 mg IntraVENous Daily    insulin lispro (HUMALOG,ADMELOG) injection vial 0-8 Units  0-8 Units SubCUTAneous Q6H    lacosamide (VIMPAT) injection 100 mg  100 mg IntraVENous BID    medicated lip ointment (BLISTEX)   Topical PRN    sodium chloride flush 0.9 % injection 5-40 mL  5-40 mL IntraVENous 2 times per day    sodium chloride flush 0.9 % injection 5-40 mL  5-40 mL IntraVENous PRN    0.9 % sodium chloride infusion   IntraVENous PRN    potassium chloride 20 mEq/50 mL IVPB (Central Line)  20 mEq IntraVENous PRN    Or    potassium chloride 10 mEq/100 mL IVPB (Peripheral Line)  10 mEq IntraVENous PRN    magnesium sulfate 2000 mg in 50 mL IVPB premix  2,000 mg IntraVENous PRN    ondansetron (ZOFRAN-ODT) disintegrating tablet 4 mg  4 mg Oral Q8H PRN    Or    ondansetron (ZOFRAN) injection 
[facility] Progress Note    Subjective:    Seen in ICU  - Intubated with mechanical ventilation.  On pressure support 12 mmHg  Excessive secretion.  Slow improvement and following commands.    On tube feeds      Objective:    Current Facility-Administered Medications   Medication Dose Route Frequency    insulin glargine (LANTUS) injection vial 15 Units  15 Units SubCUTAneous Daily    sodium phosphate 15 mmol in sodium chloride 0.9 % 250 mL IVPB ([id number])  15 mmol IntraVENous PRN    apixaban (ELIQUIS) tablet 2.5 mg  2.5 mg Oral BID    hydrALAZINE (APRESOLINE) injection 20 mg  20 mg IntraVENous Q4H PRN    glucose chewable tablet 16 g  4 tablet Oral PRN    dextrose bolus 10% 125 mL  125 mL IntraVENous PRN    Or    dextrose bolus 10% 250 mL  250 mL IntraVENous PRN    Glucagon Emergency KIT 1 mg  1 mg SubCUTAneous PRN    dextrose 10 % infusion   IntraVENous Continuous PRN    hydrALAZINE (APRESOLINE) tablet 50 mg  50 mg Per NG tube 3 times per day    carvedilol (COREG) tablet 25 mg  25 mg Per NG tube BID    pantoprazole (PROTONIX) 40 mg in sodium chloride (PF) 0.9 % 10 mL injection  40 mg IntraVENous Daily    insulin lispro (HUMALOG,ADMELOG) injection vial 0-8 Units  0-8 Units SubCUTAneous Q6H    lacosamide (VIMPAT) injection 100 mg  100 mg IntraVENous BID    medicated lip ointment (BLISTEX)   Topical PRN    sodium chloride flush 0.9 % injection 5-40 mL  5-40 mL IntraVENous 2 times per day    sodium chloride flush 0.9 % injection 5-40 mL  5-40 mL IntraVENous PRN    0.9 % sodium chloride infusion   IntraVENous PRN    potassium chloride 20 mEq/50 mL IVPB (Central Line)  20 mEq IntraVENous PRN    Or    potassium chloride 10 mEq/100 mL IVPB (Peripheral Line)  10 mEq IntraVENous PRN    magnesium sulfate 2000 mg in 50 mL IVPB premix  2,000 mg IntraVENous PRN    ondansetron (ZOFRAN-ODT) disintegrating tablet 4 mg  4 mg Oral Q8H PRN    Or    ondansetron (ZOFRAN) injection 4 mg  4 mg IntraVENous Q6H PRN    polyethylene 
[facility] Progress Note    Subjective:    Seen in ICU  - Intubated with mechanical ventilation.  On pressure support 12 mmHg  Excessive secretion.  Slow improvement and following commands.    On tube feeds      Objective:    Current Facility-Administered Medications   Medication Dose Route Frequency    sodium phosphate 15 mmol in sodium chloride 0.9 % 250 mL IVPB ([id number])  15 mmol IntraVENous PRN    insulin glargine (LANTUS) injection vial 5 Units  5 Units SubCUTAneous Daily    apixaban (ELIQUIS) tablet 2.5 mg  2.5 mg Oral BID    hydrALAZINE (APRESOLINE) injection 20 mg  20 mg IntraVENous Q4H PRN    glucose chewable tablet 16 g  4 tablet Oral PRN    dextrose bolus 10% 125 mL  125 mL IntraVENous PRN    Or    dextrose bolus 10% 250 mL  250 mL IntraVENous PRN    Glucagon Emergency KIT 1 mg  1 mg SubCUTAneous PRN    dextrose 10 % infusion   IntraVENous Continuous PRN    hydrALAZINE (APRESOLINE) tablet 50 mg  50 mg Per NG tube 3 times per day    cefTRIAXone (ROCEPHIN) 1,000 mg in sterile water 10 mL IV syringe  1,000 mg IntraVENous Q24H    carvedilol (COREG) tablet 25 mg  25 mg Per NG tube BID    pantoprazole (PROTONIX) 40 mg in sodium chloride (PF) 0.9 % 10 mL injection  40 mg IntraVENous Daily    insulin lispro (HUMALOG,ADMELOG) injection vial 0-8 Units  0-8 Units SubCUTAneous Q6H    lacosamide (VIMPAT) injection 100 mg  100 mg IntraVENous BID    medicated lip ointment (BLISTEX)   Topical PRN    sodium chloride flush 0.9 % injection 5-40 mL  5-40 mL IntraVENous 2 times per day    sodium chloride flush 0.9 % injection 5-40 mL  5-40 mL IntraVENous PRN    0.9 % sodium chloride infusion   IntraVENous PRN    potassium chloride 20 mEq/50 mL IVPB (Central Line)  20 mEq IntraVENous PRN    Or    potassium chloride 10 mEq/100 mL IVPB (Peripheral Line)  10 mEq IntraVENous PRN    magnesium sulfate 2000 mg in 50 mL IVPB premix  2,000 mg IntraVENous PRN    ondansetron (ZOFRAN-ODT) disintegrating tablet 4 mg  4 mg Oral 
[facility] Progress Note    Subjective:    Seen in ICU  - Intubated with mechanical ventilation.  On pressure support 8 mmHg  Excessive secretion.  Slow improvement and following commands.    On tube feeds    Sister present bedside      Objective:    Current Facility-Administered Medications   Medication Dose Route Frequency    insulin glargine (LANTUS) injection vial 15 Units  15 Units SubCUTAneous Daily    sodium phosphate 15 mmol in sodium chloride 0.9 % 250 mL IVPB ([id number])  15 mmol IntraVENous PRN    apixaban (ELIQUIS) tablet 2.5 mg  2.5 mg Oral BID    hydrALAZINE (APRESOLINE) injection 20 mg  20 mg IntraVENous Q4H PRN    glucose chewable tablet 16 g  4 tablet Oral PRN    dextrose bolus 10% 125 mL  125 mL IntraVENous PRN    Or    dextrose bolus 10% 250 mL  250 mL IntraVENous PRN    Glucagon Emergency KIT 1 mg  1 mg SubCUTAneous PRN    dextrose 10 % infusion   IntraVENous Continuous PRN    hydrALAZINE (APRESOLINE) tablet 50 mg  50 mg Per NG tube 3 times per day    carvedilol (COREG) tablet 25 mg  25 mg Per NG tube BID    pantoprazole (PROTONIX) 40 mg in sodium chloride (PF) 0.9 % 10 mL injection  40 mg IntraVENous Daily    insulin lispro (HUMALOG,ADMELOG) injection vial 0-8 Units  0-8 Units SubCUTAneous Q6H    lacosamide (VIMPAT) injection 100 mg  100 mg IntraVENous BID    medicated lip ointment (BLISTEX)   Topical PRN    sodium chloride flush 0.9 % injection 5-40 mL  5-40 mL IntraVENous 2 times per day    sodium chloride flush 0.9 % injection 5-40 mL  5-40 mL IntraVENous PRN    0.9 % sodium chloride infusion   IntraVENous PRN    potassium chloride 20 mEq/50 mL IVPB (Central Line)  20 mEq IntraVENous PRN    Or    potassium chloride 10 mEq/100 mL IVPB (Peripheral Line)  10 mEq IntraVENous PRN    magnesium sulfate 2000 mg in 50 mL IVPB premix  2,000 mg IntraVENous PRN    ondansetron (ZOFRAN-ODT) disintegrating tablet 4 mg  4 mg Oral Q8H PRN    Or    ondansetron (ZOFRAN) injection 4 mg  4 mg IntraVENous 
[facility] Progress Note    Subjective:  Pt seen and examined in CCU, awake and alert   Remains with NGT    ROS:   Un able to obtain     Objective:    Current Facility-Administered Medications   Medication Dose Route Frequency    apixaban (ELIQUIS) tablet 2.5 mg  2.5 mg Per NG tube BID    carvedilol (COREG) tablet 25 mg  25 mg Per NG tube BID    hydrALAZINE (APRESOLINE) tablet 50 mg  50 mg Per NG tube 3 times per day    acetaminophen (TYLENOL) tablet 650 mg  650 mg Per NG tube Q6H PRN    Or    acetaminophen (TYLENOL) suppository 650 mg  650 mg Rectal Q6H PRN    scopolamine (TRANSDERM-SCOP) transdermal patch 1 patch  1 patch TransDERmal Q72H    insulin glargine (LANTUS) injection vial 15 Units  15 Units SubCUTAneous Daily    sodium phosphate 15 mmol in sodium chloride 0.9 % 250 mL IVPB ([id number])  15 mmol IntraVENous PRN    hydrALAZINE (APRESOLINE) injection 20 mg  20 mg IntraVENous Q4H PRN    glucose chewable tablet 16 g  4 tablet Oral PRN    dextrose bolus 10% 125 mL  125 mL IntraVENous PRN    Or    dextrose bolus 10% 250 mL  250 mL IntraVENous PRN    Glucagon Emergency KIT 1 mg  1 mg SubCUTAneous PRN    dextrose 10 % infusion   IntraVENous Continuous PRN    pantoprazole (PROTONIX) 40 mg in sodium chloride (PF) 0.9 % 10 mL injection  40 mg IntraVENous Daily    insulin lispro (HUMALOG,ADMELOG) injection vial 0-8 Units  0-8 Units SubCUTAneous Q6H    lacosamide (VIMPAT) injection 100 mg  100 mg IntraVENous BID    medicated lip ointment (BLISTEX)   Topical PRN    sodium chloride flush 0.9 % injection 5-40 mL  5-40 mL IntraVENous 2 times per day    sodium chloride flush 0.9 % injection 5-40 mL  5-40 mL IntraVENous PRN    0.9 % sodium chloride infusion   IntraVENous PRN    potassium chloride 20 mEq/50 mL IVPB (Central Line)  20 mEq IntraVENous PRN    Or    potassium chloride 10 mEq/100 mL IVPB (Peripheral Line)  10 mEq IntraVENous PRN    magnesium sulfate 2000 mg in 50 mL IVPB premix  2,000 mg IntraVENous PRN    
[facility] Progress Note    Subjective:  Pt seen and examined in CCU, pt is lethargic, not following commands.   Remains with NGT    ROS:   Un able to obtain     Objective:    Current Facility-Administered Medications   Medication Dose Route Frequency    carvedilol (COREG) tablet 25 mg  25 mg Oral BID    hydrALAZINE (APRESOLINE) tablet 50 mg  50 mg Oral 3 times per day    scopolamine (TRANSDERM-SCOP) transdermal patch 1 patch  1 patch TransDERmal Q72H    insulin glargine (LANTUS) injection vial 15 Units  15 Units SubCUTAneous Daily    sodium phosphate 15 mmol in sodium chloride 0.9 % 250 mL IVPB ([id number])  15 mmol IntraVENous PRN    apixaban (ELIQUIS) tablet 2.5 mg  2.5 mg Oral BID    hydrALAZINE (APRESOLINE) injection 20 mg  20 mg IntraVENous Q4H PRN    glucose chewable tablet 16 g  4 tablet Oral PRN    dextrose bolus 10% 125 mL  125 mL IntraVENous PRN    Or    dextrose bolus 10% 250 mL  250 mL IntraVENous PRN    Glucagon Emergency KIT 1 mg  1 mg SubCUTAneous PRN    dextrose 10 % infusion   IntraVENous Continuous PRN    pantoprazole (PROTONIX) 40 mg in sodium chloride (PF) 0.9 % 10 mL injection  40 mg IntraVENous Daily    insulin lispro (HUMALOG,ADMELOG) injection vial 0-8 Units  0-8 Units SubCUTAneous Q6H    lacosamide (VIMPAT) injection 100 mg  100 mg IntraVENous BID    medicated lip ointment (BLISTEX)   Topical PRN    sodium chloride flush 0.9 % injection 5-40 mL  5-40 mL IntraVENous 2 times per day    sodium chloride flush 0.9 % injection 5-40 mL  5-40 mL IntraVENous PRN    0.9 % sodium chloride infusion   IntraVENous PRN    potassium chloride 20 mEq/50 mL IVPB (Central Line)  20 mEq IntraVENous PRN    Or    potassium chloride 10 mEq/100 mL IVPB (Peripheral Line)  10 mEq IntraVENous PRN    magnesium sulfate 2000 mg in 50 mL IVPB premix  2,000 mg IntraVENous PRN    ondansetron (ZOFRAN-ODT) disintegrating tablet 4 mg  4 mg Oral Q8H PRN    Or    ondansetron (ZOFRAN) injection 4 mg  4 mg IntraVENous Q6H 
Neurology Progress Note       History:   79-year-old female with, PVD, hypertension, diabetes presents with new onset seizure.  History per family at bedside, witnessed shaking of the right arm while body healed over to the left.  EMS arrived who administered midazolam, with some improvement of shaking.  There was tongue laceration.  Thus far workup notable for hyperthermia, UTI.  No prior history of seizures.      Interim:  No events overnight. Still very poorly responsive     Scheduled Meds:   hydrALAZINE  50 mg Per NG tube 3 times per day    cefTRIAXone (ROCEPHIN) IV  1,000 mg IntraVENous Q24H    carvedilol  25 mg Per NG tube BID    pantoprazole (PROTONIX) 40 mg in sodium chloride (PF) 0.9 % 10 mL injection  40 mg IntraVENous Daily    insulin lispro  0-8 Units SubCUTAneous Q6H    lacosamide  100 mg IntraVENous BID    heparin (porcine)  5,000 Units SubCUTAneous 3 times per day    sodium chloride flush  5-40 mL IntraVENous 2 times per day     Continuous Infusions:   sodium chloride      sodium chloride 50 mL/hr at 04/20/25 1454     PRN Meds:.medicated lip ointment, sodium chloride flush, sodium chloride, potassium chloride **OR** potassium chloride, magnesium sulfate, ondansetron **OR** ondansetron, polyethylene glycol, acetaminophen **OR** acetaminophen      Exam: Pertinent positives and negatives include:    Vitals:    04/21/25 1130 04/21/25 1158 04/21/25 1202 04/21/25 1230   BP: 133/62 133/62 (!) 173/83 (!) 179/82   Pulse: 64 64 64 65   Resp:       Temp:       TempSrc:       SpO2: 100% 100% 100%    Weight:       Height:           Intubated, off sedated.  Opens eyes to voice stimulation. Tongue laceration mostly healed.  No involuntary movements observed.    Imaging and review of data:   CT head without acute pathology.  Severe atrophy, diffuse.     cEEG with transient periods of generalized theta activity.  No seizures.    Assessment and Plan:   79-year-old female with new onset seizure in the setting of UTI. 
Nutrition Assessment  Assessment Type: Reassess  Reason for visit:  Tube Feeding Management (Pulmonology)  Malnutrition Screening Tool Score: 1    Nutrition Intervention:   Food and/or Nutrient Delivery:   Enteral Nutrition:   Enteral Access: Nasogastric  Change  Formula: Renal (Nepro with Carbsteady)  Goal Rate: Continuous 40 ml/hr  Change  Water flush  70 ml every 3 hours  Modulars: None not indicated at this time   Enteral regimen at above goal to provide per 24 hours:  1558 calories, 71 grams protein and 1200 ml free fluid.    Above regimen: Intended to meet macronutrient goals Restricted fluid provisions related to  dialysis, low Na  Labs:   Basic Metabolic Panel, Magnesium and Phosphorus active per nutrition parameters  POC Glucoses/SSI Active   Nutrition Related Medication Management:  Electrolyte Replacement:   Continue prn protocol  CrCl less than 30mL/min and Active per MD    Intravenous fluids:  Not applicable  Thiamine Not indicated  Bowel Regimen Active prn  Coordination of Nutrition Care:  Coordination with health care provider Elsi ADAMS       Malnutrition Assessment:  [facility] Clinical Malnutrition Criteria  Malnutrition Status: No malnutrition  Nutrition Focused Physical Exam:  No marilu wasting on visual exam    Nutrition Assessment:  Food/Nutrition Related History:   Daughter provides history. States that patient typically has a good appetite, eating 3 meals/day on average. Breakfast is typically her best meal. She eats a light lunch & dinner. She watches her potassium/phosphorus intake. No issues chewing/swallowing at baseline. Denies n/v/c/d prior to admit.      Do You Have Any Cultural, Episcopal, or Ethnic Food Preferences?: No     Weight History:   EM[facility] weight history review: 134# 8/1/24 (endocrinology), 136# 11/1/24 (endocrinology). Daughter reports that during pt's last admit she said her UBW was ~145 lbs. Bedscale weight of 136 lbs today.     Nutrition Background:       [facility] 
Nutrition Assessment  Assessment Type: Reassess  Reason for visit:  Tube Feeding Management (Pulmonology)  Malnutrition Screening Tool Score: 1    Nutrition Intervention:   Food and/or Nutrient Delivery:   Enteral Nutrition:   Enteral Access: Nasogastric  Continue  Formula: Renal (Nepro with Carbsteady)  Goal Rate: Continuous 40 ml/hr  Continue  Water flush  70 ml every 3 hours  Modulars: None not indicated at this time   Enteral regimen at above goal to provide per 24 hours:  1558 calories, 71 grams protein and 1200 ml free fluid.    Above regimen: Intended to meet macronutrient goals Restricted fluid provisions related to  dialysis, hx of low Na  Labs:   Basic Metabolic Panel, Magnesium and Phosphorus active per nutrition parameters  POC Glucoses/SSI Active   Nutrition Related Medication Management:  Electrolyte Replacement:   Continue prn protocol  CrCl less than 30mL/min and Active per MD    Intravenous fluids:  Not applicable  Thiamine Not indicated  Bowel Regimen Active prn  Coordination of Nutrition Care:  Coordination with health care provider Mishel ADAMS       Malnutrition Assessment:  [facility]/A[facility]S.P[facility]N Clinical Malnutrition Criteria  Malnutrition Status: No malnutrition  Nutrition Focused Physical Exam:  No marilu wasting on visual exam    Nutrition Assessment:  Food/Nutrition Related History:   Daughter provides history. States that patient typically has a good appetite, eating 3 meals/day on average. Breakfast is typically her best meal. She eats a light lunch & dinner. She watches her potassium/phosphorus intake. No issues chewing/swallowing at baseline. Denies n/v/c/d prior to admit.      Do You Have Any Cultural, Baptist, or Ethnic Food Preferences?: No     Weight History:   EMR weight history review: 134# 8/1/24 (endocrinology), 136# 11/1/24 (endocrinology). Daughter reports that during pt's last admit she said her UBW was ~145 lbs. Bedscale weight of 136 lbs today.     Nutrition Background:     
Nutrition Assessment  Assessment Type: Reassess  Reason for visit:  Tube Feeding Management (Pulmonology)  Malnutrition Screening Tool Score: 1    Nutrition Intervention:   Food and/or Nutrient Delivery:   Enteral Nutrition:   Enteral Access: Orogastric  Change  Formula: Peptide Based (Vital AF 1.2 Robert)  Goal Rate: Continuous 60 ml/hr  Change  Water flush  55 ml every 3 hours  Modulars: None not indicated at this time   Enteral regimen at above goal to provide per 24 hours:  1584 calories, 99 grams protein and 1511 ml free fluid.    Above regimen: Intended to meet macronutrient goals  Labs:   Basic Metabolic Panel, Magnesium and Phosphorus active per nutrition parameters  POC Glucoses/SSI Active   Nutrition Related Medication Management:  Electrolyte Replacement:   Continue prn protocol  CrCl less than 30mL/min and Active per MD    Intravenous fluids:  Continue per provider order  Thiamine Not indicated  Bowel Regimen Active prn  Coordination of Nutrition Care:  Coordination with health care provider [place] ADAMS       Malnutrition Assessment:  [facility] Clinical Malnutrition Criteria  Malnutrition Status: No malnutrition  Nutrition Focused Physical Exam:  No marilu wasting on visual exam    Nutrition Assessment:  Food/Nutrition Related History:   Daughter provides history. States that patient typically has a good appetite, eating 3 meals/day on average. Breakfast is typically her best meal. She eats a light lunch & dinner. She watches her potassium/phosphorus intake. No issues chewing/swallowing at baseline. Denies n/v/c/d prior to admit.      Do You Have Any Cultural, Hinduism, or Ethnic Food Preferences?: No     Weight History:   EMR weight history review: 134# 8/1/24 (endocrinology), 136# 11/1/24 (endocrinology). Daughter reports that during pt's last admit she said her UBW was ~145 lbs. Bedscale weight of 136 lbs today.     Nutrition Background:       PMH significant for DM 2, ESRD on HD MWF, HTN, HLD, 
Nutrition Assessment  Assessment Type: Reassess  Reason for visit:  Tube Feeding Management (Pulmonology)  Malnutrition Screening Tool Score: 1    Nutrition Intervention:   Food and/or Nutrient Delivery:   Enteral Nutrition:   Enteral Access: Orogastric  Change  Formula: Renal (Nepro with Carbsteady)  Goal Rate: Continuous 40 ml/hr  Initiate  Water flush  40 ml every hour  Modulars: None not indicated at this time   Enteral regimen at above goal to provide per 24 hours:  1584 calories, 71 grams protein and 1520 ml free fluid.    Above regimen: Intended to meet macronutrient goals  Labs:   Basic Metabolic Panel, Magnesium and Phosphorus active per nutrition parameters  POC Glucoses/SSI Active   Nutrition Related Medication Management:  Electrolyte Replacement:   Deferred prn protocol  CrCl less than 30mL/min   Continue prn potassium protocol per MD  Intravenous fluids:  Continue per provider order  Thiamine Not indicated  Bowel Regimen Active prn  Meals and Snacks:  Diet: Continue NPO and advance as medically appropriate  Medical Food Supplements:   Medical food supplement therapy:  None Deferred pt is NPO   Coordination of Nutrition Care:  Coordination with health care provider Princess ANGIE       Malnutrition Assessment:  Academy/A.S.P.E.N Clinical Malnutrition Criteria  Malnutrition Status: No malnutrition  Nutrition Focused Physical Exam: Deferred due to intubation    Nutrition Assessment:  Food/Nutrition Related History:   Daughter provides history. States that patient typically has a good appetite, eating 3 meals/day on average. Breakfast is typically her best meal. She eats a light lunch & dinner. She watches her potassium/phosphorus intake. No issues chewing/swallowing at baseline. Denies n/v/c/d prior to admit.      Do You Have Any Cultural, Rastafarian, or Ethnic Food Preferences?: No     Weight History:   EMR weight history review: 134# 8/1/24 (endocrinology), 136# 11/1/24 (endocrinology). Daughter reports 
Nutrition Assessment  Assessment Type: Reassess  Reason for visit:  Tube Feeding Management (Pulmonology)  Malnutrition Screening Tool Score: 1    Nutrition Intervention:   Food and/or Nutrient Delivery:   Enteral Nutrition:   Enteral Access: Orogastric  Continue  Formula: Peptide Based (Vital AF 1.2 Robert)  Goal Rate: Continuous 60 ml/hr  Change  Water flush  55 ml every 6 hours  Modulars: None not indicated at this time   Enteral regimen at above goal to provide per 24 hours:  1584 calories, 99 grams protein and 1291 ml free fluid.    Above regimen: Intended to meet macronutrient goals Restricted fluid provisions related to  dialysis, low Na  Labs:   Basic Metabolic Panel, Magnesium and Phosphorus active per nutrition parameters  POC Glucoses/SSI Active   Nutrition Related Medication Management:  Electrolyte Replacement:   Continue prn protocol  CrCl less than 30mL/min and Active per MD    Intravenous fluids:  Not applicable  Thiamine Not indicated  Bowel Regimen Active prn  Coordination of Nutrition Care:  Coordination with health care provider [place] ADAMS       Malnutrition Assessment:  Academy[facility]S.P.[facility]N Clinical Malnutrition Criteria  Malnutrition Status: No malnutrition  Nutrition Focused Physical Exam:  No marilu wasting on visual exam    Nutrition Assessment:  Food/Nutrition Related History:   Daughter provides history. States that patient typically has a good appetite, eating 3 meals/day on average. Breakfast is typically her best meal. She eats a light lunch & dinner. She watches her potassium/phosphorus intake. No issues chewing/swallowing at baseline. Denies n/v/c/d prior to admit.      Do You Have Any Cultural, Scientologist, or Ethnic Food Preferences?: No     Weight History:   EMR weight history review: 134# 8/1/24 (endocrinology), 136# 11/1/24 (endocrinology). Daughter reports that during pt's last admit she said her UBW was ~145 lbs. Bedscale weight of 136 lbs today.     Nutrition Background:       [facility] 
Nutrition Assessment  Assessment Type: Reassess  Reason for visit:  Tube Feeding Management (Pulmonology)  Malnutrition Screening Tool Score: 1    Nutrition Intervention:   Food and/or Nutrient Delivery:   Enteral Nutrition:   Enteral Access: Orogastric  Continue  Formula: Renal (Nepro with Carbsteady)  Goal Rate: Continuous 40 ml/hr  Continue  Water flush  40 ml every hour  Modulars: None not indicated at this time   Enteral regimen at above goal to provide per 24 hours:  1584 calories, 71 grams protein and 1520 ml free fluid.    Above regimen: Intended to meet macronutrient goals  Labs:   Basic Metabolic Panel, Magnesium and Phosphorus active per nutrition parameters  POC Glucoses/SSI Active   Nutrition Related Medication Management:  Electrolyte Replacement:   Deferred prn protocol  CrCl less than 30mL/min   Continue prn potassium protocol per MD  Intravenous fluids:  Continue per provider order  Thiamine Not indicated  Bowel Regimen Active prn  Coordination of Nutrition Care:  Coordination with health care provider Nyasia ADAMS       Malnutrition Assessment:  Academy/A.S.P.E.N Clinical Malnutrition Criteria  Malnutrition Status: No malnutrition  Nutrition Focused Physical Exam:  No marilu wasting on visual exam    Nutrition Assessment:  Food/Nutrition Related History:   Daughter provides history. States that patient typically has a good appetite, eating 3 meals/day on average. Breakfast is typically her best meal. She eats a light lunch & dinner. She watches her potassium/phosphorus intake. No issues chewing/swallowing at baseline. Denies n/v/c/d prior to admit.      Do You Have Any Cultural, Baptist, or Ethnic Food Preferences?: No     Weight History:   EMR weight history review: 134# 8/1/24 (endocrinology), 136# 11/1/24 (endocrinology). Daughter reports that during pt's last admit she said her UBW was ~145 lbs. Bedscale weight of 136 lbs today.     Nutrition Background:       PMH significant for DM 2, ESRD on HD 
Nutrition Assessment  Assessment Type: Reassess  Reason for visit:  Tube Feeding Management (Pulmonology)  Malnutrition Screening Tool Score: 1    Nutrition Intervention:   Food and/or Nutrient Delivery:   Enteral Nutrition:   Plan to remove NGT per provider discussion with Dr. Will luis per discussion with provider  Meals and Snacks:  Diet: Continue current diet per SLP evaluation  Medical Food Supplements:   Medical food supplement therapy:  Initiate Nepro with Carbsteady (renal oral supplement) 420 calories, 19 grams protein per 8 ounce serving  Coordination of Nutrition Care:  Coordination with health care provider Provider, Will and RN, Al       Malnutrition Assessment:  Academy/A.S.P.E[facility]N Clinical Malnutrition Criteria  Malnutrition Status: No malnutrition  Nutrition Focused Physical Exam:  No marilu wasting on visual exam    Nutrition Assessment:  Food/Nutrition Related History:   Daughter provides history. States that patient typically has a good appetite, eating 3 meals/day on average. Breakfast is typically her best meal. She eats a light lunch & dinner. She watches her potassium/phosphorus intake. No issues chewing/swallowing at baseline. Denies n/v/c/d prior to admit.      Do You Have Any Cultural, Buddhist, or Ethnic Food Preferences?: No     Weight History:   EMR weight history review: 134# 8/1/24 (endocrinology), 136# 11/1/24 (endocrinology). Daughter reports that during pt's last admit she said her UBW was ~145 lbs. Bedscale weight of 136 lbs today.     Nutrition Background:       PMH significant for DM 2, ESRD on HD MWF, HTN, HLD, hyperparathyroidism, chronic anemia, and thyroid disease. She is found by her family in status elipticus prior to calling EMS. Due to airway compromise she was emergently intubated. She was admitted with hyperthermia, and UTI.  4/20: MRI Brain - no acute changes.  HD initiated 4/18. Resumed HD 4/21 and 4/22 for contrast clearance.   WC note 4/18 with fragile scars 
Nutrition Assessment  Assessment Type: Reassess  Reason for visit:  Tube Feeding Management (Pulmonology)  Malnutrition Screening Tool Score: 1    Nutrition Intervention:   Food and/or Nutrient Delivery:   Meals and Snacks:  Diet: Continue current diet per SLP evaluation  Medical Food Supplements:   Medical food supplement therapy:  Continue Nepro with Carbsteady (renal oral supplement) 420 calories, 19 grams protein per 8 ounce serving  Coordination of Nutrition Care:  Coordination with health care provider RNElo and Shiela ARRINGTON       Malnutrition Assessment:  Academy/A.S.P.E.N Clinical Malnutrition Criteria  Malnutrition Status: No malnutrition  Nutrition Focused Physical Exam:  No marilu wasting on visual exam    Nutrition Assessment:  Food/Nutrition Related History:   Daughter provides history. States that patient typically has a good appetite, eating 3 meals/day on average. Breakfast is typically her best meal. She eats a light lunch & dinner. She watches her potassium/phosphorus intake. No issues chewing/swallowing at baseline. Denies n/v/c/d prior to admit.      Do You Have Any Cultural, Oriental orthodox, or Ethnic Food Preferences?: No     Weight History:   EMR weight history review: 134# 8/1/24 (endocrinology), 136# 11/1/24 (endocrinology). Daughter reports that during pt's last admit she said her UBW was ~145 lbs. Bedscale weight of 136 lbs today.     Nutrition Background:       PMH significant for DM 2, ESRD on HD MWF, HTN, HLD, hyperparathyroidism, chronic anemia, and thyroid disease. She is found by her family in status elipticus prior to calling EMS. Due to airway compromise she was emergently intubated. She was admitted with hyperthermia, and UTI.  4/20: MRI Brain - no acute changes.  HD initiated 4/18. Resumed HD 4/21 and 4/22 for contrast clearance.   WC note 4/18 with fragile scars and blister to sacrum/buttocks  4/28: Extubated, NG placed for primary needs.   Last HD 4/24. SLED 4/28  Nutrition 
Nutrition Assessment  Assessment Type: Reassess  Reason for visit:  Tube Feeding Management (Pulmonology)  Malnutrition Screening Tool Score: 1    Nutrition Intervention:   Food and/or Nutrient Delivery:   Meals and Snacks:  Diet: Continue current diet per SLP evaluation  Medical Food Supplements:   Medical food supplement therapy:  Continue Nepro with Carbsteady (renal oral supplement) 420 calories, 19 grams protein per 8 ounce serving  Coordination of Nutrition Care:  Coordination with health care provider SLPAf[place]       Malnutrition Assessment:  Academy/A.S.P.E.N Clinical Malnutrition Criteria  Malnutrition Status: No malnutrition  Nutrition Focused Physical Exam:  No marilu wasting on visual exam    Nutrition Assessment:  Food/Nutrition Related History:   Daughter provides history. States that patient typically has a good appetite, eating 3 meals/day on average. Breakfast is typically her best meal. She eats a light lunch & dinner. She watches her potassium/phosphorus intake. No issues chewing/swallowing at baseline. Denies n/v/c/d prior to admit.      Do You Have Any Cultural, Hindu, or Ethnic Food Preferences?: No     Weight History:   EMR weight history review: 134# 8/1/24 (endocrinology), 136# 11/1/24 (endocrinology). Daughter reports that during pt's last admit she said her UBW was ~145 lbs. Bedscale weight of 136 lbs today.     Nutrition Background:       PMH significant for DM 2, ESRD on HD MWF, HTN, HLD, hyperparathyroidism, chronic anemia, and thyroid disease. She is found by her family in status elipticus prior to calling EMS. Due to airway compromise she was emergently intubated. She was admitted with hyperthermia, and UTI.  4/20: MRI Brain - no acute changes.  HD initiated 4/18. Resumed HD 4/21 and 4/22 for contrast clearance.   WC note 4/18 with fragile scars and blister to sacrum/buttocks  4/28: Extubated, NG placed for primary needs.   Last HD 4/24. SLED 4/28  Nutrition 
OT Note:    OT attempted to see patient for therapy today. Pt off the floor for dialysis. Will re-attempt to see patient at a later date/time.    NIXON Tobar    
Patient's OGT reinserted after noting it was pulled out to 35cm. Replaced to 55cm. Dr. Madrid reviewed KUB images, OGT placement confirmed, okay to give patient meds and restart tube feedings.    
Physical Therapy Note:    Attempted to see patient this PM for physical therapy treatment  session. Patient BRYANNA at dialysis. Will follow and re-attempt as schedule permits/patient available. Thank you,    Jose Goncalves, PT     Rehab Caseload Tracker    
Please call pt 's daughter if pt goes to dialysis tomorrow and if there are any changes in her condition.  
RRT Clinical Rounding Nurse Update    Vitals:    05/05/25 1200 05/05/25 1210 05/05/25 1328 05/05/25 1600   BP: 135/69 (!) 148/73 (!) 125/52 (!) 164/78   Pulse: 67 68  90   Resp: 17   17   Temp: 98 °F (36.7 °C)   98.8 °F (37.1 °C)   TempSrc: Oral   Oral   SpO2: 98%   97%   Weight:       Height:            DETERIORATION INDEX SCORE: 36    ASSESSMENT:  Previous outreach assessment was reviewed. There have been no significant changes since previous assessment.    PLAN:  Will follow per RRT Clinical Rounding Program protocol.    Rashida Dixon RN  [place]: [phone]  [place]: [phone]   
Rapid Response Team Note      Subjective: Responded to Rapid Response.    Summary: Patient in dialysis, rapid response called d/t hypotension (bp 60/30) and pt minimally responsive after 25 min of HD. Pt rinsed back immediately, bp improved (bp 96/43 and repeated at 156/76) and patient back to baseline. Alert to name and place and can follow simple commands.       See Rapid Response/Code Blue Narrator for full documentation    Outcome: Patient to remain in current location. Will follow-up per Rapid Response Team Clinical Rounding protocol.      Rashida Dixon RN  [facility]: [phone]  [facility]: [phone]   
Respiratory Mechanics completed and are as follows:  RR: 21 bpm  Ve: 7L  VT: 325  RSBI: 65  NIF: -22 cm H20  Leak test passed    Patient extubated to a 2L NC. Patient is able to communicate and is negative for stridor. Breath sounds are diminished. No complications with extubation.     Dain Pulido RCP   
Spiritual Health History and Assessment/Progress Note  [facility]      Room # 3304/01    Name: Nataliia Pradhan           Age: 80 y.o.    Gender: female          MRN: [id number]  Bahai: Rastafari       Preferred Language: English      Date: 04/27/25  Visit Time: Begin Time: 1150 End Time : 1200  Complexity of Encounter: Low      Visit Summary:  met with patient and family in response to IDT rounds. Patient appeared to be resting comfortably at the time.  Family was at bedside and supportive, and also confirmed that patient needs rest. Family was receptive of prayer when offered.   provided pastoral presence, prayer and empathetic listening.   will continue to follow in CCU.    Referral/Consult From: Rounding   Encounter Overview/Reason: Follow-up  Service Provided For: Patient     Patient was not available. Patient was unable to communicate.    Elo, Belief, Meaning:   Patient is connected with a elo tradition or spiritual practice  Family/Friends are connected with a elo tradition or spiritual practice and have beliefs or practices that help with coping during difficult times  Rituals (if applicable)      Importance and Influence:  Patient unable to assess at this time  Family/Friends does not have beliefs influential to healthcare decision-making identified during this visit    Community:  Patient   is not connected with a spiritual community, , Support system includes , and Children, Family members   Family/Friends   is not connected with a spiritual community, , indicated that they feel well-supported.  , Support system includes , and Children, Family members     Assessment and Plan of Care:   Emotions Expressed by Patient:   Assessment: Calm, Coping    Interventions by :   Intervention: Prayer    Result/ Response by Patient:   Outcome: patient is resting    Patient Plan of Care:   Plan and Referrals  Plan/Referrals: Continue to visit, (comment)     Emotions 
Spiritual Health History and Assessment/Progress Note  [facility]      Room # 705/01    Name: Nataliia Pradhan           Age: 80 y.o.    Gender: female          MRN: [id number]  Catholic: Sabianist       Preferred Language: English      Date: 05/05/25  Visit Time: Begin Time: 0747 End Time : 0822  Complexity of Encounter: Moderate      Visit Summary:  responded to Rapid Response.  prayed silently for Patient, Patient's Family, and Staff. When appropriate,  talked with Patient. Patient was not able to talk, but nodded in response to questions.  offered empathetic spiritual presence, emotional and spiritual support,and prayer.  checked in with RN.  is available for emergent needs.     Referral/Consult From: Rounding   Encounter Overview/Reason: Crisis  Type: Rapid Response  Service Provided For: Patient     Patient was available.    Elo, Belief, Meaning:   Patient :Patient was able to communicate through nods importance of Lucina elo.   Family/Friends No family/friends present  Rituals (if applicable)      Importance and Influence:  Patient unable to assess at this time  Family/Friends No family/friends present    Community:  Patient   unable to assess at this time   Family/Friends   No friends/family present    Assessment and Plan of Care:   Emotions Expressed by Patient:   Assessment: Calm, Coping    Interventions by :   Intervention: Prayer (assurance of)/[place], Sustaining Presence/Ministry of presence     Result/ Response by Patient:   Outcome: Comfort, Coping, Engaged in conversation, Expressed feelings, needs, and concerns, Receptive    Patient Plan of Care:   Plan and Referrals  Plan/Referrals: Continue Support (comment)       Electronically signed by Chaplain AMOR, on 5/5/2025 at 9:50 AM.  [facility]  [facility]  [phone]   
Spiritual Health History and Assessment/Progress Note  [facility]      Room # 3304/01    Name: Nataliia Pradhan           Age: 80 y.o.    Gender: female          MRN: [id number]  Denominational: Nondenominational       Preferred Language: English      Date: 04/22/25  Visit Time: Begin Time: 1100 End Time : 1110 Complexity: Complexity of Encounter: Low      Visit Summary:  attempted to visit patient.  Patient appeared to be sleeping comfortably at time.  also attended IDR to assess for spiritual care needs.  provided prayer and iwill continue to follow in CCU.      Referral/Consult From: Referral/Consult From: Rounding  Encounter Overview/Reason: Encounter Overview/Reason: Follow-up  Service Provided For: Service Provided For: Patient     Patient was not available. Patient was asleep.  will follow-up at a later time.    Elo, Belief, Meaning:   Patient is connected with a elo tradition or spiritual practice  Family/Friends No family/friends present  Rituals      Importance and Influence:  Patient does not have beliefs influential to healthcare decision-making identified during this visit  Family/Friends No family/friends present    Community:  Patient   Support system includes  and Extended family    Assessment and Plan of Care:   Emotions Expressed by Patient:   Assessment: Unable to assess    Interventions by :   Intervention: Prayer (assurance of)/[place]     Result/ Response by Patient:   Outcome: Did not respond    Patient Plan of Care:      will continue to follow in CCU.      Electronically signed by Chaplain Kathy, on 4/22/2025 at 11:52 AM.  Spiritual Health  [facility]  [phone]   
Spiritual Health History and Assessment/Progress Note  [facility]      Room # 705/01    Name: Nataliia Pradhan           Age: 80 y.o.    Gender: female          MRN: [id number]  Methodist: Oriental orthodox       Preferred Language: English      Date:5/5/2025  Visit Time: Begin Time: 1400 End Time : 1421  Complexity of Encounter: Low      Visit Summary:  followed-up on Patient's Rapid Response. Patient was in bed awake but was tired.  offered calming spiritual presence and prayer. Later  followed-up with Chaplain Graham. Patient was in bed awake and surrounded by Family. Chaplains introduced selves. Family expressed gratitude for care that Patient and Family have received from Chaplains especially Chaplain Ambrocio in ICU. Chaplains checked for unmet needs and offered support.    Referral/Consult From: Rounding   Encounter Overview/Reason: Follow-up  Type: Follow up, Family Care  Service Provided For: Patient, Patient and family together     Patient was available.    Elo, Belief, Meaning:   Patient identifies as spiritual, is connected with a elo tradition or spiritual practice, and has beliefs or practices that help with coping during difficult times  Family/Friends identifies as spiritual, are connected with a elo tradition or spiritual practice, and have beliefs or practices that help with coping during difficult times  Rituals (if applicable)      Importance and Influence:  Patient unable to assess at this time  Family/Friends Other: unable to assess    Community:  Patient   indicated that they feel well-supported.  , Support system includes  Children, Family members   Family/Friends   indicated that they feel well-supported.  , Support system includes , and Extended family    Assessment and Plan of Care:   Emotions Expressed by Patient:   Assessment: Calm, Coping, Peaceful, Hopeful, Sad    Interventions by :   Intervention: Active listening, Discussed belief 
TRANSFER - IN REPORT:    Verbal report received from ANGIE Gasca on Nataliia Pradhan  being received from 7th floor for routine progression of patient care      Report consisted of patient's Situation, Background, Assessment and   Recommendations(SBAR).     Information from the following report(s) Nurse Handoff Report was reviewed with the receiving nurse.    Opportunity for questions and clarification was provided.      Assessment completed upon patient's arrival to unit and care assumed.    
TRANSFER - IN REPORT:    Verbal report received from Fariba on Nataliia Pradhan  being received from ed for routine progression of patient care      Report consisted of patient's Situation, Background, Assessment and   Recommendations(SBAR).     Information from the following report(s) Nurse Handoff Report, MAR, Recent Results, and Cardiac Rhythm SR  was reviewed with the receiving nurse.    Opportunity for questions and clarification was provided.        
TRANSFER - OUT REPORT:    Verbal report given to ANGIE Martinez on Nataliia Pradhan  being transferred to [facility] for routine progression of patient care       Report consisted of patient's Situation, Background, Assessment and   Recommendations(SBAR).     Information from the following report(s) Nurse Handoff Report, Intake/Output, MAR, Recent Results, and Neuro Assessment was reviewed with the receiving nurse.           Lines:   Peripheral IV 05/11/25 Posterior;Right Hand (Active)   Site Assessment Clean, dry & intact 05/11/25 2047   Line Status Normal saline locked;Flushed;Capped 05/11/25 2047   Line Care Cap changed;Connections checked and tightened 05/11/25 2047   Phlebitis Assessment No symptoms 05/11/25 2047   Infiltration Assessment 0 05/11/25 2047   Alcohol Cap Used Yes 05/11/25 2047   Dressing Status Clean, dry & intact 05/11/25 2047   Dressing Type Transparent 05/11/25 2047   Dressing Intervention New 05/11/25 1017       Hemodialysis Central Access - Permanent/Tunneled Right Subclavian (Active)   Continued need for line? Yes 05/12/25 0750   Site Assessment Clean, dry & intact 05/12/25 0750   Blue Lumen Status Brisk blood return;Flushed;Infusing 05/12/25 0750   Red Lumen Status Brisk blood return;Flushed;Infusing 05/12/25 0750   Line Care Connections checked and tightened;Ports disinfected 05/12/25 0750   Dressing Type Antimicrobial;Sterile dressing, transparent 05/12/25 0750   Date of Last Dressing Change 05/12/25 05/12/25 0750   Dressing Status Clean, dry & intact 05/12/25 0750   Dressing Intervention New;Dressing changed 05/12/25 0540        Opportunity for questions and clarification was provided.      Patient transported with:  Tech       
TRANSFER - OUT REPORT:    Verbal report given to ANGIE Preston on Nataliia Pradhan  being transferred to [facility] for routine progression of patient care       Report consisted of patient's Situation, Background, Assessment and   Recommendations(SBAR).     Information from the following report(s) Nurse Handoff Report, Adult Overview, Intake/Output, and Neuro Assessment was reviewed with the receiving nurse.           Lines:   Peripheral IV 05/11/25 Posterior;Right Hand (Active)   Site Assessment Clean, dry & intact 05/11/25 2047   Line Status Normal saline locked;Flushed;Capped 05/11/25 2047   Line Care Cap changed;Connections checked and tightened 05/11/25 2047   Phlebitis Assessment No symptoms 05/11/25 2047   Infiltration Assessment 0 05/11/25 2047   Alcohol Cap Used Yes 05/11/25 2047   Dressing Status Clean, dry & intact 05/11/25 2047   Dressing Type Transparent 05/11/25 2047   Dressing Intervention New 05/11/25 1017       Hemodialysis Central Access - Permanent/Tunneled Right Subclavian (Active)   Continued need for line? Yes 05/12/25 0750   Site Assessment Clean, dry & intact 05/12/25 0750   Blue Lumen Status Brisk blood return;Flushed;Infusing 05/12/25 0750   Red Lumen Status Brisk blood return;Flushed;Infusing 05/12/25 0750   Line Care Connections checked and tightened;Ports disinfected 05/12/25 0750   Dressing Type Antimicrobial;Sterile dressing, transparent 05/12/25 0750   Date of Last Dressing Change 05/12/25 05/12/25 0750   Dressing Status Clean, dry & intact 05/12/25 0750   Dressing Intervention New;Dressing changed 05/12/25 0540        Opportunity for questions and clarification was provided.      Patient transported with:  Tech       
Upon arrival to CCU pt given CHG bath and placed on monitor.  HD cath present with gauze dressing, dressing changed to CHG dressing.   
Ventilator check complete; patient has a #7.0 ET tube secured at the 23 at the lip.  Patient is  sedated.  Patient is not able to follow commands.  Breath sounds are clear.  Trachea is midline, Negative for subcutaneous air, and chest excursion is symmetric. Patient is also Negative for cyanosis and is Negative for pitting edema.  All alarms are set and audible.  Resuscitation bag is  at the head of the bed.      Ventilator Settings  Mode FIO2 Rate Tidal Volume Pressure PEEP I:E Ratio   AC/PRVC  30 % 14 bpm     400 mL      8 cmH2O 1:2.8      Peak airway pressure:   15 cmH2O  Minute ventilation:   9.89 l/min          Polina Rivas RCP  
Ventilator check complete; patient has a #7.0 ET tube secured at the 23 at the lip.  Patient is  sedated.  Patient is not able to follow commands.  Breath sounds are diminished.  Trachea is midline, Negative for subcutaneous air, and chest excursion is symmetric. Patient is also Negative for cyanosis and is Negative for pitting edema.  All alarms are set and audible.  Resuscitation bag is  at the head of the bed.      Ventilator Settings  Mode FIO2 Rate Tidal Volume Pressure PEEP I:E Ratio   AC/PRVC  30 % 14     400    8   1.2.4          Lauren Magana RCP  
Ventilator check complete; patient has a #7.0 ET tube secured at the 23 at the lip.  Patient is  sedated.  Patient is not able to follow commands.  Breath sounds are diminished.  Trachea is midline, Negative for subcutaneous air, and chest excursion is symmetric. Patient is also Negative for cyanosis and is Negative for pitting edema.  All alarms are set and audible.  Resuscitation bag is  at the head of the bed.      Ventilator Settings  Mode FIO2 Rate Tidal Volume Pressure PEEP I:E Ratio   CPAP/PS  21 % 19   329   12 cm H2O  8   1:1.7      Peak airway pressure:   20  Minute ventilation:   6.46    MARKUS WHYTE RCP    
Ventilator check complete; patient has a #7.0 ET tube secured at the 23 at the lip.  Patient is not sedated.  Patient is  able to follow commands.  Breath sounds are diminished and rhonchi.  Trachea is midline, Negative for subcutaneous air, and chest excursion is symmetric. Patient is also Negative for cyanosis and is Negative for pitting edema.  All alarms are set and audible.  Resuscitation bag is  at the head of the bed.      Ventilator Settings  Mode FIO2 Rate Tidal Volume Pressure PEEP I:E Ratio   CPAP/PS  21 %       12 cm H2O  8 1:1.6      Peak airway pressure:     Minute ventilation:       ABG: No results for input(s): \"PH\", \"PCO2\", \"PO2\", \"HCO3\" in the last 72 hours.      Dorian Worley RCP  
Ventilator check complete; patient has a #7.0 ET tube secured at the 23 at the lip.  Patient is not sedated.  Patient is  able to follow commands.  Breath sounds are diminished.  Trachea is midline, Negative for subcutaneous air, and chest excursion is symmetric. Patient is also Negative for cyanosis and is Negative for pitting edema.  All alarms are set and audible.  Resuscitation bag is  at the head of the bed.      Ventilator Settings  Mode FIO2 Rate Tidal Volume Pressure PEEP I:E Ratio   CPAP/PS  21 %       12 cm H2O  8 cmH2O   1:2      Peak airway pressure: 20 cmH2O    Minute ventilation: 5.62 L/min            Joanna Gutiérrez RCP  
Ventilator check complete; patient has a #7.0 ET tube secured at the 23 at the lip.  Patient is not sedated.  Patient is  able to follow commands.  Breath sounds are diminished.  Trachea is midline, Negative for subcutaneous air, and chest excursion is symmetric. Patient is also Negative for cyanosis and is Negative for pitting edema.  All alarms are set and audible.  Resuscitation bag is  at the head of the bed.      Ventilator Settings  Mode FIO2 Rate Tidal Volume Pressure PEEP I:E Ratio   SIMV/PRVC  21 %   14 400 10 cm H2O  8 1:3.3      Peak airway pressure:   29 cm H2O  Minute ventilation:   6.26 l/min          Nathaniel Elizondo RCP    
Ventilator check complete; patient has a #7.0 ET tube secured at the 23 at the lip.  Patient is not sedated.  Patient is  able to follow commands.  Breath sounds are diminished.  Trachea is midline, Negative for subcutaneous air, and chest excursion is symmetric. Patient is also Negative for cyanosis and is Negative for pitting edema.  All alarms are set and audible.  Resuscitation bag is  at the head of the bed.      Ventilator Settings  Mode FIO2 Rate Tidal Volume Pressure PEEP I:E Ratio   SIMV/PRVC  21 %   14bpm 400ml   10 cm H2O    8smH2O 1:3.3      Peak airway pressure:   [id number]  Minute ventilation:   5.62L/MIN          Mary Carmen Franz RCP  
Ventilator check complete; patient has a #7.0 ET tube secured at the 23 at the lip.  Patient is not sedated.  Patient is  able to follow commands.  Breath sounds are diminished.  Trachea is midline, Negative for subcutaneous air, and chest excursion is symmetric. Patient is also Negative for cyanosis and is Negative for pitting edema.  All alarms are set and audible.  Resuscitation bag is  at the head of the bed.      Ventilator Settings  Mode FIO2 Rate Tidal Volume Pressure PEEP I:E Ratio   SIMV/PRVC  21 % 14  400  10 cm H2O  8  1:3.3      Peak airway pressure:  26 cmH2O  Minute ventilation: 5.8 L/min    Nataliia remains intubated on above settings, tolerating well.  She has had a small amount of clear/white, thick/thin secretions throughout the day.  Her lips and tongue remain swollen.    CONSUELO RUIZ, KIMBERLEYP      
Ventilator check complete; patient has a #7.0 ET tube secured at the 23 at the lip.  Patient is not sedated.  Patient is  able to follow commands.  Breath sounds are diminished.  Trachea is midline, Negative for subcutaneous air, and chest excursion is symmetric. Patient is also Negative for cyanosis and is Negative for pitting edema.  All alarms are set and audible.  Resuscitation bag is  at the head of the bed.      Ventilator Settings  Mode FIO2 ~ Rate Tidal Volume Pressure Support PEEP I:E Ratio   CPAP/PS  21 % 18 - 22 bpm 320 - 365 mL  8 cm H20 8 cm H20 1:1.6      Peak airway pressure: 16 cm H20  Minute ventilation: 7 L      Dain Pulido RCP   
Ventilator check complete; patient has a #7.0 ET tube secured at the 23 at the lip.  Patient is not sedated.  Patient is able to follow commands.  Breath sounds are diminished.  Trachea is midline, negative for subcutaneous air, and chest excursion is symmetric. Patient is also negative for cyanosis and is negative for pitting edema.  All alarms are set and audible.  Resuscitation bag is at the head of the bed.      Ventilator Settings  Mode FIO2 Rate Tidal Volume Pressure PEEP I:E Ratio     P/S 21%       12 cm H2O  8 cm H20 1:2.2     Peak airway pressure:   20 cm H20  Minute ventilation:   6.4 l/m           Pearl Todd RCP  
Ventilator check complete; patient has a #7.0 ET tube secured at the 23 at the lip.  Patient is not sedated.  Patient is not able to follow commands.  Breath sounds are clear and diminished.  Trachea is midline, Negative for subcutaneous air, and chest excursion is symmetric. Patient is also Negative for cyanosis and is Negative for pitting edema.  All alarms are set and audible.  Resuscitation bag is  at the head of the bed.      Ventilator Settings  Mode FIO2 Rate Tidal Volume Pressure PEEP I:E Ratio   (P) SIMV/PRVC  (P) 40 %  14  400 (P) 10 cm H2O  8 (P) 1:3.3      Peak airway pressure:   25  Minute ventilation:5.64       ABG: See in Results    ANGELES JOHNSTON RCP  
Ventilator check complete; patient has a #7.0 ET tube secured at the 23 at the lip.  Patient is not sedated.  Patient is not able to follow commands.  Breath sounds are clear.  Trachea is midline, Negative for subcutaneous air, and chest excursion is symmetric. Patient is also Negative for cyanosis and is Negative for pitting edema.  All alarms are set and audible.  Resuscitation bag is  at the head of the bed.      Ventilator Settings  Mode FIO2 Rate Tidal Volume Pressure PEEP I:E Ratio   CPAP/PS  21 %       10 cm H2O    8      Peak airway pressure:   18  Minute ventilation:   8.9      Olive Lundberg RCP  
Ventilator check complete; patient has a #7.0 ET tube secured at the 23 at the lip.  Patient is not sedated.  Patient is not able to follow commands.  Breath sounds are diminished.  Trachea is midline, Negative for subcutaneous air, and chest excursion is symmetric. Patient is also Negative for cyanosis and is Negative for pitting edema.  All alarms are set and audible.  Resuscitation bag is  at the head of the bed.      Ventilator Settings  Mode FIO2 Rate Tidal Volume Pressure PEEP I:E Ratio   SIMV/PRVC  21 %       10 cm H2O     1:3.3      Peak airway pressure:     Minute ventilation:       ABG: No results for input(s): \"PH\", \"PCO2\", \"PO2\", \"HCO3\" in the last 72 hours.      Erin Hahn RCP  
Ventilator check complete; patient has a #7.0 ET tube secured at the 23 at the lip.  Patient is not sedated.  Patient is not able to follow commands.  Breath sounds are diminished.  Trachea is midline, Negative for subcutaneous air, and chest excursion is symmetric. Patient is also Negative for cyanosis and is Negative for pitting edema.  All alarms are set and audible.  Resuscitation bag is  at the head of the bed.      Ventilator Settings  Mode FIO2 Rate Tidal Volume Pressure PEEP I:E Ratio   SIMV/PRVC  21 % 14   400   10 cm H2O  8   1:3.0      Ms.Lorne is resting comfortably on above ventilator settings.  Her tongue and lips remain swollen.  She has had a small amount of white/clear, thin secretions throughout the day.    CONSUELO RUIZ, KIMBERLEYP   
Ventilator check complete; patient has a #7.0 ET tube secured at the 23 at the lip.  Patient is not sedated.  Patient is not able to follow commands.  Breath sounds are diminished.  Trachea is midline, Negative for subcutaneous air, and chest excursion is symmetric. Patient is also Negative for cyanosis and is Negative for pitting edema.  All alarms are set and audible.  Resuscitation bag is  at the head of the bed.      Ventilator Settings  Mode FIO2 Rate Tidal Volume Pressure PEEP I:E Ratio   SIMV/PRVC  21 % 14   400 10 cm H2O  8 1:2.1      Peak airway pressure:   29 cm H2O  Minute ventilation:   6.3 l/min        Nathaniel Elizondo RCP    
Ventilator check complete; patient has a #7.0 ET tube secured at the 23 at the lip.  Patient is not sedated. Breath sounds are clear and diminished.  Trachea is midline, Negative for subcutaneous air, and chest excursion is symmetric. Patient is also Negative for cyanosis and is Negative for pitting edema.  All alarms are set and audible.  Resuscitation bag is  at the head of the bed.      Ventilator Settings  Mode FIO2 Rate Tidal Volume Pressure PEEP I:E Ratio   CPAP/PS  21 %       8 cm H2O  8 cmH2O 1:1.9      Peak airway pressure:  16 cmH2O  Minute ventilation:  5.84 l/min.      Niles Lockhart RCP  
Ventilator check complete; patient has a #7.0 ET tube secured at the 23 at the lip.  Patient is not sedated. Breath sounds are diminished.  Trachea is midline, Negative for subcutaneous air, and chest excursion is symmetric. Patient is also Negative for cyanosis and is Negative for pitting edema.  All alarms are set and audible.  Resuscitation bag is  at the head of the bed.      Ventilator Settings  Mode FIO2 Rate Tidal Volume Pressure PEEP I:E Ratio   CPAP/PS  21 %       8 cm H2O  8 cmH2O   1:1.5      Peak airway pressure: 16 cmH2O    Minute ventilation: 6.46 L/min            Joanna Gutiérrez RCP  
0828  Last data filed at 4/25/2025 1813  Gross per 24 hour   Intake 1380.11 ml   Output 100 ml   Net 1280.11 ml     Constitutional: on vent  EENMT:  Sclera clear, pupils equal, oral mucosa moist  Respiratory: clear  Cardiovascular: RRR  Gastrointestinal:  soft with no tenderness; positive bowel sounds present  Musculoskeletal:  warm with no cyanosis, no lower extremity edema  Skin:  no jaundice or ecchymosis  Neurologic: blinking, no command following or tracking  Psychiatric: calm    CXR: 4/24      Recent Labs     04/25/25  0340   WBC 5.9   HGB 10.4*   HCT 32.3*   *     Recent Labs     04/23/25  1504 04/24/25  0338 04/25/25  0340 04/25/25  1830   NA  --  132* 135*  --    K 3.8 3.6 3.6  --    CL  --  98 99  --    CO2  --  24 26  --    GLUCOSE  --  195* 258*  --    BUN  --  27* 19  --    CREATININE  --  3.95* 2.95*  --    MG  --  2.0 1.9  --    PHOS 2.6 2.9 2.2* 3.9     No results for input(s): \"TROPHS\", \"NTPROBNP\", \"CRP\", \"ESR\" in the last 72 hours.    Recent Labs     04/24/25  0338 04/25/25  0340   GLUCOSE 195* 258*      ECHO:   TRANSTHORACIC ECHOCARDIOGRAM (TTE) COMPLETE (CONTRAST/BUBBLE/3D PRN) 01/19/2023    Interpretation Summary    Left Ventricle: Normal left ventricular systolic function. EF by 2D Simpsons Biplane is 60%. Left ventricle size is normal. Normal wall thickness. Normal wall motion. Normal diastolic function.    Tricuspid Valve: The estimated RVSP is 26 mmHg.    Left Atrium: Left atrium is dilated. LA Vol Index is  45 ml/m2.    Aorta: Ao root diameter is 3.0 cm. Ao Root Index is 1.68 cm/m2.    Technical qualifiers: Color flow Doppler was performed and pulse wave and/or continuous wave Doppler was performed.    Antibiotics:  Inpat Anti-Infectives (From admission, onward)       Start     Ordered Stop    04/20/25 1100  cefTRIAXone (ROCEPHIN) 1,000 mg in sterile water 10 mL IV syringe  1,000 mg,   IntraVENous,   EVERY 24 HOURS         04/20/25 1000 04/25/25 1059             Microbiology: 
Monitoring/Evaluation:  4/17: OGT placed  4/19: Vital AF 1.2 initiated via OGT  4/20: changed Nepro r/t elevated labs  4/23: changed to peptide based due to electrolyte losses  4/28: changed to Nepro r/t renal labs trending up  5/2: puree diet per SLP on FEES, NGT removed and TF stopped    Pt seen reclined in bed. She stated she ate a little for breakfast. She stated she likes ONS. RD observed Ensure from nursing at bedside. Ensure at bedside ~50% consumed. Discussed with Dia ADAMS who reports pt took a few bites of breakfast this am. RN reports pt did well with ONS this am.     Current Nutrition Therapies:  ADULT DIET; Dysphagia - Pureed; 3 carb choices (45 gm/meal)  ADULT ORAL NUTRITION SUPPLEMENT; Breakfast, Lunch, Dinner; Renal Oral Supplement    Current Intake:   Average Meal Intake: 1-25% (per RN report) Average Supplements Intake: 26-50% (per RD observation of Ensure at bedside)      Anthropometric Measures:  Height: 162.6 cm (5' 4\")  Current Body Wt: 72 kg (158 lb 11.7 oz) (5/1), Weight source: Bed scale  BMI: 27.2, Overweight (BMI 25.0-29.9) (skewed due to fluid)  Admission Body Weight: 58.5 kg (129 lb) (4/17 actual)  Ideal Body Weight (Kg) (Calculated): 55 kg (120 lbs), 113.3 %  BMI Category Overweight (BMI 25.0-29.9) (skewed due to fluid)    Comparative Standards:  Energy (kcal/day): 5063-6967 (25-30 kcal/kg) (Kcal/kg Weight used: 55 kg Ideal  Protein (g/day): 55-72 (1-1.3 g/kg) Weight Used: (Ideal) 55 kg  Fluid (ml/day):   (Standard renal)    Nutrition Diagnosis:   Inadequate oral intake related to cognitive or neurological impairment, swallowing difficulty as evidenced by  (limited intake, diet per SLP)    Nutrition Goal(s):   Previous Goal Met: Progressing toward Goal(s)  Active Goal:  (po intake to meet >50% of needs by next RD assessment)  Type of Goal: Continue current goal    Discharge Planning:    Too soon to determine    Jyoti Abbasi RD    
chloride 10 mEq/100 mL IVPB (Peripheral Line)  10 mEq IntraVENous PRN    magnesium sulfate 2000 mg in 50 mL IVPB premix  2,000 mg IntraVENous PRN    ondansetron (ZOFRAN-ODT) disintegrating tablet 4 mg  4 mg Oral Q8H PRN    Or    ondansetron (ZOFRAN) injection 4 mg  4 mg IntraVENous Q6H PRN    polyethylene glycol (GLYCOLAX) packet 17 g  17 g Oral Daily PRN         Objective:     Vitals:    05/02/25 0804 05/02/25 0830 05/02/25 0900 05/02/25 0930   BP: (!) 168/66 (!) 162/73 (!) 158/67 (!) 150/61   Pulse: 63 68 67 71   Resp:       Temp: 97.7 °F (36.5 °C)      TempSrc: Oral      SpO2: 98%      Weight:       Height:         Intake and Output:   04/30 1901 - 05/02 0700  In: 2550   Out: -   No intake/output data recorded.    Physical Exam:   Constitutional:  the patient is well developed and in no acute distress, alert, oriented to self and place   HEENT:  oral mucosa moist  Lungs: clear bilaterally   Cardiovascular:  Regular rate, S1, S2, no Rub   Abd/GI: soft and non-tender; with positive bowel sounds.  Ext: warm without cyanosis. There is no lower leg edema.  Skin:  no jaundice or rashes  Neuro: no gross neuro deficits   Psychiatric: Calm.   Access: right TCC- intact      LAB  Recent Labs     04/30/25  0434 05/02/25  0457   WBC 5.4 6.0   HGB 8.4* 8.7*   HCT 27.6* 26.4*    150     Recent Labs     04/30/25  0434 05/01/25  0532 05/02/25  0457    134* 134*   K 4.3 3.9 4.0   CL 98 96* 95*   CO2 27 26 27   BUN 27* 21 34*   CREATININE 3.31* 2.66* 3.70*   MG 2.0  --  2.2   PHOS 3.7  --  3.5     No results for input(s): \"PH\", \"PCO2\", \"PO2\", \"HCO3\" in the last 72 hours.      Assessment/Plan:  (Medical Decision Making)   ESRD on hemodialysis typically Monday Wednesday Friday.  - HD today     HTN-off Cardene  - BP better controlled      Respiratory failure on mechanical ventilation with seizures and airway protection.- resolved     New status epilepticus-loaded with anticonvulsants.     5.  Hypokalemia- controlled    
continue NPO status. Speech therapy will continue to follow for reassessment.   GENERAL    Subjective: RN KT cleared patient to participate with speech therapy. Patient upright in bed, mentation continues to improve. Patient following commands and participating in basic conversation.     Reason for Consult:  dysphagia     History of Present Injury/Illness: Ms. Pradhan  has a past medical history of Anemia, DM (diabetes mellitus) ([facility]), End stage kidney disease (HCC), HTN (hypertension), Hyperlipemia, Hyperparathyroidism, and Thyroid disease.. She also  has a past surgical history that includes parathyroidectomy (11/11/2015); Refractive surgery (2011); Dialysis fistula creation (Left, 03/20/2023); AV fistula placement (03/2023); Dialysis fistula creation (Left, 6/12/2023); vascular surgery (Left, 11/14/2023); IR TUNNELED CVC PLACE WO SQ PORT/PUMP > 5 YEARS (11/14/2023); vascular surgery (Left, 4/13/2024); and vascular surgery (Left, 3/19/2025).  Precautions/Allergies: Prednisone     Observations:  Alertness: Alert  Voice: WFL  Speech: Intelligible  Expressive Language: Aphasic    Dysphagia History: No prior speech therapy noted in chart  Previous Instrumental Swallow Studies: No prior instrumental assessment located in chart or reported by patient.     Current Diet:  NPO    Respiratory Status: Room air    Pain:  Patient does not c/o pain  Pain intervention: None- No pain observed  Pain response: Patient satisfied    OBJECTIVE    Oropharyngeal Assessment: Patient upright in bed, agreeable to PO intake, requiring total feeding assistance. Consumed ice chips x 2, thin liquid via teaspoon x 3 and by straw sip x 1, and half teaspoon of applesauce x 1. Patient with anterior loss of bolus frequently. Overt indications of airway compromise with all trials of ice chips and x 1 of thin liquid via teaspoon and straw. No overt indications of airway compromise with applesauce. Suspect bolus holding versus delay in swallow 
glycol (GLYCOLAX) packet 17 g  17 g Oral Daily PRN    acetaminophen (TYLENOL) tablet 650 mg  650 mg Oral Q6H PRN    Or    acetaminophen (TYLENOL) suppository 650 mg  650 mg Rectal Q6H PRN       Exam:  Vitals:    04/25/25 0500 04/25/25 0600 04/25/25 0623 04/25/25 0755   BP: (!) 140/66 (!) 154/63 (!) 154/63    Pulse: 78 75     Resp:    17   Temp:       TempSrc:       SpO2: 99% 100%     Weight:    68 kg (150 lb)   Height:    1.626 m (5' 4\")         Intake/Output Summary (Last 24 hours) at 4/25/2025 0850  Last data filed at 4/25/2025 0646  Gross per 24 hour   Intake 1708.82 ml   Output 3220 ml   Net -1511.18 ml       Gen: intubated  HEENT: clear membranes  CV: S1, S2  Lungs: Coarse bilaterally  Extem: trace edema        Labs  Recent Labs     04/23/25  0509 04/25/25  0340   WBC 5.0 5.9   HGB 10.0* 10.4*   HCT 31.5* 32.3*   * 121*     Recent Labs     04/23/25  0509 04/23/25  1504 04/24/25  0338 04/25/25  0340   *  --  132* 135*   K 3.2* 3.8 3.6 3.6   CL 99  --  98 99   CO2 24  --  24 26   BUN 20  --  27* 19   CREATININE 3.19*  --  3.95* 2.95*   MG 2.0  --  2.0 1.9   PHOS 1.7* 2.6 2.9 2.2*         Problem List:      Assessment/Plan:    ESRD on hemodialysis typically Monday Wednesday Friday.    - Ordered dialysis for tomorrow    HTN-off Cardene    Respiratory failure on mechanical ventilation with seizures and airway protection.     New status epilepticus-loaded with anticonvulsants.    5.  Hypokalemia  Follow electrolyte replacement protocol        
  6. Hyponatremia- stable         MATTHEW Sheets - NP  [facility]    
67 16 (!) 187/68 97 %       Oxygen Therapy  SpO2: 99 %  Pulse Oximetry Type: Continuous  Pulse via Oximetry: 79 beats per minute  SPO2 High Alarm Limit: 100  SPO2 Low Alarm Limit POX: 90  Pulse Oximeter Device Mode: Continuous  Pulse Oximeter Device Location: Finger  O2 Device: None (Room air)  Oximetry Probe Site Changed: No  Skin Assessment: Clean, dry, & intact  Skin Protection for O2 Device: Yes  Orientation: Anterior, Bilateral  Location: Cheek  Intervention(s): Mouth Care  FiO2 : 21 %  PEEP/CPAP (cm H2O): 8 cm H20  O2 Flow Rate (L/min): 2 L/min  ETCO2 (mmHg): 33 mmHg  Blood Gas  Performed?: Yes  Malachi's Test #1: Collateral flow confirmed  Site #1: Right Radial  Site Prepped #1: Yes  Number of Attempts #1: 1  Pressure Held #1: Yes  Complications #1: None  Post-procedure #1: Standard  Specimen Status #1: Point of care  How Tolerated?: Tolerated well  Oxygen Therapy: None (Room air)  Vent Mode: CPAP/PS    Estimated body mass index is 26.14 kg/m² as calculated from the following:    Height as of this encounter: 1.626 m (5' 4\").    Weight as of this encounter: 69.1 kg (152 lb 4.8 oz).    Intake/Output Summary (Last 24 hours) at 5/11/2025 0743  Last data filed at 5/11/2025 0514  Gross per 24 hour   Intake --   Output 200 ml   Net -200 ml           Physical Exam:   General:          Elderly, lethargic, chronically ill-appearing, NAD, on room air.  ENT:                Nares appear normal.    Neck:               No restricted ROM.  Trachea midline   CV:                  RRR.  No m/r/g.  Tunneled catheter.  Lungs:             CTAB.  No wheezing, rhonchi, or rales.  Symmetric expansion.  Abdomen:        Soft, nontender, nondistended.  Extremities:     No edema  Skin:                No rashes. Warm and dry.    Neuro:             GCS 14, moving all 4 extremities spontaneously.  Psych:             AOX2, flat affect and mood    I have personally reviewed labs and tests:  Recent Labs:  Recent Results (from the past 48 hours) 
confirmed  Site #1: Right Radial  Site Prepped #1: Yes  Number of Attempts #1: 1  Pressure Held #1: Yes  Complications #1: None  Post-procedure #1: Standard  Specimen Status #1: Point of care  How Tolerated?: Tolerated well  Oxygen Therapy: None (Room air)  Vent Mode: CPAP/PS    Estimated body mass index is 27.26 kg/m² as calculated from the following:    Height as of this encounter: 1.626 m (5' 4\").    Weight as of this encounter: 72 kg (158 lb 12.8 oz).    Intake/Output Summary (Last 24 hours) at 5/4/2025 1255  Last data filed at 5/3/2025 2325  Gross per 24 hour   Intake 10 ml   Output 0 ml   Net 10 ml         Physical Exam:   General:          Chronically ill-appearing.  No acute distress.  ENT:                Nares appear normal.    Neck:               No restricted ROM.  Trachea midline   CV:                  RRR.  No m/r/g.  Tunneled catheter.  Lungs:             CTAB.  No wheezing, rhonchi, or rales.  Symmetric expansion.  Abdomen:        Soft, nontender, nondistended.  Extremities:     No edema  Skin:                No rashes. Warm and dry.    Neuro:             Pleasantly confused.  Psych:             Unable to evaluate.    I have personally reviewed labs and tests:  Recent Labs:  Recent Results (from the past 48 hours)   POCT Glucose    Collection Time: 05/02/25  7:09 PM   Result Value Ref Range    POC Glucose 207 (H) 65 - 100 mg/dL    Performed by: [employer]    POCT Glucose    Collection Time: 05/02/25 11:20 PM   Result Value Ref Range    POC Glucose 87 65 - 100 mg/dL    Performed by: [employer]    POCT Glucose    Collection Time: 05/03/25  6:19 AM   Result Value Ref Range    POC Glucose 76 65 - 100 mg/dL    Performed by: [employer]    POCT Glucose    Collection Time: 05/03/25 11:40 AM   Result Value Ref Range    POC Glucose 144 (H) 65 - 100 mg/dL    Performed by: Ángel    POCT Glucose    Collection Time: 05/03/25  4:25 PM   Result Value Ref Range    POC Glucose 114 (H) 65 - 100 mg/dL    
to assess further due to poor positioning. .  Swallows of pudding were timely. Unable to assess further due to poor positioning .    Pharyngeal characteristics:* based on limited assessment   adequate retraction of base of tongue  adequate epiglottic inversion  adequate constriction of posterior pharyngeal wall    Aspiration/Penetration Scale:  unable to assess    Upper Esophageal Screen:   unable to rule out an esophageal component of dysphagia  *Distal esophagus not assessed due to limitations of modified barium swallow study.       Dysphagia Outcome and Severity Scale (GEO)  Score: 1 Description   [] 7 Normal in all situations   [] 6 Within Functional Limits/ Modified independent   [] 5 Mild Dysphagia: Distant Supervision. May need one diet consistency      restricted.   [] 4 Mild-Moderate Dysphagia: Intermittent supervision/cuing. One-two diet    consistencies restricted.   [] 3 Moderate Dysphagia: Total assistance, supervision, or strategies.       Two or more diet consistencies restricted.   [] 2 Moderate-Severe Dysphagia: Maximum assistance or maximum use     of strategies with partial po intake   [x] 1 Severe dysphagia- NPO. Unable to tolerate any po safely       PLAN    Duration/Frequency: Continue to follow patient 3x/week for duration of hospitalization and/or until goals met    Rehabilitation Potential For Stated Goals: Good    Interdisciplinary Collaboration: MD/ PA/ NP  and radiologist     Medical Necessity    Skilled intervention continues to be required due to persistent signs and symptoms of aspiration and unable to attend/participate in therapy as expected.    Education:   Patient and/or family/caregiver educated on Results of evaluation, Role of speech therapy, Diet recommendations, SLP recommendations, and SLP plan  Education response: Verbalizes understanding    Safety:   Waiting with holding bay staff for transport back to room    Therapy Time:  Time In: 1215  Time Out: 1245  Minutes: 
Time: 04/29/25  8:08 PM   Result Value Ref Range    POC Glucose 157 (H) 65 - 100 mg/dL    Performed by: Emerson    POCT Glucose    Collection Time: 04/30/25  2:49 AM   Result Value Ref Range    POC Glucose 156 (H) 65 - 100 mg/dL    Performed by: Marvin    Basic Metabolic Panel    Collection Time: 04/30/25  4:34 AM   Result Value Ref Range    Sodium 136 136 - 145 mmol/L    Potassium 4.3 3.5 - 5.1 mmol/L    Chloride 98 98 - 107 mmol/L    CO2 27 20 - 29 mmol/L    Anion Gap 11 7 - 16 mmol/L    Glucose 160 (H) 70 - 99 mg/dL    BUN 27 (H) 8 - 23 MG/DL    Creatinine 3.31 (H) 0.60 - 1.10 MG/DL    Est, Glom Filt Rate 14 (L) >60 ml/min/1.73m2    Calcium 8.5 (L) 8.8 - 10.2 MG/DL   CBC with Auto Differential    Collection Time: 04/30/25  4:34 AM   Result Value Ref Range    WBC 5.4 4.3 - 11.1 K/uL    RBC 2.94 (L) 4.05 - 5.2 M/uL    Hemoglobin 8.4 (L) 11.7 - 15.4 g/dL    Hematocrit 27.6 (L) 35.8 - 46.3 %    MCV 93.9 82 - 102 FL    MCH 28.6 26.1 - 32.9 PG    MCHC 30.4 (L) 31.4 - 35.0 g/dL    RDW 15.1 (H) 11.9 - 14.6 %    Platelets 173 150 - 450 K/uL    MPV 11.7 9.4 - 12.3 FL    nRBC 0.00 0.0 - 0.2 K/uL    Differential Type AUTOMATED      Neutrophils % 60.7 43.0 - 78.0 %    Lymphocytes % 21.3 13.0 - 44.0 %    Monocytes % 12.0 4.0 - 12.0 %    Eosinophils % 4.4 0.5 - 7.8 %    Basophils % 0.7 0.0 - 2.0 %    Immature Granulocytes % 0.9 0.0 - 5.0 %    Neutrophils Absolute 3.28 1.70 - 8.20 K/UL    Lymphocytes Absolute 1.15 0.50 - 4.60 K/UL    Monocytes Absolute 0.65 0.10 - 1.30 K/UL    Eosinophils Absolute 0.24 0.00 - 0.80 K/UL    Basophils Absolute 0.04 0.00 - 0.20 K/UL    Immature Granulocytes Absolute 0.05 0.0 - 0.5 K/UL   Magnesium    Collection Time: 04/30/25  4:34 AM   Result Value Ref Range    Magnesium 2.0 1.8 - 2.4 mg/dL   Phosphorus    Collection Time: 04/30/25  4:34 AM   Result Value Ref Range    Phosphorus 3.7 2.5 - 4.5 MG/DL   POCT Glucose    Collection Time: 04/30/25  7:49 AM   Result Value Ref Range    POC 
   Creatinine 3.70 (H) 0.60 - 1.10 MG/DL    Est, Glom Filt Rate 12 (L) >60 ml/min/1.73m2    Calcium 8.6 (L) 8.8 - 10.2 MG/DL   CBC with Auto Differential    Collection Time: 05/02/25  4:57 AM   Result Value Ref Range    WBC 6.0 4.3 - 11.1 K/uL    RBC 2.94 (L) 4.05 - 5.2 M/uL    Hemoglobin 8.7 (L) 11.7 - 15.4 g/dL    Hematocrit 26.4 (L) 35.8 - 46.3 %    MCV 89.8 82 - 102 FL    MCH 29.6 26.1 - 32.9 PG    MCHC 33.0 31.4 - 35.0 g/dL    RDW 14.8 (H) 11.9 - 14.6 %    Platelets 150 150 - 450 K/uL    MPV 11.0 9.4 - 12.3 FL    nRBC 0.00 0.0 - 0.2 K/uL    Differential Type AUTOMATED      Neutrophils % 64.7 43.0 - 78.0 %    Lymphocytes % 20.1 13.0 - 44.0 %    Monocytes % 9.6 4.0 - 12.0 %    Eosinophils % 4.6 0.5 - 7.8 %    Basophils % 0.7 0.0 - 2.0 %    Immature Granulocytes % 0.3 0.0 - 5.0 %    Neutrophils Absolute 3.90 1.70 - 8.20 K/UL    Lymphocytes Absolute 1.21 0.50 - 4.60 K/UL    Monocytes Absolute 0.58 0.10 - 1.30 K/UL    Eosinophils Absolute 0.28 0.00 - 0.80 K/UL    Basophils Absolute 0.04 0.00 - 0.20 K/UL    Immature Granulocytes Absolute 0.02 0.0 - 0.5 K/UL   Magnesium    Collection Time: 05/02/25  4:57 AM   Result Value Ref Range    Magnesium 2.2 1.8 - 2.4 mg/dL   Phosphorus    Collection Time: 05/02/25  4:57 AM   Result Value Ref Range    Phosphorus 3.5 2.5 - 4.5 MG/DL   POCT Glucose    Collection Time: 05/02/25 12:52 PM   Result Value Ref Range    POC Glucose 193 (H) 65 - 100 mg/dL    Performed by: CottermanNadineRN(BS)        No results for input(s): \"COVID19\" in the last 72 hours.    Current Meds:  Current Facility-Administered Medications   Medication Dose Route Frequency    famotidine (PEPCID) tablet 20 mg  20 mg Per NG tube Daily    apixaban (ELIQUIS) tablet 2.5 mg  2.5 mg Per NG tube BID    carvedilol (COREG) tablet 25 mg  25 mg Per NG tube BID    hydrALAZINE (APRESOLINE) tablet 50 mg  50 mg Per NG tube 3 times per day    acetaminophen (TYLENOL) tablet 650 mg  650 mg Per NG tube Q6H PRN    Or    
%    Eosinophils % 4.4 0.5 - 7.8 %    Basophils % 0.7 0.0 - 2.0 %    Immature Granulocytes % 0.9 0.0 - 5.0 %    Neutrophils Absolute 3.28 1.70 - 8.20 K/UL    Lymphocytes Absolute 1.15 0.50 - 4.60 K/UL    Monocytes Absolute 0.65 0.10 - 1.30 K/UL    Eosinophils Absolute 0.24 0.00 - 0.80 K/UL    Basophils Absolute 0.04 0.00 - 0.20 K/UL    Immature Granulocytes Absolute 0.05 0.0 - 0.5 K/UL   Magnesium    Collection Time: 04/30/25  4:34 AM   Result Value Ref Range    Magnesium 2.0 1.8 - 2.4 mg/dL   Phosphorus    Collection Time: 04/30/25  4:34 AM   Result Value Ref Range    Phosphorus 3.7 2.5 - 4.5 MG/DL   POCT Glucose    Collection Time: 04/30/25  7:49 AM   Result Value Ref Range    POC Glucose 190 (H) 65 - 100 mg/dL    Performed by: Mariah        No results for input(s): \"COVID19\" in the last 72 hours.    Current Meds:  Current Facility-Administered Medications   Medication Dose Route Frequency    [START ON 5/1/2025] famotidine (PEPCID) tablet 20 mg  20 mg Per NG tube Daily    apixaban (ELIQUIS) tablet 2.5 mg  2.5 mg Per NG tube BID    carvedilol (COREG) tablet 25 mg  25 mg Per NG tube BID    hydrALAZINE (APRESOLINE) tablet 50 mg  50 mg Per NG tube 3 times per day    acetaminophen (TYLENOL) tablet 650 mg  650 mg Per NG tube Q6H PRN    Or    acetaminophen (TYLENOL) suppository 650 mg  650 mg Rectal Q6H PRN    scopolamine (TRANSDERM-SCOP) transdermal patch 1 patch  1 patch TransDERmal Q72H    insulin glargine (LANTUS) injection vial 15 Units  15 Units SubCUTAneous Daily    sodium phosphate 15 mmol in sodium chloride 0.9 % 250 mL IVPB  15 mmol IntraVENous PRN    hydrALAZINE (APRESOLINE) injection 20 mg  20 mg IntraVENous Q4H PRN    glucose chewable tablet 16 g  4 tablet Oral PRN    dextrose bolus 10% 125 mL  125 mL IntraVENous PRN    Or    dextrose bolus 10% 250 mL  250 mL IntraVENous PRN    Glucagon Emergency KIT 1 mg  1 mg SubCUTAneous PRN    dextrose 10 % infusion   IntraVENous Continuous PRN    insulin lispro 
Method: Verbal  Barriers to Learning: Cognition  Education Outcome: Continued education needed  Educated patient and/or family/caregiver on the following: Fall prevention strategies    TIME IN/OUT:  Time In: 1034  Time Out: 1058  Minutes: 24    KALEB SALES PT    
complications.    Education:   Patient and/or family/caregiver educated on Role of speech therapy, Diet recommendations, SLP recommendations, and SLP plan  Education response: Verbalizes understanding and Needs reinforcement    Safety:   Call light within reach  In Bed  RN notified   Recommendations written on whiteboard    Therapy Time:  Time In: 1225  Time Out: 1241  Minutes: 16    Africa Seo M.Ed, CCC-SLP  Speech Language Pathologist  [facility]ute Rehabilitation Services  Contact: Kirti    5/12/2025 12:49 PM    
effort to eject     Dysphagia Outcome and Severity Scale (GEO)  Score 5 Description   [] 7   Normal in all situations   [] 6   Within Functional Limits/ Modified independent   [x] 5   Mild Dysphagia: Distant Supervision. May need one diet consistency      restricted.   [] 4   Mild-Moderate Dysphagia: Intermittent supervision/cuing. One-two diet    consistencies restricted.   [] 3   Moderate Dysphagia: Total assistance, supervision, or strategies.       Two or more diet consistencies restricted.   [] 2   Moderate-Severe Dysphagia: Maximum assistance or maximum use     of strategies with partial po intake   [] 1   Severe dysphagia- NPO. Unable to tolerate any po safely     PLAN    Duration/Frequency: Continue to follow patient 3x/week for duration of hospitalization and/or until goals met    Rehabilitation Potential For Stated Goals: Good    Interdisciplinary Collaboration: RN/ PCT    Medical Necessity    Skilled intervention continues to be required due to medical complications.    Education:   Patient and/or family/caregiver educated on Results of evaluation, Role of speech therapy, Diet recommendations, SLP recommendations, and SLP plan  Education response: Verbalizes understanding and Needs reinforcement    Safety:   Call light within reach  In Bed  RN notified   Family/visitors at bedside    Therapy Time:  Time In: 1415  Time Out: 1445  Minutes: 30    BERENICE CORDOVA  5/2/2025 3:08 PM

## 2025-05-13 ENCOUNTER — CARE COORDINATION (OUTPATIENT)
Dept: CARE COORDINATION | Facility: CLINIC | Age: 80
End: 2025-05-13

## 2025-05-13 NOTE — CARE COORDINATION
Transition of care outreach postponed for 7 days due to patient's discharge to [facility].

## 2025-05-20 ENCOUNTER — CARE COORDINATION (OUTPATIENT)
Dept: CARE COORDINATION | Facility: CLINIC | Age: 80
End: 2025-05-20

## 2025-05-20 NOTE — CARE COORDINATION
Care Transitions Post-Acute Facility Update Call    2025    Patient: Nataliia Pradhan Patient : 1945   MRN: [id number]  Reason for Admission: Status epilepticus  Discharge Date: 25 RARS: Readmission Risk Score: 17.5         Care Transitions Post Acute Facility Update    Care Transitions Interventions      Post Acute Facility Update     Spoke with ANGEL Leon at [facility] who reports patient remains in their care with no discharge planning to convey at present.  Carolyn agrees to contact this nurse with any changes prior to outreach next week.  Will continue to f/u per protocol.

## 2025-05-27 ENCOUNTER — CARE COORDINATION (OUTPATIENT)
Dept: CARE COORDINATION | Facility: CLINIC | Age: 80
End: 2025-05-27

## 2025-05-27 NOTE — CARE COORDINATION
Care Transitions Post-Acute Facility Update Call    2025    Patient: Nataliia Pradhan Patient : 1945   MRN: [id number]  Reason for Admission: Status epilepticus  Discharge Date: 25 RARS: Readmission Risk Score: 17.5         Care Transitions Post Acute Facility Update    Care Transitions Interventions      Post Acute Facility Update     Outreach to [facility].  Left voicemail for ANGEL Leon requesting discharge planning update if available.  Will continue to outreach per protocol.

## 2025-05-31 ENCOUNTER — HOSPITAL ENCOUNTER (EMERGENCY)
Age: 80
Discharge: HOME | End: 2025-06-01
Attending: EMERGENCY MEDICINE
Payer: MEDICARE

## 2025-05-31 DIAGNOSIS — I12.9 HYPERTENSION, RENAL DISEASE: ICD-10-CM

## 2025-05-31 DIAGNOSIS — E11.65 HYPERGLYCEMIA DUE TO DIABETES MELLITUS (HCC): ICD-10-CM

## 2025-05-31 DIAGNOSIS — Z99.2 END-STAGE RENAL DISEASE ON HEMODIALYSIS (HCC): ICD-10-CM

## 2025-05-31 DIAGNOSIS — Z71.1 FEARED CONDITION NOT DEMONSTRATED: Primary | ICD-10-CM

## 2025-05-31 DIAGNOSIS — N18.6 END-STAGE RENAL DISEASE ON HEMODIALYSIS (HCC): ICD-10-CM

## 2025-05-31 LAB
ALBUMIN SERPL-MCNC: 2.9 G/DL (ref 3.2–4.6)
ALBUMIN/GLOB SERPL: 0.7 (ref 1–1.9)
ALP SERPL-CCNC: 181 U/L (ref 35–104)
ALT SERPL-CCNC: 12 U/L (ref 8–45)
ANION GAP SERPL CALC-SCNC: 13 MMOL/L (ref 7–16)
AST SERPL-CCNC: 29 U/L (ref 15–37)
BASOPHILS # BLD: 0.02 K/UL (ref 0–0.2)
BASOPHILS NFR BLD: 0.4 % (ref 0–2)
BILIRUB SERPL-MCNC: 0.4 MG/DL (ref 0–1.2)
BUN SERPL-MCNC: 29 MG/DL (ref 8–23)
CALCIUM SERPL-MCNC: 9.3 MG/DL (ref 8.8–10.2)
CHLORIDE SERPL-SCNC: 100 MMOL/L (ref 98–107)
CO2 SERPL-SCNC: 24 MMOL/L (ref 20–29)
CREAT SERPL-MCNC: 4.32 MG/DL (ref 0.6–1.1)
DIFFERENTIAL METHOD BLD: ABNORMAL
EKG ATRIAL RATE: 70 BPM
EKG DIAGNOSIS: NORMAL
EKG P AXIS: 24 DEGREES
EKG P-R INTERVAL: 143 MS
EKG Q-T INTERVAL: 418 MS
EKG QRS DURATION: 91 MS
EKG QTC CALCULATION (BAZETT): 451 MS
EKG R AXIS: -49 DEGREES
EKG T AXIS: 12 DEGREES
EKG VENTRICULAR RATE: 70 BPM
EOSINOPHIL # BLD: 0.5 K/UL (ref 0–0.8)
EOSINOPHIL NFR BLD: 8.9 % (ref 0.5–7.8)
ERYTHROCYTE [DISTWIDTH] IN BLOOD BY AUTOMATED COUNT: 16.9 % (ref 11.9–14.6)
GLOBULIN SER CALC-MCNC: 4 G/DL (ref 2.3–3.5)
GLUCOSE BLD STRIP.AUTO-MCNC: 196 MG/DL (ref 65–100)
GLUCOSE BLD STRIP.AUTO-MCNC: 224 MG/DL (ref 65–100)
GLUCOSE SERPL-MCNC: 253 MG/DL (ref 70–99)
HCT VFR BLD AUTO: 32 % (ref 35.8–46.3)
HGB BLD-MCNC: 10 G/DL (ref 11.7–15.4)
IMM GRANULOCYTES # BLD AUTO: 0.02 K/UL (ref 0–0.5)
IMM GRANULOCYTES NFR BLD AUTO: 0.4 % (ref 0–5)
INR PPP: 1.2
LYMPHOCYTES # BLD: 1.57 K/UL (ref 0.5–4.6)
LYMPHOCYTES NFR BLD: 28 % (ref 13–44)
MCH RBC QN AUTO: 29.3 PG (ref 26.1–32.9)
MCHC RBC AUTO-ENTMCNC: 31.3 G/DL (ref 31.4–35)
MCV RBC AUTO: 93.8 FL (ref 82–102)
MONOCYTES # BLD: 0.55 K/UL (ref 0.1–1.3)
MONOCYTES NFR BLD: 9.8 % (ref 4–12)
NEUTS SEG # BLD: 2.95 K/UL (ref 1.7–8.2)
NEUTS SEG NFR BLD: 52.5 % (ref 43–78)
NRBC # BLD: 0 K/UL (ref 0–0.2)
PLATELET # BLD AUTO: 108 K/UL (ref 150–450)
PMV BLD AUTO: 11 FL (ref 9.4–12.3)
POTASSIUM SERPL-SCNC: 4.9 MMOL/L (ref 3.5–5.1)
PROT SERPL-MCNC: 6.8 G/DL (ref 6.3–8.2)
PROTHROMBIN TIME: 15.4 SEC (ref 11.3–14.9)
RBC # BLD AUTO: 3.41 M/UL (ref 4.05–5.2)
SERVICE CMNT-IMP: ABNORMAL
SERVICE CMNT-IMP: ABNORMAL
SODIUM SERPL-SCNC: 137 MMOL/L (ref 136–145)
WBC # BLD AUTO: 5.6 K/UL (ref 4.3–11.1)

## 2025-05-31 PROCEDURE — 96374 THER/PROPH/DIAG INJ IV PUSH: CPT

## 2025-05-31 PROCEDURE — 86901 BLOOD TYPING SEROLOGIC RH(D): CPT

## 2025-05-31 PROCEDURE — 82962 GLUCOSE BLOOD TEST: CPT

## 2025-05-31 PROCEDURE — 86900 BLOOD TYPING SEROLOGIC ABO: CPT

## 2025-05-31 PROCEDURE — 6370000000 HC RX 637 (ALT 250 FOR IP): Performed by: EMERGENCY MEDICINE

## 2025-05-31 PROCEDURE — 86850 RBC ANTIBODY SCREEN: CPT

## 2025-05-31 PROCEDURE — 93005 ELECTROCARDIOGRAM TRACING: CPT | Performed by: EMERGENCY MEDICINE

## 2025-05-31 PROCEDURE — 85025 COMPLETE CBC W/AUTO DIFF WBC: CPT

## 2025-05-31 PROCEDURE — 85610 PROTHROMBIN TIME: CPT

## 2025-05-31 PROCEDURE — 99284 EMERGENCY DEPT VISIT MOD MDM: CPT

## 2025-05-31 PROCEDURE — 6360000002 HC RX W HCPCS: Performed by: EMERGENCY MEDICINE

## 2025-05-31 PROCEDURE — 80053 COMPREHEN METABOLIC PANEL: CPT

## 2025-05-31 PROCEDURE — 96375 TX/PRO/DX INJ NEW DRUG ADDON: CPT

## 2025-05-31 RX ORDER — HYDRALAZINE HYDROCHLORIDE 20 MG/ML
10 INJECTION INTRAMUSCULAR; INTRAVENOUS
Status: COMPLETED | OUTPATIENT
Start: 2025-05-31 | End: 2025-05-31

## 2025-05-31 RX ADMIN — INSULIN HUMAN 3 UNITS: 100 INJECTION, SOLUTION PARENTERAL at 19:52

## 2025-05-31 RX ADMIN — HYDRALAZINE HYDROCHLORIDE 10 MG: 20 INJECTION INTRAMUSCULAR; INTRAVENOUS at 19:48

## 2025-05-31 NOTE — ED TRIAGE NOTES
Patient coming from [facility].    Facility called since her hemoglobin was 3.2 per dialysis.  Patient denies any pain or complaints at this time.          
No

## 2025-05-31 NOTE — ED NOTES
Report given to ANGIE Buck and care assumed at this time.      Maritza Espinoza RN  05/31/25 3860

## 2025-05-31 NOTE — ED PROVIDER NOTES
Emergency Department Provider Note       [facility] EMERGENCY DEPT   PCP: Melo Pope MD   Age: 80 y.o.   Sex: female     DISPOSITION Decision To Discharge 05/31/2025 07:17:21 PM    ICD-10-CM    1. Feared condition not demonstrated  Z71.1       2. Hypertension, renal disease  I12.9       3. End-stage renal disease on hemodialysis (HCC)  N18.6     Z99.2       4. Hyperglycemia due to diabetes mellitus (HCC)  E11.65           Medical Decision Making     Ddx:    Anemia, renal failure, dementia, electrolyte normality, confusion,  ED Course as of 05/31/25 2014   Sat May 31, 2025   1915 I spoke with the patient's daughter who is sitting at bedside.  I updated her regarding the findings on the blood work.  Her blood sugar was noted to be elevated as well as her blood pressure which are normally well-controlled.  Daughter tells me that she is scheduled to be discharged from the postacute care to home on Monday and so she is glad that she will get to go home and does not need to be admitted to the hospital.  Patient will be given a dose of hydralazine and insulin and reassess prior to the planned time discharge [KH]   1916 I spoke with a nurse at the postacute and she said that they got a call from the dialysis clinic today stating that they ran the blood from yesterday at dialysis and were told that the hemoglobin is 3.2.  The nurse at postacute says the patient had been acting normal yesterday and today and was surprised that there may be something off. [KH]      ED Course User Index  [KH] Kirill Rao, DO     1 or more acute illnesses that pose a threat to life or bodily function.   Shared medical decision making was utilized in creating the patients health plan today.  I independently ordered and reviewed each unique test.    I reviewed external records: ED visit note from a different ED.   I reviewed external records: provider visit note from PCP.  I reviewed external records: provider visit note from outside  specialist.  I reviewed external records: previous EKG including cardiologist interpretation.    I reviewed external records: previous lab results from outside ED.  I reviewed external records: previous imaging study including radiologist interpretation.   The patients assessment required an independent historian: EMS.  The reason they were needed is important historical information not provided by the patient.  ED cardiac monitoring rhythm strip was ordered and interpreted:  sinus rhythm, no evidence of an arrhythmia  ST Segments:Normal ST segments - NO STEMI   Rate: 78              History     80-year-old female presenting to the emergency department day from postacute secondary to anemia of 3.2 drawn at their facility.  Patient has a history of confusion and cannot tell me why she is actually here.  The patient was asked multiple questions and for the most part she just looked at me and shook her head yes or no but did not seem to really respond well in the affirmative either way.  The patient is here by herself and family will try to be contacted.          ROS     Review of Systems   Unable to perform ROS: Dementia        Physical Exam     Vitals signs and nursing note reviewed:  Vitals:    05/31/25 1852 05/31/25 1854 05/31/25 1948 05/31/25 2002   BP: (!) 191/82  (!) 205/82 (!) 157/79   Pulse: 70 69  76   Resp: 17 13  12   Temp:       TempSrc:       SpO2:  98%  99%      Physical Exam     GENERAL:The patient has There is no height or weight on file to calculate BMI. Well-hydrated.    VITAL SIGNS: Heart rate, blood pressure, respiratory rate reviewed as recorded in  nurse's notes  EYES: Pupils reactive. Extraocular motion intact. No conjunctival redness or drainage.  MOUTH/THROAT: Pharynx clear; airway patent.  NECK: Supple, no meningeal signs. Trachea midline. No masses or thyromegaly.  LUNGS: Breath sounds clear and equal bilaterally no accessory muscle use.  CARDIOVASCULAR: Regular rate and rhythm  ABDOMEN:

## 2025-06-01 VITALS
OXYGEN SATURATION: 99 % | RESPIRATION RATE: 17 BRPM | DIASTOLIC BLOOD PRESSURE: 79 MMHG | HEART RATE: 76 BPM | TEMPERATURE: 98.2 F | SYSTOLIC BLOOD PRESSURE: 157 MMHG

## 2025-06-01 LAB
ABO + RH BLD: NORMAL
BLOOD GROUP ANTIBODIES SERPL: NORMAL
SPECIMEN EXP DATE BLD: NORMAL

## 2025-06-02 LAB
EKG ATRIAL RATE: 70 BPM
EKG DIAGNOSIS: NORMAL
EKG P AXIS: 24 DEGREES
EKG P-R INTERVAL: 143 MS
EKG Q-T INTERVAL: 418 MS
EKG QRS DURATION: 91 MS
EKG QTC CALCULATION (BAZETT): 451 MS
EKG R AXIS: -49 DEGREES
EKG T AXIS: 12 DEGREES
EKG VENTRICULAR RATE: 70 BPM

## 2025-06-02 PROCEDURE — 93010 ELECTROCARDIOGRAM REPORT: CPT | Performed by: INTERNAL MEDICINE

## 2025-06-03 ENCOUNTER — CARE COORDINATION (OUTPATIENT)
Dept: CARE COORDINATION | Facility: CLINIC | Age: 80
End: 2025-06-03

## 2025-06-03 NOTE — CARE COORDINATION
Care Transitions Post-Acute Facility Update Call    6/3/2025    Patient: Nataliia Pradhan Patient : 1945   MRN: [id number]  Reason for Admission: Status Epilepticus  Discharge Date: 25 RARS: Readmission Risk Score: 17.5         Care Transitions Post Acute Facility Update    Care Transitions Interventions      Post Acute Facility Update     Spoke with ANGEL Leon at [facility] who reports patient will discharge home with her daughter today, 6/3/25.  Orders for HH placed with [facility].  Hospital bed, wheelchair and cody lift ordered through [facility].  Notified CTN.

## 2025-06-04 ENCOUNTER — CARE COORDINATION (OUTPATIENT)
Dept: CARE COORDINATION | Facility: CLINIC | Age: 80
End: 2025-06-04

## 2025-06-04 NOTE — CARE COORDINATION
Care Transitions Note    Initial Call - Call within 2 business days of discharge: Yes    Attempted to reach patient for transitions of care follow up. Unable to reach patient.    Outreach Attempts:   Unable to leave message. MB full    Patient: Nataliia Pradhan    Patient : 1945   MRN: [id number]    Reason for Admission:  Status epilepticus   Discharge Date: 25  RURS: Readmission Risk Score: 17.5    Last Discharge Facility       Date Complaint Diagnosis Description Type Department Provider    25 lab check Feared condition not demonstrated ... ED (DISCHARGE) Kirill Jorgensen, DO            Was this an external facility discharge? Yes. Discharge Date: 6/3/2025. Facility Name: [facility]  Home with [facility]  DME: hospital bed, wheelchair, cody lift  Follow Up Appointment:   Patient has hospital follow up appointment scheduled within 7 days of discharge.    Future Appointments         Provider Specialty Dept Phone    6/10/2025 8:00 AM [facility] Vascular Surgery [phone]    6/10/2025 9:00 AM Inder Rutledge MD Vascular Surgery [phone]    2025 3:20 PM Murtaza Catalan MD Family Medicine [phone]            Plan for follow-up on next business day.      Jackie Dillon RN

## 2025-06-05 ENCOUNTER — CARE COORDINATION (OUTPATIENT)
Dept: CARE COORDINATION | Facility: CLINIC | Age: 80
End: 2025-06-05

## 2025-06-05 NOTE — CARE COORDINATION
Care Transitions Note    Initial Call - Call within 2 business days of discharge: Yes    Attempted to reach patient for transitions of care follow up. Unable to reach patient.    Outreach Attempts:   Multiple attempts to contact patient at phone numbers on file.   HIPAA compliant voicemail left for patient.   [facility] [employer] message sent.     Patient: Nataliia Pradhan    Patient : 1945   MRN: [id number]    Reason for Admission: Status epilepticus   Discharge Date: 25  RURS: Readmission Risk Score: 17.5    Last Discharge Facility       Date Complaint Diagnosis Description Type Department Provider    25 lab check Feared condition not demonstrated ... ED (DISCHARGE) Kirill Jorgensen, DO            Was this an external facility discharge? Yes. Discharge Date: 6/3/2025. Facility Name: [facility]   DME: hospital bed, wheelchair, cody lift  Follow Up Appointment:   Patient has hospital follow up appointment scheduled within 7 days of discharge.    Future Appointments         Provider Specialty Dept Phone    6/10/2025 8:00 AM [facility] Vascular Surgery [phone]    6/10/2025 9:00 AM Inder Rutledge MD Vascular Surgery [phone]    2025 3:20 PM Murtaza Catalan MD Family Medicine [phone]            No further follow-up call indicated     Jackie Dillon RN

## 2025-06-10 ENCOUNTER — OFFICE VISIT (OUTPATIENT)
Dept: VASCULAR SURGERY | Age: 80
End: 2025-06-10
Payer: MEDICARE

## 2025-06-10 VITALS
OXYGEN SATURATION: 96 % | SYSTOLIC BLOOD PRESSURE: 198 MMHG | HEART RATE: 74 BPM | DIASTOLIC BLOOD PRESSURE: 82 MMHG | BODY MASS INDEX: 24.03 KG/M2 | HEIGHT: 64 IN

## 2025-06-10 DIAGNOSIS — I73.9 PVD (PERIPHERAL VASCULAR DISEASE) WITH CLAUDICATION: Primary | ICD-10-CM

## 2025-06-10 PROCEDURE — 1090F PRES/ABSN URINE INCON ASSESS: CPT | Performed by: SURGERY

## 2025-06-10 PROCEDURE — 3077F SYST BP >= 140 MM HG: CPT | Performed by: SURGERY

## 2025-06-10 PROCEDURE — 1159F MED LIST DOCD IN RCRD: CPT | Performed by: SURGERY

## 2025-06-10 PROCEDURE — 3079F DIAST BP 80-89 MM HG: CPT | Performed by: SURGERY

## 2025-06-10 PROCEDURE — 99213 OFFICE O/P EST LOW 20 MIN: CPT | Performed by: SURGERY

## 2025-06-10 PROCEDURE — G8427 DOCREV CUR MEDS BY ELIG CLIN: HCPCS | Performed by: SURGERY

## 2025-06-10 PROCEDURE — 1123F ACP DISCUSS/DSCN MKR DOCD: CPT | Performed by: SURGERY

## 2025-06-10 PROCEDURE — G8399 PT W/DXA RESULTS DOCUMENT: HCPCS | Performed by: SURGERY

## 2025-06-10 PROCEDURE — G8420 CALC BMI NORM PARAMETERS: HCPCS | Performed by: SURGERY

## 2025-06-10 PROCEDURE — 1111F DSCHRG MED/CURRENT MED MERGE: CPT | Performed by: SURGERY

## 2025-06-10 PROCEDURE — 1036F TOBACCO NON-USER: CPT | Performed by: SURGERY

## 2025-06-10 NOTE — PROGRESS NOTES
[address] [address]  [phone] FAX: [phone]    Nataliia Pradhan  : 1945    Chief Complaint:     History of Present Illness:   Patient follows up today for follow-up duplex study.  Patient has exhausted her left upper arm graft.    CURRENT MEDICATIONS:  Current Outpatient Medications   Medication Sig Dispense Refill    apixaban (ELIQUIS) 2.5 MG TABS tablet Take 1 tablet by mouth 2 times daily 60 tablet 3    insulin glargine, 1 unit dial, (TOUJEO SOLOSTAR) 300 UNIT/ML concentrated injection pen Inject 3 Units into the skin nightly 5 Adjustable Dose Pre-filled Pen Syringe 2    hydrALAZINE (APRESOLINE) 25 MG tablet Take 1 tablet by mouth every 12 hours 90 tablet 3    carvedilol (COREG) 6.25 MG tablet Take 1 tablet by mouth 2 times daily 60 tablet 3    bumetanide (BUMEX) 1 MG tablet Take 1 tablet by mouth daily 30 tablet 0    doxazosin (CARDURA) 4 MG tablet TAKE 1 TABLET BY MOUTH IN THE MORNING AND AT BEDTIME 180 tablet 3    atorvastatin (LIPITOR) 40 MG tablet Take 1 tablet by mouth at bedtime 90 tablet 3    Carboxymethylcellul-Glycerin 0.5-0.9 % SOLN Apply to eye as needed      levothyroxine (SYNTHROID) 75 MCG tablet Take 1 tablet by mouth Daily 90 tablet 3    calcitRIOL (ROCALTROL) 0.25 MCG capsule Take 1 capsule by mouth daily      lacosamide (VIMPAT) 100 MG TABS tablet Take 1 tablet by mouth 2 times daily for 60 days. Max Daily Amount: 200 mg (Patient not taking: Reported on 6/10/2025) 120 tablet 3    Lancets MISC Check BS BID       No current facility-administered medications for this visit.       Past Medical History:   Diagnosis Date    Anemia     s/p iron infusions (last 23)    DM (diabetes mellitus) ([facility]) 2014    Toujeo-am -160; A1C: 7.0 per pt    End stage kidney disease (HCC)     [facility] M,W, F    HTN (hypertension) 2014    Hyperlipemia 2014    Hyperparathyroidism 2014    Thyroid disease        Physical

## 2025-06-26 ENCOUNTER — PREP FOR PROCEDURE (OUTPATIENT)
Dept: VASCULAR SURGERY | Age: 80
End: 2025-06-26

## 2025-06-27 ENCOUNTER — TELEPHONE (OUTPATIENT)
Dept: VASCULAR SURGERY | Age: 80
End: 2025-06-27

## 2025-06-27 NOTE — TELEPHONE ENCOUNTER
Called Lyla at dialysis center to ask if she can get the word to the pt that she needs to hold her Eliquis for 3 days starting Monday, 6/30/25 since I have not been able to reach her. Lyla stated she can get the word to her.

## 2025-06-30 ENCOUNTER — TELEPHONE (OUTPATIENT)
Dept: VASCULAR SURGERY | Age: 80
End: 2025-06-30

## 2025-06-30 NOTE — TELEPHONE ENCOUNTER
Called pt and spk w/her daughter, Mita to be sure she is holding her Eliquis starting today. Ms. Fan stated, yes. She stopped it as of today, last dose was on yesterday. She also asked is the procedure Out-pt, I answered \"Yes.\" And she asked if she would take any meds the day of surgery? I told her Pre-Op will direct her regarding any meds on the DOS. She expressed understanding.

## 2025-07-02 RX ORDER — FOLIC ACID/VIT B COMPLEX AND C 0.8 MG
1 TABLET ORAL DAILY
COMMUNITY

## 2025-07-02 RX ORDER — CINACALCET 30 MG/1
30 TABLET, FILM COATED ORAL DAILY
COMMUNITY

## 2025-07-02 NOTE — PRE-PROCEDURE INSTRUCTIONS
Patient daughter Mita Pradhan verified name and .  Order for consent NOT found in EHR at time of PAT visit. Unable to verify case posting against order; surgery verified by patient.     Type 1B surgery, Phone assessment complete.  Orders not received.  Labs per surgeon: none  Labs per anesthesia protocol: POC glucose, Hgb, and Potassium DOS      Preop huddle noted to obtain ht and wt on arrival. Pt daughter was not aware of pt current ht/wt.    Patient daughter answered medical/surgical history questions at their best of ability. All prior to admission medications documented in EPIC.    Patient daughter instructed to continue taking all prescription medications up to the day of surgery but to take only the following medications the day of surgery according to anesthesia guidelines with a small sip of water: Synthroid, Hydralazine, Cardura, and Coreg.Toujeo 4 Units the night prior to procedure.    Patient  daughter informed that all vitamins and supplements should be held 7 days prior to surgery and NSAIDS 5 days prior to surgery. Prescription meds to hold: Pt has been holding Eliquis as instructed and continues to hold.    Patient daughter instructed on the following:    > Arrive at Main Entrance, time of arrival to be called the day before by 1700  > No food after midnight, patient may drink clear liquids up until 2 hours prior to arrival. No gum, candy, mints.   > Responsible adult must drive patient to the hospital, stay during surgery, and patient will need supervision 24 hours after anesthesia  > Use non moisturizing soap in shower the night before surgery and on the morning of surgery  > All piercings must be removed prior to arrival.    > Leave all valuables (money and jewelry) at home but bring insurance card and ID on DOS.   > Do not wear make-up, nail polish, lotions, cologne, perfumes, powders, or oil on skin. Artificial nails are not permitted.

## 2025-07-03 ENCOUNTER — ANESTHESIA EVENT (OUTPATIENT)
Dept: SURGERY | Age: 80
End: 2025-07-03
Payer: MEDICARE

## 2025-07-03 ENCOUNTER — HOSPITAL ENCOUNTER (OUTPATIENT)
Age: 80
Setting detail: OUTPATIENT SURGERY
Discharge: HOME | End: 2025-07-03
Attending: SURGERY | Admitting: SURGERY
Payer: MEDICARE

## 2025-07-03 ENCOUNTER — ANESTHESIA (OUTPATIENT)
Dept: SURGERY | Age: 80
End: 2025-07-03
Payer: MEDICARE

## 2025-07-03 VITALS
HEART RATE: 88 BPM | BODY MASS INDEX: 18.44 KG/M2 | TEMPERATURE: 97.9 F | HEIGHT: 64 IN | RESPIRATION RATE: 19 BRPM | SYSTOLIC BLOOD PRESSURE: 112 MMHG | WEIGHT: 108.03 LBS | OXYGEN SATURATION: 98 % | DIASTOLIC BLOOD PRESSURE: 59 MMHG

## 2025-07-03 DIAGNOSIS — Z99.2 ESRD (END STAGE RENAL DISEASE) ON DIALYSIS (HCC): Primary | ICD-10-CM

## 2025-07-03 DIAGNOSIS — N18.6 ESRD (END STAGE RENAL DISEASE) ON DIALYSIS (HCC): Primary | ICD-10-CM

## 2025-07-03 LAB
CREAT BLD-MCNC: 3.88 MG/DL (ref 0.8–1.5)
GLUCOSE BLD STRIP.AUTO-MCNC: 143 MG/DL (ref 65–100)
HGB BLD-MCNC: 16.2 G/DL (ref 11.7–15.4)
POTASSIUM BLD-SCNC: 4.4 MMOL/L (ref 3.5–5.1)
SERVICE CMNT-IMP: ABNORMAL

## 2025-07-03 PROCEDURE — 3700000001 HC ADD 15 MINUTES (ANESTHESIA): Performed by: SURGERY

## 2025-07-03 PROCEDURE — 82962 GLUCOSE BLOOD TEST: CPT

## 2025-07-03 PROCEDURE — 6360000002 HC RX W HCPCS

## 2025-07-03 PROCEDURE — 84132 ASSAY OF SERUM POTASSIUM: CPT

## 2025-07-03 PROCEDURE — 2709999900 HC NON-CHARGEABLE SUPPLY: Performed by: SURGERY

## 2025-07-03 PROCEDURE — 7100000011 HC PHASE II RECOVERY - ADDTL 15 MIN: Performed by: SURGERY

## 2025-07-03 PROCEDURE — 3700000000 HC ANESTHESIA ATTENDED CARE: Performed by: SURGERY

## 2025-07-03 PROCEDURE — 7100000001 HC PACU RECOVERY - ADDTL 15 MIN: Performed by: SURGERY

## 2025-07-03 PROCEDURE — C1768 GRAFT, VASCULAR: HCPCS | Performed by: SURGERY

## 2025-07-03 PROCEDURE — 36830 ARTERY-VEIN NONAUTOGRAFT: CPT | Performed by: SURGERY

## 2025-07-03 PROCEDURE — 2580000003 HC RX 258

## 2025-07-03 PROCEDURE — 6360000002 HC RX W HCPCS: Performed by: SURGERY

## 2025-07-03 PROCEDURE — 85018 HEMOGLOBIN: CPT

## 2025-07-03 PROCEDURE — 2580000003 HC RX 258: Performed by: ANESTHESIOLOGY

## 2025-07-03 PROCEDURE — 7100000000 HC PACU RECOVERY - FIRST 15 MIN: Performed by: SURGERY

## 2025-07-03 PROCEDURE — 3600000003 HC SURGERY LEVEL 3 BASE: Performed by: SURGERY

## 2025-07-03 PROCEDURE — 3600000013 HC SURGERY LEVEL 3 ADDTL 15MIN: Performed by: SURGERY

## 2025-07-03 PROCEDURE — 82565 ASSAY OF CREATININE: CPT

## 2025-07-03 PROCEDURE — 7100000010 HC PHASE II RECOVERY - FIRST 15 MIN: Performed by: SURGERY

## 2025-07-03 PROCEDURE — 2500000003 HC RX 250 WO HCPCS: Performed by: SURGERY

## 2025-07-03 PROCEDURE — 2500000003 HC RX 250 WO HCPCS

## 2025-07-03 DEVICE — GRAFT VASC L45CM DIA4-6MM UNIV PTFE STR STD WALL TAPR: Type: IMPLANTABLE DEVICE | Site: ARM | Status: FUNCTIONAL

## 2025-07-03 RX ORDER — SODIUM CHLORIDE 0.9 % (FLUSH) 0.9 %
5-40 SYRINGE (ML) INJECTION PRN
Status: DISCONTINUED | OUTPATIENT
Start: 2025-07-03 | End: 2025-07-03 | Stop reason: HOSPADM

## 2025-07-03 RX ORDER — NALOXONE HYDROCHLORIDE 0.4 MG/ML
INJECTION, SOLUTION INTRAMUSCULAR; INTRAVENOUS; SUBCUTANEOUS PRN
Status: DISCONTINUED | OUTPATIENT
Start: 2025-07-03 | End: 2025-07-03 | Stop reason: HOSPADM

## 2025-07-03 RX ORDER — LIDOCAINE HYDROCHLORIDE 20 MG/ML
INJECTION, SOLUTION EPIDURAL; INFILTRATION; INTRACAUDAL; PERINEURAL PRN
Status: DISCONTINUED | OUTPATIENT
Start: 2025-07-03 | End: 2025-07-03 | Stop reason: ALTCHOICE

## 2025-07-03 RX ORDER — IPRATROPIUM BROMIDE AND ALBUTEROL SULFATE 2.5; .5 MG/3ML; MG/3ML
1 SOLUTION RESPIRATORY (INHALATION)
Status: DISCONTINUED | OUTPATIENT
Start: 2025-07-03 | End: 2025-07-03 | Stop reason: HOSPADM

## 2025-07-03 RX ORDER — ONDANSETRON 2 MG/ML
INJECTION INTRAMUSCULAR; INTRAVENOUS
Status: DISCONTINUED | OUTPATIENT
Start: 2025-07-03 | End: 2025-07-03 | Stop reason: SDUPTHER

## 2025-07-03 RX ORDER — SODIUM CHLORIDE 9 MG/ML
INJECTION, SOLUTION INTRAVENOUS PRN
Status: DISCONTINUED | OUTPATIENT
Start: 2025-07-03 | End: 2025-07-03 | Stop reason: HOSPADM

## 2025-07-03 RX ORDER — MAGNESIUM HYDROXIDE 1200 MG/15ML
LIQUID ORAL CONTINUOUS PRN
Status: COMPLETED | OUTPATIENT
Start: 2025-07-03 | End: 2025-07-03

## 2025-07-03 RX ORDER — HEPARIN SODIUM 5000 [USP'U]/ML
INJECTION, SOLUTION INTRAVENOUS; SUBCUTANEOUS PRN
Status: DISCONTINUED | OUTPATIENT
Start: 2025-07-03 | End: 2025-07-03 | Stop reason: ALTCHOICE

## 2025-07-03 RX ORDER — SODIUM CHLORIDE 0.9 % (FLUSH) 0.9 %
5-40 SYRINGE (ML) INJECTION EVERY 12 HOURS SCHEDULED
Status: DISCONTINUED | OUTPATIENT
Start: 2025-07-03 | End: 2025-07-03 | Stop reason: HOSPADM

## 2025-07-03 RX ORDER — HEPARIN SODIUM 1000 [USP'U]/ML
INJECTION, SOLUTION INTRAVENOUS; SUBCUTANEOUS
Status: DISCONTINUED | OUTPATIENT
Start: 2025-07-03 | End: 2025-07-03 | Stop reason: SDUPTHER

## 2025-07-03 RX ORDER — DEXTROSE MONOHYDRATE 100 MG/ML
INJECTION, SOLUTION INTRAVENOUS CONTINUOUS PRN
Status: DISCONTINUED | OUTPATIENT
Start: 2025-07-03 | End: 2025-07-03 | Stop reason: HOSPADM

## 2025-07-03 RX ORDER — DROPERIDOL 2.5 MG/ML
0.62 INJECTION, SOLUTION INTRAMUSCULAR; INTRAVENOUS
Status: DISCONTINUED | OUTPATIENT
Start: 2025-07-03 | End: 2025-07-03 | Stop reason: HOSPADM

## 2025-07-03 RX ORDER — EPHEDRINE SULFATE 5 MG/ML
INJECTION INTRAVENOUS
Status: DISCONTINUED | OUTPATIENT
Start: 2025-07-03 | End: 2025-07-03 | Stop reason: SDUPTHER

## 2025-07-03 RX ORDER — FENTANYL CITRATE 50 UG/ML
25 INJECTION, SOLUTION INTRAMUSCULAR; INTRAVENOUS EVERY 5 MIN PRN
Status: DISCONTINUED | OUTPATIENT
Start: 2025-07-03 | End: 2025-07-03 | Stop reason: HOSPADM

## 2025-07-03 RX ORDER — TRAMADOL HYDROCHLORIDE 50 MG/1
50 TABLET ORAL
Status: DISCONTINUED | OUTPATIENT
Start: 2025-07-03 | End: 2025-07-03 | Stop reason: HOSPADM

## 2025-07-03 RX ORDER — METOCLOPRAMIDE HYDROCHLORIDE 5 MG/ML
10 INJECTION INTRAMUSCULAR; INTRAVENOUS
Status: DISCONTINUED | OUTPATIENT
Start: 2025-07-03 | End: 2025-07-03 | Stop reason: HOSPADM

## 2025-07-03 RX ORDER — LABETALOL HYDROCHLORIDE 5 MG/ML
10 INJECTION, SOLUTION INTRAVENOUS
Status: DISCONTINUED | OUTPATIENT
Start: 2025-07-03 | End: 2025-07-03 | Stop reason: HOSPADM

## 2025-07-03 RX ORDER — PAPAVERINE HYDROCHLORIDE 30 MG/ML
INJECTION INTRAMUSCULAR; INTRAVENOUS PRN
Status: DISCONTINUED | OUTPATIENT
Start: 2025-07-03 | End: 2025-07-03 | Stop reason: ALTCHOICE

## 2025-07-03 RX ORDER — IBUPROFEN 600 MG/1
1 TABLET ORAL PRN
Status: DISCONTINUED | OUTPATIENT
Start: 2025-07-03 | End: 2025-07-03 | Stop reason: HOSPADM

## 2025-07-03 RX ORDER — LIDOCAINE HYDROCHLORIDE 20 MG/ML
INJECTION, SOLUTION EPIDURAL; INFILTRATION; INTRACAUDAL; PERINEURAL
Status: DISCONTINUED | OUTPATIENT
Start: 2025-07-03 | End: 2025-07-03 | Stop reason: SDUPTHER

## 2025-07-03 RX ORDER — OXYCODONE AND ACETAMINOPHEN 5; 325 MG/1; MG/1
1 TABLET ORAL EVERY 4 HOURS PRN
Qty: 30 TABLET | Refills: 0 | Status: SHIPPED | OUTPATIENT
Start: 2025-07-03 | End: 2025-07-08

## 2025-07-03 RX ORDER — SODIUM CHLORIDE, SODIUM LACTATE, POTASSIUM CHLORIDE, CALCIUM CHLORIDE 600; 310; 30; 20 MG/100ML; MG/100ML; MG/100ML; MG/100ML
INJECTION, SOLUTION INTRAVENOUS
Status: DISCONTINUED | OUTPATIENT
Start: 2025-07-03 | End: 2025-07-03

## 2025-07-03 RX ORDER — DIPHENHYDRAMINE HYDROCHLORIDE 50 MG/ML
12.5 INJECTION, SOLUTION INTRAMUSCULAR; INTRAVENOUS
Status: DISCONTINUED | OUTPATIENT
Start: 2025-07-03 | End: 2025-07-03 | Stop reason: HOSPADM

## 2025-07-03 RX ORDER — BUPIVACAINE HYDROCHLORIDE 2.5 MG/ML
INJECTION, SOLUTION EPIDURAL; INFILTRATION; INTRACAUDAL; PERINEURAL PRN
Status: DISCONTINUED | OUTPATIENT
Start: 2025-07-03 | End: 2025-07-03 | Stop reason: ALTCHOICE

## 2025-07-03 RX ORDER — PROPOFOL 10 MG/ML
INJECTION, EMULSION INTRAVENOUS
Status: DISCONTINUED | OUTPATIENT
Start: 2025-07-03 | End: 2025-07-03 | Stop reason: SDUPTHER

## 2025-07-03 RX ORDER — HYDRALAZINE HYDROCHLORIDE 20 MG/ML
10 INJECTION INTRAMUSCULAR; INTRAVENOUS
Status: DISCONTINUED | OUTPATIENT
Start: 2025-07-03 | End: 2025-07-03 | Stop reason: HOSPADM

## 2025-07-03 RX ADMIN — HEPARIN SODIUM 6000 UNITS: 1000 INJECTION, SOLUTION INTRAVENOUS; SUBCUTANEOUS at 09:53

## 2025-07-03 RX ADMIN — PROPOFOL 150 MG: 10 INJECTION, EMULSION INTRAVENOUS at 09:19

## 2025-07-03 RX ADMIN — PHENYLEPHRINE HYDROCHLORIDE 30 MCG/KG/MIN: 10 INJECTION INTRAVENOUS at 09:37

## 2025-07-03 RX ADMIN — SODIUM CHLORIDE 25 ML: 0.9 INJECTION, SOLUTION INTRAVENOUS at 08:51

## 2025-07-03 RX ADMIN — ONDANSETRON 4 MG: 2 INJECTION, SOLUTION INTRAMUSCULAR; INTRAVENOUS at 09:26

## 2025-07-03 RX ADMIN — EPHEDRINE SULFATE 5 MG: 5 INJECTION INTRAVENOUS at 09:28

## 2025-07-03 RX ADMIN — EPHEDRINE SULFATE 5 MG: 5 INJECTION INTRAVENOUS at 09:30

## 2025-07-03 RX ADMIN — EPHEDRINE SULFATE 10 MG: 5 INJECTION INTRAVENOUS at 09:50

## 2025-07-03 RX ADMIN — EPHEDRINE SULFATE 5 MG: 5 INJECTION INTRAVENOUS at 09:43

## 2025-07-03 RX ADMIN — PHENYLEPHRINE HYDROCHLORIDE 2 ML: 10 INJECTION INTRAVENOUS at 09:42

## 2025-07-03 RX ADMIN — LIDOCAINE HYDROCHLORIDE 80 MG: 20 INJECTION, SOLUTION EPIDURAL; INFILTRATION; INTRACAUDAL; PERINEURAL at 09:19

## 2025-07-03 RX ADMIN — Medication 2000 MG: at 09:26

## 2025-07-03 RX ADMIN — EPHEDRINE SULFATE 10 MG: 5 INJECTION INTRAVENOUS at 09:33

## 2025-07-03 RX ADMIN — EPHEDRINE SULFATE 10 MG: 5 INJECTION INTRAVENOUS at 10:03

## 2025-07-03 ASSESSMENT — PAIN - FUNCTIONAL ASSESSMENT
PAIN_FUNCTIONAL_ASSESSMENT: NONE - DENIES PAIN
PAIN_FUNCTIONAL_ASSESSMENT: 0-10

## 2025-07-03 NOTE — OP NOTE
[address]  [address]                            OPERATIVE REPORT      PATIENT NAME: GARRY GRANDE           : 1945  MED REC NO: [id number]                       [place] [facility] [place]  ACCOUNT NO: [id number]                       ADMIT DATE: 2025  PROVIDER: Inder Rutledge MD    DATE OF SERVICE:  2025    PREOPERATIVE DIAGNOSES:  End-stage renal disease, needing long-term dialysis access.    POSTOPERATIVE DIAGNOSES:  End-stage renal disease, needing long-term dialysis access.    PROCEDURES PERFORMED:  Creation of right brachial upper arm axillary loop graft.    SURGEON:  Inder Rutledge MD    ASSISTANT:      ANESTHESIA:  General.    ESTIMATED BLOOD LOSS:      SPECIMENS REMOVED:  None.    INTRAOPERATIVE FINDINGS:       COMPLICATIONS:  None.    IMPLANTS:      INDICATIONS:      DESCRIPTION OF PROCEDURE:  After getting informed consent, the patient was brought to the operating room, anesthesia was then induced.  Preop antibiotics were given before skin incision.  The patient's right arm was then prepped and draped in normal sterile fashion.  Incision was made over the antecubital fossa.  We dissected down through the subcutaneous tissues.  We identified the brachial artery, it was small, was measuring about 4 mm.  We got control proximally and distally.  We made a counterincision above the axillary artery.  We dissected down to the subcutaneous tissue where we identified the brachial artery and vein.  There was 2 side branches that we suture ligated with 2-0 silk pops.  Once we dissected circumferentially, we tunneled a 4 x 6 tapered PTFE graft.  We cut the proximal end to make the lumen of 6 mm, gave 6000 heparin.  With #11 blade, did arteriotomy, extended with Mark scissors.  Then, we spatulated the graft and did end-to-side anastomosis using 6-0 Prolene.  We had good pulsatile flow through the graft.  We clamped the graft,

## 2025-07-03 NOTE — BRIEF OP NOTE
[address] [place] [address]  [phone] FAX: [phone]    Brief Op Note Template Note    Pre-Op Diagnosis: ESRD (end stage renal disease) on dialysis (HCC) [N18.6, Z99.2]    Post-Op Diagnosis:  * No post-op diagnosis entered *    Procedures: Procedure(s):  RIGHT BRACHIAL UPPER ARM AXILLARY LOOP GRAFT, O.R. 12    Surgeon: INDER RUTLEDGE MD    Assistants: Surgeon(s):  Inder Rutledge MD      Anesthesia:  General     Findings: End-stage renal disease    Tourniquet Time:  * No tourniquets in log *    Estimated Blood Loss:               Specimens            Implants:    Implant Name Type Inv. Item Serial No.  Lot No. LRB No. Used Action   GRAFT VASC L45CM DIA4-6MM UNIV PTFE STR STD WALL TAPR - [id number] Vascular grafts GRAFT VASC L45CM DIA4-6MM UNIV PTFE STR STD WALL TAPR [id number]  [employer]  Right 1 Implanted       Complications: None               Signed: INDER RUTLEDGE MD      Elements of this note have been dictated using speech recognition software. As a result, errors of speech recognition may have occurred.

## 2025-07-03 NOTE — ANESTHESIA POSTPROCEDURE EVALUATION
Department of Anesthesiology  Postprocedure Note    Patient: Nataliia Pradhan  MRN: [id number]  YOB: 1945  Date of evaluation: 7/3/2025    Procedure Summary       Date: 07/03/25 Room / Location: [facility] MAIN OR 12 / [facility] MAIN OR    Anesthesia Start: 0911 Anesthesia Stop: 1103    Procedure: RIGHT BRACHIAL UPPER ARM AXILLARY LOOP GRAFT, O.R. 12 (Right: Arm Upper) Diagnosis:       ESRD (end stage renal disease) on dialysis (HCC)      (ESRD (end stage renal disease) on dialysis (HCC) [N18.6, Z99.2])    Providers: Inder Rutledge MD Responsible Provider: Eusebio Ga MD    Anesthesia Type: General ASA Status: 4            Anesthesia Type: General    Milton Phase I: Milton Score: 10    Milton Phase II: Milton Score: 10    Anesthesia Post Evaluation    Patient location during evaluation: bedside  Patient participation: complete - patient participated  Level of consciousness: awake and sleepy but conscious  Pain scale: Present, but controlled.  Airway patency: patent  Nausea & Vomiting: no nausea and no vomiting  Cardiovascular status: blood pressure returned to baseline  Respiratory status: acceptable  Hydration status: euvolemic  Pain management: adequate    No notable events documented.

## 2025-07-03 NOTE — ANESTHESIA PRE PROCEDURE
tablet Take 1 tablet by mouth Daily 2/13/23   Burt Mcrae MD   calcitRIOL (ROCALTROL) 0.25 MCG capsule Take 1 capsule by mouth daily 10/21/22   Provider, Historical, MD   Lancets MISC Check BS BID 12/5/14   Automatic Reconciliation, Ar       Current medications:    Current Facility-Administered Medications   Medication Dose Route Frequency Provider Last Rate Last Admin    ceFAZolin (ANCEF) 2000 mg in sterile water 20 mL IV syringe  2,000 mg IntraVENous On Call to OR Inder Rutledge MD        sodium chloride flush 0.9 % injection 5-40 mL  5-40 mL IntraVENous 2 times per day Eusebio Ga MD        sodium chloride flush 0.9 % injection 5-40 mL  5-40 mL IntraVENous PRN Eusebio Ga MD        0.9 % sodium chloride infusion   IntraVENous PRN Eusebio Ga MD           Allergies:    Allergies   Allergen Reactions    Prednisone Other (See Comments)     Elevated blood sugar        Problem List:    Patient Active Problem List   Diagnosis Code    Hyperlipemia E78.5    Foot pain M79.673    Type 2 diabetes mellitus with nephropathy (HCC) E11.21    Pre-ulcerative corn or callous L84    HTN (hypertension) I10    Cardiac murmur R01.1    Bilateral leg edema R60.0    Hypothyroidism E03.9    Abnormal EKG R94.31    ESRD (end stage renal disease) (HCC) N18.6    PVD (peripheral vascular disease) I73.9    Coagulation defect D68.9    ESRD needing dialysis (HCC) N18.6, Z99.2    Volume overload E87.70    Anasarca associated with disorder of kidney N04.9    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

## 2025-07-03 NOTE — DISCHARGE INSTRUCTIONS
INSTRUCTIONS FOR A-V FISTULA, GRAFT ACCESS, REVISION OR DECLOT    ACTIVITY  As tolerated and as directed by your doctor.   Keep arm straight and raised above heart level for the next 24 hours.    DIET  Clear liquids until no nausea or vomiting; then light diet for the first day.  Advance to regular diet on second day, unless your doctor orders otherwise.  If nausea and vomiting continues, call your doctor.     PAIN  Take pain medication as directed by your doctor.  Call your doctor if pain is NOT relieved by the medication.  DO NOT take aspirin or blood thinners until directed by your doctor.    MEDICATION INTERACTION:  During your procedure you potentially received a medication or medications which may reduce the effectiveness of oral contraceptives. Please consider other forms of contraception for 1 month following your procedure if you are currently using oral contraceptives as your primary form of birth control. In addition to this, we recommend continuing your oral contraceptive as prescribed, unless otherwise instructed by your physician, during this time    DRESSING CARE  Keep your dressing clean and dry  Can remove ace wrap the next day    CARE OF YOUR ACCESS  DO:  Keep access area clean with soap and water daily after dressing has been removed.  Feel for the thrill daily. (by placing your fingers over the graft you will be able to feel a vibration (thrill) which means blood is flowing and the graft is working.)  DO NOT:  Wear tight sleeves, watches, belts or bracelets over graft.  Carry heavy bags across the graft.  Sleep on graft side.  Let your blood pressure be taken on graft side.  Let blood be drawn from your graft side.    CALL YOUR DOCTOR IF  Excessive bleeding that does not stop after holding mild pressure over area.  Temperature of 101 degrees F or above.  Redness, excessive swelling or bruising, and/or green or yellow, smelly discharge from incision   Loss of sensation-cold, white, or blue fingers

## 2025-07-11 ENCOUNTER — TELEPHONE (OUTPATIENT)
Dept: VASCULAR SURGERY | Age: 80
End: 2025-07-11

## 2025-07-11 NOTE — TELEPHONE ENCOUNTER
Patient's daughter c/o redness that is on patient's arm following surgery she had on 7/3 with Dr Rutledge.  Per Dr Rutledge, some redness is normal following the procedure she had.  Patient to return to office on 7/22.

## 2025-07-22 ENCOUNTER — OFFICE VISIT (OUTPATIENT)
Dept: VASCULAR SURGERY | Age: 80
End: 2025-07-22
Payer: MEDICARE

## 2025-07-22 VITALS
SYSTOLIC BLOOD PRESSURE: 120 MMHG | DIASTOLIC BLOOD PRESSURE: 68 MMHG | BODY MASS INDEX: 18.54 KG/M2 | HEART RATE: 98 BPM | HEIGHT: 64 IN | OXYGEN SATURATION: 97 %

## 2025-07-22 DIAGNOSIS — I73.9 PVD (PERIPHERAL VASCULAR DISEASE) WITH CLAUDICATION: Primary | ICD-10-CM

## 2025-07-22 PROCEDURE — G8399 PT W/DXA RESULTS DOCUMENT: HCPCS | Performed by: SURGERY

## 2025-07-22 PROCEDURE — 99213 OFFICE O/P EST LOW 20 MIN: CPT | Performed by: SURGERY

## 2025-07-22 PROCEDURE — G8420 CALC BMI NORM PARAMETERS: HCPCS | Performed by: SURGERY

## 2025-07-22 PROCEDURE — 1036F TOBACCO NON-USER: CPT | Performed by: SURGERY

## 2025-07-22 PROCEDURE — 1123F ACP DISCUSS/DSCN MKR DOCD: CPT | Performed by: SURGERY

## 2025-07-22 PROCEDURE — 3078F DIAST BP <80 MM HG: CPT | Performed by: SURGERY

## 2025-07-22 PROCEDURE — 1090F PRES/ABSN URINE INCON ASSESS: CPT | Performed by: SURGERY

## 2025-07-22 PROCEDURE — G8427 DOCREV CUR MEDS BY ELIG CLIN: HCPCS | Performed by: SURGERY

## 2025-07-22 PROCEDURE — 1159F MED LIST DOCD IN RCRD: CPT | Performed by: SURGERY

## 2025-07-22 PROCEDURE — 3074F SYST BP LT 130 MM HG: CPT | Performed by: SURGERY

## 2025-07-22 NOTE — PROGRESS NOTES
[address] [address]  [phone] FAX: [phone]    Nataliia Pradhan  : 1945    Chief Complaint:     History of Present Illness:   Patient follows up today for status post creation of right upper arm graft.  Patient currently on dialysis through right chest tunneled catheter.    CURRENT MEDICATIONS:  Current Outpatient Medications   Medication Sig Dispense Refill    naloxone 4 MG/0.1ML LIQD nasal spray 1 spray by Nasal route as needed for Opioid Reversal 1 each 0    cinacalcet (SENSIPAR) 30 MG tablet Take 1 tablet by mouth daily      B Complex-C-Folic Acid (JENN-ERASMO) TABS Take 1 tablet by mouth daily      Ferric Citrate (AURYXIA PO) Take by mouth daily      apixaban (ELIQUIS) 2.5 MG TABS tablet Take 1 tablet by mouth 2 times daily 60 tablet 3    lacosamide (VIMPAT) 100 MG TABS tablet Take 1 tablet by mouth 2 times daily for 60 days. Max Daily Amount: 200 mg 120 tablet 3    insulin glargine, 1 unit dial, (TOUJEO SOLOSTAR) 300 UNIT/ML concentrated injection pen Inject 3 Units into the skin nightly (Patient taking differently: Inject 5 Units into the skin nightly) 5 Adjustable Dose Pre-filled Pen Syringe 2    hydrALAZINE (APRESOLINE) 25 MG tablet Take 1 tablet by mouth every 12 hours 90 tablet 3    carvedilol (COREG) 6.25 MG tablet Take 1 tablet by mouth 2 times daily 60 tablet 3    bumetanide (BUMEX) 1 MG tablet Take 1 tablet by mouth daily 30 tablet 0    doxazosin (CARDURA) 4 MG tablet TAKE 1 TABLET BY MOUTH IN THE MORNING AND AT BEDTIME 180 tablet 3    atorvastatin (LIPITOR) 40 MG tablet Take 1 tablet by mouth at bedtime 90 tablet 3    Carboxymethylcellul-Glycerin 0.5-0.9 % SOLN Apply to eye as needed      levothyroxine (SYNTHROID) 75 MCG tablet Take 1 tablet by mouth Daily 90 tablet 3    calcitRIOL (ROCALTROL) 0.25 MCG capsule Take 1 capsule by mouth daily      Lancets MISC Check BS BID       No current facility-administered medications for this visit.

## 2025-07-30 ENCOUNTER — TRANSCRIBE ORDERS (OUTPATIENT)
Dept: INTERVENTIONAL RADIOLOGY/VASCULAR | Age: 80
End: 2025-07-30

## 2025-07-30 DIAGNOSIS — N18.6 ESRD (END STAGE RENAL DISEASE) (HCC): Primary | ICD-10-CM

## 2025-08-01 ENCOUNTER — OFFICE VISIT (OUTPATIENT)
Dept: FAMILY MEDICINE CLINIC | Facility: CLINIC | Age: 80
End: 2025-08-01
Payer: MEDICARE

## 2025-08-01 VITALS — BODY MASS INDEX: 18.54 KG/M2 | SYSTOLIC BLOOD PRESSURE: 126 MMHG | HEIGHT: 64 IN | DIASTOLIC BLOOD PRESSURE: 76 MMHG

## 2025-08-01 DIAGNOSIS — E87.70 HYPERVOLEMIA, UNSPECIFIED HYPERVOLEMIA TYPE: ICD-10-CM

## 2025-08-01 DIAGNOSIS — N18.6 ESRD (END STAGE RENAL DISEASE) ON DIALYSIS (HCC): Primary | ICD-10-CM

## 2025-08-01 DIAGNOSIS — Z99.2 ESRD (END STAGE RENAL DISEASE) ON DIALYSIS (HCC): Primary | ICD-10-CM

## 2025-08-01 PROCEDURE — 99214 OFFICE O/P EST MOD 30 MIN: CPT | Performed by: FAMILY MEDICINE

## 2025-08-01 PROCEDURE — 3074F SYST BP LT 130 MM HG: CPT | Performed by: FAMILY MEDICINE

## 2025-08-01 PROCEDURE — 1123F ACP DISCUSS/DSCN MKR DOCD: CPT | Performed by: FAMILY MEDICINE

## 2025-08-01 PROCEDURE — 3078F DIAST BP <80 MM HG: CPT | Performed by: FAMILY MEDICINE

## 2025-08-01 PROCEDURE — 1159F MED LIST DOCD IN RCRD: CPT | Performed by: FAMILY MEDICINE

## 2025-08-01 RX ORDER — BUMETANIDE 1 MG/1
1 TABLET ORAL DAILY
Qty: 30 TABLET | Refills: 2 | Status: SHIPPED | OUTPATIENT
Start: 2025-08-01 | End: 2025-10-30

## 2025-08-01 RX ORDER — FOLIC ACID/VIT B COMPLEX AND C 0.8 MG
1 TABLET ORAL DAILY
Qty: 30 TABLET | Refills: 2 | Status: SHIPPED | OUTPATIENT
Start: 2025-08-01 | End: 2025-10-30

## 2025-08-01 ASSESSMENT — PATIENT HEALTH QUESTIONNAIRE - PHQ9
SUM OF ALL RESPONSES TO PHQ QUESTIONS 1-9: 0
SUM OF ALL RESPONSES TO PHQ QUESTIONS 1-9: 0
2. FEELING DOWN, DEPRESSED OR HOPELESS: NOT AT ALL
1. LITTLE INTEREST OR PLEASURE IN DOING THINGS: NOT AT ALL
SUM OF ALL RESPONSES TO PHQ QUESTIONS 1-9: 0
SUM OF ALL RESPONSES TO PHQ QUESTIONS 1-9: 0

## 2025-08-02 PROBLEM — D50.9 IRON DEFICIENCY ANEMIA, UNSPECIFIED: Status: ACTIVE | Noted: 2023-04-06

## 2025-08-02 PROBLEM — N04.9 NEPHROTIC SYNDROME: Status: ACTIVE | Noted: 2025-05-12

## 2025-08-02 PROBLEM — Z99.2 DEPENDENCE ON RENAL DIALYSIS: Status: ACTIVE | Noted: 2025-05-12

## 2025-08-02 PROBLEM — R26.81 UNSTEADINESS ON FEET: Status: ACTIVE | Noted: 2025-05-12

## 2025-08-02 PROBLEM — D63.1 ANEMIA IN STAGE 5 CHRONIC KIDNEY DISEASE, NOT ON CHRONIC DIALYSIS (HCC): Status: ACTIVE | Noted: 2023-09-15

## 2025-08-02 PROBLEM — L89.313 PRESSURE ULCER OF RIGHT BUTTOCK, STAGE 3 (HCC): Status: ACTIVE | Noted: 2025-07-09

## 2025-08-02 PROBLEM — G40.309 IDIOPATHIC GENERALIZED EPILEPSY (HCC): Status: ACTIVE | Noted: 2025-06-10

## 2025-08-02 PROBLEM — E04.2 MULTINODULAR GOITER: Status: ACTIVE | Noted: 2020-01-27

## 2025-08-02 PROBLEM — R48.9 SYMBOLIC DYSFUNCTION: Status: RESOLVED | Noted: 2025-05-12 | Resolved: 2025-08-02

## 2025-08-02 PROBLEM — E11.65 HYPERGLYCEMIA DUE TO TYPE 2 DIABETES MELLITUS (HCC): Status: ACTIVE | Noted: 2025-06-10

## 2025-08-02 PROBLEM — Z71.89 ADVANCE DIRECTIVE DISCUSSED WITH PATIENT: Status: RESOLVED | Noted: 2025-06-12 | Resolved: 2025-08-02

## 2025-08-02 PROBLEM — R41.82 ALTERED MENTAL STATUS: Status: RESOLVED | Noted: 2025-05-12 | Resolved: 2025-08-02

## 2025-08-02 PROBLEM — R13.12 OROPHARYNGEAL DYSPHAGIA: Status: ACTIVE | Noted: 2025-05-12

## 2025-08-02 PROBLEM — E11.3293 MILD NONPROLIFERATIVE DIABETIC RETINOPATHY OF BOTH EYES WITHOUT MACULAR EDEMA ASSOCIATED WITH TYPE 2 DIABETES MELLITUS (HCC): Status: ACTIVE | Noted: 2021-12-16

## 2025-08-02 PROBLEM — N18.5 ANEMIA IN STAGE 5 CHRONIC KIDNEY DISEASE, NOT ON CHRONIC DIALYSIS (HCC): Status: ACTIVE | Noted: 2023-09-15

## 2025-08-02 PROBLEM — Z78.0 MENOPAUSE PRESENT: Status: ACTIVE | Noted: 2021-12-16

## 2025-08-02 PROBLEM — Z86.39 HISTORY OF PRIMARY HYPERPARATHYROIDISM: Status: ACTIVE | Noted: 2019-04-23

## 2025-08-02 PROBLEM — E11.21 DIABETIC NEPHROPATHY ASSOCIATED WITH TYPE 2 DIABETES MELLITUS (HCC): Status: ACTIVE | Noted: 2025-05-12

## 2025-08-02 PROBLEM — M85.89 OSTEOPENIA OF MULTIPLE SITES: Status: ACTIVE | Noted: 2019-04-23

## 2025-08-02 PROBLEM — M62.81 MUSCLE WEAKNESS: Status: ACTIVE | Noted: 2025-05-12

## 2025-08-02 PROBLEM — K75.81 NONALCOHOLIC STEATOHEPATITIS: Status: ACTIVE | Noted: 2023-10-01

## 2025-08-02 ASSESSMENT — ENCOUNTER SYMPTOMS
BACK PAIN: 1
COUGH: 0
ABDOMINAL DISTENTION: 0
DIARRHEA: 0
SORE THROAT: 0
SHORTNESS OF BREATH: 0

## 2025-08-04 ENCOUNTER — TELEPHONE (OUTPATIENT)
Dept: FAMILY MEDICINE CLINIC | Facility: CLINIC | Age: 80
End: 2025-08-04

## 2025-08-07 ENCOUNTER — HOSPITAL ENCOUNTER (OUTPATIENT)
Dept: INTERVENTIONAL RADIOLOGY/VASCULAR | Age: 80
Discharge: HOME | End: 2025-08-09
Attending: INTERNAL MEDICINE
Payer: MEDICARE

## 2025-08-07 ENCOUNTER — TELEPHONE (OUTPATIENT)
Dept: FAMILY MEDICINE CLINIC | Facility: CLINIC | Age: 80
End: 2025-08-07

## 2025-08-07 VITALS
OXYGEN SATURATION: 97 % | HEART RATE: 68 BPM | SYSTOLIC BLOOD PRESSURE: 142 MMHG | TEMPERATURE: 97.7 F | RESPIRATION RATE: 18 BRPM | DIASTOLIC BLOOD PRESSURE: 67 MMHG

## 2025-08-07 DIAGNOSIS — N18.6 ESRD (END STAGE RENAL DISEASE) (HCC): ICD-10-CM

## 2025-08-07 PROCEDURE — 36589 REMOVAL TUNNELED CV CATH: CPT

## 2025-08-09 ENCOUNTER — HOSPITAL ENCOUNTER (EMERGENCY)
Age: 80
Discharge: HOME | DRG: 622 | End: 2025-08-09
Attending: EMERGENCY MEDICINE
Payer: MEDICARE

## 2025-08-09 VITALS
TEMPERATURE: 98.8 F | WEIGHT: 115.5 LBS | SYSTOLIC BLOOD PRESSURE: 124 MMHG | RESPIRATION RATE: 16 BRPM | OXYGEN SATURATION: 100 % | DIASTOLIC BLOOD PRESSURE: 61 MMHG | HEART RATE: 88 BPM | HEIGHT: 64 IN | BODY MASS INDEX: 19.72 KG/M2

## 2025-08-09 DIAGNOSIS — R73.9 HYPERGLYCEMIA: Primary | ICD-10-CM

## 2025-08-09 LAB
ALBUMIN SERPL-MCNC: 2 G/DL (ref 3.2–4.6)
ALBUMIN/GLOB SERPL: 0.4 (ref 1–1.9)
ALP SERPL-CCNC: 100 U/L (ref 35–104)
ALT SERPL-CCNC: 5 U/L (ref 8–45)
ANION GAP SERPL CALC-SCNC: 16 MMOL/L (ref 7–16)
AST SERPL-CCNC: 22 U/L (ref 15–37)
BILIRUB SERPL-MCNC: 0.6 MG/DL (ref 0–1.2)
BUN SERPL-MCNC: 38 MG/DL (ref 8–23)
CALCIUM SERPL-MCNC: 8.1 MG/DL (ref 8.8–10.2)
CHLORIDE SERPL-SCNC: 95 MMOL/L (ref 98–107)
CO2 SERPL-SCNC: 21 MMOL/L (ref 20–29)
CREAT SERPL-MCNC: 3.29 MG/DL (ref 0.6–1.1)
ERYTHROCYTE [DISTWIDTH] IN BLOOD BY AUTOMATED COUNT: 16 % (ref 11.9–14.6)
GLOBULIN SER CALC-MCNC: 4.7 G/DL (ref 2.3–3.5)
GLUCOSE BLD STRIP.AUTO-MCNC: 222 MG/DL (ref 65–100)
GLUCOSE SERPL-MCNC: 254 MG/DL (ref 70–99)
HCT VFR BLD AUTO: 37.1 % (ref 35.8–46.3)
HGB BLD-MCNC: 12 G/DL (ref 11.7–15.4)
MCH RBC QN AUTO: 29.8 PG (ref 26.1–32.9)
MCHC RBC AUTO-ENTMCNC: 32.3 G/DL (ref 31.4–35)
MCV RBC AUTO: 92.1 FL (ref 82–102)
NRBC # BLD: 0 K/UL (ref 0–0.2)
PLATELET # BLD AUTO: 128 K/UL (ref 150–450)
PMV BLD AUTO: 11.4 FL (ref 9.4–12.3)
POTASSIUM SERPL-SCNC: 5 MMOL/L (ref 3.5–5.1)
PROT SERPL-MCNC: 6.7 G/DL (ref 6.3–8.2)
RBC # BLD AUTO: 4.03 M/UL (ref 4.05–5.2)
SERVICE CMNT-IMP: ABNORMAL
SODIUM SERPL-SCNC: 132 MMOL/L (ref 136–145)
WBC # BLD AUTO: 10.8 K/UL (ref 4.3–11.1)

## 2025-08-09 PROCEDURE — 99284 EMERGENCY DEPT VISIT MOD MDM: CPT

## 2025-08-09 PROCEDURE — 80053 COMPREHEN METABOLIC PANEL: CPT

## 2025-08-09 PROCEDURE — 96360 HYDRATION IV INFUSION INIT: CPT

## 2025-08-09 PROCEDURE — 85027 COMPLETE CBC AUTOMATED: CPT

## 2025-08-09 PROCEDURE — 82962 GLUCOSE BLOOD TEST: CPT

## 2025-08-09 PROCEDURE — 2580000003 HC RX 258: Performed by: EMERGENCY MEDICINE

## 2025-08-09 RX ORDER — 0.9 % SODIUM CHLORIDE 0.9 %
500 INTRAVENOUS SOLUTION INTRAVENOUS ONCE
Status: COMPLETED | OUTPATIENT
Start: 2025-08-09 | End: 2025-08-09

## 2025-08-09 RX ADMIN — SODIUM CHLORIDE 500 ML: 0.9 INJECTION, SOLUTION INTRAVENOUS at 12:33

## 2025-08-09 ASSESSMENT — ENCOUNTER SYMPTOMS
SHORTNESS OF BREATH: 0
NAUSEA: 0
ABDOMINAL PAIN: 0
VOMITING: 0
COUGH: 0

## 2025-08-09 ASSESSMENT — PAIN SCALES - GENERAL
PAINLEVEL_OUTOF10: 0
PAINLEVEL_OUTOF10: 10

## 2025-08-09 ASSESSMENT — PAIN - FUNCTIONAL ASSESSMENT
PAIN_FUNCTIONAL_ASSESSMENT: 0-10
PAIN_FUNCTIONAL_ASSESSMENT: 0-10

## 2025-08-09 ASSESSMENT — LIFESTYLE VARIABLES
HOW MANY STANDARD DRINKS CONTAINING ALCOHOL DO YOU HAVE ON A TYPICAL DAY: PATIENT DOES NOT DRINK
HOW OFTEN DO YOU HAVE A DRINK CONTAINING ALCOHOL: NEVER

## 2025-08-09 ASSESSMENT — PAIN DESCRIPTION - DESCRIPTORS: DESCRIPTORS: ACHING

## 2025-08-09 ASSESSMENT — PAIN DESCRIPTION - ORIENTATION: ORIENTATION: RIGHT;LEFT

## 2025-08-09 ASSESSMENT — PAIN DESCRIPTION - LOCATION: LOCATION: KNEE

## 2025-08-12 ENCOUNTER — HOSPITAL ENCOUNTER (INPATIENT)
Age: 80
LOS: 9 days | Discharge: HOME | DRG: 622 | End: 2025-08-21
Attending: STUDENT IN AN ORGANIZED HEALTH CARE EDUCATION/TRAINING PROGRAM | Admitting: INTERNAL MEDICINE
Payer: MEDICARE

## 2025-08-12 ENCOUNTER — APPOINTMENT (OUTPATIENT)
Dept: CT IMAGING | Age: 80
DRG: 622 | End: 2025-08-12
Payer: MEDICARE

## 2025-08-12 DIAGNOSIS — L02.91 ABSCESS: Primary | ICD-10-CM

## 2025-08-12 DIAGNOSIS — S31.000A WOUND OF SACRAL REGION, INITIAL ENCOUNTER: ICD-10-CM

## 2025-08-12 PROBLEM — D63.1 ANEMIA DUE TO CHRONIC KIDNEY DISEASE: Status: ACTIVE | Noted: 2025-08-12

## 2025-08-12 PROBLEM — L89.153 DECUBITUS ULCER OF SACRAL REGION, STAGE 3 (HCC): Status: ACTIVE | Noted: 2025-08-12

## 2025-08-12 PROBLEM — N18.9 ANEMIA DUE TO CHRONIC KIDNEY DISEASE: Status: ACTIVE | Noted: 2025-08-12

## 2025-08-12 LAB
ALBUMIN SERPL-MCNC: 2.1 G/DL (ref 3.2–4.6)
ALBUMIN/GLOB SERPL: 0.4 (ref 1–1.9)
ALP SERPL-CCNC: 124 U/L (ref 35–104)
ALT SERPL-CCNC: 11 U/L (ref 8–45)
ANION GAP SERPL CALC-SCNC: 22 MMOL/L (ref 7–16)
AST SERPL-CCNC: 26 U/L (ref 15–37)
BASOPHILS # BLD: 0.03 K/UL (ref 0–0.2)
BASOPHILS NFR BLD: 0.2 % (ref 0–2)
BILIRUB SERPL-MCNC: 0.4 MG/DL (ref 0–1.2)
BUN SERPL-MCNC: 49 MG/DL (ref 8–23)
CALCIUM SERPL-MCNC: 7.7 MG/DL (ref 8.8–10.2)
CHLORIDE SERPL-SCNC: 92 MMOL/L (ref 98–107)
CO2 SERPL-SCNC: 19 MMOL/L (ref 20–29)
CREAT SERPL-MCNC: 3.73 MG/DL (ref 0.6–1.1)
DIFFERENTIAL METHOD BLD: ABNORMAL
EOSINOPHIL # BLD: 0.01 K/UL (ref 0–0.8)
EOSINOPHIL NFR BLD: 0.1 % (ref 0.5–7.8)
ERYTHROCYTE [DISTWIDTH] IN BLOOD BY AUTOMATED COUNT: 15.5 % (ref 11.9–14.6)
GLOBULIN SER CALC-MCNC: 4.8 G/DL (ref 2.3–3.5)
GLUCOSE BLD STRIP.AUTO-MCNC: 197 MG/DL (ref 65–100)
GLUCOSE BLD STRIP.AUTO-MCNC: 215 MG/DL (ref 65–100)
GLUCOSE SERPL-MCNC: 236 MG/DL (ref 70–99)
HCT VFR BLD AUTO: 37.4 % (ref 35.8–46.3)
HGB BLD-MCNC: 11.5 G/DL (ref 11.7–15.4)
IMM GRANULOCYTES # BLD AUTO: 0.06 K/UL (ref 0–0.5)
IMM GRANULOCYTES NFR BLD AUTO: 0.4 % (ref 0–5)
LYMPHOCYTES # BLD: 0.73 K/UL (ref 0.5–4.6)
LYMPHOCYTES NFR BLD: 5.2 % (ref 13–44)
MCH RBC QN AUTO: 28.2 PG (ref 26.1–32.9)
MCHC RBC AUTO-ENTMCNC: 30.7 G/DL (ref 31.4–35)
MCV RBC AUTO: 91.7 FL (ref 82–102)
MONOCYTES # BLD: 0.71 K/UL (ref 0.1–1.3)
MONOCYTES NFR BLD: 5 % (ref 4–12)
NEUTS SEG # BLD: 12.52 K/UL (ref 1.7–8.2)
NEUTS SEG NFR BLD: 89.1 % (ref 43–78)
NRBC # BLD: 0 K/UL (ref 0–0.2)
PLATELET # BLD AUTO: 143 K/UL (ref 150–450)
PMV BLD AUTO: 12.1 FL (ref 9.4–12.3)
POTASSIUM SERPL-SCNC: 4.8 MMOL/L (ref 3.5–5.1)
PROT SERPL-MCNC: 6.9 G/DL (ref 6.3–8.2)
RBC # BLD AUTO: 4.08 M/UL (ref 4.05–5.2)
SERVICE CMNT-IMP: ABNORMAL
SERVICE CMNT-IMP: ABNORMAL
SODIUM SERPL-SCNC: 133 MMOL/L (ref 136–145)
WBC # BLD AUTO: 14.1 K/UL (ref 4.3–11.1)

## 2025-08-12 PROCEDURE — 74177 CT ABD & PELVIS W/CONTRAST: CPT

## 2025-08-12 PROCEDURE — S5553 INSULIN LONG ACTING 5 U: HCPCS | Performed by: INTERNAL MEDICINE

## 2025-08-12 PROCEDURE — 6360000002 HC RX W HCPCS: Performed by: STUDENT IN AN ORGANIZED HEALTH CARE EDUCATION/TRAINING PROGRAM

## 2025-08-12 PROCEDURE — 2500000003 HC RX 250 WO HCPCS: Performed by: INTERNAL MEDICINE

## 2025-08-12 PROCEDURE — 6360000002 HC RX W HCPCS: Performed by: INTERNAL MEDICINE

## 2025-08-12 PROCEDURE — 1100000000 HC RM PRIVATE

## 2025-08-12 PROCEDURE — 99285 EMERGENCY DEPT VISIT HI MDM: CPT

## 2025-08-12 PROCEDURE — 99223 1ST HOSP IP/OBS HIGH 75: CPT | Performed by: SURGERY

## 2025-08-12 PROCEDURE — 82962 GLUCOSE BLOOD TEST: CPT

## 2025-08-12 PROCEDURE — 6360000004 HC RX CONTRAST MEDICATION: Performed by: STUDENT IN AN ORGANIZED HEALTH CARE EDUCATION/TRAINING PROGRAM

## 2025-08-12 PROCEDURE — 80053 COMPREHEN METABOLIC PANEL: CPT

## 2025-08-12 PROCEDURE — 2580000003 HC RX 258: Performed by: STUDENT IN AN ORGANIZED HEALTH CARE EDUCATION/TRAINING PROGRAM

## 2025-08-12 PROCEDURE — 2580000003 HC RX 258

## 2025-08-12 PROCEDURE — 2500000003 HC RX 250 WO HCPCS: Performed by: STUDENT IN AN ORGANIZED HEALTH CARE EDUCATION/TRAINING PROGRAM

## 2025-08-12 PROCEDURE — 85025 COMPLETE CBC W/AUTO DIFF WBC: CPT

## 2025-08-12 PROCEDURE — 6370000000 HC RX 637 (ALT 250 FOR IP): Performed by: INTERNAL MEDICINE

## 2025-08-12 RX ORDER — DEXTROSE MONOHYDRATE 100 MG/ML
INJECTION, SOLUTION INTRAVENOUS CONTINUOUS PRN
Status: DISCONTINUED | OUTPATIENT
Start: 2025-08-12 | End: 2025-08-21 | Stop reason: HOSPADM

## 2025-08-12 RX ORDER — FOLIC ACID/VIT B COMPLEX AND C 0.8 MG
1 TABLET ORAL DAILY
Status: DISCONTINUED | OUTPATIENT
Start: 2025-08-13 | End: 2025-08-21 | Stop reason: HOSPADM

## 2025-08-12 RX ORDER — SODIUM CHLORIDE 0.9 % (FLUSH) 0.9 %
5-40 SYRINGE (ML) INJECTION EVERY 12 HOURS SCHEDULED
Status: DISCONTINUED | OUTPATIENT
Start: 2025-08-12 | End: 2025-08-21 | Stop reason: HOSPADM

## 2025-08-12 RX ORDER — CARVEDILOL 6.25 MG/1
6.25 TABLET ORAL 2 TIMES DAILY
Status: DISCONTINUED | OUTPATIENT
Start: 2025-08-12 | End: 2025-08-21 | Stop reason: HOSPADM

## 2025-08-12 RX ORDER — HYDRALAZINE HYDROCHLORIDE 25 MG/1
25 TABLET, FILM COATED ORAL EVERY 12 HOURS SCHEDULED
Status: DISCONTINUED | OUTPATIENT
Start: 2025-08-12 | End: 2025-08-21 | Stop reason: HOSPADM

## 2025-08-12 RX ORDER — DOXAZOSIN 4 MG/1
4 TABLET ORAL 2 TIMES DAILY
Status: DISCONTINUED | OUTPATIENT
Start: 2025-08-12 | End: 2025-08-21 | Stop reason: HOSPADM

## 2025-08-12 RX ORDER — ONDANSETRON 2 MG/ML
4 INJECTION INTRAMUSCULAR; INTRAVENOUS EVERY 6 HOURS PRN
Status: DISCONTINUED | OUTPATIENT
Start: 2025-08-12 | End: 2025-08-21 | Stop reason: HOSPADM

## 2025-08-12 RX ORDER — HEPARIN SODIUM 5000 [USP'U]/ML
5000 INJECTION, SOLUTION INTRAVENOUS; SUBCUTANEOUS EVERY 8 HOURS SCHEDULED
Status: DISCONTINUED | OUTPATIENT
Start: 2025-08-12 | End: 2025-08-13

## 2025-08-12 RX ORDER — ACETAMINOPHEN 325 MG/1
650 TABLET ORAL EVERY 6 HOURS PRN
Status: DISCONTINUED | OUTPATIENT
Start: 2025-08-12 | End: 2025-08-14

## 2025-08-12 RX ORDER — ONDANSETRON 4 MG/1
4 TABLET, ORALLY DISINTEGRATING ORAL EVERY 8 HOURS PRN
Status: DISCONTINUED | OUTPATIENT
Start: 2025-08-12 | End: 2025-08-21 | Stop reason: HOSPADM

## 2025-08-12 RX ORDER — DEXTROSE MONOHYDRATE AND SODIUM CHLORIDE 5; .45 G/100ML; G/100ML
INJECTION, SOLUTION INTRAVENOUS CONTINUOUS
Status: DISCONTINUED | OUTPATIENT
Start: 2025-08-12 | End: 2025-08-14

## 2025-08-12 RX ORDER — CINACALCET 30 MG/1
30 TABLET, FILM COATED ORAL DAILY
Status: DISCONTINUED | OUTPATIENT
Start: 2025-08-13 | End: 2025-08-21 | Stop reason: HOSPADM

## 2025-08-12 RX ORDER — FAMOTIDINE 20 MG/1
10 TABLET, FILM COATED ORAL DAILY PRN
Status: DISCONTINUED | OUTPATIENT
Start: 2025-08-12 | End: 2025-08-21 | Stop reason: HOSPADM

## 2025-08-12 RX ORDER — LEVOTHYROXINE SODIUM 75 UG/1
75 TABLET ORAL DAILY
Status: DISCONTINUED | OUTPATIENT
Start: 2025-08-12 | End: 2025-08-21 | Stop reason: HOSPADM

## 2025-08-12 RX ORDER — ACETAMINOPHEN 650 MG/1
650 SUPPOSITORY RECTAL EVERY 6 HOURS PRN
Status: DISCONTINUED | OUTPATIENT
Start: 2025-08-12 | End: 2025-08-14

## 2025-08-12 RX ORDER — INSULIN GLARGINE-YFGN 100 [IU]/ML
3 INJECTION, SOLUTION SUBCUTANEOUS NIGHTLY
Status: DISCONTINUED | OUTPATIENT
Start: 2025-08-12 | End: 2025-08-16

## 2025-08-12 RX ORDER — CALCITRIOL 0.25 UG/1
0.25 CAPSULE, LIQUID FILLED ORAL
Status: DISCONTINUED | OUTPATIENT
Start: 2025-08-13 | End: 2025-08-21 | Stop reason: HOSPADM

## 2025-08-12 RX ORDER — ATORVASTATIN CALCIUM 40 MG/1
40 TABLET, FILM COATED ORAL NIGHTLY
Status: DISCONTINUED | OUTPATIENT
Start: 2025-08-12 | End: 2025-08-21 | Stop reason: HOSPADM

## 2025-08-12 RX ORDER — MAGNESIUM HYDROXIDE/ALUMINUM HYDROXICE/SIMETHICONE 120; 1200; 1200 MG/30ML; MG/30ML; MG/30ML
30 SUSPENSION ORAL EVERY 6 HOURS PRN
Status: DISCONTINUED | OUTPATIENT
Start: 2025-08-12 | End: 2025-08-21 | Stop reason: HOSPADM

## 2025-08-12 RX ORDER — INSULIN LISPRO 100 [IU]/ML
0-8 INJECTION, SOLUTION INTRAVENOUS; SUBCUTANEOUS
Status: DISCONTINUED | OUTPATIENT
Start: 2025-08-12 | End: 2025-08-21 | Stop reason: HOSPADM

## 2025-08-12 RX ORDER — BISACODYL 10 MG
10 SUPPOSITORY, RECTAL RECTAL DAILY PRN
Status: DISCONTINUED | OUTPATIENT
Start: 2025-08-12 | End: 2025-08-21 | Stop reason: HOSPADM

## 2025-08-12 RX ORDER — IOPAMIDOL 755 MG/ML
100 INJECTION, SOLUTION INTRAVASCULAR
Status: COMPLETED | OUTPATIENT
Start: 2025-08-12 | End: 2025-08-12

## 2025-08-12 RX ORDER — POLYETHYLENE GLYCOL 3350 17 G/17G
17 POWDER, FOR SOLUTION ORAL DAILY PRN
Status: DISCONTINUED | OUTPATIENT
Start: 2025-08-12 | End: 2025-08-21 | Stop reason: HOSPADM

## 2025-08-12 RX ORDER — SODIUM CHLORIDE, SODIUM LACTATE, POTASSIUM CHLORIDE, AND CALCIUM CHLORIDE .6; .31; .03; .02 G/100ML; G/100ML; G/100ML; G/100ML
250 INJECTION, SOLUTION INTRAVENOUS
Status: COMPLETED | OUTPATIENT
Start: 2025-08-12 | End: 2025-08-12

## 2025-08-12 RX ORDER — SODIUM CHLORIDE 0.9 % (FLUSH) 0.9 %
5-40 SYRINGE (ML) INJECTION PRN
Status: DISCONTINUED | OUTPATIENT
Start: 2025-08-12 | End: 2025-08-21 | Stop reason: HOSPADM

## 2025-08-12 RX ORDER — SODIUM CHLORIDE 9 MG/ML
INJECTION, SOLUTION INTRAVENOUS PRN
Status: DISCONTINUED | OUTPATIENT
Start: 2025-08-12 | End: 2025-08-21 | Stop reason: HOSPADM

## 2025-08-12 RX ORDER — BUMETANIDE 1 MG/1
1 TABLET ORAL DAILY
Status: DISCONTINUED | OUTPATIENT
Start: 2025-08-12 | End: 2025-08-21 | Stop reason: HOSPADM

## 2025-08-12 RX ORDER — IBUPROFEN 600 MG/1
1 TABLET ORAL PRN
Status: DISCONTINUED | OUTPATIENT
Start: 2025-08-12 | End: 2025-08-21 | Stop reason: HOSPADM

## 2025-08-12 RX ADMIN — IOPAMIDOL 100 ML: 755 INJECTION, SOLUTION INTRAVENOUS at 14:46

## 2025-08-12 RX ADMIN — HYDRALAZINE HYDROCHLORIDE 25 MG: 25 TABLET ORAL at 20:43

## 2025-08-12 RX ADMIN — HEPARIN SODIUM 5000 UNITS: 5000 INJECTION, SOLUTION INTRAVENOUS; SUBCUTANEOUS at 22:05

## 2025-08-12 RX ADMIN — SODIUM CHLORIDE, SODIUM LACTATE, POTASSIUM CHLORIDE, AND CALCIUM CHLORIDE 250 ML: .6; .31; .03; .02 INJECTION, SOLUTION INTRAVENOUS at 14:50

## 2025-08-12 RX ADMIN — SODIUM CHLORIDE, PRESERVATIVE FREE 10 ML: 5 INJECTION INTRAVENOUS at 20:26

## 2025-08-12 RX ADMIN — INSULIN GLARGINE-YFGN 3 UNITS: 100 INJECTION, SOLUTION SUBCUTANEOUS at 20:42

## 2025-08-12 RX ADMIN — INSULIN LISPRO 2 UNITS: 100 INJECTION, SOLUTION INTRAVENOUS; SUBCUTANEOUS at 20:42

## 2025-08-12 RX ADMIN — CARVEDILOL 6.25 MG: 6.25 TABLET, FILM COATED ORAL at 20:43

## 2025-08-12 RX ADMIN — WATER 2000 MG: 1 INJECTION INTRAMUSCULAR; INTRAVENOUS; SUBCUTANEOUS at 16:44

## 2025-08-12 RX ADMIN — DEXTROSE AND SODIUM CHLORIDE: 5; .45 INJECTION, SOLUTION INTRAVENOUS at 18:39

## 2025-08-12 RX ADMIN — DOXAZOSIN 4 MG: 4 TABLET ORAL at 20:43

## 2025-08-12 RX ADMIN — ATORVASTATIN CALCIUM 40 MG: 40 TABLET, FILM COATED ORAL at 20:43

## 2025-08-12 ASSESSMENT — PAIN DESCRIPTION - LOCATION: LOCATION: BUTTOCKS

## 2025-08-12 ASSESSMENT — PAIN DESCRIPTION - PAIN TYPE: TYPE: CHRONIC PAIN

## 2025-08-12 ASSESSMENT — PAIN - FUNCTIONAL ASSESSMENT
PAIN_FUNCTIONAL_ASSESSMENT: PREVENTS OR INTERFERES SOME ACTIVE ACTIVITIES AND ADLS
PAIN_FUNCTIONAL_ASSESSMENT: 0-10

## 2025-08-12 ASSESSMENT — PAIN SCALES - GENERAL
PAINLEVEL_OUTOF10: 0
PAINLEVEL_OUTOF10: 10

## 2025-08-12 ASSESSMENT — PAIN DESCRIPTION - ORIENTATION: ORIENTATION: LEFT

## 2025-08-13 ENCOUNTER — ANESTHESIA EVENT (OUTPATIENT)
Dept: SURGERY | Age: 80
End: 2025-08-13
Payer: MEDICARE

## 2025-08-13 PROBLEM — S31.000A SACRAL WOUND: Status: ACTIVE | Noted: 2025-08-13

## 2025-08-13 LAB
ALBUMIN SERPL-MCNC: 1.9 G/DL (ref 3.2–4.6)
ALBUMIN/GLOB SERPL: 0.5 (ref 1–1.9)
ALP SERPL-CCNC: 86 U/L (ref 35–104)
ALT SERPL-CCNC: 9 U/L (ref 8–45)
ANION GAP SERPL CALC-SCNC: 17 MMOL/L (ref 7–16)
APTT PPP: 35.6 SEC (ref 23.3–37.4)
AST SERPL-CCNC: 24 U/L (ref 15–37)
BASOPHILS # BLD: 0.01 K/UL (ref 0–0.2)
BASOPHILS NFR BLD: 0.1 % (ref 0–2)
BILIRUB DIRECT SERPL-MCNC: 0.2 MG/DL (ref 0–0.3)
BILIRUB SERPL-MCNC: 0.3 MG/DL (ref 0–1.2)
BUN SERPL-MCNC: 56 MG/DL (ref 8–23)
CALCIUM SERPL-MCNC: 7.2 MG/DL (ref 8.8–10.2)
CHLORIDE SERPL-SCNC: 92 MMOL/L (ref 98–107)
CO2 SERPL-SCNC: 22 MMOL/L (ref 20–29)
CREAT SERPL-MCNC: 4.16 MG/DL (ref 0.6–1.1)
DIFFERENTIAL METHOD BLD: ABNORMAL
EOSINOPHIL # BLD: 0.02 K/UL (ref 0–0.8)
EOSINOPHIL NFR BLD: 0.2 % (ref 0.5–7.8)
ERYTHROCYTE [DISTWIDTH] IN BLOOD BY AUTOMATED COUNT: 15.6 % (ref 11.9–14.6)
GLOBULIN SER CALC-MCNC: 4.1 G/DL (ref 2.3–3.5)
GLUCOSE BLD STRIP.AUTO-MCNC: 181 MG/DL (ref 65–100)
GLUCOSE BLD STRIP.AUTO-MCNC: 185 MG/DL (ref 65–100)
GLUCOSE BLD STRIP.AUTO-MCNC: 252 MG/DL (ref 65–100)
GLUCOSE BLD STRIP.AUTO-MCNC: 260 MG/DL (ref 65–100)
GLUCOSE SERPL-MCNC: 238 MG/DL (ref 70–99)
HCT VFR BLD AUTO: 34.8 % (ref 35.8–46.3)
HGB BLD-MCNC: 11 G/DL (ref 11.7–15.4)
IMM GRANULOCYTES # BLD AUTO: 0.04 K/UL (ref 0–0.5)
IMM GRANULOCYTES NFR BLD AUTO: 0.4 % (ref 0–5)
LYMPHOCYTES # BLD: 1 K/UL (ref 0.5–4.6)
LYMPHOCYTES NFR BLD: 10.1 % (ref 13–44)
MCH RBC QN AUTO: 28.1 PG (ref 26.1–32.9)
MCHC RBC AUTO-ENTMCNC: 31.6 G/DL (ref 31.4–35)
MCV RBC AUTO: 88.8 FL (ref 82–102)
MONOCYTES # BLD: 0.69 K/UL (ref 0.1–1.3)
MONOCYTES NFR BLD: 6.9 % (ref 4–12)
NEUTS SEG # BLD: 8.19 K/UL (ref 1.7–8.2)
NEUTS SEG NFR BLD: 82.3 % (ref 43–78)
NRBC # BLD: 0 K/UL (ref 0–0.2)
PLATELET # BLD AUTO: 120 K/UL (ref 150–450)
PLATELET # BLD AUTO: 138 K/UL (ref 150–450)
PMV BLD AUTO: 12.5 FL (ref 9.4–12.3)
POTASSIUM SERPL-SCNC: ABNORMAL MMOL/L (ref 3.5–5.1)
PROT SERPL-MCNC: 6 G/DL (ref 6.3–8.2)
RBC # BLD AUTO: 3.92 M/UL (ref 4.05–5.2)
SERVICE CMNT-IMP: ABNORMAL
SODIUM SERPL-SCNC: 131 MMOL/L (ref 136–145)
WBC # BLD AUTO: 10 K/UL (ref 4.3–11.1)

## 2025-08-13 PROCEDURE — 5A1D70Z PERFORMANCE OF URINARY FILTRATION, INTERMITTENT, LESS THAN 6 HOURS PER DAY: ICD-10-PCS | Performed by: HOSPITALIST

## 2025-08-13 PROCEDURE — 85730 THROMBOPLASTIN TIME PARTIAL: CPT

## 2025-08-13 PROCEDURE — 99232 SBSQ HOSP IP/OBS MODERATE 35: CPT | Performed by: SURGERY

## 2025-08-13 PROCEDURE — 97166 OT EVAL MOD COMPLEX 45 MIN: CPT

## 2025-08-13 PROCEDURE — 6360000002 HC RX W HCPCS: Performed by: NURSE PRACTITIONER

## 2025-08-13 PROCEDURE — 36415 COLL VENOUS BLD VENIPUNCTURE: CPT

## 2025-08-13 PROCEDURE — 90935 HEMODIALYSIS ONE EVALUATION: CPT

## 2025-08-13 PROCEDURE — 97162 PT EVAL MOD COMPLEX 30 MIN: CPT

## 2025-08-13 PROCEDURE — 85025 COMPLETE CBC W/AUTO DIFF WBC: CPT

## 2025-08-13 PROCEDURE — 85049 AUTOMATED PLATELET COUNT: CPT

## 2025-08-13 PROCEDURE — 6360000002 HC RX W HCPCS: Performed by: HOSPITALIST

## 2025-08-13 PROCEDURE — 82962 GLUCOSE BLOOD TEST: CPT

## 2025-08-13 PROCEDURE — 80076 HEPATIC FUNCTION PANEL: CPT

## 2025-08-13 PROCEDURE — 80048 BASIC METABOLIC PNL TOTAL CA: CPT

## 2025-08-13 PROCEDURE — 1100000000 HC RM PRIVATE

## 2025-08-13 PROCEDURE — 6360000002 HC RX W HCPCS: Performed by: INTERNAL MEDICINE

## 2025-08-13 PROCEDURE — 2580000003 HC RX 258: Performed by: HOSPITALIST

## 2025-08-13 PROCEDURE — 6370000000 HC RX 637 (ALT 250 FOR IP): Performed by: INTERNAL MEDICINE

## 2025-08-13 PROCEDURE — S5553 INSULIN LONG ACTING 5 U: HCPCS | Performed by: INTERNAL MEDICINE

## 2025-08-13 PROCEDURE — 97530 THERAPEUTIC ACTIVITIES: CPT

## 2025-08-13 PROCEDURE — 97535 SELF CARE MNGMENT TRAINING: CPT

## 2025-08-13 PROCEDURE — 2500000003 HC RX 250 WO HCPCS: Performed by: INTERNAL MEDICINE

## 2025-08-13 PROCEDURE — 97112 NEUROMUSCULAR REEDUCATION: CPT

## 2025-08-13 RX ORDER — HEPARIN SODIUM 1000 [USP'U]/ML
1500 INJECTION, SOLUTION INTRAVENOUS; SUBCUTANEOUS
Status: DISCONTINUED | OUTPATIENT
Start: 2025-08-13 | End: 2025-08-13

## 2025-08-13 RX ORDER — ARGATROBAN 1 MG/ML
.0625-1 INJECTION INTRAVENOUS CONTINUOUS
Status: DISCONTINUED | OUTPATIENT
Start: 2025-08-13 | End: 2025-08-13

## 2025-08-13 RX ORDER — 0.9 % SODIUM CHLORIDE 0.9 %
500 INTRAVENOUS SOLUTION INTRAVENOUS ONCE
Status: COMPLETED | OUTPATIENT
Start: 2025-08-13 | End: 2025-08-13

## 2025-08-13 RX ORDER — HEPARIN SODIUM 5000 [USP'U]/ML
5000 INJECTION, SOLUTION INTRAVENOUS; SUBCUTANEOUS EVERY 8 HOURS SCHEDULED
Status: DISCONTINUED | OUTPATIENT
Start: 2025-08-13 | End: 2025-08-16

## 2025-08-13 RX ORDER — HYDROMORPHONE HYDROCHLORIDE 1 MG/ML
0.5 INJECTION, SOLUTION INTRAMUSCULAR; INTRAVENOUS; SUBCUTANEOUS EVERY 6 HOURS PRN
Status: DISCONTINUED | OUTPATIENT
Start: 2025-08-13 | End: 2025-08-14

## 2025-08-13 RX ORDER — TRAMADOL HYDROCHLORIDE 50 MG/1
50 TABLET ORAL EVERY 6 HOURS PRN
Status: DISCONTINUED | OUTPATIENT
Start: 2025-08-13 | End: 2025-08-21 | Stop reason: HOSPADM

## 2025-08-13 RX ADMIN — HEPARIN SODIUM 5000 UNITS: 5000 INJECTION, SOLUTION INTRAVENOUS; SUBCUTANEOUS at 22:25

## 2025-08-13 RX ADMIN — INSULIN GLARGINE-YFGN 3 UNITS: 100 INJECTION, SOLUTION SUBCUTANEOUS at 20:03

## 2025-08-13 RX ADMIN — LEVOTHYROXINE SODIUM 75 MCG: 0.07 TABLET ORAL at 05:30

## 2025-08-13 RX ADMIN — DOXAZOSIN 4 MG: 4 TABLET ORAL at 08:26

## 2025-08-13 RX ADMIN — CARVEDILOL 6.25 MG: 6.25 TABLET, FILM COATED ORAL at 08:27

## 2025-08-13 RX ADMIN — HYDROMORPHONE HYDROCHLORIDE 0.5 MG: 1 INJECTION, SOLUTION INTRAMUSCULAR; INTRAVENOUS; SUBCUTANEOUS at 12:08

## 2025-08-13 RX ADMIN — HEPARIN SODIUM 5000 UNITS: 5000 INJECTION, SOLUTION INTRAVENOUS; SUBCUTANEOUS at 05:31

## 2025-08-13 RX ADMIN — INSULIN LISPRO 2 UNITS: 100 INJECTION, SOLUTION INTRAVENOUS; SUBCUTANEOUS at 16:22

## 2025-08-13 RX ADMIN — INSULIN LISPRO 2 UNITS: 100 INJECTION, SOLUTION INTRAVENOUS; SUBCUTANEOUS at 12:08

## 2025-08-13 RX ADMIN — SODIUM CHLORIDE, PRESERVATIVE FREE 10 ML: 5 INJECTION INTRAVENOUS at 08:27

## 2025-08-13 RX ADMIN — Medication 1 TABLET: at 08:26

## 2025-08-13 RX ADMIN — HYDRALAZINE HYDROCHLORIDE 25 MG: 25 TABLET ORAL at 08:27

## 2025-08-13 RX ADMIN — SODIUM CHLORIDE 500 ML: 0.9 INJECTION, SOLUTION INTRAVENOUS at 11:26

## 2025-08-13 RX ADMIN — CINACALCET HYDROCHLORIDE 30 MG: 30 TABLET, FILM COATED ORAL at 08:26

## 2025-08-13 RX ADMIN — HEPARIN SODIUM 1500 UNITS: 1000 INJECTION INTRAVENOUS; SUBCUTANEOUS at 08:45

## 2025-08-13 RX ADMIN — BUMETANIDE 1 MG: 1 TABLET ORAL at 08:26

## 2025-08-13 RX ADMIN — ATORVASTATIN CALCIUM 40 MG: 40 TABLET, FILM COATED ORAL at 20:01

## 2025-08-13 RX ADMIN — HEPARIN SODIUM 5000 UNITS: 5000 INJECTION, SOLUTION INTRAVENOUS; SUBCUTANEOUS at 16:23

## 2025-08-13 RX ADMIN — INSULIN LISPRO 4 UNITS: 100 INJECTION, SOLUTION INTRAVENOUS; SUBCUTANEOUS at 08:26

## 2025-08-13 RX ADMIN — INSULIN LISPRO 4 UNITS: 100 INJECTION, SOLUTION INTRAVENOUS; SUBCUTANEOUS at 20:04

## 2025-08-13 RX ADMIN — CALCITRIOL 0.25 MCG: 0.25 CAPSULE ORAL at 16:23

## 2025-08-13 RX ADMIN — VANCOMYCIN HYDROCHLORIDE 1000 MG: 1 INJECTION, POWDER, LYOPHILIZED, FOR SOLUTION INTRAVENOUS at 11:25

## 2025-08-13 ASSESSMENT — PAIN SCALES - GENERAL
PAINLEVEL_OUTOF10: 0
PAINLEVEL_OUTOF10: 9

## 2025-08-13 ASSESSMENT — PAIN DESCRIPTION - LOCATION: LOCATION: SACRUM

## 2025-08-13 ASSESSMENT — PAIN DESCRIPTION - DESCRIPTORS: DESCRIPTORS: TENDER;THROBBING

## 2025-08-13 ASSESSMENT — PAIN - FUNCTIONAL ASSESSMENT
PAIN_FUNCTIONAL_ASSESSMENT: 0-10
PAIN_FUNCTIONAL_ASSESSMENT: PREVENTS OR INTERFERES SOME ACTIVE ACTIVITIES AND ADLS

## 2025-08-14 ENCOUNTER — ANESTHESIA (OUTPATIENT)
Dept: SURGERY | Age: 80
End: 2025-08-14
Payer: MEDICARE

## 2025-08-14 LAB
ABO + RH BLD: NORMAL
ANION GAP SERPL CALC-SCNC: 16 MMOL/L (ref 7–16)
BASOPHILS # BLD: 0.01 K/UL (ref 0–0.2)
BASOPHILS NFR BLD: 0.1 % (ref 0–2)
BLOOD GROUP ANTIBODIES SERPL: NORMAL
BUN SERPL-MCNC: 54 MG/DL (ref 8–23)
CALCIUM SERPL-MCNC: 7 MG/DL (ref 8.8–10.2)
CHLORIDE SERPL-SCNC: 97 MMOL/L (ref 98–107)
CO2 SERPL-SCNC: 20 MMOL/L (ref 20–29)
CREAT SERPL-MCNC: 4.3 MG/DL (ref 0.6–1.1)
DIFFERENTIAL METHOD BLD: ABNORMAL
EOSINOPHIL # BLD: 0.05 K/UL (ref 0–0.8)
EOSINOPHIL NFR BLD: 0.6 % (ref 0.5–7.8)
ERYTHROCYTE [DISTWIDTH] IN BLOOD BY AUTOMATED COUNT: 15.5 % (ref 11.9–14.6)
GLUCOSE BLD STRIP.AUTO-MCNC: 152 MG/DL (ref 65–100)
GLUCOSE BLD STRIP.AUTO-MCNC: 180 MG/DL (ref 65–100)
GLUCOSE BLD STRIP.AUTO-MCNC: 185 MG/DL (ref 65–100)
GLUCOSE BLD STRIP.AUTO-MCNC: 206 MG/DL (ref 65–100)
GLUCOSE BLD STRIP.AUTO-MCNC: 219 MG/DL (ref 65–100)
GLUCOSE SERPL-MCNC: 197 MG/DL (ref 70–99)
HCT VFR BLD AUTO: 30.3 % (ref 35.8–46.3)
HGB BLD-MCNC: 9.5 G/DL (ref 11.7–15.4)
IMM GRANULOCYTES # BLD AUTO: 0.04 K/UL (ref 0–0.5)
IMM GRANULOCYTES NFR BLD AUTO: 0.5 % (ref 0–5)
INR PPP: 1
LYMPHOCYTES # BLD: 1.06 K/UL (ref 0.5–4.6)
LYMPHOCYTES NFR BLD: 12.9 % (ref 13–44)
MCH RBC QN AUTO: 28 PG (ref 26.1–32.9)
MCHC RBC AUTO-ENTMCNC: 31.4 G/DL (ref 31.4–35)
MCV RBC AUTO: 89.4 FL (ref 82–102)
MONOCYTES # BLD: 0.66 K/UL (ref 0.1–1.3)
MONOCYTES NFR BLD: 8 % (ref 4–12)
NEUTS SEG # BLD: 6.39 K/UL (ref 1.7–8.2)
NEUTS SEG NFR BLD: 77.9 % (ref 43–78)
NRBC # BLD: 0 K/UL (ref 0–0.2)
PLATELET # BLD AUTO: 107 K/UL (ref 150–450)
PMV BLD AUTO: 11.4 FL (ref 9.4–12.3)
POTASSIUM SERPL-SCNC: 4.8 MMOL/L (ref 3.5–5.1)
PROTHROMBIN TIME: 13.7 SEC (ref 11.3–14.9)
RBC # BLD AUTO: 3.39 M/UL (ref 4.05–5.2)
SERVICE CMNT-IMP: ABNORMAL
SODIUM SERPL-SCNC: 133 MMOL/L (ref 136–145)
SPECIMEN EXP DATE BLD: NORMAL
WBC # BLD AUTO: 8.2 K/UL (ref 4.3–11.1)

## 2025-08-14 PROCEDURE — 3700000001 HC ADD 15 MINUTES (ANESTHESIA): Performed by: SURGERY

## 2025-08-14 PROCEDURE — 7100000000 HC PACU RECOVERY - FIRST 15 MIN: Performed by: SURGERY

## 2025-08-14 PROCEDURE — 0KBN0ZZ EXCISION OF RIGHT HIP MUSCLE, OPEN APPROACH: ICD-10-PCS | Performed by: SURGERY

## 2025-08-14 PROCEDURE — 2580000003 HC RX 258

## 2025-08-14 PROCEDURE — 86900 BLOOD TYPING SEROLOGIC ABO: CPT

## 2025-08-14 PROCEDURE — 15945 EXC ISCH PR ULC SKN FLP OSTC: CPT | Performed by: SURGERY

## 2025-08-14 PROCEDURE — 80048 BASIC METABOLIC PNL TOTAL CA: CPT

## 2025-08-14 PROCEDURE — S5553 INSULIN LONG ACTING 5 U: HCPCS | Performed by: INTERNAL MEDICINE

## 2025-08-14 PROCEDURE — 2500000003 HC RX 250 WO HCPCS: Performed by: NURSE ANESTHETIST, CERTIFIED REGISTERED

## 2025-08-14 PROCEDURE — 2500000003 HC RX 250 WO HCPCS: Performed by: INTERNAL MEDICINE

## 2025-08-14 PROCEDURE — 6360000002 HC RX W HCPCS: Performed by: SURGERY

## 2025-08-14 PROCEDURE — 3600000013 HC SURGERY LEVEL 3 ADDTL 15MIN: Performed by: SURGERY

## 2025-08-14 PROCEDURE — 2580000003 HC RX 258: Performed by: ANESTHESIOLOGY

## 2025-08-14 PROCEDURE — 82962 GLUCOSE BLOOD TEST: CPT

## 2025-08-14 PROCEDURE — 36415 COLL VENOUS BLD VENIPUNCTURE: CPT

## 2025-08-14 PROCEDURE — 1100000000 HC RM PRIVATE

## 2025-08-14 PROCEDURE — 7100000001 HC PACU RECOVERY - ADDTL 15 MIN: Performed by: SURGERY

## 2025-08-14 PROCEDURE — 86850 RBC ANTIBODY SCREEN: CPT

## 2025-08-14 PROCEDURE — 85610 PROTHROMBIN TIME: CPT

## 2025-08-14 PROCEDURE — 85025 COMPLETE CBC W/AUTO DIFF WBC: CPT

## 2025-08-14 PROCEDURE — 6360000002 HC RX W HCPCS: Performed by: HOSPITALIST

## 2025-08-14 PROCEDURE — 86901 BLOOD TYPING SEROLOGIC RH(D): CPT

## 2025-08-14 PROCEDURE — 3600000003 HC SURGERY LEVEL 3 BASE: Performed by: SURGERY

## 2025-08-14 PROCEDURE — 2709999900 HC NON-CHARGEABLE SUPPLY: Performed by: SURGERY

## 2025-08-14 PROCEDURE — 3700000000 HC ANESTHESIA ATTENDED CARE: Performed by: SURGERY

## 2025-08-14 PROCEDURE — 6360000002 HC RX W HCPCS: Performed by: NURSE ANESTHETIST, CERTIFIED REGISTERED

## 2025-08-14 PROCEDURE — 2580000003 HC RX 258: Performed by: NURSE PRACTITIONER

## 2025-08-14 PROCEDURE — 6370000000 HC RX 637 (ALT 250 FOR IP): Performed by: INTERNAL MEDICINE

## 2025-08-14 RX ORDER — DIPHENHYDRAMINE HYDROCHLORIDE 50 MG/ML
12.5 INJECTION, SOLUTION INTRAMUSCULAR; INTRAVENOUS
Status: DISCONTINUED | OUTPATIENT
Start: 2025-08-14 | End: 2025-08-14 | Stop reason: HOSPADM

## 2025-08-14 RX ORDER — ONDANSETRON 2 MG/ML
INJECTION INTRAMUSCULAR; INTRAVENOUS
Status: DISCONTINUED | OUTPATIENT
Start: 2025-08-14 | End: 2025-08-14 | Stop reason: SDUPTHER

## 2025-08-14 RX ORDER — LIDOCAINE HYDROCHLORIDE 20 MG/ML
INJECTION, SOLUTION EPIDURAL; INFILTRATION; INTRACAUDAL; PERINEURAL
Status: DISCONTINUED | OUTPATIENT
Start: 2025-08-14 | End: 2025-08-14 | Stop reason: SDUPTHER

## 2025-08-14 RX ORDER — FENTANYL CITRATE 50 UG/ML
INJECTION, SOLUTION INTRAMUSCULAR; INTRAVENOUS
Status: DISCONTINUED | OUTPATIENT
Start: 2025-08-14 | End: 2025-08-14 | Stop reason: SDUPTHER

## 2025-08-14 RX ORDER — ACETAMINOPHEN 325 MG/1
650 TABLET ORAL EVERY 6 HOURS SCHEDULED
Status: DISCONTINUED | OUTPATIENT
Start: 2025-08-14 | End: 2025-08-21 | Stop reason: HOSPADM

## 2025-08-14 RX ORDER — PROPOFOL 10 MG/ML
INJECTION, EMULSION INTRAVENOUS
Status: DISCONTINUED | OUTPATIENT
Start: 2025-08-14 | End: 2025-08-14 | Stop reason: SDUPTHER

## 2025-08-14 RX ORDER — PROCHLORPERAZINE EDISYLATE 5 MG/ML
5 INJECTION INTRAMUSCULAR; INTRAVENOUS
Status: DISCONTINUED | OUTPATIENT
Start: 2025-08-14 | End: 2025-08-14 | Stop reason: HOSPADM

## 2025-08-14 RX ORDER — SODIUM CHLORIDE 0.9 % (FLUSH) 0.9 %
5-40 SYRINGE (ML) INJECTION PRN
Status: DISCONTINUED | OUTPATIENT
Start: 2025-08-14 | End: 2025-08-14 | Stop reason: HOSPADM

## 2025-08-14 RX ORDER — SODIUM CHLORIDE, SODIUM LACTATE, POTASSIUM CHLORIDE, CALCIUM CHLORIDE 600; 310; 30; 20 MG/100ML; MG/100ML; MG/100ML; MG/100ML
INJECTION, SOLUTION INTRAVENOUS CONTINUOUS
Status: DISCONTINUED | OUTPATIENT
Start: 2025-08-14 | End: 2025-08-14 | Stop reason: HOSPADM

## 2025-08-14 RX ORDER — LIDOCAINE HYDROCHLORIDE 10 MG/ML
1 INJECTION, SOLUTION INFILTRATION; PERINEURAL
Status: DISCONTINUED | OUTPATIENT
Start: 2025-08-14 | End: 2025-08-14 | Stop reason: HOSPADM

## 2025-08-14 RX ORDER — IBUPROFEN 600 MG/1
1 TABLET ORAL PRN
Status: DISCONTINUED | OUTPATIENT
Start: 2025-08-14 | End: 2025-08-14 | Stop reason: HOSPADM

## 2025-08-14 RX ORDER — ONDANSETRON 2 MG/ML
4 INJECTION INTRAMUSCULAR; INTRAVENOUS
Status: DISCONTINUED | OUTPATIENT
Start: 2025-08-14 | End: 2025-08-14 | Stop reason: HOSPADM

## 2025-08-14 RX ORDER — SODIUM CHLORIDE 9 MG/ML
INJECTION, SOLUTION INTRAVENOUS PRN
Status: DISCONTINUED | OUTPATIENT
Start: 2025-08-14 | End: 2025-08-14 | Stop reason: HOSPADM

## 2025-08-14 RX ORDER — SODIUM CHLORIDE, SODIUM LACTATE, POTASSIUM CHLORIDE, CALCIUM CHLORIDE 600; 310; 30; 20 MG/100ML; MG/100ML; MG/100ML; MG/100ML
INJECTION, SOLUTION INTRAVENOUS CONTINUOUS
Status: DISCONTINUED | OUTPATIENT
Start: 2025-08-14 | End: 2025-08-15

## 2025-08-14 RX ORDER — ROCURONIUM BROMIDE 10 MG/ML
INJECTION, SOLUTION INTRAVENOUS
Status: DISCONTINUED | OUTPATIENT
Start: 2025-08-14 | End: 2025-08-14 | Stop reason: SDUPTHER

## 2025-08-14 RX ORDER — OXYCODONE HYDROCHLORIDE 5 MG/1
5 TABLET ORAL
Status: DISCONTINUED | OUTPATIENT
Start: 2025-08-14 | End: 2025-08-14 | Stop reason: HOSPADM

## 2025-08-14 RX ORDER — SODIUM CHLORIDE 0.9 % (FLUSH) 0.9 %
5-40 SYRINGE (ML) INJECTION EVERY 12 HOURS SCHEDULED
Status: DISCONTINUED | OUTPATIENT
Start: 2025-08-14 | End: 2025-08-14 | Stop reason: HOSPADM

## 2025-08-14 RX ORDER — DEXTROSE MONOHYDRATE 100 MG/ML
INJECTION, SOLUTION INTRAVENOUS CONTINUOUS PRN
Status: DISCONTINUED | OUTPATIENT
Start: 2025-08-14 | End: 2025-08-14 | Stop reason: HOSPADM

## 2025-08-14 RX ORDER — EPHEDRINE SULFATE 5 MG/ML
INJECTION INTRAVENOUS
Status: DISCONTINUED | OUTPATIENT
Start: 2025-08-14 | End: 2025-08-14 | Stop reason: SDUPTHER

## 2025-08-14 RX ORDER — MIDODRINE HYDROCHLORIDE 5 MG/1
5 TABLET ORAL 3 TIMES DAILY PRN
Status: DISCONTINUED | OUTPATIENT
Start: 2025-08-14 | End: 2025-08-21 | Stop reason: HOSPADM

## 2025-08-14 RX ORDER — FENTANYL CITRATE 50 UG/ML
25 INJECTION, SOLUTION INTRAMUSCULAR; INTRAVENOUS EVERY 5 MIN PRN
Status: DISCONTINUED | OUTPATIENT
Start: 2025-08-14 | End: 2025-08-14 | Stop reason: HOSPADM

## 2025-08-14 RX ADMIN — PHENYLEPHRINE HYDROCHLORIDE 100 MCG: 0.1 INJECTION, SOLUTION INTRAVENOUS at 16:13

## 2025-08-14 RX ADMIN — INSULIN GLARGINE-YFGN 3 UNITS: 100 INJECTION, SOLUTION SUBCUTANEOUS at 21:02

## 2025-08-14 RX ADMIN — DEXTROSE AND SODIUM CHLORIDE: 5; .45 INJECTION, SOLUTION INTRAVENOUS at 09:51

## 2025-08-14 RX ADMIN — Medication 2000 MG: at 16:18

## 2025-08-14 RX ADMIN — HEPARIN SODIUM 5000 UNITS: 5000 INJECTION, SOLUTION INTRAVENOUS; SUBCUTANEOUS at 21:01

## 2025-08-14 RX ADMIN — INSULIN LISPRO 2 UNITS: 100 INJECTION, SOLUTION INTRAVENOUS; SUBCUTANEOUS at 09:51

## 2025-08-14 RX ADMIN — FENTANYL CITRATE 50 MCG: 50 INJECTION, SOLUTION INTRAMUSCULAR; INTRAVENOUS at 16:36

## 2025-08-14 RX ADMIN — SUGAMMADEX 200 MG: 100 INJECTION, SOLUTION INTRAVENOUS at 17:04

## 2025-08-14 RX ADMIN — PROPOFOL 120 MG: 10 INJECTION, EMULSION INTRAVENOUS at 16:10

## 2025-08-14 RX ADMIN — ONDANSETRON 4 MG: 2 INJECTION, SOLUTION INTRAMUSCULAR; INTRAVENOUS at 16:42

## 2025-08-14 RX ADMIN — SODIUM CHLORIDE: 9 INJECTION, SOLUTION INTRAVENOUS at 16:03

## 2025-08-14 RX ADMIN — EPHEDRINE SULFATE 10 MG: 5 INJECTION INTRAVENOUS at 16:31

## 2025-08-14 RX ADMIN — INSULIN LISPRO 2 UNITS: 100 INJECTION, SOLUTION INTRAVENOUS; SUBCUTANEOUS at 12:03

## 2025-08-14 RX ADMIN — CINACALCET HYDROCHLORIDE 30 MG: 30 TABLET, FILM COATED ORAL at 09:52

## 2025-08-14 RX ADMIN — Medication 1 TABLET: at 09:52

## 2025-08-14 RX ADMIN — SODIUM CHLORIDE, SODIUM LACTATE, POTASSIUM CHLORIDE, AND CALCIUM CHLORIDE: .6; .31; .03; .02 INJECTION, SOLUTION INTRAVENOUS at 19:12

## 2025-08-14 RX ADMIN — EPHEDRINE SULFATE 10 MG: 5 INJECTION INTRAVENOUS at 16:29

## 2025-08-14 RX ADMIN — LIDOCAINE HYDROCHLORIDE 40 MG: 20 INJECTION, SOLUTION EPIDURAL; INFILTRATION; INTRACAUDAL; PERINEURAL at 16:10

## 2025-08-14 RX ADMIN — SODIUM CHLORIDE, PRESERVATIVE FREE 10 ML: 5 INJECTION INTRAVENOUS at 09:54

## 2025-08-14 RX ADMIN — PHENYLEPHRINE HYDROCHLORIDE 100 MCG: 0.1 INJECTION, SOLUTION INTRAVENOUS at 16:14

## 2025-08-14 RX ADMIN — INSULIN LISPRO 2 UNITS: 100 INJECTION, SOLUTION INTRAVENOUS; SUBCUTANEOUS at 21:02

## 2025-08-14 RX ADMIN — ROCURONIUM BROMIDE 40 MG: 10 INJECTION, SOLUTION INTRAVENOUS at 16:10

## 2025-08-14 RX ADMIN — SODIUM CHLORIDE, PRESERVATIVE FREE 10 ML: 5 INJECTION INTRAVENOUS at 21:02

## 2025-08-14 RX ADMIN — FENTANYL CITRATE 50 MCG: 50 INJECTION, SOLUTION INTRAMUSCULAR; INTRAVENOUS at 16:10

## 2025-08-14 ASSESSMENT — PAIN SCALES - GENERAL
PAINLEVEL_OUTOF10: 0

## 2025-08-14 ASSESSMENT — PAIN - FUNCTIONAL ASSESSMENT: PAIN_FUNCTIONAL_ASSESSMENT: 0-10

## 2025-08-15 LAB
ANION GAP SERPL CALC-SCNC: 13 MMOL/L (ref 7–16)
BASOPHILS # BLD: 0.01 K/UL (ref 0–0.2)
BASOPHILS NFR BLD: 0.2 % (ref 0–2)
BUN SERPL-MCNC: 57 MG/DL (ref 8–23)
CALCIUM SERPL-MCNC: 6.8 MG/DL (ref 8.8–10.2)
CHLORIDE SERPL-SCNC: 96 MMOL/L (ref 98–107)
CO2 SERPL-SCNC: 23 MMOL/L (ref 20–29)
CREAT SERPL-MCNC: 4.9 MG/DL (ref 0.6–1.1)
DIFFERENTIAL METHOD BLD: ABNORMAL
EOSINOPHIL # BLD: 0.12 K/UL (ref 0–0.8)
EOSINOPHIL NFR BLD: 1.9 % (ref 0.5–7.8)
ERYTHROCYTE [DISTWIDTH] IN BLOOD BY AUTOMATED COUNT: 15.6 % (ref 11.9–14.6)
GLUCOSE BLD STRIP.AUTO-MCNC: 100 MG/DL (ref 65–100)
GLUCOSE BLD STRIP.AUTO-MCNC: 178 MG/DL (ref 65–100)
GLUCOSE BLD STRIP.AUTO-MCNC: 286 MG/DL (ref 65–100)
GLUCOSE BLD STRIP.AUTO-MCNC: 300 MG/DL (ref 65–100)
GLUCOSE SERPL-MCNC: 104 MG/DL (ref 70–99)
HCT VFR BLD AUTO: 26.3 % (ref 35.8–46.3)
HGB BLD-MCNC: 8.2 G/DL (ref 11.7–15.4)
IMM GRANULOCYTES # BLD AUTO: 0.04 K/UL (ref 0–0.5)
IMM GRANULOCYTES NFR BLD AUTO: 0.6 % (ref 0–5)
LYMPHOCYTES # BLD: 1.34 K/UL (ref 0.5–4.6)
LYMPHOCYTES NFR BLD: 21.1 % (ref 13–44)
MCH RBC QN AUTO: 27.6 PG (ref 26.1–32.9)
MCHC RBC AUTO-ENTMCNC: 31.2 G/DL (ref 31.4–35)
MCV RBC AUTO: 88.6 FL (ref 82–102)
MONOCYTES # BLD: 0.58 K/UL (ref 0.1–1.3)
MONOCYTES NFR BLD: 9.1 % (ref 4–12)
NEUTS SEG # BLD: 4.26 K/UL (ref 1.7–8.2)
NEUTS SEG NFR BLD: 67.1 % (ref 43–78)
NRBC # BLD: 0 K/UL (ref 0–0.2)
PLATELET # BLD AUTO: 118 K/UL (ref 150–450)
PMV BLD AUTO: 11.7 FL (ref 9.4–12.3)
POTASSIUM SERPL-SCNC: 4.6 MMOL/L (ref 3.5–5.1)
RBC # BLD AUTO: 2.97 M/UL (ref 4.05–5.2)
SERVICE CMNT-IMP: ABNORMAL
SERVICE CMNT-IMP: NORMAL
SODIUM SERPL-SCNC: 131 MMOL/L (ref 136–145)
WBC # BLD AUTO: 6.4 K/UL (ref 4.3–11.1)

## 2025-08-15 PROCEDURE — 97606 NEG PRS WND THER DME>50 SQCM: CPT

## 2025-08-15 PROCEDURE — 83036 HEMOGLOBIN GLYCOSYLATED A1C: CPT

## 2025-08-15 PROCEDURE — 82962 GLUCOSE BLOOD TEST: CPT

## 2025-08-15 PROCEDURE — 6360000002 HC RX W HCPCS: Performed by: HOSPITALIST

## 2025-08-15 PROCEDURE — 90935 HEMODIALYSIS ONE EVALUATION: CPT

## 2025-08-15 PROCEDURE — 6370000000 HC RX 637 (ALT 250 FOR IP): Performed by: HOSPITALIST

## 2025-08-15 PROCEDURE — 2700000000 HC OXYGEN THERAPY PER DAY

## 2025-08-15 PROCEDURE — 2500000003 HC RX 250 WO HCPCS: Performed by: HOSPITALIST

## 2025-08-15 PROCEDURE — S5553 INSULIN LONG ACTING 5 U: HCPCS | Performed by: INTERNAL MEDICINE

## 2025-08-15 PROCEDURE — 80048 BASIC METABOLIC PNL TOTAL CA: CPT

## 2025-08-15 PROCEDURE — 94760 N-INVAS EAR/PLS OXIMETRY 1: CPT

## 2025-08-15 PROCEDURE — 1100000000 HC RM PRIVATE

## 2025-08-15 PROCEDURE — 6370000000 HC RX 637 (ALT 250 FOR IP)

## 2025-08-15 PROCEDURE — 6370000000 HC RX 637 (ALT 250 FOR IP): Performed by: INTERNAL MEDICINE

## 2025-08-15 PROCEDURE — 36415 COLL VENOUS BLD VENIPUNCTURE: CPT

## 2025-08-15 PROCEDURE — 85025 COMPLETE CBC W/AUTO DIFF WBC: CPT

## 2025-08-15 RX ADMIN — CINACALCET HYDROCHLORIDE 30 MG: 30 TABLET, FILM COATED ORAL at 08:09

## 2025-08-15 RX ADMIN — ACETAMINOPHEN 650 MG: 325 TABLET ORAL at 04:58

## 2025-08-15 RX ADMIN — HYDRALAZINE HYDROCHLORIDE 25 MG: 25 TABLET ORAL at 22:12

## 2025-08-15 RX ADMIN — ACETAMINOPHEN 650 MG: 325 TABLET ORAL at 22:12

## 2025-08-15 RX ADMIN — HEPARIN SODIUM 5000 UNITS: 5000 INJECTION, SOLUTION INTRAVENOUS; SUBCUTANEOUS at 22:12

## 2025-08-15 RX ADMIN — HEPARIN SODIUM 5000 UNITS: 5000 INJECTION, SOLUTION INTRAVENOUS; SUBCUTANEOUS at 04:58

## 2025-08-15 RX ADMIN — LEVOTHYROXINE SODIUM 75 MCG: 0.07 TABLET ORAL at 04:58

## 2025-08-15 RX ADMIN — INSULIN GLARGINE-YFGN 3 UNITS: 100 INJECTION, SOLUTION SUBCUTANEOUS at 22:12

## 2025-08-15 RX ADMIN — CALCITRIOL 0.25 MCG: 0.25 CAPSULE ORAL at 16:22

## 2025-08-15 RX ADMIN — INSULIN LISPRO 6 UNITS: 100 INJECTION, SOLUTION INTRAVENOUS; SUBCUTANEOUS at 22:12

## 2025-08-15 RX ADMIN — Medication 1 TABLET: at 08:09

## 2025-08-15 RX ADMIN — ATORVASTATIN CALCIUM 40 MG: 40 TABLET, FILM COATED ORAL at 22:12

## 2025-08-15 ASSESSMENT — PAIN DESCRIPTION - DESCRIPTORS: DESCRIPTORS: SORE

## 2025-08-15 ASSESSMENT — PAIN - FUNCTIONAL ASSESSMENT
PAIN_FUNCTIONAL_ASSESSMENT: 0-10

## 2025-08-15 ASSESSMENT — PAIN SCALES - GENERAL
PAINLEVEL_OUTOF10: 3
PAINLEVEL_OUTOF10: 0

## 2025-08-15 ASSESSMENT — PAIN DESCRIPTION - LOCATION: LOCATION: SACRUM

## 2025-08-15 ASSESSMENT — PAIN DESCRIPTION - ORIENTATION: ORIENTATION: POSTERIOR

## 2025-08-16 LAB
ANION GAP SERPL CALC-SCNC: 14 MMOL/L (ref 7–16)
BASOPHILS # BLD: 0.02 K/UL (ref 0–0.2)
BASOPHILS NFR BLD: 0.4 % (ref 0–2)
BUN SERPL-MCNC: 36 MG/DL (ref 8–23)
CALCIUM SERPL-MCNC: 6.9 MG/DL (ref 8.8–10.2)
CHLORIDE SERPL-SCNC: 99 MMOL/L (ref 98–107)
CO2 SERPL-SCNC: 22 MMOL/L (ref 20–29)
CREAT SERPL-MCNC: 3.32 MG/DL (ref 0.6–1.1)
DIFFERENTIAL METHOD BLD: ABNORMAL
EOSINOPHIL # BLD: 0.1 K/UL (ref 0–0.8)
EOSINOPHIL NFR BLD: 2.1 % (ref 0.5–7.8)
ERYTHROCYTE [DISTWIDTH] IN BLOOD BY AUTOMATED COUNT: 15.7 % (ref 11.9–14.6)
EST. AVERAGE GLUCOSE BLD GHB EST-MCNC: 163 MG/DL
GLUCOSE BLD STRIP.AUTO-MCNC: 158 MG/DL (ref 65–100)
GLUCOSE BLD STRIP.AUTO-MCNC: 194 MG/DL (ref 65–100)
GLUCOSE BLD STRIP.AUTO-MCNC: 253 MG/DL (ref 65–100)
GLUCOSE BLD STRIP.AUTO-MCNC: 353 MG/DL (ref 65–100)
GLUCOSE SERPL-MCNC: 326 MG/DL (ref 70–99)
HBA1C MFR BLD: 7.3 % (ref 0–5.6)
HCT VFR BLD AUTO: 24 % (ref 35.8–46.3)
HGB BLD-MCNC: 7.8 G/DL (ref 11.7–15.4)
IMM GRANULOCYTES # BLD AUTO: 0.02 K/UL (ref 0–0.5)
IMM GRANULOCYTES NFR BLD AUTO: 0.4 % (ref 0–5)
LYMPHOCYTES # BLD: 0.77 K/UL (ref 0.5–4.6)
LYMPHOCYTES NFR BLD: 16 % (ref 13–44)
MCH RBC QN AUTO: 29.3 PG (ref 26.1–32.9)
MCHC RBC AUTO-ENTMCNC: 32.5 G/DL (ref 31.4–35)
MCV RBC AUTO: 90.2 FL (ref 82–102)
MONOCYTES # BLD: 0.45 K/UL (ref 0.1–1.3)
MONOCYTES NFR BLD: 9.3 % (ref 4–12)
NEUTS SEG # BLD: 3.46 K/UL (ref 1.7–8.2)
NEUTS SEG NFR BLD: 71.8 % (ref 43–78)
NRBC # BLD: 0 K/UL (ref 0–0.2)
PLATELET # BLD AUTO: 98 K/UL (ref 150–450)
PMV BLD AUTO: 12.3 FL (ref 9.4–12.3)
POTASSIUM SERPL-SCNC: 4.4 MMOL/L (ref 3.5–5.1)
RBC # BLD AUTO: 2.66 M/UL (ref 4.05–5.2)
SERVICE CMNT-IMP: ABNORMAL
SODIUM SERPL-SCNC: 135 MMOL/L (ref 136–145)
WBC # BLD AUTO: 4.8 K/UL (ref 4.3–11.1)

## 2025-08-16 PROCEDURE — S5553 INSULIN LONG ACTING 5 U: HCPCS | Performed by: HOSPITALIST

## 2025-08-16 PROCEDURE — 6370000000 HC RX 637 (ALT 250 FOR IP): Performed by: HOSPITALIST

## 2025-08-16 PROCEDURE — 6370000000 HC RX 637 (ALT 250 FOR IP): Performed by: INTERNAL MEDICINE

## 2025-08-16 PROCEDURE — 36415 COLL VENOUS BLD VENIPUNCTURE: CPT

## 2025-08-16 PROCEDURE — 6360000002 HC RX W HCPCS: Performed by: HOSPITALIST

## 2025-08-16 PROCEDURE — 85025 COMPLETE CBC W/AUTO DIFF WBC: CPT

## 2025-08-16 PROCEDURE — 82962 GLUCOSE BLOOD TEST: CPT

## 2025-08-16 PROCEDURE — 80048 BASIC METABOLIC PNL TOTAL CA: CPT

## 2025-08-16 PROCEDURE — 1100000000 HC RM PRIVATE

## 2025-08-16 PROCEDURE — 6370000000 HC RX 637 (ALT 250 FOR IP)

## 2025-08-16 RX ORDER — INSULIN GLARGINE-YFGN 100 [IU]/ML
4 INJECTION, SOLUTION SUBCUTANEOUS NIGHTLY
Status: DISCONTINUED | OUTPATIENT
Start: 2025-08-16 | End: 2025-08-21 | Stop reason: HOSPADM

## 2025-08-16 RX ORDER — CEPHALEXIN 500 MG/1
500 CAPSULE ORAL EVERY 12 HOURS SCHEDULED
Status: DISCONTINUED | OUTPATIENT
Start: 2025-08-16 | End: 2025-08-21 | Stop reason: HOSPADM

## 2025-08-16 RX ORDER — INSULIN LISPRO 100 [IU]/ML
4 INJECTION, SOLUTION INTRAVENOUS; SUBCUTANEOUS
Status: DISCONTINUED | OUTPATIENT
Start: 2025-08-16 | End: 2025-08-21 | Stop reason: HOSPADM

## 2025-08-16 RX ORDER — DOXYCYCLINE 100 MG/1
100 CAPSULE ORAL 2 TIMES DAILY WITH MEALS
Status: DISCONTINUED | OUTPATIENT
Start: 2025-08-16 | End: 2025-08-21 | Stop reason: HOSPADM

## 2025-08-16 RX ADMIN — TRAMADOL HYDROCHLORIDE 50 MG: 50 TABLET, COATED ORAL at 08:47

## 2025-08-16 RX ADMIN — INSULIN LISPRO 4 UNITS: 100 INJECTION, SOLUTION INTRAVENOUS; SUBCUTANEOUS at 12:00

## 2025-08-16 RX ADMIN — TRAMADOL HYDROCHLORIDE 50 MG: 50 TABLET, COATED ORAL at 17:10

## 2025-08-16 RX ADMIN — INSULIN GLARGINE-YFGN 4 UNITS: 100 INJECTION, SOLUTION SUBCUTANEOUS at 20:46

## 2025-08-16 RX ADMIN — INSULIN LISPRO 4 UNITS: 100 INJECTION, SOLUTION INTRAVENOUS; SUBCUTANEOUS at 17:11

## 2025-08-16 RX ADMIN — ACETAMINOPHEN 650 MG: 325 TABLET ORAL at 17:09

## 2025-08-16 RX ADMIN — INSULIN LISPRO 4 UNITS: 100 INJECTION, SOLUTION INTRAVENOUS; SUBCUTANEOUS at 08:48

## 2025-08-16 RX ADMIN — Medication 1 TABLET: at 08:38

## 2025-08-16 RX ADMIN — CINACALCET HYDROCHLORIDE 30 MG: 30 TABLET, FILM COATED ORAL at 08:38

## 2025-08-16 RX ADMIN — ACETAMINOPHEN 650 MG: 325 TABLET ORAL at 11:58

## 2025-08-16 RX ADMIN — CEPHALEXIN 500 MG: 500 CAPSULE ORAL at 20:45

## 2025-08-16 RX ADMIN — ATORVASTATIN CALCIUM 40 MG: 40 TABLET, FILM COATED ORAL at 20:46

## 2025-08-16 RX ADMIN — INSULIN LISPRO 2 UNITS: 100 INJECTION, SOLUTION INTRAVENOUS; SUBCUTANEOUS at 20:47

## 2025-08-16 RX ADMIN — APIXABAN 2.5 MG: 2.5 TABLET, FILM COATED ORAL at 08:38

## 2025-08-16 RX ADMIN — CARVEDILOL 6.25 MG: 6.25 TABLET, FILM COATED ORAL at 20:46

## 2025-08-16 RX ADMIN — LEVOTHYROXINE SODIUM 75 MCG: 0.07 TABLET ORAL at 05:50

## 2025-08-16 RX ADMIN — HYDRALAZINE HYDROCHLORIDE 25 MG: 25 TABLET ORAL at 08:38

## 2025-08-16 RX ADMIN — HEPARIN SODIUM 5000 UNITS: 5000 INJECTION, SOLUTION INTRAVENOUS; SUBCUTANEOUS at 05:50

## 2025-08-16 RX ADMIN — DOXYCYCLINE HYCLATE 100 MG: 100 CAPSULE ORAL at 17:10

## 2025-08-16 RX ADMIN — ACETAMINOPHEN 650 MG: 325 TABLET ORAL at 05:49

## 2025-08-16 RX ADMIN — APIXABAN 2.5 MG: 2.5 TABLET, FILM COATED ORAL at 20:46

## 2025-08-16 RX ADMIN — CARVEDILOL 6.25 MG: 6.25 TABLET, FILM COATED ORAL at 08:38

## 2025-08-16 RX ADMIN — INSULIN LISPRO 8 UNITS: 100 INJECTION, SOLUTION INTRAVENOUS; SUBCUTANEOUS at 08:48

## 2025-08-16 ASSESSMENT — PAIN - FUNCTIONAL ASSESSMENT
PAIN_FUNCTIONAL_ASSESSMENT: 0-10
PAIN_FUNCTIONAL_ASSESSMENT: 0-10

## 2025-08-16 ASSESSMENT — PAIN DESCRIPTION - ORIENTATION
ORIENTATION: POSTERIOR
ORIENTATION: MID

## 2025-08-16 ASSESSMENT — PAIN SCALES - GENERAL
PAINLEVEL_OUTOF10: 0
PAINLEVEL_OUTOF10: 0
PAINLEVEL_OUTOF10: 3
PAINLEVEL_OUTOF10: 9

## 2025-08-16 ASSESSMENT — PAIN DESCRIPTION - DESCRIPTORS
DESCRIPTORS: SORE
DESCRIPTORS: ACHING;SORE;SHOOTING

## 2025-08-16 ASSESSMENT — PAIN DESCRIPTION - LOCATION
LOCATION: BUTTOCKS
LOCATION: SACRUM

## 2025-08-17 LAB
ANION GAP SERPL CALC-SCNC: 11 MMOL/L (ref 7–16)
BASOPHILS # BLD: 0.01 K/UL (ref 0–0.2)
BASOPHILS NFR BLD: 0.2 % (ref 0–2)
BUN SERPL-MCNC: 56 MG/DL (ref 8–23)
CALCIUM SERPL-MCNC: 6.4 MG/DL (ref 8.8–10.2)
CHLORIDE SERPL-SCNC: 99 MMOL/L (ref 98–107)
CO2 SERPL-SCNC: 25 MMOL/L (ref 20–29)
CREAT SERPL-MCNC: 4.1 MG/DL (ref 0.6–1.1)
DIFFERENTIAL METHOD BLD: ABNORMAL
EOSINOPHIL # BLD: 0.12 K/UL (ref 0–0.8)
EOSINOPHIL NFR BLD: 2.8 % (ref 0.5–7.8)
ERYTHROCYTE [DISTWIDTH] IN BLOOD BY AUTOMATED COUNT: 15.8 % (ref 11.9–14.6)
GLUCOSE BLD STRIP.AUTO-MCNC: 122 MG/DL (ref 65–100)
GLUCOSE BLD STRIP.AUTO-MCNC: 177 MG/DL (ref 65–100)
GLUCOSE BLD STRIP.AUTO-MCNC: 248 MG/DL (ref 65–100)
GLUCOSE BLD STRIP.AUTO-MCNC: 267 MG/DL (ref 65–100)
GLUCOSE SERPL-MCNC: 96 MG/DL (ref 70–99)
HCT VFR BLD AUTO: 22.4 % (ref 35.8–46.3)
HGB BLD-MCNC: 7.2 G/DL (ref 11.7–15.4)
IMM GRANULOCYTES # BLD AUTO: 0.02 K/UL (ref 0–0.5)
IMM GRANULOCYTES NFR BLD AUTO: 0.5 % (ref 0–5)
LYMPHOCYTES # BLD: 1.19 K/UL (ref 0.5–4.6)
LYMPHOCYTES NFR BLD: 27.9 % (ref 13–44)
MCH RBC QN AUTO: 28.8 PG (ref 26.1–32.9)
MCHC RBC AUTO-ENTMCNC: 32.1 G/DL (ref 31.4–35)
MCV RBC AUTO: 89.6 FL (ref 82–102)
MONOCYTES # BLD: 0.43 K/UL (ref 0.1–1.3)
MONOCYTES NFR BLD: 10.1 % (ref 4–12)
NEUTS SEG # BLD: 2.49 K/UL (ref 1.7–8.2)
NEUTS SEG NFR BLD: 58.5 % (ref 43–78)
NRBC # BLD: 0 K/UL (ref 0–0.2)
PLATELET # BLD AUTO: 132 K/UL (ref 150–450)
PMV BLD AUTO: 11.5 FL (ref 9.4–12.3)
POTASSIUM SERPL-SCNC: 4.5 MMOL/L (ref 3.5–5.1)
RBC # BLD AUTO: 2.5 M/UL (ref 4.05–5.2)
SERVICE CMNT-IMP: ABNORMAL
SODIUM SERPL-SCNC: 135 MMOL/L (ref 136–145)
WBC # BLD AUTO: 4.3 K/UL (ref 4.3–11.1)

## 2025-08-17 PROCEDURE — S5553 INSULIN LONG ACTING 5 U: HCPCS | Performed by: HOSPITALIST

## 2025-08-17 PROCEDURE — 36415 COLL VENOUS BLD VENIPUNCTURE: CPT

## 2025-08-17 PROCEDURE — 6370000000 HC RX 637 (ALT 250 FOR IP): Performed by: INTERNAL MEDICINE

## 2025-08-17 PROCEDURE — 6370000000 HC RX 637 (ALT 250 FOR IP)

## 2025-08-17 PROCEDURE — 6370000000 HC RX 637 (ALT 250 FOR IP): Performed by: HOSPITALIST

## 2025-08-17 PROCEDURE — 1100000000 HC RM PRIVATE

## 2025-08-17 PROCEDURE — 80048 BASIC METABOLIC PNL TOTAL CA: CPT

## 2025-08-17 PROCEDURE — 82962 GLUCOSE BLOOD TEST: CPT

## 2025-08-17 PROCEDURE — 85025 COMPLETE CBC W/AUTO DIFF WBC: CPT

## 2025-08-17 RX ADMIN — HYDRALAZINE HYDROCHLORIDE 25 MG: 25 TABLET ORAL at 07:53

## 2025-08-17 RX ADMIN — INSULIN GLARGINE-YFGN 4 UNITS: 100 INJECTION, SOLUTION SUBCUTANEOUS at 20:13

## 2025-08-17 RX ADMIN — Medication 1 TABLET: at 07:53

## 2025-08-17 RX ADMIN — ACETAMINOPHEN 650 MG: 325 TABLET ORAL at 05:05

## 2025-08-17 RX ADMIN — ATORVASTATIN CALCIUM 40 MG: 40 TABLET, FILM COATED ORAL at 20:24

## 2025-08-17 RX ADMIN — LEVOTHYROXINE SODIUM 75 MCG: 0.07 TABLET ORAL at 05:05

## 2025-08-17 RX ADMIN — INSULIN LISPRO 4 UNITS: 100 INJECTION, SOLUTION INTRAVENOUS; SUBCUTANEOUS at 11:55

## 2025-08-17 RX ADMIN — ACETAMINOPHEN 650 MG: 325 TABLET ORAL at 01:05

## 2025-08-17 RX ADMIN — CARVEDILOL 6.25 MG: 6.25 TABLET, FILM COATED ORAL at 20:27

## 2025-08-17 RX ADMIN — CARVEDILOL 6.25 MG: 6.25 TABLET, FILM COATED ORAL at 07:53

## 2025-08-17 RX ADMIN — ACETAMINOPHEN 650 MG: 325 TABLET ORAL at 11:54

## 2025-08-17 RX ADMIN — CEPHALEXIN 500 MG: 500 CAPSULE ORAL at 07:53

## 2025-08-17 RX ADMIN — CINACALCET HYDROCHLORIDE 30 MG: 30 TABLET, FILM COATED ORAL at 07:52

## 2025-08-17 RX ADMIN — APIXABAN 2.5 MG: 2.5 TABLET, FILM COATED ORAL at 20:24

## 2025-08-17 RX ADMIN — INSULIN LISPRO 2 UNITS: 100 INJECTION, SOLUTION INTRAVENOUS; SUBCUTANEOUS at 17:25

## 2025-08-17 RX ADMIN — DOXYCYCLINE HYCLATE 100 MG: 100 CAPSULE ORAL at 17:30

## 2025-08-17 RX ADMIN — INSULIN LISPRO 4 UNITS: 100 INJECTION, SOLUTION INTRAVENOUS; SUBCUTANEOUS at 20:14

## 2025-08-17 RX ADMIN — APIXABAN 2.5 MG: 2.5 TABLET, FILM COATED ORAL at 07:52

## 2025-08-17 RX ADMIN — INSULIN LISPRO 4 UNITS: 100 INJECTION, SOLUTION INTRAVENOUS; SUBCUTANEOUS at 07:54

## 2025-08-17 RX ADMIN — CEPHALEXIN 500 MG: 500 CAPSULE ORAL at 20:24

## 2025-08-17 RX ADMIN — DOXAZOSIN 4 MG: 4 TABLET ORAL at 08:07

## 2025-08-17 RX ADMIN — DOXYCYCLINE HYCLATE 100 MG: 100 CAPSULE ORAL at 07:53

## 2025-08-17 RX ADMIN — INSULIN LISPRO 4 UNITS: 100 INJECTION, SOLUTION INTRAVENOUS; SUBCUTANEOUS at 17:25

## 2025-08-17 RX ADMIN — ACETAMINOPHEN 650 MG: 325 TABLET ORAL at 17:30

## 2025-08-17 ASSESSMENT — PAIN DESCRIPTION - LOCATION
LOCATION: SACRUM
LOCATION: BUTTOCKS

## 2025-08-17 ASSESSMENT — PAIN SCALES - GENERAL
PAINLEVEL_OUTOF10: 0

## 2025-08-17 ASSESSMENT — PAIN - FUNCTIONAL ASSESSMENT
PAIN_FUNCTIONAL_ASSESSMENT: FACE, LEGS, ACTIVITY, CRY, AND CONSOLABILITY (FLACC)
PAIN_FUNCTIONAL_ASSESSMENT: WONG-BAKER FACES
PAIN_FUNCTIONAL_ASSESSMENT: 0-10
PAIN_FUNCTIONAL_ASSESSMENT: 0-10

## 2025-08-18 DIAGNOSIS — S31.000D WOUND OF SACRAL REGION, SUBSEQUENT ENCOUNTER: Primary | ICD-10-CM

## 2025-08-18 LAB
GLUCOSE BLD STRIP.AUTO-MCNC: 118 MG/DL (ref 65–100)
GLUCOSE BLD STRIP.AUTO-MCNC: 240 MG/DL (ref 65–100)
GLUCOSE BLD STRIP.AUTO-MCNC: 80 MG/DL (ref 65–100)
SERVICE CMNT-IMP: ABNORMAL
SERVICE CMNT-IMP: ABNORMAL
SERVICE CMNT-IMP: NORMAL

## 2025-08-18 PROCEDURE — 6370000000 HC RX 637 (ALT 250 FOR IP)

## 2025-08-18 PROCEDURE — 6370000000 HC RX 637 (ALT 250 FOR IP): Performed by: INTERNAL MEDICINE

## 2025-08-18 PROCEDURE — 82962 GLUCOSE BLOOD TEST: CPT

## 2025-08-18 PROCEDURE — S5553 INSULIN LONG ACTING 5 U: HCPCS | Performed by: HOSPITALIST

## 2025-08-18 PROCEDURE — 6370000000 HC RX 637 (ALT 250 FOR IP): Performed by: HOSPITALIST

## 2025-08-18 PROCEDURE — 90935 HEMODIALYSIS ONE EVALUATION: CPT

## 2025-08-18 PROCEDURE — 1100000000 HC RM PRIVATE

## 2025-08-18 PROCEDURE — 97606 NEG PRS WND THER DME>50 SQCM: CPT

## 2025-08-18 RX ADMIN — LEVOTHYROXINE SODIUM 75 MCG: 0.07 TABLET ORAL at 06:27

## 2025-08-18 RX ADMIN — ACETAMINOPHEN 650 MG: 325 TABLET ORAL at 17:23

## 2025-08-18 RX ADMIN — ACETAMINOPHEN 650 MG: 325 TABLET ORAL at 06:27

## 2025-08-18 RX ADMIN — CEPHALEXIN 500 MG: 500 CAPSULE ORAL at 08:44

## 2025-08-18 RX ADMIN — INSULIN LISPRO 2 UNITS: 100 INJECTION, SOLUTION INTRAVENOUS; SUBCUTANEOUS at 20:43

## 2025-08-18 RX ADMIN — HYDRALAZINE HYDROCHLORIDE 25 MG: 25 TABLET ORAL at 08:44

## 2025-08-18 RX ADMIN — CINACALCET HYDROCHLORIDE 30 MG: 30 TABLET, FILM COATED ORAL at 08:44

## 2025-08-18 RX ADMIN — INSULIN LISPRO 4 UNITS: 100 INJECTION, SOLUTION INTRAVENOUS; SUBCUTANEOUS at 08:47

## 2025-08-18 RX ADMIN — CEPHALEXIN 500 MG: 500 CAPSULE ORAL at 20:41

## 2025-08-18 RX ADMIN — DOXYCYCLINE HYCLATE 100 MG: 100 CAPSULE ORAL at 17:23

## 2025-08-18 RX ADMIN — CALCITRIOL 0.25 MCG: 0.25 CAPSULE ORAL at 17:23

## 2025-08-18 RX ADMIN — DOXYCYCLINE HYCLATE 100 MG: 100 CAPSULE ORAL at 08:44

## 2025-08-18 RX ADMIN — APIXABAN 2.5 MG: 2.5 TABLET, FILM COATED ORAL at 20:41

## 2025-08-18 RX ADMIN — DOXAZOSIN 4 MG: 4 TABLET ORAL at 08:44

## 2025-08-18 RX ADMIN — INSULIN GLARGINE-YFGN 4 UNITS: 100 INJECTION, SOLUTION SUBCUTANEOUS at 20:43

## 2025-08-18 RX ADMIN — Medication 1 TABLET: at 08:45

## 2025-08-18 RX ADMIN — ATORVASTATIN CALCIUM 40 MG: 40 TABLET, FILM COATED ORAL at 20:41

## 2025-08-18 RX ADMIN — DOXAZOSIN 4 MG: 4 TABLET ORAL at 20:41

## 2025-08-18 RX ADMIN — APIXABAN 2.5 MG: 2.5 TABLET, FILM COATED ORAL at 08:44

## 2025-08-18 RX ADMIN — CARVEDILOL 6.25 MG: 6.25 TABLET, FILM COATED ORAL at 08:45

## 2025-08-18 ASSESSMENT — PAIN SCALES - GENERAL
PAINLEVEL_OUTOF10: 0

## 2025-08-19 LAB
GLUCOSE BLD STRIP.AUTO-MCNC: 120 MG/DL (ref 65–100)
GLUCOSE BLD STRIP.AUTO-MCNC: 151 MG/DL (ref 65–100)
GLUCOSE BLD STRIP.AUTO-MCNC: 159 MG/DL (ref 65–100)
GLUCOSE BLD STRIP.AUTO-MCNC: 240 MG/DL (ref 65–100)
SERVICE CMNT-IMP: ABNORMAL

## 2025-08-19 PROCEDURE — 97530 THERAPEUTIC ACTIVITIES: CPT

## 2025-08-19 PROCEDURE — 6370000000 HC RX 637 (ALT 250 FOR IP): Performed by: HOSPITALIST

## 2025-08-19 PROCEDURE — 6370000000 HC RX 637 (ALT 250 FOR IP)

## 2025-08-19 PROCEDURE — 82962 GLUCOSE BLOOD TEST: CPT

## 2025-08-19 PROCEDURE — 1100000000 HC RM PRIVATE

## 2025-08-19 PROCEDURE — 6370000000 HC RX 637 (ALT 250 FOR IP): Performed by: INTERNAL MEDICINE

## 2025-08-19 PROCEDURE — S5553 INSULIN LONG ACTING 5 U: HCPCS | Performed by: HOSPITALIST

## 2025-08-19 PROCEDURE — 97112 NEUROMUSCULAR REEDUCATION: CPT

## 2025-08-19 RX ADMIN — DOXAZOSIN 4 MG: 4 TABLET ORAL at 23:39

## 2025-08-19 RX ADMIN — CINACALCET HYDROCHLORIDE 30 MG: 30 TABLET, FILM COATED ORAL at 09:33

## 2025-08-19 RX ADMIN — INSULIN LISPRO 2 UNITS: 100 INJECTION, SOLUTION INTRAVENOUS; SUBCUTANEOUS at 20:46

## 2025-08-19 RX ADMIN — HYDRALAZINE HYDROCHLORIDE 25 MG: 25 TABLET ORAL at 23:40

## 2025-08-19 RX ADMIN — ACETAMINOPHEN 650 MG: 325 TABLET ORAL at 23:40

## 2025-08-19 RX ADMIN — INSULIN LISPRO 4 UNITS: 100 INJECTION, SOLUTION INTRAVENOUS; SUBCUTANEOUS at 17:54

## 2025-08-19 RX ADMIN — CEPHALEXIN 500 MG: 500 CAPSULE ORAL at 20:43

## 2025-08-19 RX ADMIN — ACETAMINOPHEN 650 MG: 325 TABLET ORAL at 17:56

## 2025-08-19 RX ADMIN — INSULIN LISPRO 4 UNITS: 100 INJECTION, SOLUTION INTRAVENOUS; SUBCUTANEOUS at 09:22

## 2025-08-19 RX ADMIN — APIXABAN 2.5 MG: 2.5 TABLET, FILM COATED ORAL at 20:43

## 2025-08-19 RX ADMIN — APIXABAN 2.5 MG: 2.5 TABLET, FILM COATED ORAL at 09:32

## 2025-08-19 RX ADMIN — CARVEDILOL 6.25 MG: 6.25 TABLET, FILM COATED ORAL at 20:44

## 2025-08-19 RX ADMIN — Medication 1 TABLET: at 09:32

## 2025-08-19 RX ADMIN — CEPHALEXIN 500 MG: 500 CAPSULE ORAL at 09:33

## 2025-08-19 RX ADMIN — INSULIN GLARGINE-YFGN 4 UNITS: 100 INJECTION, SOLUTION SUBCUTANEOUS at 20:45

## 2025-08-19 RX ADMIN — DOXYCYCLINE HYCLATE 100 MG: 100 CAPSULE ORAL at 17:56

## 2025-08-19 RX ADMIN — INSULIN LISPRO 4 UNITS: 100 INJECTION, SOLUTION INTRAVENOUS; SUBCUTANEOUS at 13:20

## 2025-08-19 RX ADMIN — ATORVASTATIN CALCIUM 40 MG: 40 TABLET, FILM COATED ORAL at 20:43

## 2025-08-19 RX ADMIN — DOXYCYCLINE HYCLATE 100 MG: 100 CAPSULE ORAL at 09:33

## 2025-08-19 RX ADMIN — ACETAMINOPHEN 650 MG: 325 TABLET ORAL at 13:20

## 2025-08-19 RX ADMIN — CARVEDILOL 6.25 MG: 6.25 TABLET, FILM COATED ORAL at 10:31

## 2025-08-19 ASSESSMENT — PAIN SCALES - GENERAL
PAINLEVEL_OUTOF10: 2
PAINLEVEL_OUTOF10: 0

## 2025-08-20 LAB
ANION GAP SERPL CALC-SCNC: 11 MMOL/L (ref 7–16)
BUN SERPL-MCNC: 56 MG/DL (ref 8–23)
CALCIUM SERPL-MCNC: 6.7 MG/DL (ref 8.8–10.2)
CHLORIDE SERPL-SCNC: 101 MMOL/L (ref 98–107)
CO2 SERPL-SCNC: 26 MMOL/L (ref 20–29)
CREAT SERPL-MCNC: 3.79 MG/DL (ref 0.6–1.1)
ERYTHROCYTE [DISTWIDTH] IN BLOOD BY AUTOMATED COUNT: 16.4 % (ref 11.9–14.6)
GLUCOSE BLD STRIP.AUTO-MCNC: 193 MG/DL (ref 65–100)
GLUCOSE BLD STRIP.AUTO-MCNC: 260 MG/DL (ref 65–100)
GLUCOSE BLD STRIP.AUTO-MCNC: 80 MG/DL (ref 65–100)
GLUCOSE BLD STRIP.AUTO-MCNC: 91 MG/DL (ref 65–100)
GLUCOSE BLD STRIP.AUTO-MCNC: 98 MG/DL (ref 65–100)
GLUCOSE SERPL-MCNC: 73 MG/DL (ref 70–99)
HCT VFR BLD AUTO: 20.6 % (ref 35.8–46.3)
HCT VFR BLD AUTO: 31.8 % (ref 35.8–46.3)
HGB BLD-MCNC: 10.3 G/DL (ref 11.7–15.4)
HGB BLD-MCNC: 6.4 G/DL (ref 11.7–15.4)
HISTORY CHECK: NORMAL
MCH RBC QN AUTO: 28.1 PG (ref 26.1–32.9)
MCHC RBC AUTO-ENTMCNC: 31.1 G/DL (ref 31.4–35)
MCV RBC AUTO: 90.4 FL (ref 82–102)
NRBC # BLD: 0 K/UL (ref 0–0.2)
PLATELET # BLD AUTO: 115 K/UL (ref 150–450)
PMV BLD AUTO: 11.4 FL (ref 9.4–12.3)
POTASSIUM SERPL-SCNC: 3.9 MMOL/L (ref 3.5–5.1)
RBC # BLD AUTO: 2.28 M/UL (ref 4.05–5.2)
SERVICE CMNT-IMP: ABNORMAL
SERVICE CMNT-IMP: ABNORMAL
SERVICE CMNT-IMP: NORMAL
SODIUM SERPL-SCNC: 139 MMOL/L (ref 136–145)
WBC # BLD AUTO: 4.6 K/UL (ref 4.3–11.1)

## 2025-08-20 PROCEDURE — 85027 COMPLETE CBC AUTOMATED: CPT

## 2025-08-20 PROCEDURE — 6370000000 HC RX 637 (ALT 250 FOR IP): Performed by: HOSPITALIST

## 2025-08-20 PROCEDURE — 36415 COLL VENOUS BLD VENIPUNCTURE: CPT

## 2025-08-20 PROCEDURE — 86900 BLOOD TYPING SEROLOGIC ABO: CPT

## 2025-08-20 PROCEDURE — 86923 COMPATIBILITY TEST ELECTRIC: CPT

## 2025-08-20 PROCEDURE — 6370000000 HC RX 637 (ALT 250 FOR IP): Performed by: INTERNAL MEDICINE

## 2025-08-20 PROCEDURE — 80048 BASIC METABOLIC PNL TOTAL CA: CPT

## 2025-08-20 PROCEDURE — 30233N1 TRANSFUSION OF NONAUTOLOGOUS RED BLOOD CELLS INTO PERIPHERAL VEIN, PERCUTANEOUS APPROACH: ICD-10-PCS

## 2025-08-20 PROCEDURE — 85014 HEMATOCRIT: CPT

## 2025-08-20 PROCEDURE — 82962 GLUCOSE BLOOD TEST: CPT

## 2025-08-20 PROCEDURE — 86850 RBC ANTIBODY SCREEN: CPT

## 2025-08-20 PROCEDURE — 90935 HEMODIALYSIS ONE EVALUATION: CPT

## 2025-08-20 PROCEDURE — 1100000000 HC RM PRIVATE

## 2025-08-20 PROCEDURE — P9016 RBC LEUKOCYTES REDUCED: HCPCS

## 2025-08-20 PROCEDURE — 85018 HEMOGLOBIN: CPT

## 2025-08-20 PROCEDURE — 97606 NEG PRS WND THER DME>50 SQCM: CPT

## 2025-08-20 PROCEDURE — 36430 TRANSFUSION BLD/BLD COMPNT: CPT

## 2025-08-20 PROCEDURE — 6370000000 HC RX 637 (ALT 250 FOR IP)

## 2025-08-20 PROCEDURE — 86901 BLOOD TYPING SEROLOGIC RH(D): CPT

## 2025-08-20 PROCEDURE — S5553 INSULIN LONG ACTING 5 U: HCPCS | Performed by: HOSPITALIST

## 2025-08-20 RX ORDER — SODIUM CHLORIDE 9 MG/ML
INJECTION, SOLUTION INTRAVENOUS PRN
Status: DISCONTINUED | OUTPATIENT
Start: 2025-08-20 | End: 2025-08-21 | Stop reason: HOSPADM

## 2025-08-20 RX ADMIN — DOXAZOSIN 4 MG: 4 TABLET ORAL at 20:52

## 2025-08-20 RX ADMIN — HYDRALAZINE HYDROCHLORIDE 25 MG: 25 TABLET ORAL at 20:51

## 2025-08-20 RX ADMIN — CEPHALEXIN 500 MG: 500 CAPSULE ORAL at 11:43

## 2025-08-20 RX ADMIN — INSULIN LISPRO 2 UNITS: 100 INJECTION, SOLUTION INTRAVENOUS; SUBCUTANEOUS at 17:19

## 2025-08-20 RX ADMIN — ATORVASTATIN CALCIUM 40 MG: 40 TABLET, FILM COATED ORAL at 20:51

## 2025-08-20 RX ADMIN — DOXAZOSIN 4 MG: 4 TABLET ORAL at 11:44

## 2025-08-20 RX ADMIN — APIXABAN 2.5 MG: 2.5 TABLET, FILM COATED ORAL at 11:43

## 2025-08-20 RX ADMIN — Medication 1 TABLET: at 11:43

## 2025-08-20 RX ADMIN — ACETAMINOPHEN 650 MG: 325 TABLET ORAL at 06:00

## 2025-08-20 RX ADMIN — CEPHALEXIN 500 MG: 500 CAPSULE ORAL at 20:51

## 2025-08-20 RX ADMIN — INSULIN LISPRO 4 UNITS: 100 INJECTION, SOLUTION INTRAVENOUS; SUBCUTANEOUS at 20:52

## 2025-08-20 RX ADMIN — CALCITRIOL 0.25 MCG: 0.25 CAPSULE ORAL at 17:20

## 2025-08-20 RX ADMIN — ACETAMINOPHEN 650 MG: 325 TABLET ORAL at 17:20

## 2025-08-20 RX ADMIN — CINACALCET HYDROCHLORIDE 30 MG: 30 TABLET, FILM COATED ORAL at 11:43

## 2025-08-20 RX ADMIN — LEVOTHYROXINE SODIUM 75 MCG: 0.07 TABLET ORAL at 06:00

## 2025-08-20 RX ADMIN — ACETAMINOPHEN 650 MG: 325 TABLET ORAL at 22:21

## 2025-08-20 RX ADMIN — INSULIN LISPRO 4 UNITS: 100 INJECTION, SOLUTION INTRAVENOUS; SUBCUTANEOUS at 17:20

## 2025-08-20 RX ADMIN — CARVEDILOL 6.25 MG: 6.25 TABLET, FILM COATED ORAL at 22:22

## 2025-08-20 RX ADMIN — ACETAMINOPHEN 650 MG: 325 TABLET ORAL at 11:43

## 2025-08-20 RX ADMIN — INSULIN GLARGINE-YFGN 4 UNITS: 100 INJECTION, SOLUTION SUBCUTANEOUS at 20:52

## 2025-08-20 RX ADMIN — DOXYCYCLINE HYCLATE 100 MG: 100 CAPSULE ORAL at 17:20

## 2025-08-20 RX ADMIN — APIXABAN 2.5 MG: 2.5 TABLET, FILM COATED ORAL at 20:51

## 2025-08-20 ASSESSMENT — PAIN SCALES - GENERAL
PAINLEVEL_OUTOF10: 0
PAINLEVEL_OUTOF10: 3

## 2025-08-20 ASSESSMENT — PAIN DESCRIPTION - ORIENTATION: ORIENTATION: POSTERIOR

## 2025-08-20 ASSESSMENT — PAIN DESCRIPTION - LOCATION: LOCATION: HIP;SACRUM

## 2025-08-20 ASSESSMENT — PAIN - FUNCTIONAL ASSESSMENT
PAIN_FUNCTIONAL_ASSESSMENT: 0-10
PAIN_FUNCTIONAL_ASSESSMENT: PREVENTS OR INTERFERES WITH MANY ACTIVE NOT PASSIVE ACTIVITIES

## 2025-08-20 ASSESSMENT — PAIN DESCRIPTION - DESCRIPTORS: DESCRIPTORS: ACHING;SHARP;SORE

## 2025-08-21 VITALS
TEMPERATURE: 98.2 F | WEIGHT: 136 LBS | BODY MASS INDEX: 23.22 KG/M2 | DIASTOLIC BLOOD PRESSURE: 55 MMHG | SYSTOLIC BLOOD PRESSURE: 140 MMHG | OXYGEN SATURATION: 99 % | HEART RATE: 81 BPM | HEIGHT: 64 IN | RESPIRATION RATE: 17 BRPM

## 2025-08-21 LAB
ANION GAP SERPL CALC-SCNC: 10 MMOL/L (ref 7–16)
BUN SERPL-MCNC: 32 MG/DL (ref 8–23)
CALCIUM SERPL-MCNC: 7.1 MG/DL (ref 8.8–10.2)
CHLORIDE SERPL-SCNC: 101 MMOL/L (ref 98–107)
CO2 SERPL-SCNC: 28 MMOL/L (ref 20–29)
CREAT SERPL-MCNC: 2.51 MG/DL (ref 0.6–1.1)
ERYTHROCYTE [DISTWIDTH] IN BLOOD BY AUTOMATED COUNT: 18.3 % (ref 11.9–14.6)
GLUCOSE BLD STRIP.AUTO-MCNC: 125 MG/DL (ref 65–100)
GLUCOSE BLD STRIP.AUTO-MCNC: 131 MG/DL (ref 65–100)
GLUCOSE BLD STRIP.AUTO-MCNC: 49 MG/DL (ref 65–100)
GLUCOSE BLD STRIP.AUTO-MCNC: 77 MG/DL (ref 65–100)
GLUCOSE SERPL-MCNC: 62 MG/DL (ref 70–99)
HCT VFR BLD AUTO: 29.1 % (ref 35.8–46.3)
HGB BLD-MCNC: 9.4 G/DL (ref 11.7–15.4)
MCH RBC QN AUTO: 27.7 PG (ref 26.1–32.9)
MCHC RBC AUTO-ENTMCNC: 32.3 G/DL (ref 31.4–35)
MCV RBC AUTO: 85.8 FL (ref 82–102)
NRBC # BLD: 0 K/UL (ref 0–0.2)
PLATELET # BLD AUTO: 115 K/UL (ref 150–450)
PMV BLD AUTO: 10.8 FL (ref 9.4–12.3)
POTASSIUM SERPL-SCNC: 3.6 MMOL/L (ref 3.5–5.1)
RBC # BLD AUTO: 3.39 M/UL (ref 4.05–5.2)
SERVICE CMNT-IMP: ABNORMAL
SERVICE CMNT-IMP: NORMAL
SODIUM SERPL-SCNC: 139 MMOL/L (ref 136–145)
WBC # BLD AUTO: 5.2 K/UL (ref 4.3–11.1)

## 2025-08-21 PROCEDURE — 6360000002 HC RX W HCPCS: Performed by: INTERNAL MEDICINE

## 2025-08-21 PROCEDURE — 80048 BASIC METABOLIC PNL TOTAL CA: CPT

## 2025-08-21 PROCEDURE — 6370000000 HC RX 637 (ALT 250 FOR IP)

## 2025-08-21 PROCEDURE — 85027 COMPLETE CBC AUTOMATED: CPT

## 2025-08-21 PROCEDURE — 6370000000 HC RX 637 (ALT 250 FOR IP): Performed by: HOSPITALIST

## 2025-08-21 PROCEDURE — 82962 GLUCOSE BLOOD TEST: CPT

## 2025-08-21 PROCEDURE — 6370000000 HC RX 637 (ALT 250 FOR IP): Performed by: INTERNAL MEDICINE

## 2025-08-21 PROCEDURE — 36415 COLL VENOUS BLD VENIPUNCTURE: CPT

## 2025-08-21 RX ORDER — CEPHALEXIN 500 MG/1
500 CAPSULE ORAL 2 TIMES DAILY
Qty: 12 CAPSULE | Refills: 0 | Status: ON HOLD | OUTPATIENT
Start: 2025-08-21 | End: 2025-08-27 | Stop reason: HOSPADM

## 2025-08-21 RX ORDER — DOXYCYCLINE 100 MG/1
100 CAPSULE ORAL 2 TIMES DAILY
Qty: 12 CAPSULE | Refills: 0 | Status: SHIPPED
Start: 2025-08-21 | End: 2025-08-21

## 2025-08-21 RX ORDER — CEPHALEXIN 500 MG/1
500 CAPSULE ORAL EVERY 12 HOURS SCHEDULED
Qty: 12 CAPSULE | Refills: 0 | Status: SHIPPED
Start: 2025-08-21 | End: 2025-08-21

## 2025-08-21 RX ORDER — DOXYCYCLINE 100 MG/1
100 CAPSULE ORAL 2 TIMES DAILY
Qty: 12 CAPSULE | Refills: 0 | Status: ON HOLD | OUTPATIENT
Start: 2025-08-21 | End: 2025-08-27 | Stop reason: HOSPADM

## 2025-08-21 RX ADMIN — CINACALCET HYDROCHLORIDE 30 MG: 30 TABLET, FILM COATED ORAL at 08:42

## 2025-08-21 RX ADMIN — CARVEDILOL 6.25 MG: 6.25 TABLET, FILM COATED ORAL at 08:42

## 2025-08-21 RX ADMIN — GLUCAGON 1 MG: KIT at 06:43

## 2025-08-21 RX ADMIN — ACETAMINOPHEN 650 MG: 325 TABLET ORAL at 12:29

## 2025-08-21 RX ADMIN — DOXAZOSIN 4 MG: 4 TABLET ORAL at 08:42

## 2025-08-21 RX ADMIN — APIXABAN 2.5 MG: 2.5 TABLET, FILM COATED ORAL at 08:42

## 2025-08-21 RX ADMIN — DOXYCYCLINE HYCLATE 100 MG: 100 CAPSULE ORAL at 08:42

## 2025-08-21 RX ADMIN — CEPHALEXIN 500 MG: 500 CAPSULE ORAL at 08:42

## 2025-08-21 RX ADMIN — LEVOTHYROXINE SODIUM 75 MCG: 0.07 TABLET ORAL at 05:05

## 2025-08-21 RX ADMIN — Medication 1 TABLET: at 08:43

## 2025-08-21 RX ADMIN — ACETAMINOPHEN 650 MG: 325 TABLET ORAL at 05:05

## 2025-08-21 RX ADMIN — HYDRALAZINE HYDROCHLORIDE 25 MG: 25 TABLET ORAL at 08:42

## 2025-08-21 ASSESSMENT — PAIN DESCRIPTION - LOCATION: LOCATION: HIP

## 2025-08-21 ASSESSMENT — PAIN DESCRIPTION - ONSET: ONSET: ON-GOING

## 2025-08-21 ASSESSMENT — PAIN DESCRIPTION - FREQUENCY: FREQUENCY: CONTINUOUS

## 2025-08-21 ASSESSMENT — PAIN DESCRIPTION - ORIENTATION: ORIENTATION: POSTERIOR

## 2025-08-21 ASSESSMENT — PAIN DESCRIPTION - DESCRIPTORS: DESCRIPTORS: ACHING

## 2025-08-21 ASSESSMENT — PAIN - FUNCTIONAL ASSESSMENT
PAIN_FUNCTIONAL_ASSESSMENT: PREVENTS OR INTERFERES WITH MANY ACTIVE NOT PASSIVE ACTIVITIES
PAIN_FUNCTIONAL_ASSESSMENT: WONG-BAKER FACES

## 2025-08-21 ASSESSMENT — PAIN SCALES - WONG BAKER: WONGBAKER_NUMERICALRESPONSE: HURTS A LITTLE BIT

## 2025-08-21 ASSESSMENT — PAIN DESCRIPTION - PAIN TYPE: TYPE: CHRONIC PAIN

## 2025-08-22 ENCOUNTER — HOSPITAL ENCOUNTER (INPATIENT)
Age: 80
LOS: 5 days | Discharge: HOME HEALTH | DRG: 919 | End: 2025-08-27
Attending: EMERGENCY MEDICINE | Admitting: HOSPITALIST
Payer: MEDICARE

## 2025-08-22 DIAGNOSIS — L89.219 PRESSURE INJURY OF SKIN OF RIGHT HIP, UNSPECIFIED INJURY STAGE: Primary | ICD-10-CM

## 2025-08-22 PROBLEM — D69.6 THROMBOCYTOPENIA: Status: ACTIVE | Noted: 2025-08-22

## 2025-08-22 PROBLEM — D62 ACUTE BLOOD LOSS ANEMIA: Status: ACTIVE | Noted: 2025-08-22

## 2025-08-22 PROBLEM — L89.159 SACRAL DECUBITUS ULCER: Status: ACTIVE | Noted: 2025-08-13

## 2025-08-22 PROBLEM — T14.8XXA BLEEDING FROM WOUND: Status: ACTIVE | Noted: 2025-08-22

## 2025-08-22 LAB
ALBUMIN SERPL-MCNC: 1.9 G/DL (ref 3.2–4.6)
ALBUMIN/GLOB SERPL: 0.5 (ref 1–1.9)
ALP SERPL-CCNC: 106 U/L (ref 35–104)
ALT SERPL-CCNC: 7 U/L (ref 8–45)
ANION GAP SERPL CALC-SCNC: 14 MMOL/L (ref 7–16)
AST SERPL-CCNC: 42 U/L (ref 15–37)
BASOPHILS # BLD: 0.03 K/UL (ref 0–0.2)
BASOPHILS NFR BLD: 0.3 % (ref 0–2)
BILIRUB SERPL-MCNC: 0.4 MG/DL (ref 0–1.2)
BUN SERPL-MCNC: 33 MG/DL (ref 8–23)
CALCIUM SERPL-MCNC: 7.5 MG/DL (ref 8.8–10.2)
CHLORIDE SERPL-SCNC: 99 MMOL/L (ref 98–107)
CO2 SERPL-SCNC: 25 MMOL/L (ref 20–29)
CREAT SERPL-MCNC: 2.57 MG/DL (ref 0.6–1.1)
DIFFERENTIAL METHOD BLD: ABNORMAL
EOSINOPHIL # BLD: 0.01 K/UL (ref 0–0.8)
EOSINOPHIL NFR BLD: 0.1 % (ref 0.5–7.8)
ERYTHROCYTE [DISTWIDTH] IN BLOOD BY AUTOMATED COUNT: 18.1 % (ref 11.9–14.6)
GLOBULIN SER CALC-MCNC: 4 G/DL (ref 2.3–3.5)
GLUCOSE BLD STRIP.AUTO-MCNC: 154 MG/DL (ref 65–100)
GLUCOSE SERPL-MCNC: 186 MG/DL (ref 70–99)
HCT VFR BLD AUTO: 26.3 % (ref 35.8–46.3)
HGB BLD-MCNC: 8.1 G/DL (ref 11.7–15.4)
IMM GRANULOCYTES # BLD AUTO: 0.04 K/UL (ref 0–0.5)
IMM GRANULOCYTES NFR BLD AUTO: 0.5 % (ref 0–5)
INR PPP: 1.1
LACTATE SERPL-SCNC: 1.9 MMOL/L (ref 0.5–2)
LYMPHOCYTES # BLD: 0.92 K/UL (ref 0.5–4.6)
LYMPHOCYTES NFR BLD: 10.5 % (ref 13–44)
MCH RBC QN AUTO: 27.6 PG (ref 26.1–32.9)
MCHC RBC AUTO-ENTMCNC: 30.8 G/DL (ref 31.4–35)
MCV RBC AUTO: 89.5 FL (ref 82–102)
MONOCYTES # BLD: 0.63 K/UL (ref 0.1–1.3)
MONOCYTES NFR BLD: 7.2 % (ref 4–12)
NEUTS SEG # BLD: 7.14 K/UL (ref 1.7–8.2)
NEUTS SEG NFR BLD: 81.4 % (ref 43–78)
NRBC # BLD: 0 K/UL (ref 0–0.2)
PLATELET # BLD AUTO: 111 K/UL (ref 150–450)
PMV BLD AUTO: 11.6 FL (ref 9.4–12.3)
POTASSIUM SERPL-SCNC: ABNORMAL MMOL/L (ref 3.5–5.1)
PROCALCITONIN SERPL-MCNC: 3.33 NG/ML (ref 0–0.1)
PROT SERPL-MCNC: 5.9 G/DL (ref 6.3–8.2)
PROTHROMBIN TIME: 15.1 SEC (ref 11.3–14.9)
RBC # BLD AUTO: 2.94 M/UL (ref 4.05–5.2)
SERVICE CMNT-IMP: ABNORMAL
SODIUM SERPL-SCNC: 138 MMOL/L (ref 136–145)
WBC # BLD AUTO: 8.8 K/UL (ref 4.3–11.1)

## 2025-08-22 PROCEDURE — 82962 GLUCOSE BLOOD TEST: CPT

## 2025-08-22 PROCEDURE — 93005 ELECTROCARDIOGRAM TRACING: CPT | Performed by: FAMILY MEDICINE

## 2025-08-22 PROCEDURE — 1100000000 HC RM PRIVATE

## 2025-08-22 PROCEDURE — 6370000000 HC RX 637 (ALT 250 FOR IP): Performed by: HOSPITALIST

## 2025-08-22 PROCEDURE — 83605 ASSAY OF LACTIC ACID: CPT

## 2025-08-22 PROCEDURE — 86850 RBC ANTIBODY SCREEN: CPT

## 2025-08-22 PROCEDURE — 80053 COMPREHEN METABOLIC PANEL: CPT

## 2025-08-22 PROCEDURE — 85025 COMPLETE CBC W/AUTO DIFF WBC: CPT

## 2025-08-22 PROCEDURE — 86923 COMPATIBILITY TEST ELECTRIC: CPT

## 2025-08-22 PROCEDURE — 85610 PROTHROMBIN TIME: CPT

## 2025-08-22 PROCEDURE — 99285 EMERGENCY DEPT VISIT HI MDM: CPT

## 2025-08-22 PROCEDURE — 2500000003 HC RX 250 WO HCPCS: Performed by: HOSPITALIST

## 2025-08-22 PROCEDURE — 86900 BLOOD TYPING SEROLOGIC ABO: CPT

## 2025-08-22 PROCEDURE — 86901 BLOOD TYPING SEROLOGIC RH(D): CPT

## 2025-08-22 PROCEDURE — 87040 BLOOD CULTURE FOR BACTERIA: CPT

## 2025-08-22 PROCEDURE — 84145 PROCALCITONIN (PCT): CPT

## 2025-08-22 RX ORDER — ONDANSETRON 2 MG/ML
4 INJECTION INTRAMUSCULAR; INTRAVENOUS EVERY 6 HOURS PRN
Status: DISCONTINUED | OUTPATIENT
Start: 2025-08-22 | End: 2025-08-27 | Stop reason: HOSPADM

## 2025-08-22 RX ORDER — ONDANSETRON 4 MG/1
4 TABLET, ORALLY DISINTEGRATING ORAL EVERY 8 HOURS PRN
Status: DISCONTINUED | OUTPATIENT
Start: 2025-08-22 | End: 2025-08-27 | Stop reason: HOSPADM

## 2025-08-22 RX ORDER — MAGNESIUM SULFATE IN WATER 40 MG/ML
2000 INJECTION, SOLUTION INTRAVENOUS PRN
Status: DISCONTINUED | OUTPATIENT
Start: 2025-08-22 | End: 2025-08-27 | Stop reason: HOSPADM

## 2025-08-22 RX ORDER — DOXYCYCLINE 100 MG/1
100 CAPSULE ORAL 2 TIMES DAILY
Status: DISCONTINUED | OUTPATIENT
Start: 2025-08-22 | End: 2025-08-27 | Stop reason: HOSPADM

## 2025-08-22 RX ORDER — CEPHALEXIN 500 MG/1
500 CAPSULE ORAL 2 TIMES DAILY
Status: DISCONTINUED | OUTPATIENT
Start: 2025-08-23 | End: 2025-08-27 | Stop reason: HOSPADM

## 2025-08-22 RX ORDER — ACETAMINOPHEN 650 MG/1
650 SUPPOSITORY RECTAL EVERY 6 HOURS PRN
Status: DISCONTINUED | OUTPATIENT
Start: 2025-08-22 | End: 2025-08-27 | Stop reason: HOSPADM

## 2025-08-22 RX ORDER — SODIUM CHLORIDE 0.9 % (FLUSH) 0.9 %
5-40 SYRINGE (ML) INJECTION PRN
Status: DISCONTINUED | OUTPATIENT
Start: 2025-08-22 | End: 2025-08-27 | Stop reason: HOSPADM

## 2025-08-22 RX ORDER — POLYETHYLENE GLYCOL 3350 17 G/17G
17 POWDER, FOR SOLUTION ORAL DAILY PRN
Status: DISCONTINUED | OUTPATIENT
Start: 2025-08-22 | End: 2025-08-27 | Stop reason: HOSPADM

## 2025-08-22 RX ORDER — POTASSIUM CHLORIDE 7.45 MG/ML
10 INJECTION INTRAVENOUS PRN
Status: DISCONTINUED | OUTPATIENT
Start: 2025-08-22 | End: 2025-08-27 | Stop reason: HOSPADM

## 2025-08-22 RX ORDER — DEXTROSE MONOHYDRATE 100 MG/ML
INJECTION, SOLUTION INTRAVENOUS CONTINUOUS PRN
Status: DISCONTINUED | OUTPATIENT
Start: 2025-08-22 | End: 2025-08-27 | Stop reason: HOSPADM

## 2025-08-22 RX ORDER — ATORVASTATIN CALCIUM 40 MG/1
40 TABLET, FILM COATED ORAL NIGHTLY
Status: DISCONTINUED | OUTPATIENT
Start: 2025-08-22 | End: 2025-08-27 | Stop reason: HOSPADM

## 2025-08-22 RX ORDER — SODIUM CHLORIDE 9 MG/ML
INJECTION, SOLUTION INTRAVENOUS PRN
Status: DISCONTINUED | OUTPATIENT
Start: 2025-08-22 | End: 2025-08-27 | Stop reason: HOSPADM

## 2025-08-22 RX ORDER — SODIUM CHLORIDE 0.9 % (FLUSH) 0.9 %
5-40 SYRINGE (ML) INJECTION EVERY 12 HOURS SCHEDULED
Status: DISCONTINUED | OUTPATIENT
Start: 2025-08-22 | End: 2025-08-27 | Stop reason: HOSPADM

## 2025-08-22 RX ORDER — LEVOTHYROXINE SODIUM 75 UG/1
75 TABLET ORAL DAILY
Status: DISCONTINUED | OUTPATIENT
Start: 2025-08-23 | End: 2025-08-27 | Stop reason: HOSPADM

## 2025-08-22 RX ORDER — POTASSIUM CHLORIDE 1500 MG/1
40 TABLET, EXTENDED RELEASE ORAL PRN
Status: DISCONTINUED | OUTPATIENT
Start: 2025-08-22 | End: 2025-08-27 | Stop reason: HOSPADM

## 2025-08-22 RX ORDER — IBUPROFEN 600 MG/1
1 TABLET ORAL PRN
Status: DISCONTINUED | OUTPATIENT
Start: 2025-08-22 | End: 2025-08-27 | Stop reason: HOSPADM

## 2025-08-22 RX ORDER — CARVEDILOL 3.12 MG/1
6.25 TABLET ORAL 2 TIMES DAILY
Status: DISCONTINUED | OUTPATIENT
Start: 2025-08-22 | End: 2025-08-27 | Stop reason: HOSPADM

## 2025-08-22 RX ORDER — INSULIN LISPRO 100 [IU]/ML
0-4 INJECTION, SOLUTION INTRAVENOUS; SUBCUTANEOUS
Status: DISCONTINUED | OUTPATIENT
Start: 2025-08-22 | End: 2025-08-27 | Stop reason: HOSPADM

## 2025-08-22 RX ORDER — BUMETANIDE 1 MG/1
1 TABLET ORAL DAILY
Status: DISCONTINUED | OUTPATIENT
Start: 2025-08-23 | End: 2025-08-27 | Stop reason: HOSPADM

## 2025-08-22 RX ORDER — ACETAMINOPHEN 325 MG/1
650 TABLET ORAL EVERY 6 HOURS PRN
Status: DISCONTINUED | OUTPATIENT
Start: 2025-08-22 | End: 2025-08-27 | Stop reason: HOSPADM

## 2025-08-22 RX ADMIN — ATORVASTATIN CALCIUM 40 MG: 40 TABLET, FILM COATED ORAL at 23:53

## 2025-08-22 RX ADMIN — DOXYCYCLINE HYCLATE 100 MG: 100 CAPSULE ORAL at 23:53

## 2025-08-22 RX ADMIN — SODIUM CHLORIDE, PRESERVATIVE FREE 10 ML: 5 INJECTION INTRAVENOUS at 23:54

## 2025-08-23 LAB
ABO + RH BLD: NORMAL
ALBUMIN SERPL-MCNC: 1.7 G/DL (ref 3.2–4.6)
ALBUMIN/GLOB SERPL: 0.5 (ref 1–1.9)
ALP SERPL-CCNC: 88 U/L (ref 35–104)
ALT SERPL-CCNC: 6 U/L (ref 8–45)
ANION GAP SERPL CALC-SCNC: 16 MMOL/L (ref 7–16)
AST SERPL-CCNC: 30 U/L (ref 15–37)
BASOPHILS # BLD: 0.04 K/UL (ref 0–0.2)
BASOPHILS NFR BLD: 0.4 % (ref 0–2)
BILIRUB SERPL-MCNC: 0.4 MG/DL (ref 0–1.2)
BLD PROD TYP BPU: NORMAL
BLOOD BANK BLOOD PRODUCT EXPIRATION DATE: NORMAL
BLOOD BANK DISPENSE STATUS: NORMAL
BLOOD BANK ISBT PRODUCT BLOOD TYPE: 6200
BLOOD BANK PRODUCT CODE: NORMAL
BLOOD BANK UNIT TYPE AND RH: NORMAL
BLOOD GROUP ANTIBODIES SERPL: NORMAL
BPU ID: NORMAL
BUN SERPL-MCNC: 38 MG/DL (ref 8–23)
CALCIUM SERPL-MCNC: 7.3 MG/DL (ref 8.8–10.2)
CHLORIDE SERPL-SCNC: 99 MMOL/L (ref 98–107)
CO2 SERPL-SCNC: 22 MMOL/L (ref 20–29)
CREAT SERPL-MCNC: 3.04 MG/DL (ref 0.6–1.1)
CROSSMATCH RESULT: NORMAL
DIFFERENTIAL METHOD BLD: ABNORMAL
EKG ATRIAL RATE: 107 BPM
EKG DIAGNOSIS: NORMAL
EKG P AXIS: 84 DEGREES
EKG P-R INTERVAL: 122 MS
EKG Q-T INTERVAL: 349 MS
EKG QRS DURATION: 87 MS
EKG QTC CALCULATION (BAZETT): 464 MS
EKG R AXIS: -69 DEGREES
EKG T AXIS: 74 DEGREES
EKG VENTRICULAR RATE: 106 BPM
EOSINOPHIL # BLD: 0 K/UL (ref 0–0.8)
EOSINOPHIL NFR BLD: 0 % (ref 0.5–7.8)
ERYTHROCYTE [DISTWIDTH] IN BLOOD BY AUTOMATED COUNT: 18.1 % (ref 11.9–14.6)
EST. AVERAGE GLUCOSE BLD GHB EST-MCNC: 140 MG/DL
GLOBULIN SER CALC-MCNC: 3.4 G/DL (ref 2.3–3.5)
GLUCOSE BLD STRIP.AUTO-MCNC: 145 MG/DL (ref 65–100)
GLUCOSE BLD STRIP.AUTO-MCNC: 152 MG/DL (ref 65–100)
GLUCOSE BLD STRIP.AUTO-MCNC: 215 MG/DL (ref 65–100)
GLUCOSE BLD STRIP.AUTO-MCNC: 218 MG/DL (ref 65–100)
GLUCOSE SERPL-MCNC: 195 MG/DL (ref 70–99)
HBA1C MFR BLD: 6.5 % (ref 0–5.6)
HCT VFR BLD AUTO: 17.3 % (ref 35.8–46.3)
HCT VFR BLD AUTO: 19.3 % (ref 35.8–46.3)
HCT VFR BLD AUTO: 22 % (ref 35.8–46.3)
HGB BLD-MCNC: 5.8 G/DL (ref 11.7–15.4)
HGB BLD-MCNC: 6.2 G/DL (ref 11.7–15.4)
HGB BLD-MCNC: 6.9 G/DL (ref 11.7–15.4)
HISTORY CHECK: NORMAL
HISTORY CHECK: NORMAL
IMM GRANULOCYTES # BLD AUTO: 0.13 K/UL (ref 0–0.5)
IMM GRANULOCYTES NFR BLD AUTO: 1.2 % (ref 0–5)
LYMPHOCYTES # BLD: 1.21 K/UL (ref 0.5–4.6)
LYMPHOCYTES NFR BLD: 10.9 % (ref 13–44)
MCH RBC QN AUTO: 28.6 PG (ref 26.1–32.9)
MCHC RBC AUTO-ENTMCNC: 33.5 G/DL (ref 31.4–35)
MCV RBC AUTO: 85.2 FL (ref 82–102)
MONOCYTES # BLD: 0.71 K/UL (ref 0.1–1.3)
MONOCYTES NFR BLD: 6.4 % (ref 4–12)
NEUTS SEG # BLD: 8.98 K/UL (ref 1.7–8.2)
NEUTS SEG NFR BLD: 81.1 % (ref 43–78)
NRBC # BLD: 0 K/UL (ref 0–0.2)
PLATELET # BLD AUTO: 80 K/UL (ref 150–450)
PMV BLD AUTO: 11.6 FL (ref 9.4–12.3)
POTASSIUM SERPL-SCNC: 5.3 MMOL/L (ref 3.5–5.1)
PROT SERPL-MCNC: 5.2 G/DL (ref 6.3–8.2)
RBC # BLD AUTO: 2.03 M/UL (ref 4.05–5.2)
SERVICE CMNT-IMP: ABNORMAL
SODIUM SERPL-SCNC: 138 MMOL/L (ref 136–145)
SPECIMEN EXP DATE BLD: NORMAL
UNIT DIVISION: 0
UNIT ISSUE DATE/TIME: NORMAL
WBC # BLD AUTO: 11.1 K/UL (ref 4.3–11.1)

## 2025-08-23 PROCEDURE — 85014 HEMATOCRIT: CPT

## 2025-08-23 PROCEDURE — 36430 TRANSFUSION BLD/BLD COMPNT: CPT

## 2025-08-23 PROCEDURE — 1100000000 HC RM PRIVATE

## 2025-08-23 PROCEDURE — 82962 GLUCOSE BLOOD TEST: CPT

## 2025-08-23 PROCEDURE — 36415 COLL VENOUS BLD VENIPUNCTURE: CPT

## 2025-08-23 PROCEDURE — 85025 COMPLETE CBC W/AUTO DIFF WBC: CPT

## 2025-08-23 PROCEDURE — 93010 ELECTROCARDIOGRAM REPORT: CPT | Performed by: INTERNAL MEDICINE

## 2025-08-23 PROCEDURE — 5A1D70Z PERFORMANCE OF URINARY FILTRATION, INTERMITTENT, LESS THAN 6 HOURS PER DAY: ICD-10-PCS | Performed by: INTERNAL MEDICINE

## 2025-08-23 PROCEDURE — 85018 HEMOGLOBIN: CPT

## 2025-08-23 PROCEDURE — 87040 BLOOD CULTURE FOR BACTERIA: CPT

## 2025-08-23 PROCEDURE — 80053 COMPREHEN METABOLIC PANEL: CPT

## 2025-08-23 PROCEDURE — 2500000003 HC RX 250 WO HCPCS: Performed by: HOSPITALIST

## 2025-08-23 PROCEDURE — 83036 HEMOGLOBIN GLYCOSYLATED A1C: CPT

## 2025-08-23 PROCEDURE — 6370000000 HC RX 637 (ALT 250 FOR IP): Performed by: HOSPITALIST

## 2025-08-23 PROCEDURE — 2580000003 HC RX 258: Performed by: FAMILY MEDICINE

## 2025-08-23 PROCEDURE — 30233N1 TRANSFUSION OF NONAUTOLOGOUS RED BLOOD CELLS INTO PERIPHERAL VEIN, PERCUTANEOUS APPROACH: ICD-10-PCS | Performed by: FAMILY MEDICINE

## 2025-08-23 PROCEDURE — P9016 RBC LEUKOCYTES REDUCED: HCPCS

## 2025-08-23 RX ORDER — SODIUM CHLORIDE 9 MG/ML
INJECTION, SOLUTION INTRAVENOUS PRN
Status: DISCONTINUED | OUTPATIENT
Start: 2025-08-23 | End: 2025-08-27 | Stop reason: HOSPADM

## 2025-08-23 RX ORDER — 0.9 % SODIUM CHLORIDE 0.9 %
500 INTRAVENOUS SOLUTION INTRAVENOUS ONCE
Status: COMPLETED | OUTPATIENT
Start: 2025-08-23 | End: 2025-08-23

## 2025-08-23 RX ADMIN — SODIUM CHLORIDE 500 ML: 0.9 INJECTION, SOLUTION INTRAVENOUS at 05:00

## 2025-08-23 RX ADMIN — ATORVASTATIN CALCIUM 40 MG: 40 TABLET, FILM COATED ORAL at 21:00

## 2025-08-23 RX ADMIN — LEVOTHYROXINE SODIUM 75 MCG: 0.07 TABLET ORAL at 06:43

## 2025-08-23 RX ADMIN — INSULIN LISPRO 1 UNITS: 100 INJECTION, SOLUTION INTRAVENOUS; SUBCUTANEOUS at 21:00

## 2025-08-23 RX ADMIN — CEPHALEXIN 500 MG: 500 CAPSULE ORAL at 06:43

## 2025-08-23 RX ADMIN — DOXYCYCLINE HYCLATE 100 MG: 100 CAPSULE ORAL at 08:48

## 2025-08-23 RX ADMIN — DOXYCYCLINE HYCLATE 100 MG: 100 CAPSULE ORAL at 21:00

## 2025-08-23 RX ADMIN — SODIUM CHLORIDE, PRESERVATIVE FREE 10 ML: 5 INJECTION INTRAVENOUS at 21:00

## 2025-08-23 RX ADMIN — CEPHALEXIN 500 MG: 500 CAPSULE ORAL at 21:00

## 2025-08-23 RX ADMIN — SODIUM CHLORIDE, PRESERVATIVE FREE 10 ML: 5 INJECTION INTRAVENOUS at 08:50

## 2025-08-23 ASSESSMENT — PAIN SCALES - GENERAL
PAINLEVEL_OUTOF10: 0

## 2025-08-24 LAB
ABO + RH BLD: NORMAL
ANION GAP SERPL CALC-SCNC: 14 MMOL/L (ref 7–16)
BLD PROD TYP BPU: NORMAL
BLD PROD TYP BPU: NORMAL
BLOOD BANK BLOOD PRODUCT EXPIRATION DATE: NORMAL
BLOOD BANK BLOOD PRODUCT EXPIRATION DATE: NORMAL
BLOOD BANK DISPENSE STATUS: NORMAL
BLOOD BANK DISPENSE STATUS: NORMAL
BLOOD BANK ISBT PRODUCT BLOOD TYPE: 600
BLOOD BANK ISBT PRODUCT BLOOD TYPE: 6200
BLOOD BANK PRODUCT CODE: NORMAL
BLOOD BANK PRODUCT CODE: NORMAL
BLOOD BANK UNIT TYPE AND RH: NORMAL
BLOOD BANK UNIT TYPE AND RH: NORMAL
BLOOD GROUP ANTIBODIES SERPL: NORMAL
BPU ID: NORMAL
BPU ID: NORMAL
BUN SERPL-MCNC: 53 MG/DL (ref 8–23)
CALCIUM SERPL-MCNC: 7.4 MG/DL (ref 8.8–10.2)
CHLORIDE SERPL-SCNC: 97 MMOL/L (ref 98–107)
CO2 SERPL-SCNC: 25 MMOL/L (ref 20–29)
CREAT SERPL-MCNC: 3.6 MG/DL (ref 0.6–1.1)
CROSSMATCH RESULT: NORMAL
CROSSMATCH RESULT: NORMAL
GLUCOSE BLD STRIP.AUTO-MCNC: 182 MG/DL (ref 65–100)
GLUCOSE BLD STRIP.AUTO-MCNC: 222 MG/DL (ref 65–100)
GLUCOSE BLD STRIP.AUTO-MCNC: 246 MG/DL (ref 65–100)
GLUCOSE BLD STRIP.AUTO-MCNC: 338 MG/DL (ref 65–100)
GLUCOSE SERPL-MCNC: 162 MG/DL (ref 70–99)
HCT VFR BLD AUTO: 21.2 % (ref 35.8–46.3)
HCT VFR BLD AUTO: 22.8 % (ref 35.8–46.3)
HCT VFR BLD AUTO: 23.3 % (ref 35.8–46.3)
HGB BLD-MCNC: 7.3 G/DL (ref 11.7–15.4)
HGB BLD-MCNC: 7.8 G/DL (ref 11.7–15.4)
HGB BLD-MCNC: 7.8 G/DL (ref 11.7–15.4)
POTASSIUM SERPL-SCNC: 4.6 MMOL/L (ref 3.5–5.1)
SERVICE CMNT-IMP: ABNORMAL
SODIUM SERPL-SCNC: 137 MMOL/L (ref 136–145)
SPECIMEN EXP DATE BLD: NORMAL
UNIT DIVISION: 0
UNIT DIVISION: 0
UNIT ISSUE DATE/TIME: NORMAL
UNIT ISSUE DATE/TIME: NORMAL

## 2025-08-24 PROCEDURE — 36415 COLL VENOUS BLD VENIPUNCTURE: CPT

## 2025-08-24 PROCEDURE — 2500000003 HC RX 250 WO HCPCS: Performed by: HOSPITALIST

## 2025-08-24 PROCEDURE — 6370000000 HC RX 637 (ALT 250 FOR IP): Performed by: HOSPITALIST

## 2025-08-24 PROCEDURE — 1100000003 HC PRIVATE W/ TELEMETRY

## 2025-08-24 PROCEDURE — 85018 HEMOGLOBIN: CPT

## 2025-08-24 PROCEDURE — 82962 GLUCOSE BLOOD TEST: CPT

## 2025-08-24 PROCEDURE — 85014 HEMATOCRIT: CPT

## 2025-08-24 PROCEDURE — 80048 BASIC METABOLIC PNL TOTAL CA: CPT

## 2025-08-24 RX ADMIN — ATORVASTATIN CALCIUM 40 MG: 40 TABLET, FILM COATED ORAL at 21:01

## 2025-08-24 RX ADMIN — INSULIN LISPRO 1 UNITS: 100 INJECTION, SOLUTION INTRAVENOUS; SUBCUTANEOUS at 08:54

## 2025-08-24 RX ADMIN — LEVOTHYROXINE SODIUM 75 MCG: 0.07 TABLET ORAL at 06:08

## 2025-08-24 RX ADMIN — INSULIN LISPRO 1 UNITS: 100 INJECTION, SOLUTION INTRAVENOUS; SUBCUTANEOUS at 17:24

## 2025-08-24 RX ADMIN — CEPHALEXIN 500 MG: 500 CAPSULE ORAL at 08:45

## 2025-08-24 RX ADMIN — INSULIN LISPRO 3 UNITS: 100 INJECTION, SOLUTION INTRAVENOUS; SUBCUTANEOUS at 21:01

## 2025-08-24 RX ADMIN — CARVEDILOL 6.25 MG: 3.12 TABLET, FILM COATED ORAL at 08:45

## 2025-08-24 RX ADMIN — INSULIN LISPRO 1 UNITS: 100 INJECTION, SOLUTION INTRAVENOUS; SUBCUTANEOUS at 12:43

## 2025-08-24 RX ADMIN — SODIUM CHLORIDE, PRESERVATIVE FREE 10 ML: 5 INJECTION INTRAVENOUS at 08:45

## 2025-08-24 RX ADMIN — DOXYCYCLINE HYCLATE 100 MG: 100 CAPSULE ORAL at 21:00

## 2025-08-24 RX ADMIN — CEPHALEXIN 500 MG: 500 CAPSULE ORAL at 21:01

## 2025-08-24 RX ADMIN — CARVEDILOL 6.25 MG: 3.12 TABLET, FILM COATED ORAL at 21:01

## 2025-08-24 RX ADMIN — DOXYCYCLINE HYCLATE 100 MG: 100 CAPSULE ORAL at 08:45

## 2025-08-24 RX ADMIN — BUMETANIDE 1 MG: 1 TABLET ORAL at 08:45

## 2025-08-24 RX ADMIN — ACETAMINOPHEN 650 MG: 325 TABLET ORAL at 17:30

## 2025-08-24 RX ADMIN — SODIUM CHLORIDE, PRESERVATIVE FREE 10 ML: 5 INJECTION INTRAVENOUS at 21:01

## 2025-08-24 ASSESSMENT — PAIN SCALES - GENERAL
PAINLEVEL_OUTOF10: 0

## 2025-08-24 ASSESSMENT — PAIN - FUNCTIONAL ASSESSMENT: PAIN_FUNCTIONAL_ASSESSMENT: 0-10

## 2025-08-25 ENCOUNTER — TELEPHONE (OUTPATIENT)
Dept: FAMILY MEDICINE CLINIC | Facility: CLINIC | Age: 80
End: 2025-08-25

## 2025-08-25 DIAGNOSIS — L89.154 PRESSURE INJURY OF SACRAL REGION, STAGE 4 (HCC): Primary | ICD-10-CM

## 2025-08-25 LAB
ANION GAP SERPL CALC-SCNC: 13 MMOL/L (ref 7–16)
BASOPHILS # BLD: 0.07 K/UL (ref 0–0.2)
BASOPHILS NFR BLD: 0.9 % (ref 0–2)
BUN SERPL-MCNC: 70 MG/DL (ref 8–23)
CALCIUM SERPL-MCNC: 7.4 MG/DL (ref 8.8–10.2)
CHLORIDE SERPL-SCNC: 102 MMOL/L (ref 98–107)
CO2 SERPL-SCNC: 23 MMOL/L (ref 20–29)
CREAT SERPL-MCNC: 4.56 MG/DL (ref 0.6–1.1)
DIFFERENTIAL METHOD BLD: ABNORMAL
EOSINOPHIL # BLD: 0.1 K/UL (ref 0–0.8)
EOSINOPHIL NFR BLD: 1.3 % (ref 0.5–7.8)
ERYTHROCYTE [DISTWIDTH] IN BLOOD BY AUTOMATED COUNT: 17.5 % (ref 11.9–14.6)
GLUCOSE BLD STRIP.AUTO-MCNC: 107 MG/DL (ref 65–100)
GLUCOSE BLD STRIP.AUTO-MCNC: 138 MG/DL (ref 65–100)
GLUCOSE BLD STRIP.AUTO-MCNC: 209 MG/DL (ref 65–100)
GLUCOSE BLD STRIP.AUTO-MCNC: 275 MG/DL (ref 65–100)
GLUCOSE BLD STRIP.AUTO-MCNC: 282 MG/DL (ref 65–100)
GLUCOSE SERPL-MCNC: 196 MG/DL (ref 70–99)
HCT VFR BLD AUTO: 23.8 % (ref 35.8–46.3)
HCT VFR BLD AUTO: 24.3 % (ref 35.8–46.3)
HCT VFR BLD AUTO: 26.6 % (ref 35.8–46.3)
HGB BLD-MCNC: 7.7 G/DL (ref 11.7–15.4)
HGB BLD-MCNC: 7.9 G/DL (ref 11.7–15.4)
HGB BLD-MCNC: 8.9 G/DL (ref 11.7–15.4)
IMM GRANULOCYTES # BLD AUTO: 0.04 K/UL (ref 0–0.5)
IMM GRANULOCYTES NFR BLD AUTO: 0.5 % (ref 0–5)
LYMPHOCYTES # BLD: 1.77 K/UL (ref 0.5–4.6)
LYMPHOCYTES NFR BLD: 22.9 % (ref 13–44)
MCH RBC QN AUTO: 28.1 PG (ref 26.1–32.9)
MCHC RBC AUTO-ENTMCNC: 32.4 G/DL (ref 31.4–35)
MCV RBC AUTO: 86.9 FL (ref 82–102)
MONOCYTES # BLD: 0.6 K/UL (ref 0.1–1.3)
MONOCYTES NFR BLD: 7.8 % (ref 4–12)
NEUTS SEG # BLD: 5.16 K/UL (ref 1.7–8.2)
NEUTS SEG NFR BLD: 66.6 % (ref 43–78)
NRBC # BLD: 0 K/UL (ref 0–0.2)
PLATELET # BLD AUTO: 119 K/UL (ref 150–450)
PMV BLD AUTO: 11 FL (ref 9.4–12.3)
POTASSIUM SERPL-SCNC: 4.3 MMOL/L (ref 3.5–5.1)
RBC # BLD AUTO: 2.74 M/UL (ref 4.05–5.2)
SERVICE CMNT-IMP: ABNORMAL
SODIUM SERPL-SCNC: 138 MMOL/L (ref 136–145)
WBC # BLD AUTO: 7.7 K/UL (ref 4.3–11.1)

## 2025-08-25 PROCEDURE — 6370000000 HC RX 637 (ALT 250 FOR IP): Performed by: NURSE PRACTITIONER

## 2025-08-25 PROCEDURE — 80048 BASIC METABOLIC PNL TOTAL CA: CPT

## 2025-08-25 PROCEDURE — 85018 HEMOGLOBIN: CPT

## 2025-08-25 PROCEDURE — 90935 HEMODIALYSIS ONE EVALUATION: CPT

## 2025-08-25 PROCEDURE — 85014 HEMATOCRIT: CPT

## 2025-08-25 PROCEDURE — 1100000003 HC PRIVATE W/ TELEMETRY

## 2025-08-25 PROCEDURE — 2500000003 HC RX 250 WO HCPCS: Performed by: HOSPITALIST

## 2025-08-25 PROCEDURE — 85025 COMPLETE CBC W/AUTO DIFF WBC: CPT

## 2025-08-25 PROCEDURE — 6370000000 HC RX 637 (ALT 250 FOR IP): Performed by: HOSPITALIST

## 2025-08-25 PROCEDURE — 36415 COLL VENOUS BLD VENIPUNCTURE: CPT

## 2025-08-25 PROCEDURE — 82962 GLUCOSE BLOOD TEST: CPT

## 2025-08-25 RX ADMIN — BUMETANIDE 1 MG: 1 TABLET ORAL at 08:16

## 2025-08-25 RX ADMIN — LEVOTHYROXINE SODIUM 75 MCG: 0.07 TABLET ORAL at 05:50

## 2025-08-25 RX ADMIN — DOXYCYCLINE HYCLATE 100 MG: 100 CAPSULE ORAL at 08:16

## 2025-08-25 RX ADMIN — INSULIN LISPRO 2 UNITS: 100 INJECTION, SOLUTION INTRAVENOUS; SUBCUTANEOUS at 21:45

## 2025-08-25 RX ADMIN — SODIUM CHLORIDE, PRESERVATIVE FREE 5 ML: 5 INJECTION INTRAVENOUS at 21:49

## 2025-08-25 RX ADMIN — CARVEDILOL 6.25 MG: 3.12 TABLET, FILM COATED ORAL at 08:16

## 2025-08-25 RX ADMIN — CEPHALEXIN 500 MG: 500 CAPSULE ORAL at 21:44

## 2025-08-25 RX ADMIN — INSULIN LISPRO 2 UNITS: 100 INJECTION, SOLUTION INTRAVENOUS; SUBCUTANEOUS at 11:01

## 2025-08-25 RX ADMIN — INSULIN LISPRO 1 UNITS: 100 INJECTION, SOLUTION INTRAVENOUS; SUBCUTANEOUS at 08:16

## 2025-08-25 RX ADMIN — ATORVASTATIN CALCIUM 40 MG: 40 TABLET, FILM COATED ORAL at 21:44

## 2025-08-25 RX ADMIN — CARVEDILOL 6.25 MG: 3.12 TABLET, FILM COATED ORAL at 21:45

## 2025-08-25 RX ADMIN — CEPHALEXIN 500 MG: 500 CAPSULE ORAL at 08:16

## 2025-08-25 RX ADMIN — DOXYCYCLINE HYCLATE 100 MG: 100 CAPSULE ORAL at 21:45

## 2025-08-25 RX ADMIN — SODIUM CHLORIDE, PRESERVATIVE FREE 10 ML: 5 INJECTION INTRAVENOUS at 08:26

## 2025-08-25 RX ADMIN — APIXABAN 2.5 MG: 2.5 TABLET, FILM COATED ORAL at 21:45

## 2025-08-25 ASSESSMENT — PAIN SCALES - GENERAL
PAINLEVEL_OUTOF10: 0

## 2025-08-26 LAB
ANION GAP SERPL CALC-SCNC: 12 MMOL/L (ref 7–16)
BASOPHILS # BLD: 0.05 K/UL (ref 0–0.2)
BASOPHILS NFR BLD: 0.7 % (ref 0–2)
BUN SERPL-MCNC: 35 MG/DL (ref 8–23)
CALCIUM SERPL-MCNC: 7.8 MG/DL (ref 8.8–10.2)
CHLORIDE SERPL-SCNC: 99 MMOL/L (ref 98–107)
CO2 SERPL-SCNC: 26 MMOL/L (ref 20–29)
CREAT SERPL-MCNC: 2.98 MG/DL (ref 0.6–1.1)
DIFFERENTIAL METHOD BLD: ABNORMAL
EOSINOPHIL # BLD: 0.09 K/UL (ref 0–0.8)
EOSINOPHIL NFR BLD: 1.3 % (ref 0.5–7.8)
ERYTHROCYTE [DISTWIDTH] IN BLOOD BY AUTOMATED COUNT: 17.1 % (ref 11.9–14.6)
GLUCOSE BLD STRIP.AUTO-MCNC: 175 MG/DL (ref 65–100)
GLUCOSE BLD STRIP.AUTO-MCNC: 243 MG/DL (ref 65–100)
GLUCOSE BLD STRIP.AUTO-MCNC: 303 MG/DL (ref 65–100)
GLUCOSE BLD STRIP.AUTO-MCNC: 311 MG/DL (ref 65–100)
GLUCOSE SERPL-MCNC: 168 MG/DL (ref 70–99)
HCT VFR BLD AUTO: 22.2 % (ref 35.8–46.3)
HCT VFR BLD AUTO: 24.9 % (ref 35.8–46.3)
HGB BLD-MCNC: 7.4 G/DL (ref 11.7–15.4)
HGB BLD-MCNC: 8 G/DL (ref 11.7–15.4)
IMM GRANULOCYTES # BLD AUTO: 0.02 K/UL (ref 0–0.5)
IMM GRANULOCYTES NFR BLD AUTO: 0.3 % (ref 0–5)
LYMPHOCYTES # BLD: 1.46 K/UL (ref 0.5–4.6)
LYMPHOCYTES NFR BLD: 21.4 % (ref 13–44)
MCH RBC QN AUTO: 28.7 PG (ref 26.1–32.9)
MCHC RBC AUTO-ENTMCNC: 33.3 G/DL (ref 31.4–35)
MCV RBC AUTO: 86 FL (ref 82–102)
MONOCYTES # BLD: 0.53 K/UL (ref 0.1–1.3)
MONOCYTES NFR BLD: 7.8 % (ref 4–12)
NEUTS SEG # BLD: 4.66 K/UL (ref 1.7–8.2)
NEUTS SEG NFR BLD: 68.5 % (ref 43–78)
NRBC # BLD: 0 K/UL (ref 0–0.2)
PLATELET # BLD AUTO: 125 K/UL (ref 150–450)
PMV BLD AUTO: 11.1 FL (ref 9.4–12.3)
POTASSIUM SERPL-SCNC: 3.6 MMOL/L (ref 3.5–5.1)
RBC # BLD AUTO: 2.58 M/UL (ref 4.05–5.2)
SERVICE CMNT-IMP: ABNORMAL
SODIUM SERPL-SCNC: 137 MMOL/L (ref 136–145)
WBC # BLD AUTO: 6.8 K/UL (ref 4.3–11.1)

## 2025-08-26 PROCEDURE — 6370000000 HC RX 637 (ALT 250 FOR IP): Performed by: HOSPITALIST

## 2025-08-26 PROCEDURE — 2500000003 HC RX 250 WO HCPCS: Performed by: HOSPITALIST

## 2025-08-26 PROCEDURE — 82962 GLUCOSE BLOOD TEST: CPT

## 2025-08-26 PROCEDURE — 85014 HEMATOCRIT: CPT

## 2025-08-26 PROCEDURE — 85025 COMPLETE CBC W/AUTO DIFF WBC: CPT

## 2025-08-26 PROCEDURE — 1100000000 HC RM PRIVATE

## 2025-08-26 PROCEDURE — 6370000000 HC RX 637 (ALT 250 FOR IP): Performed by: NURSE PRACTITIONER

## 2025-08-26 PROCEDURE — 80048 BASIC METABOLIC PNL TOTAL CA: CPT

## 2025-08-26 PROCEDURE — 85018 HEMOGLOBIN: CPT

## 2025-08-26 PROCEDURE — 36415 COLL VENOUS BLD VENIPUNCTURE: CPT

## 2025-08-26 RX ADMIN — CEPHALEXIN 500 MG: 500 CAPSULE ORAL at 09:11

## 2025-08-26 RX ADMIN — LEVOTHYROXINE SODIUM 75 MCG: 0.07 TABLET ORAL at 06:24

## 2025-08-26 RX ADMIN — INSULIN LISPRO 1 UNITS: 100 INJECTION, SOLUTION INTRAVENOUS; SUBCUTANEOUS at 17:12

## 2025-08-26 RX ADMIN — DOXYCYCLINE HYCLATE 100 MG: 100 CAPSULE ORAL at 21:20

## 2025-08-26 RX ADMIN — APIXABAN 2.5 MG: 2.5 TABLET, FILM COATED ORAL at 09:12

## 2025-08-26 RX ADMIN — APIXABAN 2.5 MG: 2.5 TABLET, FILM COATED ORAL at 21:20

## 2025-08-26 RX ADMIN — ACETAMINOPHEN 650 MG: 325 TABLET ORAL at 16:19

## 2025-08-26 RX ADMIN — INSULIN LISPRO 3 UNITS: 100 INJECTION, SOLUTION INTRAVENOUS; SUBCUTANEOUS at 11:51

## 2025-08-26 RX ADMIN — CEPHALEXIN 500 MG: 500 CAPSULE ORAL at 21:20

## 2025-08-26 RX ADMIN — ATORVASTATIN CALCIUM 40 MG: 40 TABLET, FILM COATED ORAL at 21:20

## 2025-08-26 RX ADMIN — BUMETANIDE 1 MG: 1 TABLET ORAL at 09:12

## 2025-08-26 RX ADMIN — CARVEDILOL 6.25 MG: 3.12 TABLET, FILM COATED ORAL at 09:11

## 2025-08-26 RX ADMIN — CARVEDILOL 6.25 MG: 3.12 TABLET, FILM COATED ORAL at 21:20

## 2025-08-26 RX ADMIN — INSULIN LISPRO 3 UNITS: 100 INJECTION, SOLUTION INTRAVENOUS; SUBCUTANEOUS at 21:20

## 2025-08-26 RX ADMIN — SODIUM CHLORIDE, PRESERVATIVE FREE 10 ML: 5 INJECTION INTRAVENOUS at 21:26

## 2025-08-26 RX ADMIN — SODIUM CHLORIDE, PRESERVATIVE FREE 10 ML: 5 INJECTION INTRAVENOUS at 09:12

## 2025-08-26 RX ADMIN — DOXYCYCLINE HYCLATE 100 MG: 100 CAPSULE ORAL at 09:11

## 2025-08-26 ASSESSMENT — PAIN - FUNCTIONAL ASSESSMENT
PAIN_FUNCTIONAL_ASSESSMENT: ACTIVITIES ARE NOT PREVENTED
PAIN_FUNCTIONAL_ASSESSMENT: 0-10

## 2025-08-26 ASSESSMENT — PAIN SCALES - GENERAL
PAINLEVEL_OUTOF10: 0
PAINLEVEL_OUTOF10: 9

## 2025-08-26 ASSESSMENT — PAIN DESCRIPTION - LOCATION: LOCATION: LEG

## 2025-08-27 VITALS
OXYGEN SATURATION: 100 % | TEMPERATURE: 98.1 F | SYSTOLIC BLOOD PRESSURE: 163 MMHG | RESPIRATION RATE: 18 BRPM | BODY MASS INDEX: 18.52 KG/M2 | DIASTOLIC BLOOD PRESSURE: 63 MMHG | HEIGHT: 64 IN | WEIGHT: 108.47 LBS | HEART RATE: 84 BPM

## 2025-08-27 PROBLEM — D62 ACUTE BLOOD LOSS ANEMIA: Status: RESOLVED | Noted: 2025-08-22 | Resolved: 2025-08-27

## 2025-08-27 PROBLEM — T14.8XXA BLEEDING FROM WOUND: Status: RESOLVED | Noted: 2025-08-22 | Resolved: 2025-08-27

## 2025-08-27 LAB
ANION GAP SERPL CALC-SCNC: 13 MMOL/L (ref 7–16)
BACTERIA SPEC CULT: NORMAL
BASOPHILS # BLD: 0.03 K/UL (ref 0–0.2)
BASOPHILS NFR BLD: 0.4 % (ref 0–2)
BUN SERPL-MCNC: 53 MG/DL (ref 8–23)
CALCIUM SERPL-MCNC: 7.9 MG/DL (ref 8.8–10.2)
CHLORIDE SERPL-SCNC: 99 MMOL/L (ref 98–107)
CO2 SERPL-SCNC: 24 MMOL/L (ref 20–29)
CREAT SERPL-MCNC: 3.99 MG/DL (ref 0.6–1.1)
DIFFERENTIAL METHOD BLD: ABNORMAL
EOSINOPHIL # BLD: 0.13 K/UL (ref 0–0.8)
EOSINOPHIL NFR BLD: 1.9 % (ref 0.5–7.8)
ERYTHROCYTE [DISTWIDTH] IN BLOOD BY AUTOMATED COUNT: 17.3 % (ref 11.9–14.6)
GLUCOSE BLD STRIP.AUTO-MCNC: 176 MG/DL (ref 65–100)
GLUCOSE BLD STRIP.AUTO-MCNC: 205 MG/DL (ref 65–100)
GLUCOSE BLD STRIP.AUTO-MCNC: 206 MG/DL (ref 65–100)
GLUCOSE BLD STRIP.AUTO-MCNC: 247 MG/DL (ref 65–100)
GLUCOSE BLD STRIP.AUTO-MCNC: 284 MG/DL (ref 65–100)
GLUCOSE SERPL-MCNC: 181 MG/DL (ref 70–99)
HCT VFR BLD AUTO: 25.3 % (ref 35.8–46.3)
HGB BLD-MCNC: 8.1 G/DL (ref 11.7–15.4)
IMM GRANULOCYTES # BLD AUTO: 0.02 K/UL (ref 0–0.5)
IMM GRANULOCYTES NFR BLD AUTO: 0.3 % (ref 0–5)
LYMPHOCYTES # BLD: 1.53 K/UL (ref 0.5–4.6)
LYMPHOCYTES NFR BLD: 22.6 % (ref 13–44)
MCH RBC QN AUTO: 28.4 PG (ref 26.1–32.9)
MCHC RBC AUTO-ENTMCNC: 32 G/DL (ref 31.4–35)
MCV RBC AUTO: 88.8 FL (ref 82–102)
MONOCYTES # BLD: 0.59 K/UL (ref 0.1–1.3)
MONOCYTES NFR BLD: 8.7 % (ref 4–12)
NEUTS SEG # BLD: 4.48 K/UL (ref 1.7–8.2)
NEUTS SEG NFR BLD: 66.1 % (ref 43–78)
NRBC # BLD: 0 K/UL (ref 0–0.2)
PLATELET # BLD AUTO: 134 K/UL (ref 150–450)
PMV BLD AUTO: 10.5 FL (ref 9.4–12.3)
POTASSIUM SERPL-SCNC: 4 MMOL/L (ref 3.5–5.1)
RBC # BLD AUTO: 2.85 M/UL (ref 4.05–5.2)
SERVICE CMNT-IMP: ABNORMAL
SERVICE CMNT-IMP: NORMAL
SODIUM SERPL-SCNC: 137 MMOL/L (ref 136–145)
WBC # BLD AUTO: 6.8 K/UL (ref 4.3–11.1)

## 2025-08-27 PROCEDURE — 6370000000 HC RX 637 (ALT 250 FOR IP): Performed by: HOSPITALIST

## 2025-08-27 PROCEDURE — 82962 GLUCOSE BLOOD TEST: CPT

## 2025-08-27 PROCEDURE — 6370000000 HC RX 637 (ALT 250 FOR IP): Performed by: NURSE PRACTITIONER

## 2025-08-27 PROCEDURE — 80048 BASIC METABOLIC PNL TOTAL CA: CPT

## 2025-08-27 PROCEDURE — 36415 COLL VENOUS BLD VENIPUNCTURE: CPT

## 2025-08-27 PROCEDURE — 85025 COMPLETE CBC W/AUTO DIFF WBC: CPT

## 2025-08-27 PROCEDURE — 90935 HEMODIALYSIS ONE EVALUATION: CPT

## 2025-08-27 PROCEDURE — 2500000003 HC RX 250 WO HCPCS: Performed by: HOSPITALIST

## 2025-08-27 RX ADMIN — INSULIN LISPRO 1 UNITS: 100 INJECTION, SOLUTION INTRAVENOUS; SUBCUTANEOUS at 10:20

## 2025-08-27 RX ADMIN — CEPHALEXIN 500 MG: 500 CAPSULE ORAL at 08:32

## 2025-08-27 RX ADMIN — SODIUM CHLORIDE, PRESERVATIVE FREE 10 ML: 5 INJECTION INTRAVENOUS at 08:33

## 2025-08-27 RX ADMIN — BUMETANIDE 1 MG: 1 TABLET ORAL at 08:32

## 2025-08-27 RX ADMIN — CARVEDILOL 6.25 MG: 3.12 TABLET, FILM COATED ORAL at 08:32

## 2025-08-27 RX ADMIN — APIXABAN 2.5 MG: 2.5 TABLET, FILM COATED ORAL at 08:32

## 2025-08-27 RX ADMIN — DOXYCYCLINE HYCLATE 100 MG: 100 CAPSULE ORAL at 08:32

## 2025-08-27 ASSESSMENT — PAIN SCALES - GENERAL
PAINLEVEL_OUTOF10: 0
PAINLEVEL_OUTOF10: 0

## 2025-08-28 LAB
BACTERIA SPEC CULT: NORMAL
SERVICE CMNT-IMP: NORMAL

## 2025-09-04 ENCOUNTER — HOSPITAL ENCOUNTER (OUTPATIENT)
Dept: WOUND CARE | Age: 80
Discharge: HOME | End: 2025-09-04
Payer: MEDICARE

## 2025-09-04 VITALS
DIASTOLIC BLOOD PRESSURE: 54 MMHG | SYSTOLIC BLOOD PRESSURE: 128 MMHG | RESPIRATION RATE: 18 BRPM | TEMPERATURE: 98.6 F | BODY MASS INDEX: 18.95 KG/M2 | WEIGHT: 111 LBS | HEIGHT: 64 IN | HEART RATE: 81 BPM

## 2025-09-04 PROCEDURE — 11042 DBRDMT SUBQ TIS 1ST 20SQCM/<: CPT

## 2025-09-04 ASSESSMENT — PAIN DESCRIPTION - ORIENTATION: ORIENTATION: MID

## 2025-09-04 ASSESSMENT — PAIN DESCRIPTION - DESCRIPTORS: DESCRIPTORS: ACHING

## 2025-09-04 ASSESSMENT — PAIN DESCRIPTION - LOCATION: LOCATION: SACRUM

## 2025-09-04 ASSESSMENT — PAIN SCALES - GENERAL: PAINLEVEL_OUTOF10: 9

## (undated) DEVICE — INFLATION DEVICE: Brand: ENCORE™ 26

## (undated) DEVICE — SUTURE PERMAHAND SZ 2-0 L18IN NONABSORBABLE BLK L26MM SH C012D

## (undated) DEVICE — Device

## (undated) DEVICE — LOOP VES L406MM DIA1MM MAXI BLU SIL RADPQ DISP

## (undated) DEVICE — SUTURE PROL SZ 6-0 L24IN NONABSORBABLE BLU BV-1 L9.3MM 3/8 M8805

## (undated) DEVICE — VASCUCLAMP RADIOPAQUE VASCULAR CLAMP MEDIUM ANGLED (2 CLAMPS PER PACKAGE): Brand: VASCUCLAMP

## (undated) DEVICE — CATHETER VASC AD 5FR L65CM 0.038 DIAG KA 2 PERIPH W/O

## (undated) DEVICE — SUTURE MCRYL SZ 4-0 L27IN ABSRB UD L19MM PS-2 1/2 CIR PRIM Y426H

## (undated) DEVICE — DRAPE LAP 102X78X121IN POLYPR SMS STD T INSTR PD CNTOUR

## (undated) DEVICE — SOLUTION IRRIG 1000ML H2O PIC PLAS SHATTERPROOF CONTAINER

## (undated) DEVICE — 48" PROBE COVER W/GEL, ULTRASOUND, STERILE: Brand: SITE-RITE

## (undated) DEVICE — CATHETER EVERCROSS .035 7 MM X 60 MM X 80 CM

## (undated) DEVICE — INTRODUCER SHTH 8FR CANN L5.5CM DIL TIP 35MM BLU TUNGSTEN

## (undated) DEVICE — AGENT HEMSTAT W2XL4IN OXIDIZED REGENERATED CELOS ABSRB SFT

## (undated) DEVICE — PVC URETHRAL CATHETER: Brand: DOVER

## (undated) DEVICE — SOLUTION IRRIG 1000ML 09% SOD CHL USP PIC PLAS CONTAINER

## (undated) DEVICE — BANDAGE,GAUZE,BULKEE II,4.5"X4.1YD,STRL: Brand: MEDLINE

## (undated) DEVICE — BNDG,ELSTC,MATRIX,STRL,4"X5YD,LF,HOOK&LP: Brand: MEDLINE

## (undated) DEVICE — GLOVE SURG SZ 7 L11.33IN FNGR THK9.8MIL STRW LTX POLYMER

## (undated) DEVICE — APPLIER CLP L9.38IN M LIG TI DISP STR RNG HNDL LIGACLP

## (undated) DEVICE — RADIFOCUS GLIDEWIRE: Brand: GLIDEWIRE

## (undated) DEVICE — THROMBECTOMY-EMBOLECTOMY: Brand: MEDLINE INDUSTRIES, INC.

## (undated) DEVICE — APPLIER CLP L9.375IN APER 2.1MM CLS L3.8MM 20 SM TI CLP

## (undated) DEVICE — SUTURE VICRYL + SZ 3-0 L27IN ABSRB UD L26MM SH 1/2 CIR VCP416H

## (undated) DEVICE — BENTSON WIRE GUIDE 20CM DISTAL FLEXIBILITY WITH SOFTENED TIP: Brand: BENTSON

## (undated) DEVICE — SUTURE PERMAHAND SZ 2-0 L12X18IN NONABSORBABLE BLK SILK A185H

## (undated) DEVICE — SUTURE MONOCRYL SZ 4-0 L27IN ABSRB UD L19MM PS-2 1/2 CIR PRIM Y426H

## (undated) DEVICE — GLOVE SURG SZ 7.5 L11.73IN FNGR THK9.8MIL STRW LTX POLYMER

## (undated) DEVICE — MICROPUNCTURE INTRODUCER SET SILHOUETTE TRANSITIONLESS PUSH-PLUS DESIGN - STIFFENED CANNULA WITH STAINLESS STEEL WIRE GUIDE: Brand: MICROPUNCTURE

## (undated) DEVICE — PRESSURE TUBING: Brand: TRUWAVE

## (undated) DEVICE — SPONGE GZ KERLIX W4.5INXL4.1YD COT 6 PLY OPN WV STRETCHABLE

## (undated) DEVICE — SUTURE VCRL SZ 3-0 L27IN ABSRB UD L26MM SH 1/2 CIR J416H

## (undated) DEVICE — SYRINGE ANGIO CNTRST DEL 20 CC POLYCARB DK GRN MEDALLION

## (undated) DEVICE — SUTURE PROL SZ 5-0 L24IN NONABSORBABLE BLU L13MM C-1 3/8 M8725

## (undated) DEVICE — 3M™ IOBAN™ 2 ANTIMICROBIAL INCISE DRAPE 6650EZ: Brand: IOBAN™ 2

## (undated) DEVICE — TOWEL DRP W15XL26IN BLU NONABSORBENT W/ ADH STRP 2 PER PK

## (undated) DEVICE — TRAY SKIN SCRUB W/4 COMPARTMENT

## (undated) DEVICE — INTRODUCER SHTH 11FR CANN L11CM DIL TIP 45MM YEL TUNGSTEN

## (undated) DEVICE — STOPCOCK ANGIO 1050PSI DK BLU L ROT M LUER 3 W OFF HNDL

## (undated) DEVICE — BASIC SINGLE BASIN 2-LF: Brand: MEDLINE INDUSTRIES, INC.

## (undated) DEVICE — FOGARTY ARTERIAL EMBOLECTOMY CATHETER 4F 80CM: Brand: FOGARTY

## (undated) DEVICE — GUIDEWIRE VASC L180CM DIA0.035IN TIP L7CM PTFE S STL STR

## (undated) DEVICE — INTRODUCER SHTH 5FR CANN L11CM DIL TIP 25MM GRY TUNGSTEN

## (undated) DEVICE — AV FISTULA: Brand: MEDLINE INDUSTRIES, INC.

## (undated) DEVICE — GLOVE,SURG,TRIUMPH MICRO,LTX,PF,7.5: Brand: MEDLINE

## (undated) DEVICE — SYRINGE MED 3ML CLR PLAS STD N CTRL LUERLOCK TIP DISP

## (undated) DEVICE — SURGIFOAM SPNG SZ 100